# Patient Record
Sex: MALE | Race: WHITE | Employment: FULL TIME | URBAN - METROPOLITAN AREA
[De-identification: names, ages, dates, MRNs, and addresses within clinical notes are randomized per-mention and may not be internally consistent; named-entity substitution may affect disease eponyms.]

---

## 2017-01-11 ENCOUNTER — TELEPHONE (OUTPATIENT)
Dept: FAMILY MEDICINE | Facility: CLINIC | Age: 57
End: 2017-01-11

## 2017-01-11 NOTE — TELEPHONE ENCOUNTER
Denied    Pt needs to try and fail Cialis.  Requirement: Trial and failure of 3 or more in a class with at least 3 alternatives, 2 in a class with 2 alternatives, or 1 in a class with only 1 alternative.    Maria Victoria Maldonado, GABRIELET

## 2017-01-11 NOTE — TELEPHONE ENCOUNTER
Called [facility] [phone] for PA, they are going to send it to the pharmacist and we will get a fax within 24-48 hours.  ID# [id number]    Sounds like pt might have to try Cialis. I did tell them that pt has been on this medication since 2013.      Maria Victoria Maldonado, XRT

## 2017-01-12 NOTE — TELEPHONE ENCOUNTER
Patient has tried and failed Cialis:  See OV notes from 12/18/2015:    Patient is also concerned about changing his Cialis to a different medication for ED  Had rectal cancer and had radiation to the area which has affected his ability to get and maintain an erection  Has noticed no improvement with Cialis, would like something else    Ilene Horner RN

## 2017-01-16 NOTE — TELEPHONE ENCOUNTER
Called insurance again with Cialis trial dates.    APPROVED    Start date- 1/16/17  End date- 1/16/18  Pharm informed.      GABRIELE FloresT

## 2017-02-07 ENCOUNTER — TELEPHONE (OUTPATIENT)
Dept: UROLOGY | Facility: CLINIC | Age: 57
End: 2017-02-07

## 2017-02-07 ENCOUNTER — TELEPHONE (OUTPATIENT)
Dept: FAMILY MEDICINE | Facility: CLINIC | Age: 57
End: 2017-02-07

## 2017-02-07 ENCOUNTER — OFFICE VISIT (OUTPATIENT)
Dept: FAMILY MEDICINE | Facility: CLINIC | Age: 57
End: 2017-02-07

## 2017-02-07 VITALS
BODY MASS INDEX: 39.56 KG/M2 | DIASTOLIC BLOOD PRESSURE: 88 MMHG | WEIGHT: 261 LBS | HEART RATE: 92 BPM | HEIGHT: 68 IN | RESPIRATION RATE: 16 BRPM | TEMPERATURE: 98 F | SYSTOLIC BLOOD PRESSURE: 140 MMHG

## 2017-02-07 DIAGNOSIS — M54.50 ACUTE LEFT-SIDED LOW BACK PAIN WITHOUT SCIATICA: ICD-10-CM

## 2017-02-07 DIAGNOSIS — J06.9 UPPER RESPIRATORY TRACT INFECTION, UNSPECIFIED TYPE: Primary | ICD-10-CM

## 2017-02-07 DIAGNOSIS — R05.9 COUGH: Primary | ICD-10-CM

## 2017-02-07 DIAGNOSIS — G89.29 CHRONIC PAIN OF RIGHT KNEE: ICD-10-CM

## 2017-02-07 DIAGNOSIS — M25.561 CHRONIC PAIN OF RIGHT KNEE: ICD-10-CM

## 2017-02-07 PROCEDURE — 99214 OFFICE O/P EST MOD 30 MIN: CPT | Performed by: FAMILY MEDICINE

## 2017-02-07 RX ORDER — NABUMETONE 500 MG/1
1000 TABLET, FILM COATED ORAL DAILY PRN
Qty: 60 TABLET | Refills: 1 | Status: SHIPPED | OUTPATIENT
Start: 2017-02-07 | End: 2017-05-10

## 2017-02-07 RX ORDER — AZITHROMYCIN 250 MG/1
TABLET, FILM COATED ORAL
Qty: 6 TABLET | Refills: 0 | Status: SHIPPED | OUTPATIENT
Start: 2017-02-07 | End: 2017-05-10

## 2017-02-07 RX ORDER — CODEINE PHOSPHATE AND GUAIFENESIN 10; 100 MG/5ML; MG/5ML
2 SOLUTION ORAL EVERY 4 HOURS PRN
Qty: 120 ML | Refills: 0 | Status: SHIPPED | OUTPATIENT
Start: 2017-02-07 | End: 2017-05-10

## 2017-02-07 ASSESSMENT — ENCOUNTER SYMPTOMS
HEADACHES: 0
FOCAL WEAKNESS: 0
WHEEZING: 0
COUGH: 1
DOUBLE VISION: 0
SENSORY CHANGE: 0
BACK PAIN: 1
DIZZINESS: 0
SHORTNESS OF BREATH: 0
SPUTUM PRODUCTION: 1
BLURRED VISION: 0
TREMORS: 0

## 2017-02-07 NOTE — PATIENT INSTRUCTIONS
Pairing acetaminophen (Tylenol) with ibuprofen (Advil) has been shown to be as effective as morphine for controlling pain.    600-800 mg ibuprofen (3-4 tabs) with breakfast, lunch and dinner  1000 mg acetaminophen (2 extra strength tabs) at mid-morning, mid-afternoon and evening.    Do not take more than 3000mg of acetaminophen (6 extra strength tabs) in 24 hours.

## 2017-02-07 NOTE — PROGRESS NOTES
SUBJECTIVE:                                                    Aydin Reilly is a 56 year old male who presents to clinic today for the following health issues:      Acute Illness   Acute illness concerns: cold  Onset: x 1 week     Fever: no     Chills/Sweats: no     Headache (location?): YES    Sinus Pressure:YES    Conjunctivitis:  no    Ear Pain: no    Rhinorrhea: YES    Congestion: YES    Sore Throat: YES     Cough: YES - productive with SOB    Wheeze: YES     Decreased Appetite: no     Nausea: no     Vomiting: no    Diarrhea:  no    Dysuria/Freq.: no    Fatigue/Achiness: no     Sick/Strep Exposure: no      Therapies Tried and outcome: sudafed-with relief      Back Pain      Duration: x 1 week        Specific cause: none    Description:   Location of pain: low back left  Character of pain: sharp/stabbing  Pain radiation:none  New numbness or weakness in legs, not attributed to pain:  no radiation.    Intensity: Currently 6-7/10, At its worst 10/10    History:   Pain interferes with job: YES  History of back problems: L4 and L5; sciatic   Any previous MRI or X-rays: yes  Sees a specialist for back pain: yes-[facility]  Therapies tried without relief: aspirin, flexeril at pm-helps with sleeping    Alleviating factors:   Improved by: none      Precipitating factors:  Worsened by: sitting-getting up from sitting position    Functional and Psychosocial Screen (Naomi STarT Back):      Last 2 scores:     NAOMI START BACK TOTAL SCORE 2/7/2017   Total Score (all 9) 3              Accompanying Signs & Symptoms:  Risk of Fracture:  None  Risk of Cauda Equina:  None  Risk of Infection:  None  Risk of Cancer:  None  Risk of Ankylosing Spondylitis:  Onset at age <35, male, AND morning back stiffness. no                  Recently let go from work due to absence - which included last hospitalization and inpatient treatment.   from wife.  Currently delivering pizzas.    Back pain is most likely to trigger when  straightening up after bending over.  Using ASA, heat, not helpful.  Has some flexeril, using at night.  Feels related to getting in and out of car.      Having some more issues of incontinence.  Seemed to start a day after having intercourse with wife.  Has appointment with uro.  Seems to happen after standing.    HPI      Review of Systems   Constitutional: Positive for malaise/fatigue.   Eyes: Negative for blurred vision and double vision.   Respiratory: Positive for cough and sputum production. Negative for shortness of breath and wheezing.    Musculoskeletal: Positive for back pain.   Neurological: Negative for dizziness, tremors, sensory change, focal weakness and headaches.         Physical Exam   Constitutional: He is oriented to person, place, and time and well-developed, well-nourished, and in no distress. No distress.   HENT:   Right Ear: Tympanic membrane, external ear and ear canal normal.   Left Ear: Tympanic membrane, external ear and ear canal normal.   Mouth/Throat: Oropharynx is clear and moist. No oropharyngeal exudate.   Eyes: Conjunctivae are normal.   Neck: Neck supple.   Cardiovascular: Normal rate, regular rhythm and normal heart sounds.    Pulmonary/Chest: Effort normal and breath sounds normal.   Musculoskeletal:        Lumbar back: He exhibits decreased range of motion, tenderness and spasm. He exhibits no bony tenderness.   Lymphadenopathy:     He has no cervical adenopathy.   Neurological: He is alert and oriented to person, place, and time. He has normal strength and normal reflexes.   Skin: Skin is warm and dry. No rash noted.   Nursing note and vitals reviewed.      (J06.9) Upper respiratory tract infection, unspecified type  (primary encounter diagnosis)  Comment: printed rx for prn, refused Robitussin  Plan: azithromycin (ZITHROMAX) 250 MG tablet            (M54.5) Acute left-sided low back pain without sciatica  Comment:   Plan: pain ladder, refill nabumetone    (M25.561,  G89.29)  Chronic pain of right knee  Comment:   Plan: nabumetone (RELAFEN) 500 MG tablet              RTC in 1w prn    Adolfo Simmons MD

## 2017-02-07 NOTE — NURSING NOTE
"Chief Complaint   Patient presents with     URI     Back Pain     Initial /88 mmHg  Pulse 92  Temp(Src) 98  F (36.7  C) (Oral)  Resp 16  Ht 5' 8\" (1.727 m)  Wt 261 lb (118.389 kg)  BMI 39.69 kg/m2 Estimated body mass index is 39.69 kg/(m^2) as calculated from the following:    Height as of this encounter: 5' 8\" (1.727 m).    Weight as of this encounter: 261 lb (118.389 kg)..  BP completed using cuff size regular-RA      "

## 2017-02-07 NOTE — TELEPHONE ENCOUNTER
Was in today, offered cough medication and declined, but now after coughing again would like rx tyrone Cota RN, BS  Clinical Nurse Triage .

## 2017-02-07 NOTE — TELEPHONE ENCOUNTER
Rx was faxed to [facility], pharmacy will notify patient when ready to be picked up. Bruno Danielle

## 2017-02-13 ENCOUNTER — TELEPHONE (OUTPATIENT)
Dept: FAMILY MEDICINE | Facility: CLINIC | Age: 57
End: 2017-02-13

## 2017-02-13 DIAGNOSIS — M54.50 ACUTE RIGHT-SIDED LOW BACK PAIN WITHOUT SCIATICA: Primary | ICD-10-CM

## 2017-02-13 NOTE — TELEPHONE ENCOUNTER
Reason for call: Symptom   Symptom or request: sore throat and back pain    Duration (how long have symptoms been present): patient was seen on 02/07/17 and states his throat is still sore and his back is still hurting.  Have you been treated for this before? Yes    Additional comments: none    Phone Number Pt can be reached at: Other phone number:  [phone]  Best Time: anytime  Can we leave a detailed message on this number? YES

## 2017-02-14 RX ORDER — HYDROCODONE BITARTRATE AND ACETAMINOPHEN 5; 325 MG/1; MG/1
1-2 TABLET ORAL EVERY 8 HOURS PRN
Qty: 20 TABLET | Refills: 0 | Status: SHIPPED | OUTPATIENT
Start: 2017-02-14 | End: 2017-05-10

## 2017-02-14 NOTE — TELEPHONE ENCOUNTER
Back is stable to worse.  Referred to PT.  Has Relafen, Voltaren gel, flexeril.  Asking for small supply of pain narcotic.  Will do single fill to cover a few days.  No refills.  Rx printed    Throat is feeling better.  Has not used abx, forgot he had it.

## 2017-02-16 ENCOUNTER — TELEPHONE (OUTPATIENT)
Dept: FAMILY MEDICINE | Facility: CLINIC | Age: 57
End: 2017-02-16

## 2017-02-16 DIAGNOSIS — Z59.9 FINANCIAL DIFFICULTIES: Primary | ICD-10-CM

## 2017-02-16 SDOH — ECONOMIC STABILITY - INCOME SECURITY: PROBLEM RELATED TO HOUSING AND ECONOMIC CIRCUMSTANCES, UNSPECIFIED: Z59.9

## 2017-02-16 NOTE — LETTER
"              [address]  [address]      [place] [address]           Phone :  [phone]          Fax:  [phone]  Aydin Sheikhmirella  [address] [facility] [address]      To Whom It May Concern:    Re:  Aydin SIMON Melba    1960      Please excuse patient from work 2/15/2017 through 2017 due to back pain.  He can go back to work \"as tolerated\" due to pain on 2017.      Sincerely,        Adolfo Simmons MD / Ilene Horner RN                                                                                         "

## 2017-02-16 NOTE — TELEPHONE ENCOUNTER
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  Requesting note to be e-mailed to him at [email or web address].  Call if questions.  [phone]    Note written and e-mailed.  Ilene Horner RN    Patient wanted to let Dr. Simmons know that patient is currently without insurance and was given a referral to sports ortho but is unable to be seen.  Any other options?  "

## 2017-02-17 NOTE — TELEPHONE ENCOUNTER
Spoke to pt, he would like referral to see if there are any resources out there for him they can help with.  Advised we would place order and they will contact him.

## 2017-02-17 NOTE — TELEPHONE ENCOUNTER
Unfortunately not.  Can offer referral to Social Work to help him find and enroll in insurance (as of now DEANDRE is still active).    Letter signed.    Adolfo Simmons MD

## 2017-02-20 ENCOUNTER — CARE COORDINATION (OUTPATIENT)
Dept: CARE COORDINATION | Facility: CLINIC | Age: 57
End: 2017-02-20

## 2017-02-20 NOTE — LETTER
Health Care Home - Access Care Plan    About Me  Patient Name:  Aydin Reilly    YOB: 1960  Age:                            56 year old   Viral MRN:         [id number] Telephone Information:     Home Phone [phone]   Mobile [phone]       Address:    [address]  [facility] [address] Email address:  [email or web address]      Emergency Contact(s)  Name Relationship Lgl Grd Work Phone Home Phone Mobile Phone   1. ZACHARIAH REILLY Spouse No none [phone]             Health Maintenance: Routine Health maintenance Reviewed: Not assessed    My Access Plan  Medical Emergency 911   Questions or concerns during clinic hours Primary Clinic Line, I will call the clinic directly: Primary Clinic: [facility] [phone]   24 Hour Appointment Line [phone] or  [phone])  (toll free)   24 Hour Nurse Line [phone] (toll free)   Questions or concerns outside clinic hours 24 Hour Appointment Line, I will call the after-hours on-call line:   [facility] [phone] or [phone] ([phone]) (toll-free)   Preferred Urgent Care Preferred Urgent Care: [facility] [place], [phone]   Preferred Hospital Preferred Hospital: [facility]  [phone]   Preferred [facility]acy [facility] [place] [facility] [place] [place] - [address]     Behavioral Health Crisis Line [facility], [phone] or 911     My Care Team Members  Patient Care Team       Relationship Specialty Notifications Start End    Adolfo Simmons MD PCP - General Family Practice  3/24/15     Phone: [phone] Fax: [phone]         [facility] [address]  [address] [facility] [address]    Buzz Ren MD Referring Physician Urology  1/12/15     Phone: [phone] Fax: [phone]         [facility] UROLOGY [address] [place] [address]    Tiffany Erazo APRN CNP  Nurse Practitioner Neurology  4/16/15     Phone: [phone] Fax: [phone]         [facility] [address] [facility] [id number] [facility] [address]    Cira Wood, RN Nurse Coordinator Neurology Admissions 5/11/15     Phone: [phone] Pager: [phone]        Darell Barrera MD MD Urology  8/19/15     Phone: [phone] Fax: [phone]         [facility] [address] [facility] [address] [facility] [address]    Marga Monae, RN Clinic Care Coordinator  Admissions 2/3/16     Comment:  not active in care coordination 8/24/16 [facility]    Phone: [phone] Fax: [phone]            My Medical and Care Information  Problem List   Patient Active Problem List   Diagnosis     Malignant neoplasm of other sites of rectum, rectosigmoid junction, and anus     Cellulitis of scrotum     Lumbar Radiculopathy, SEE FYI     Back Pain w/ Radiation, SEE FYI     Chronic abdominal pain     Hyperlipidemia with target LDL less than 130     Hypogonadism     Scrotal pain     Erectile dysfunction     Withdrawal from opioids (H)     Drug abuse, opioid type     GIB (gastrointestinal bleeding)     MAGALY (obstructive sleep apnea)     Hyperplastic rectal polyp     Suprapubic catheter dysfunction (H)     Retention of urine     Generalized anxiety disorder     Urethral stricture     Urethral stricture     Alcohol intoxication in active alcoholic with complication (H)     Alcohol abuse     History of urethral stricture     Chronic pain     Edema     Klebsiella pneumoniae infection     Sepsis (H)     Anemia of chronic disease     Other mononeuropathy     SBO (small bowel obstruction) (H)     Health Care Home     Elevated blood pressure reading without diagnosis of hypertension     Rotator cuff injury, right, initial encounter     Chondritis     Anserine bursitis     Substance abuse     Chronic pain of right knee     Intertriginous candidiasis     Gastroesophageal reflux disease, esophagitis presence not specified     Subungual hematoma  of great toe of right foot, initial encounter     Subungual hematoma of great toe of left foot, initial encounter     Sepsis, due to unspecified organism (H)

## 2017-02-20 NOTE — LETTER
February 24, 2017      Aydin Reilly  [address]  [facility] [address]    Dear Aydin,  I am the Clinic Care Coordinator that works with your primary care provider's clinic. I have been trying to reach you recently to introduce Clinic Care Coordination and to see if there was anything I could assist you with.  Below is a description of what Clinic Care Coordination is and how I can further assist you.     My role as the care coordinator  is to help you manage your health and improve access to the [facility] system in the most efficient manner.I can assist you with community resources, behavioral and psychosocial needs, chemical dependency and counseling/psychiatric services.    Please feel free to keep this letter and contact information to contact me at [phone] with any further questions or concerns that may arise. We at [facility] are focused on providing you with the highest-quality healthcare experience possible. I look forward to working with you.    Sincerely,    BAKARI Solis, MSW  Social Work Care Coordinator  P:[phone]  [facility]    Enclosed: I have enclosed a copy of a 24 Hour Access Plan. This has helpful phone numbers for you to call when needed. Please keep this in an easy to access place to use as needed.

## 2017-02-20 NOTE — LETTER
February 24, 2017      Aydin Reilly  [address]  [facility] [address]    Dear Aydin,  I am the Clinic Care Coordinator that works with your primary care provider's clinic. I wanted to thank you for spending the time to talk with me.  Below is a description of what Clinic Care Coordination is and how I can further assist you.     My role as the care coordinator  is to help you manage your health and improve access to the [facility] system in the most efficient manner.I can assist you with community resources, behavioral and psychosocial needs, chemical dependency and counseling/psychiatric services.    Please feel free to keep this letter and contact information to contact me at [phone] with any further questions or concerns that may arise. We at [facility] are focused on providing you with the highest-quality healthcare experience possible. I look forward to working with you.    Sincerely,    BAKARI Solis, MSW  Social Work Care Coordinator  P:[phone]  [facility]    Enclosed: I have enclosed a copy of a 24 Hour Access Plan. This has helpful phone numbers for you to call when needed. Please keep this in an easy to access place to use as needed.               For Insurance ([place], medicaid) assistance  [facility] - [phone] ( Can also be called for low cost dental and mental health clinics)  [address] [place] [address] ([place]:[phone] , [place]:[phone])    Job search assistance  [facility] Workforce centers     [phone]  [place]      [phone]  [facility]      [phone]  [facility] WorkForce Center  [address]  [place] [address]    [place] [facility]Force Center  [address]  [place] [address]    [place] [facility] Center  [address] [place] [address]   [place] [phone]  For orientation registration Help on Mondays at 9:30  "am.  1 hour orientation to programs offered for job seekers  http://www.emergemn.org/OurServices/HelpforJobSeekers.aspx   [employer] 343-585-6148 HIRED offers dozens of no-cost programs for job seekers in any stage of work experience or career development. Job seekers could qualify for a program based on geography, income level, age or other circumstance. Other job seekers are referred to HIRED by other agencies. No matter what the entry point, HIRED counselors commit to wrap-around job support to move the job seeker to employment   [facility]  [phone] ([place]) to their Career Connections 9-11:30am First Saturday of the month            [facility] [phone] [address] [place], [place] [phone]  [facility] referrals and support to the needy. A food shelf or pantry is on site, and the non-profit also operates Family Resource Centers.   [facility] Agency 138-084-0940 Financial resources, community resources   [facility] [phone] Assistance for the \"working poor,\" those who are hungry, low income, homeless, or unable to provide for basic needs or pay their bills   [facility] Financial Counseling [phone]    [facility] [phone] Food shelf, clothing, financial    [facility] [phone] Assistance for housing expenses such as rent and utilities. Other programs assist with medical and other basic needs. All emergency assistance is short-term in nature, and people can only apply at most once per year.   [employer] [phone] [phone]([phone] [address] [place], [place]  [address] [place] [address]  Various resources - financial, food, case management,, vouchers sometimes   [facility] [phone]  [facility] [employer]. Staff can help individuals " locate assistance programs for paying heating, electric, water, or utility bills. Some limited direct aid may be offered too, as [employer] has an emergency energy assistance rafael that can provide up to $200 to [facility] families.  [facility] also operates a dental clinic for the uninsured. Other programs include a mobile food pantry for the homebound and supplies from [employer].   [facility] Rent/Mortgage/Moving assistance   [employer]  ([phone] 911-1465 [address] [place] [address],     CAP Agency 324-306-9982 The Family Homelessness Prevention and Assistance Program is an option for [place] families with children or single adults. [facility] can provide clients facing eviction with emergency rent assistance. Support is also available for the currently homeless, and they can receive funds for paying security deposits, first months rent, and ongoing case management.   [facility] Agency 352-853-6020 Workforce Housing Program , Senior Housing Program, [facility] Youth Supportive Housing , Scattered [place] Public Housing Program, Johnna [place] [place],Rental Assistance Programs,  Accessible Rental Housing,  For section 8 voucher assistance. COUNTY PROGRAM, First contact - Can call for deposit assistance , applying for assistance, apartment finding assistance   [facility] Housing Crisis Line 832-984-5159 Emergency shelter, Assistance to prevent eviction, Information on community housing resources and referrals   [facility] (567) 705-0572 Assistance for housing expenses such as rent and utilities. Other programs assist with medical and other basic needs. All emergency assistance is short-term in nature, and people can only apply at most once per year.   [employer] ([phone] [phone]) 121-4988 [phone] [address] [address],  [place], [place]  Various resources - financial, food, case management,, vouchers sometimes   [facility] [phone] -[phone]  [place] [employer]. Staff can help individuals locate assistance programs for paying heating, electric, water, or utility bills. Some limited direct aid may be offered too, as [employer] has an emergency energy assistance rafael that can provide up to $200 to [facility] families.  [facility] also operates a dental clinic for the uninsured. Other programs include a mobile food pantry for the homebound and supplies from [employer].   [place] [employer] ([facility]) 673.771.6165 Section 8, Public Housing and other housing assistance programs.  Please visit the HRA s site to review the best housing option for your situation

## 2017-02-20 NOTE — PROGRESS NOTES
Clinic Care Coordination Contact  [facility]/Voicemail    Clinical Data: Initial: insurance/ financial concerns    Outreach attempted x 1.  Left message on voicemail with call back information and requested return call.  Plan: Care coordinator will try again in 1-2 business days.    Soha Belcher, Social Work Care Coordinator, [facility], MSW  P:[phone]  [facility] [place]

## 2017-02-23 ENCOUNTER — TELEPHONE (OUTPATIENT)
Dept: UROLOGY | Facility: CLINIC | Age: 57
End: 2017-02-23

## 2017-02-23 NOTE — TELEPHONE ENCOUNTER
i have tried to call patient several times with no ability to leave a message.  Plan for the future is in feliberto message. Suzan River LPN Staff Nurse

## 2017-02-23 NOTE — TELEPHONE ENCOUNTER
----- Message from Darell Barrera MD sent at 2/10/2017 12:16 PM CST -----  Contact: [phone]  Have him try detrol for a month. If no improvement then do urodynamics and see me.   ----- Message -----     From: Suzan River LPN     Sent: 2/7/2017   2:53 PM       To: Darell Barrera MD, #    Patient called having episodes of severe leakage no other symptoms. Saw tarik in October last year. He went to pmd he stated no uti. But he has to wear diapers and they are soaked. Suzan River LPN Staff Nurse

## 2017-02-24 NOTE — PROGRESS NOTES
Clinic Care Coordination Contact  OUTREACH    Referral Information:  Referral Source: PCP  Reason for Contact: initial: incoming call from patient  Care Conference: No     Universal Utilization:   ED Visits in last year: 0  Hospital visits in last year: 2  Last PCP appointment: 02/07/17     Concerns: no insurance  Multiple Providers or Specialists: yes      Functional Status:   Equipment Currently Used at Home: none  Transportation: drives      Psychosocial:  Current living arrangement:: Not Assessed  Financial/Insurance: No insurance    Multiple financial concerns  Recently  from spouse and is having to get a rule 25 from the [facility] for his court case. Also has had to seek  mental health due to their divorce.     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 Most of these were from medical and court related concerns, but were not documented or medical/court notes were not given. Pt is about to loose his insurance. Discussed [employer]ure and [employer]. Pt will try to apply online, but was given number for [employer] ([phone]) to call for support. Also included other resources in letter.    Financial - worried about upcoming finances. Has already applied for [facility] resources like SNAP and is waiting to hear about when his interview will be with the [facility].Discussed some resources to call for utilities and rent. Pt is currently living with someone who has been lenient with rent but is now asking for money. Unclear what resources can assist him. [facility] resources over the phone and will mail out letter as well with them ( see letter for specifics).    Work assistance - Also mailed out resources that can assist with the pt to find a job.     Resources and Interventions:  Current Resources:  ;   Referrals Placed: Community Resources, County Resources, Financial Services, [facility]     Goals:   Goal 1 Statement: I will call the CCSW if I need more support  Goal 1  Progression Percent: 10%  Goal 1 Progression Date: 02/24/17     Barriers: not sure what resources he will qualify for or what services he made need  Strengths: motivated  Patient/Caregiver understanding: good  Frequency of Care Coordination: prn     PLAN:  Pt will outreach as needed  CCSW will follow up in 2-3 weeks    Soha Belcher, Social Work Care Coordinator, LG, MSW  [place] [facility]: [facility] and [place]   P:[phone]/ Signed February 24, 2017

## 2017-02-24 NOTE — PROGRESS NOTES
Clinic Care Coordination Contact  UT/Voicemail    Clinical Data: insurance/financial concerns    Outreach attempted x 1.  Left message on voicemail with call back information and requested return call.  Plan: Care coordinator will send utc/access plan and close case in 7-10 days if pt does not return the call.    Soha Belcher, Social Work Care Coordinator, [facility], [facility]: [facility] and [place]   P:[phone] / Signed February 24, 2017

## 2017-03-17 ENCOUNTER — CARE COORDINATION (OUTPATIENT)
Dept: CARE COORDINATION | Facility: CLINIC | Age: 57
End: 2017-03-17

## 2017-03-17 NOTE — PROGRESS NOTES
Clinic Care Coordination Contact  OUTREACH    Referral Information:  Referral Source: PCP  Reason for Contact: follow up  Care Conference: No     Universal Utilization:   ED Visits in last year: 0  Hospital visits in last year: 2  Last PCP appointment: 02/07/17     Concerns: no insurance  Multiple Providers or Specialists: yes    Clinical Concerns:  Current Medical Concerns: not discussed    Current Behavioral Concerns: depression, alcohol use. Receiving treatment for MH      Medication Management:  Not discussed      Functional Status:  Equipment Currently Used at Home: none  Transportation: drives     Psychosocial:  Current living arrangement:: Not Assessed  Financial/Insurance: [facility] - will be loosing his insurance soon due to a divorce.    Called the patient to follow up on insurance and community resources sent during previous call. The patient stated he had a lot to do today and was planning to turn himself in on Monday for an outstanding warrant on him. Patient described the events of how the warrant was placed on him . Patient's speech was fast paced, and thoughts somewhat tangential. CCSW had difficulty following some points of the story and was not able to stop the patient to clarify.  Patient will most likely be be in assisted for at least 10 days. CCSW and patient decided CCSW will outreach in 2 weeks for follow up after pt has gone through the warrant process     Resources and Interventions:  Current Resources:  ;       Referrals Placed: Community Resources, County Resources, Financial Services, [facility]     PLAN:  Pt will outreach as needed  CCSW will follow up in 2 -3 weeks    Soha Belcher, Social Work Care Coordinator, LGSW, MSW  [facility]: [facility] and [place]   P:[phone]/ Signed March 17, 2017

## 2017-04-04 ENCOUNTER — TELEPHONE (OUTPATIENT)
Dept: SLEEP MEDICINE | Facility: CLINIC | Age: 57
End: 2017-04-04

## 2017-04-04 NOTE — LETTER
[facility]    [place]   [address] [place] [address]  [phone]    2017    Aydin Reilly  [address]  [facility] [address]  [id number]  : 1960    RE: Obstructive Sleep Apnea and CPAP.    To whom it may concern,    Aydin Reilly has severe and life-threatening Obstructive Sleep Apnea.  He will need to wear CPAP nightly to treat this disorder.  If he is able to regularly and nightly wear CPAP nightly he will not be at higher risk for complications or untoward health effects.    I am happy to answer any questions about his care.  If you have any questions, please call us at the number listed above.    Sincerely,      Trenton Norton MD    [facility]                    To

## 2017-04-04 NOTE — TELEPHONE ENCOUNTER
Patient left a message stating he will be going into prison, patient is requesting a letter of necessity so he is able to use his cpap machine during his stay. Message will be routed to patients sleep provider.

## 2017-04-07 ENCOUNTER — TELEPHONE (OUTPATIENT)
Dept: SLEEP MEDICINE | Facility: CLINIC | Age: 57
End: 2017-04-07

## 2017-04-07 NOTE — TELEPHONE ENCOUNTER
Left a message for patient informing his that the letter he requested is ready, it will be at the  for  or can be mailed to patient.

## 2017-04-07 NOTE — TELEPHONE ENCOUNTER
Letter at .  Please ask what he would like done with it.  Trenton Norton MD, Sleep Physician  Apr 4, 2017

## 2017-04-14 NOTE — TELEPHONE ENCOUNTER
Spoke with Patient, he would like letter to be mailed.  I mailed letter along with appointment card for upcoming visit to his home on 4/14/2017.    Carmen Gonzalez  Clinical Coordinator

## 2017-04-17 DIAGNOSIS — G58.8 OTHER MONONEUROPATHY: Primary | ICD-10-CM

## 2017-04-17 RX ORDER — GABAPENTIN 300 MG/1
CAPSULE ORAL
Qty: 180 CAPSULE | Refills: 0 | Status: SHIPPED | OUTPATIENT
Start: 2017-04-17 | End: 2017-05-01

## 2017-04-17 NOTE — TELEPHONE ENCOUNTER
gabapentin (NEURONTIN) 300 MG capsule    (Historical)  Last Written Prescription Date: Unknown  Last Quantity: Unknown, # refills: Unknown  Last Office Visit with [facility], [facility] or [employer] prescribing provider: 2/7/2017       Creatinine   Date Value Ref Range Status   11/19/2016 0.88 0.66 - 1.25 mg/dL Final     Lab Results   Component Value Date    AST 30 11/14/2016     Lab Results   Component Value Date    ALT 42 11/14/2016     BP Readings from Last 3 Encounters:   02/07/17 140/88   12/06/16 144/72   11/20/16 128/80           Routing refill request to provider for review/approval because:  Drug not on the [facility], [facility] or [employer] refill protocol or controlled substance  Medication is reported/historical      GRACIELA Flores  April 17, 2017  1:36 PM

## 2017-04-19 ENCOUNTER — CARE COORDINATION (OUTPATIENT)
Dept: CARE COORDINATION | Facility: CLINIC | Age: 57
End: 2017-04-19

## 2017-04-19 NOTE — PROGRESS NOTES
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 - interested in SSDI    Current Behavioral Concerns: substance abuse - needs to seek OP tx per court order, starting with [place], had Rule 25 done last Friday, awaiting [facility] to approve before starting the program    Education Provided to patient: Discussed SSDI - what they are measuring for the application, resources that can assist in filling out the application     Medication Management:  Not discussed     Functional Status:  Equipment Currently Used at Home: none  Transportation: can drive, but court ordered not too     Psychosocial:  Current living arrangement:: Not Assessed  Financial/Insurance: [facility]     Pt recently served time in [facility] for violating his probation    Insurance  Pt plans to meet with an  to discuss his options rather than go through MNsure. CCSW also discussed Medicaid as pt is unemployed and offered to assist pt with application should he need it.    Employment/Unemployment  Pt is trying to find a job. CCSW offered to resend resources for help with employment. Pt consents. Pt is also interested in information regarding unemployment payments. CCSW was unable to give specifics but will send the [place] unemployment page for the pt to review.    [facility]  Pt previously applied for SNAP but states he never heard anything back. Interested in applying for SNAP again and possibly other [facility] assistance programs. [facility] will send to pt     Resources and Interventions:  Current Resources:  ;    Referrals Placed: [facility], [facility]  Resources, Financial Services, [facility]     Goals:   Goal 1 Statement: I will review the resources sent by the CCSW  Goal 1 Progression Percent: 10%  Goal 1 Progression Date: 04/19/17    Barriers: Pt is forgetful, has a lot of things on his plate  Strengths: pleasant, wants assistance, willing to make the calls  Patient/Caregiver understanding: good  Frequency of Care Coordination: 3-4 weeks     PLAN:  CCSW will email resources to pt at [email or web address]. Reviewed HIPPA safety with pt regarding communication through email  Pt will outreach as needed  CCSW will follow up in 3-4 weeks    Soha Belcher, Social Work Care Coordinator, LGSW, MSW  [place] [facility]: [facility] and [place]   P:[phone]/ Signed April 19, 2017

## 2017-04-20 NOTE — PROGRESS NOTES
Email sent to patient    Osman Salazar,    Hopefully I remembered all of the resources we discussed. If you have any questions regarding the resources below please call me at [phone], otherwise I will plan to touch base in a week.      Social Security Disability    , [employer] [phone] Can help with SSI application, fees associated           County Resources  https://www.co.[place]./HealthFamily/PublicAssistance/Cash/Pages/default.aspx  Click the link and it will bring you to the [facility] public assistance page. There is an online option to fill out the form for SNAP, emergency assistance, general assistance, and more.  It also has the downloadable form option, a call in option, and an in person option to apply    Unemployment    http://www.uimn.org/uimn/   This link will bring you to the [place] unemployment page, I am not sure if this will be of use to you, but they have a customer service line you can call to talk with someone who can help screen you as well.    Monday-Friday, 8:00 a.m. to 4:30 p.m., except on holidays.  [facility] area: [phone]  Job search assistance  [facility] Workforce [facility]     [phone]  [place]      [phone]  [facility]      [phone]  [facility] WorkForce Center  [address]  [place] [address]    [place] [employer] [place]  [address], [address]  [place] [address]    [facility]  [address] [place] [address]   [facility] [phone]  For orientation registration Help on Mondays at 9:30 am.  1 hour orientation to programs offered for job seekers  http://www.emergemn.org/OurServices/HelpforJobSeekers.aspx   [employer] [phone] HIRED offers dozens of no-cost programs for job seekers in any stage of work experience or career development. Job seekers could qualify for a program based on geography, income level, age or other circumstance. Other job seekers are referred to [employer]  "by other agencies. No matter what the entry point, [employer] counselors commit to wrap-around job support to move the job seeker to employment   [facility]  Blanka Smith  [phone] ([place]) to their Career Connections 9-11:30am First Saturday of the month            [employer] [phone] [address] [place], [place] [phone]  [place] referrals and support to the needy. A food shelf or pantry is on site, and the non-profit also operates Family Resource Centers.   [facility] Agency [phone] Financial resources, community resources   [facility] [phone] Assistance for the \"working poor,\" those who are hungry, low income, homeless, or unable to provide for basic needs or pay their bills   [facility] Financial Counseling [phone]    [facility] [phone] Food shelf, clothing, financial    [facility] [phone] Assistance for housing expenses such as rent and utilities. Other programs assist with medical and other basic needs. All emergency assistance is short-term in nature, and people can only apply at most once per year.   [employer] [phone] [address] [place] [address]  Various resources - financial, food, case management,, vouchers sometimes   [facility] [phone]  [place] [employer]. Staff can help individuals locate assistance programs for paying heating, electric, water, or utility bills. Some limited direct aid may be offered too, as [employer] has an emergency energy assistance rafael that can provide up to $200 to [facility] families.  [facility] also operates a dental clinic for the uninsured. Other programs include a mobile food pantry for the homebound and supplies from " [employer].   [facility] Rent/Mortgage/Moving assistance   [employer]           [phone] [address] [place] [address],     [facility] Agency [phone] The Family Homelessness Prevention and Assistance Program is an option for [place] families with children or single adults. The state can provide clients facing eviction with emergency rent assistance. Support is also available for the currently homeless, and they can receive funds for paying security deposits, first months rent, and ongoing case management.   [place] [employer] [facility] [phone] Workforce Housing Program , Senior Housing Program, [facility] Youth Supportive Housing , [place] Public Housing Program, Johnna Akers [place],Rental Assistance Programs,  Accessible Rental Housing,  For section 8 voucher assistance. COUNTY PROGRAM, First contact - Can call for deposit assistance , applying for assistance, apartment finding assistance   [facility] Housing Crisis Line 715-035-1899 Emergency shelter, Assistance to prevent eviction, Information on community housing resources and referrals   [facility] [phone] [phone] Assistance for housing expenses such as rent and utilities. Other programs assist with medical and other basic needs. All emergency assistance is short-term in nature, and people can only apply at most once per year.   [employer] [phone] [address] [address]  Various resources - financial, food, case management,, vouchers sometimes   [facility] [phone]  [place] [employer]. Staff can help individuals locate assistance programs for paying heating, electric, water, or utility bills. Some limited direct aid may be offered too, as [employer] has an emergency energy  assistance rafael that can provide up to $200 to [facility] families.  [facility] also operates a dental clinic for the uninsured. Other programs include a mobile food pantry for the homebound and supplies from [employer].   [facility] ([facility]) [phone] Section 8, Public Housing and other housing assistance programs.  Please visit the [facility] s site to review the best housing option for your situation

## 2017-04-27 ENCOUNTER — TELEPHONE (OUTPATIENT)
Dept: FAMILY MEDICINE | Facility: CLINIC | Age: 57
End: 2017-04-27

## 2017-04-27 DIAGNOSIS — G58.8 OTHER MONONEUROPATHY: ICD-10-CM

## 2017-04-27 NOTE — TELEPHONE ENCOUNTER
Pharmacist at [facility] in [facility] calling stating patient was there to  his rx for Gabapentin and states that he also take 1,200mg at bedtime which is not on his prescription.  Patient states that he takes:  Gabapentin 300mg    3 capsules (900mg) twice daily  4 capsules (1,200mg) at bedtime    I looked at past rx's and do not see the 4 capsules at bedtime.   checked 4/27/2017:    04/17/2017 GABAPENTIN 300 MG CAPSULE 90.00   04/05/2017 GABAPENTIN 300 MG CAPSULE 84.00   04/04/2017 GABAPENTIN 300 MG CAPSULE 39.00   03/11/2017 GABAPENTIN 300 MG CAPSULE 90.00   02/20/2017 GABAPENTIN 300 MG CAPSULE 90.00   02/14/2017 HYDROCODON-ACETAMINOPHEN 5-325 20.00   02/07/2017 GUAIFENESIN-CODEINE SYRUP 120.00   11/20/2016 OXYCODONE HCL 10 MG TABLET 20.00   11/06/2016 GABAPENTIN 600 MG TABLET 60.00     10/31/2016 GABAPENTIN 300 MG CAPSULE 270.00   10/01/2016 GABAPENTIN 300 MG CAPSULE 3.00     09/27/2016 GABAPENTIN 600 MG TABLET 135.00    09/06/2016 GABAPENTIN 300 MG CAPSULE 270.00   08/19/2016 OXYCODONE HCL 5 MG TABLET 20.00      Please advise.  Pharmacist would like to know if you would ok an rx for patient to have 1,200mg at bedtime.    Ilene Horner RN

## 2017-04-28 NOTE — TELEPHONE ENCOUNTER
Dr Simmons, please review, why are there several drs prescribing same drug?  on desk  Anna Cota RN, BS  Clinical Nurse Triage.

## 2017-05-01 RX ORDER — GABAPENTIN 300 MG/1
CAPSULE ORAL
Qty: 300 CAPSULE | Refills: 0 | Status: SHIPPED | OUTPATIENT
Start: 2017-05-01 | End: 2017-07-28

## 2017-05-01 NOTE — TELEPHONE ENCOUNTER
Dose is on high end of range, but not inappropriate.  Rx modified and sent.    Adolfo Simmons MD

## 2017-05-01 NOTE — TELEPHONE ENCOUNTER
[facility] calling again,  Previous rx for Gabapentin was 900 mg BID total 1800 mg, now 900 mg am, midday, 1200 mg pm total 3000 mg  Recommended max of 2400 mg per day   in [facility] [phone]    Route back to PCP  Anna Cota RN, BS  Clinical Nurse Triage.

## 2017-05-02 NOTE — TELEPHONE ENCOUNTER
Spoke with pharmacist and informed him of the dosing of the Gabapentin rx.  Because the rx is written for a dose that is 25% above the recommended maximum dose of 2,400mg per day, the pharmacist needs a medical explanation from you in order for him to dispense it to the patient.  Please advise.  Ilene Horner RN

## 2017-05-08 ENCOUNTER — TELEPHONE (OUTPATIENT)
Dept: FAMILY MEDICINE | Facility: CLINIC | Age: 57
End: 2017-05-08

## 2017-05-08 NOTE — TELEPHONE ENCOUNTER
Reason for call: Form   Our goal is to have forms completed within 72 hours, however some forms may require a visit or additional information.     Who is the form from? [employer]  (if other please explain)  Where did the form come from? Patient or family brought in     What clinic location was the form placed at? [place]   Where was the form placed? 's Box  What number is listed as a contact on the form? [phone] (fax)    Phone call message - patient request for a letter, form or note:     Date needed: as soon as possible  Patient will  at the clinic when completed  Has the patient signed a consent form for release of information? Not Applicable    Additional comments:     Type of letter, form or note: medical    Phone Number Pt can be reached at: Home number on file [phone] (home)  Best Time: Anytime  Can we leave a detailed message on this number? YES

## 2017-05-10 ENCOUNTER — OFFICE VISIT (OUTPATIENT)
Dept: FAMILY MEDICINE | Facility: CLINIC | Age: 57
End: 2017-05-10
Payer: MEDICAID

## 2017-05-10 VITALS
SYSTOLIC BLOOD PRESSURE: 112 MMHG | HEART RATE: 107 BPM | HEIGHT: 68 IN | OXYGEN SATURATION: 96 % | BODY MASS INDEX: 38.34 KG/M2 | DIASTOLIC BLOOD PRESSURE: 72 MMHG | TEMPERATURE: 98.3 F | RESPIRATION RATE: 16 BRPM | WEIGHT: 253 LBS

## 2017-05-10 DIAGNOSIS — B37.2 INTERTRIGINOUS CANDIDIASIS: Primary | ICD-10-CM

## 2017-05-10 DIAGNOSIS — M54.16 LUMBAR RADICULOPATHY: ICD-10-CM

## 2017-05-10 DIAGNOSIS — R60.0 PEDAL EDEMA: ICD-10-CM

## 2017-05-10 DIAGNOSIS — N52.8 OTHER MALE ERECTILE DYSFUNCTION: ICD-10-CM

## 2017-05-10 DIAGNOSIS — G58.8 OTHER MONONEUROPATHY: ICD-10-CM

## 2017-05-10 DIAGNOSIS — G89.29 CHRONIC PAIN OF RIGHT KNEE: ICD-10-CM

## 2017-05-10 DIAGNOSIS — M25.561 CHRONIC PAIN OF RIGHT KNEE: ICD-10-CM

## 2017-05-10 PROCEDURE — 99214 OFFICE O/P EST MOD 30 MIN: CPT | Performed by: FAMILY MEDICINE

## 2017-05-10 RX ORDER — SILDENAFIL 100 MG/1
50-100 TABLET, FILM COATED ORAL DAILY PRN
Qty: 12 TABLET | Refills: 11 | Status: SHIPPED | OUTPATIENT
Start: 2017-05-10 | End: 2017-07-28

## 2017-05-10 RX ORDER — NABUMETONE 500 MG/1
1000 TABLET, FILM COATED ORAL DAILY PRN
Qty: 60 TABLET | Refills: 1 | Status: SHIPPED | OUTPATIENT
Start: 2017-05-10 | End: 2017-07-28

## 2017-05-10 RX ORDER — CYCLOBENZAPRINE HCL 10 MG
10 TABLET ORAL
Qty: 60 TABLET | Refills: 1 | Status: SHIPPED | OUTPATIENT
Start: 2017-05-10 | End: 2017-07-28

## 2017-05-10 RX ORDER — GABAPENTIN 300 MG/1
CAPSULE ORAL
Qty: 300 CAPSULE | Refills: 0 | Status: CANCELLED | OUTPATIENT
Start: 2017-05-10

## 2017-05-10 RX ORDER — FUROSEMIDE 40 MG
40 TABLET ORAL DAILY PRN
Qty: 30 TABLET | Refills: 1 | Status: SHIPPED | OUTPATIENT
Start: 2017-05-10 | End: 2017-07-31

## 2017-05-10 ASSESSMENT — ENCOUNTER SYMPTOMS
DYSURIA: 0
PALPITATIONS: 0
RESPIRATORY NEGATIVE: 1
CONSTITUTIONAL NEGATIVE: 1

## 2017-05-10 NOTE — NURSING NOTE
"Chief Complaint   Patient presents with     Follow Up For     back pain       Initial /72 (BP Location: Right arm, Patient Position: Chair, Cuff Size: Adult Large)  Pulse 107  Temp 98.3  F (36.8  C) (Oral)  Resp 16  Ht 5' 8\" (1.727 m)  Wt 253 lb (114.8 kg)  SpO2 96%  BMI 38.47 kg/m2 Estimated body mass index is 38.47 kg/(m^2) as calculated from the following:    Height as of this encounter: 5' 8\" (1.727 m).    Weight as of this encounter: 253 lb (114.8 kg).  Medication Reconciliation: complete     Vania Day CMA      "

## 2017-05-10 NOTE — MR AVS SNAPSHOT
After Visit Summary   5/10/2017    Aydin Reilly    MRN: [id number]           Patient Information     Date Of Birth          1960        Visit Information        Provider Department      5/10/2017 10:40 AM Adolfo Simmons MD [facility]        Today's Diagnoses     Intertriginous candidiasis    -  1    Chronic pain of right knee        Other male erectile dysfunction        Other mononeuropathy        Lumbar Radiculopathy, SEE FYI        Pedal edema          Care Instructions    Call one number to schedule at any of the above locations: [phone].        Follow-ups after your visit        Follow-up notes from your care team     Return in about 3 months (around 8/10/2017).      Your next 10 appointments already scheduled     May 25, 2017  3:30 PM CDT   Return Sleep Patient with Trenton Norton MD   [facility] ([facility])    [address] [place] [address] [phone]              Who to contact     If you have questions or need follow up information about today's clinic visit or your schedule please contact [facility] directly at [phone].  Normal or non-critical lab and imaging results will be communicated to you by MyChart, letter or phone within 4 business days after the clinic has received the results. If you do not hear from us within 7 days, please contact the clinic through [employer] or phone. If you have a critical or abnormal lab result, we will notify you by phone as soon as possible.  Submit refill requests through [employer] or call your pharmacy and they will forward the refill request to us. Please allow 3 business days for your refill to be completed.          Additional Information About Your Visit        MyChart Information     [employer] gives you secure access to your electronic health record. If you see a primary care provider, you can also send messages to  "your care team and make appointments. If you have questions, please call your primary care clinic.  If you do not have a primary care provider, please call [phone] and they will assist you.        Care EveryWhere ID     This is your Care EveryWhere ID. This could be used by other organizations to access your [facility] medical records  [id number]        Your Vitals Were     Pulse Temperature Respirations Height Pulse Oximetry BMI (Body Mass Index)    107 98.3  F (36.8  C) (Oral) 16 5' 8\" (1.727 m) 96% 38.47 kg/m2       Blood Pressure from Last 3 Encounters:   05/10/17 112/72   02/07/17 140/88   12/06/16 144/72    Weight from Last 3 Encounters:   05/10/17 253 lb (114.8 kg)   02/07/17 261 lb (118.4 kg)   12/06/16 260 lb (117.9 kg)              Today, you had the following     No orders found for display         Today's Medication Changes          These changes are accurate as of: 5/10/17 11:43 AM.  If you have any questions, ask your nurse or doctor.               Start taking these medicines.        Dose/Directions    furosemide 40 MG tablet   Commonly known as:  LASIX   Used for:  Pedal edema   Started by:  Adolfo Simmons MD        Dose:  40 mg   Take 1 tablet (40 mg) by mouth daily as needed for edema   Quantity:  30 tablet   Refills:  1         Stop taking these medicines if you haven't already. Please contact your care team if you have questions.     azithromycin 250 MG tablet   Commonly known as:  ZITHROMAX   Stopped by:  Adolfo Simmons MD           guaiFENesin-codeine 100-10 MG/5ML Soln solution   Commonly known as:  ROBITUSSIN AC   Stopped by:  Adolfo Simmons MD           HYDROcodone-acetaminophen 5-325 MG per tablet   Commonly known as:  NORCO   Stopped by:  Adolfo Simmons MD           PRILOSEC PO   Stopped by:  Adolfo Simmons MD                Where to get your medicines      These medications were sent to [facility] [address] [place] [facility] [address]  [address] [facility] [address]     Phone:  [phone]     cyclobenzaprine 10 MG tablet    furosemide 40 MG tablet    miconazole 2 % powder    nabumetone 500 MG tablet         Some of these will need a paper prescription and others can be bought over the counter.  Ask your nurse if you have questions.     Bring a paper prescription for each of these medications     sildenafil 100 MG cap/tab                Primary Care Provider Office Phone # Fax #    Adolfo Simmons [phone]       [facility] [address]  [facility] [address]        Thank you!     Thank you for choosing [facility]  for your care. Our goal is always to provide you with excellent care. Hearing back from our patients is one way we can continue to improve our services. Please take a few minutes to complete the written survey that you may receive in the mail after your visit with us. Thank you!             Your Updated Medication List - Protect others around you: Learn how to safely use, store and throw away your medicines at www.disposemymeds.org.          This list is accurate as of: 5/10/17 11:43 AM.  Always use your most recent med list.                   Brand Name Dispense Instructions for use    BENADRYL PO      Take 25 mg by mouth every 6 hours as needed       cyclobenzaprine 10 MG tablet    FLEXERIL    60 tablet    Take 1 tablet (10 mg) by mouth nightly as needed for muscle spasms       diclofenac 1 % Gel topical gel    VOLTAREN     Place 2-4 g onto the skin 4 times daily as needed for moderate pain (apply 4 grams to knees or 2 grams to hands)       furosemide 40 MG tablet    LASIX    30 tablet    Take 1 tablet (40 mg) by mouth daily as needed for edema       * GABAPENTIN PO      Take 1,200 mg by mouth At Bedtime       * gabapentin 300 MG capsule    NEURONTIN    300 capsule    900 mg morning and mid day, 1200 mg evening       ibuprofen 200 MG capsule     120 capsule    Take  400-600 mg by mouth every 8 hours as needed       lidocaine 5 % Patch    LIDODERM    30 patch    Apply up to 3 patches to painful area at once for up to 12 h within a 24 h period.  Remove after 12 hours.       loperamide 2 MG capsule    IMODIUM     Take 2 mg by mouth 4 times daily as needed for diarrhea       MELATONIN PO      Take 3 mg by mouth nightly as needed Reported on 5/10/2017       miconazole 2 % powder    MICATIN; MICRO GUARD    60 g    Apply topically 2 times daily       Multi-vitamin Tabs tablet      Take 1 tablet by mouth daily       nabumetone 500 MG tablet    RELAFEN    60 tablet    Take 2 tablets (1,000 mg) by mouth daily as needed for moderate pain       pseudoePHEDrine 30 MG tablet    SUDAFED     Take 30-60 mg by mouth every 4 hours as needed for congestion       sertraline 100 MG tablet    ZOLOFT    135 tablet    Take 1.5 tablets (150 mg) by mouth daily       sildenafil 100 MG cap/tab    REVATIO/VIAGRA    12 tablet    Take 0.5-1 tablets ( mg) by mouth daily as needed for erectile dysfunction Take 30 min to 4 hours before intercourse.  Never use with nitroglycerin, terazosin or doxazosin.       traZODone 50 MG tablet    DESYREL    90 tablet    Take 1-3 tablets ( mg) by mouth nightly as needed for sleep       * Notice:  This list has 2 medication(s) that are the same as other medications prescribed for you. Read the directions carefully, and ask your doctor or other care provider to review them with you.

## 2017-05-25 ENCOUNTER — HOSPITAL ENCOUNTER (EMERGENCY)
Facility: CLINIC | Age: 57
Discharge: HOME | End: 2017-05-26
Attending: FAMILY MEDICINE | Admitting: FAMILY MEDICINE
Payer: MEDICAID

## 2017-05-25 VITALS
BODY MASS INDEX: 37.78 KG/M2 | WEIGHT: 248.5 LBS | HEART RATE: 114 BPM | TEMPERATURE: 97.6 F | DIASTOLIC BLOOD PRESSURE: 87 MMHG | OXYGEN SATURATION: 95 % | RESPIRATION RATE: 16 BRPM | SYSTOLIC BLOOD PRESSURE: 139 MMHG

## 2017-05-25 DIAGNOSIS — F15.10 METHAMPHETAMINE ABUSE (H): ICD-10-CM

## 2017-05-25 LAB
AMPHETAMINES UR QL SCN: ABNORMAL
BARBITURATES UR QL: ABNORMAL
BENZODIAZ UR QL: ABNORMAL
CANNABINOIDS UR QL SCN: ABNORMAL
COCAINE UR QL: ABNORMAL
ETHANOL UR QL SCN: ABNORMAL
OPIATES UR QL SCN: ABNORMAL

## 2017-05-25 PROCEDURE — 99283 EMERGENCY DEPT VISIT LOW MDM: CPT | Mod: Z6 | Performed by: FAMILY MEDICINE

## 2017-05-25 PROCEDURE — 80307 DRUG TEST PRSMV CHEM ANLYZR: CPT | Mod: 59 | Performed by: EMERGENCY MEDICINE

## 2017-05-25 PROCEDURE — 80320 DRUG SCREEN QUANTALCOHOLS: CPT | Performed by: EMERGENCY MEDICINE

## 2017-05-25 PROCEDURE — 80307 DRUG TEST PRSMV CHEM ANLYZR: CPT | Performed by: EMERGENCY MEDICINE

## 2017-05-25 PROCEDURE — 99283 EMERGENCY DEPT VISIT LOW MDM: CPT | Performed by: FAMILY MEDICINE

## 2017-05-25 ASSESSMENT — ENCOUNTER SYMPTOMS
SHORTNESS OF BREATH: 0
FEVER: 0

## 2017-05-25 NOTE — ED AVS SNAPSHOT
[facility], Emergency Department    [address]    [facility] [address]    Phone:  [phone]    Fax:  [phone]                                       Aydin Reilly   MRN: [id number]    Department:  [facility], Emergency Department   Date of Visit:  5/25/2017           After Visit Summary Signature Page     I have received my discharge instructions, and my questions have been answered. I have discussed any challenges I see with this plan with the nurse or doctor.    ..........................................................................................................................................  Patient/Patient Representative Signature      ..........................................................................................................................................  Patient Representative Print Name and Relationship to Patient    ..................................................               ................................................  Date                                            Time    ..........................................................................................................................................  Reviewed by Signature/Title    ...................................................              ..............................................  Date                                                            Time

## 2017-05-25 NOTE — ED AVS SNAPSHOT
[facility], Emergency Department    [address]    [place] [address]    Phone:  [phone]    Fax:  [phone]                                       Aydin Reilly   MRN: [id number]    Department:  [facility], Emergency Department   Date of Visit:  5/25/2017           Patient Information     Date Of Birth          1960        Your diagnoses for this visit were:     Methamphetamine abuse        You were seen by James Esparza MD.        Discharge Instructions       There is no detox required for methamphetamine use. It is not a physically addicting drug. There is a great deal of psychological addiction.  Suggest that you call  in the morning at 8 am to begin an evaluation for chemical use- you may need half way house or outpt chemical treatment such as [facility] which it appears that you have been in - in the past  At this time there is nothing further to do from the emergency room.  Your urine shows amphetamine and marijuana.    24 Hour Appointment Hotline       To make an appointment at any [facility] clinic, call [phone] ([phone]). If you don't have a family doctor or clinic, we will help you find one. [facility] clinics are conveniently located to serve the needs of you and your family.             Review of your medicines      Our records show that you are taking the medicines listed below. If these are incorrect, please call your family doctor or clinic.        Dose / Directions Last dose taken    BENADRYL PO   Dose:  25 mg        Take 25 mg by mouth every 6 hours as needed   Refills:  0        cyclobenzaprine 10 MG tablet   Commonly known as:  FLEXERIL   Dose:  10 mg   Quantity:  60 tablet        Take 1 tablet (10 mg) by mouth nightly as needed for muscle spasms   Refills:  1        diclofenac 1 % Gel topical gel   Commonly known as:  VOLTAREN   Dose:  2-4 g        Place 2-4 g onto the skin 4 times daily as needed for moderate pain (apply 4 grams to knees or 2  grams to hands)   Refills:  0        furosemide 40 MG tablet   Commonly known as:  LASIX   Dose:  40 mg   Quantity:  30 tablet        Take 1 tablet (40 mg) by mouth daily as needed for edema   Refills:  1        * GABAPENTIN PO   Dose:  1200 mg        Take 1,200 mg by mouth At Bedtime   Refills:  0        * gabapentin 300 MG capsule   Commonly known as:  NEURONTIN   Quantity:  300 capsule        900 mg morning and mid day, 1200 mg evening   Refills:  0        ibuprofen 200 MG capsule   Dose:  400-600 mg   Quantity:  120 capsule        Take 400-600 mg by mouth every 8 hours as needed   Refills:  0        lidocaine 5 % Patch   Commonly known as:  LIDODERM   Quantity:  30 patch        Apply up to 3 patches to painful area at once for up to 12 h within a 24 h period.  Remove after 12 hours.   Refills:  0        loperamide 2 MG capsule   Commonly known as:  IMODIUM   Dose:  2 mg        Take 2 mg by mouth 4 times daily as needed for diarrhea   Refills:  0        MELATONIN PO   Dose:  3 mg        Take 3 mg by mouth nightly as needed Reported on 5/10/2017   Refills:  0        miconazole 2 % powder   Commonly known as:  MICATIN; MICRO GUARD   Quantity:  60 g        Apply topically 2 times daily   Refills:  3        Multi-vitamin Tabs tablet   Dose:  1 tablet        Take 1 tablet by mouth daily   Refills:  0        nabumetone 500 MG tablet   Commonly known as:  RELAFEN   Dose:  1000 mg   Quantity:  60 tablet        Take 2 tablets (1,000 mg) by mouth daily as needed for moderate pain   Refills:  1        pseudoePHEDrine 30 MG tablet   Commonly known as:  SUDAFED   Dose:  30-60 mg        Take 30-60 mg by mouth every 4 hours as needed for congestion   Refills:  0        sertraline 100 MG tablet   Commonly known as:  ZOLOFT   Dose:  150 mg   Quantity:  135 tablet        Take 1.5 tablets (150 mg) by mouth daily   Refills:  1        sildenafil 100 MG cap/tab   Commonly known as:  REVATIO/VIAGRA   Dose:   mg   Quantity:  12  tablet        Take 0.5-1 tablets ( mg) by mouth daily as needed for erectile dysfunction Take 30 min to 4 hours before intercourse.  Never use with nitroglycerin, terazosin or doxazosin.   Refills:  11        traZODone 50 MG tablet   Commonly known as:  DESYREL   Dose:   mg   Quantity:  90 tablet        Take 1-3 tablets ( mg) by mouth nightly as needed for sleep   Refills:  3        * Notice:  This list has 2 medication(s) that are the same as other medications prescribed for you. Read the directions carefully, and ask your doctor or other care provider to review them with you.            Procedures and tests performed during your visit     Drug abuse screen 6 urine (tox)      Orders Needing Specimen Collection     None      Pending Results     No orders found from 5/23/2017 to 5/26/2017.            Pending Culture Results     No orders found from 5/23/2017 to 5/26/2017.            Pending Results Instructions     If you had any lab results that were not finalized at the time of your Discharge, you can call the ED Lab Result RN at [phone]. You will be contacted by this team for any positive Lab results or changes in treatment. The nurses are available 7 days a week from 10A to 6:30P.  You can leave a message 24 hours per day and they will return your call.        Thank you for choosing [facility]       Thank you for choosing [facility] for your care. Our goal is always to provide you with excellent care. Hearing back from our patients is one way we can continue to improve our services. Please take a few minutes to complete the written survey that you may receive in the mail after you visit with us. Thank you!        [facility]ne[facility] Information     [employer] gives you secure access to your electronic health record. If you see a primary care provider, you can also send messages to your care team and make appointments. If you have questions, please call your primary care clinic.  If you do not have a primary  care provider, please call [phone] and they will assist you.        Care EveryWhere ID     This is your Care EveryWhere ID. This could be used by other organizations to access your [facility] medical records  [id number]        After Visit Summary       This is your record. Keep this with you and show to your community pharmacist(s) and doctor(s) at your next visit.

## 2017-05-26 ENCOUNTER — CARE COORDINATION (OUTPATIENT)
Dept: CARE COORDINATION | Facility: CLINIC | Age: 57
End: 2017-05-26

## 2017-05-26 NOTE — DISCHARGE INSTRUCTIONS
There is no detox required for methamphetamine use. It is not a physically addicting drug. There is a great deal of psychological addiction.  Suggest that you call  in the morning at 8 am to begin an evaluation for chemical use- you may need half way house or outpt chemical treatment such as [facility] which it appears that you have been in - in the past  At this time there is nothing further to do from the emergency room.  Your urine shows amphetamine and marijuana.

## 2017-05-26 NOTE — PROGRESS NOTES
Clinic Care Coordination Contact  [facility]       Clinical Data: Care Coordinator Outreach    Patient calling for follow up with ED visit on 05/25/2017.    Called to work number & was unable to identify this patient as an employee at the listed phone #    Outreach attempted x 1.  Unable to leave message as this number does accept incoming calls  Plan:This Clinic Care Coordinator, LATA will route this to Clinic Care CoordinatorLATA assigned to patient's clinic to follow up on reaching this patient  WINDY Keys, [facility]  Clinic Care Coordinator, LATA with [facility]  [phone]

## 2017-05-26 NOTE — ED PROVIDER NOTES
History     Chief Complaint   Patient presents with     Addiction Problem     Pt c/o using meth and would like to stop.     HPI  Aydin Reilly is a 56 year old male with a history of alcohol abuse, opiate abuse, chronic pain and rectal cancer who presents to the Emergency Department for evaluation of an addiction problem. The patient has been smoking meth for some time, which has escalated to daily use. His last was at 5:00 PM (~5.5 hour ago). He has used other illicit drugs included cocaine, last use 1 month ago. Occasional alcohol use, last drink was today. He last went through alcohol treatment in October (7 months ago). No heroin use.  He explicitly denies suicidal ideation or any other concerns or complaints at this time. The pt was asked to leave his sober house and there are legal consequences- there fore he is here looking for detox.    Past Medical History:   Diagnosis Date     Alcohol abuse     stopped in 2008     Anserine bursitis 4/19/2016     Chondritis 4/19/2016     Elevated blood pressure reading without diagnosis of hypertension 4/19/2016     Gastric ulcer, unspecified as acute or chronic, without mention of hemorrhage, perforation, or obstruction ~1997    treated non-surgically     Gastro-oesophageal reflux disease      Hyperlipidemia LDL goal < 130 4/6/2010     Hypogonadism 5/24/2010     Lumbar disc herniation with radiculopathy     L4, recurrent episodic pain     Malignant neoplasm of other sites of rectum, rectosigmoid junction, and anus 1999    Squamous cell ca of anus; see PS     Rotator cuff injury, right, initial encounter 4/19/2016     Sleep apnea      Urethral stricture unspecified 2002    Following radiation; see PS       Past Surgical History:   Procedure Laterality Date     ABDOMEN SURGERY       BACK SURGERY       BIOPSY       BIOPSY       C NONSPECIFIC PROCEDURE  1991    L4-L5 laminectomy; disk herniation     C NONSPECIFIC PROCEDURE  1999    squamous cell CA - anus, 27 xrt/3  rounds, 5-FU/cisplatin     C NONSPECIFIC PROCEDURE      umbilical hernia     C NONSPECIFIC PROCEDURE  2002    cystscopy for urethral stricture from radiation therapy     COLONOSCOPY       COLONOSCOPY  4/18/2014    Procedure: Colonoscopy   polyp bx;  Surgeon: Josef Mckeon MD;  Location: [facility]     CYSTOSCOPY, CYSTOGRAM, COMBINED N/A 2/26/2015    Procedure: COMBINED CYSTOSCOPY, CYSTOGRAM;  Surgeon: Darell Barrera MD;  Location: [facility]     CYSTOSCOPY, INTERNAL URETHROTOMY, COMBINED N/A 12/19/2014    Procedure: COMBINED CYSTOSCOPY, INTERNAL URETHROTOMY;  Surgeon: Buzz Ren MD;  Location:  OR     CYSTOSTOMY, INSERT TUBE SUPRAPUBIC, COMBINED  12/19/2014    Procedure: COMBINED CYSTOSTOMY, INSERT TUBE SUPRAPUBIC;  Surgeon: Buzz Ren MD;  Location:  OR     CYSTOSTOMY, INSERT TUBE SUPRAPUBIC, COMBINED N/A 12/29/2014    Procedure: COMBINED CYSTOSTOMY, INSERT TUBE SUPRAPUBIC;  Surgeon: Buzz Ren MD;  Location:  OR     ENT SURGERY       ESOPHAGOSCOPY, GASTROSCOPY, DUODENOSCOPY (EGD), COMBINED  10/2/2012    Procedure: COMBINED ESOPHAGOSCOPY, GASTROSCOPY, DUODENOSCOPY (EGD);  COMBINED ESOPHAGOSCOPY, GASTROSCOPY, DUODENOSCOPY (EGD) ;  Surgeon: Raj Beltre MD;  Location: [facility]     HERNIA REPAIR       LAPAROSCOPIC LYSIS ADHESIONS N/A 12/4/2015    Procedure: LAPAROSCOPIC LYSIS ADHESIONS;  Surgeon: Herbie Sanchez MD;  Location: [facility]     LAPAROTOMY EXPLORATORY N/A 12/4/2015    Procedure: LAPAROTOMY EXPLORATORY;  Surgeon: Herbie Sanchez MD;  Location: [facility]     MYRINGOTOMY      in childhood     TONSILLECTOMY and adenoidectomy      in childhood     URETHROPLASTY WITH BUCCAL GRAFT N/A 3/27/2015    Procedure: URETHROPLASTY WITH BUCCAL GRAFT;  Surgeon: Darell Barrera MD;  Location: [facility]       Family History   Problem Relation Age of Onset     Adopted: Yes     Unknown/Adopted Mother      Suicide Father      Depression No family hx of      Anxiety  Disorder No family hx of      Schizophrenia No family hx of      Bipolar Disorder No family hx of      Substance Abuse No family hx of      Dementia No family hx of      Nora Disease No family hx of      Parkinsonism No family hx of      Autism Spectrum Disorder No family hx of      Intellectual Disability (Mental Retardation) No family hx of      MENTAL ILLNESS No family hx of        Social History   Substance Use Topics     Smoking status: Never Smoker     Smokeless tobacco: Never Used     Alcohol use 0.0 oz/week     0 Standard drinks or equivalent per week      Comment: quit june 30, 2008/ Quit 1/22/15       No current facility-administered medications for this encounter.      Current Outpatient Prescriptions   Medication     nabumetone (RELAFEN) 500 MG tablet     miconazole (MICATIN; MICRO GUARD) 2 % powder     sildenafil (REVATIO/VIAGRA) 100 MG cap/tab     cyclobenzaprine (FLEXERIL) 10 MG tablet     furosemide (LASIX) 40 MG tablet     gabapentin (NEURONTIN) 300 MG capsule     lidocaine (LIDODERM) 5 % Patch     ibuprofen 200 MG capsule     diclofenac (VOLTAREN) 1 % GEL     GABAPENTIN PO     pseudoePHEDrine (SUDAFED) 30 MG tablet     multivitamin, therapeutic with minerals (MULTI-VITAMIN) TABS     DiphenhydrAMINE HCl (BENADRYL PO)     MELATONIN PO     sertraline (ZOLOFT) 100 MG tablet     traZODone (DESYREL) 50 MG tablet     loperamide (IMODIUM) 2 MG capsule     Facility-Administered Medications Ordered in Other Encounters   Medication     Self Administer Medications: Behavioral Services     glycopyrrolate (ROBINUL) injection        Allergies   Allergen Reactions     No Known Allergies      Seasonal Allergies      I have reviewed the Medications, Allergies, Past Medical and Surgical History, and Social History in the Epic system.    Review of Systems   Constitutional: Negative for chills and fever.   HENT: Negative for congestion.    Respiratory: Negative for shortness of breath.    Cardiovascular: Negative  for chest pain and palpitations.   Gastrointestinal: Negative for nausea and vomiting.   Musculoskeletal: Negative for myalgias.   Skin: Negative for rash.   Allergic/Immunologic: Negative for immunocompromised state.   Neurological: Negative.    Hematological: Negative.    Psychiatric/Behavioral: Negative for hallucinations, self-injury and suicidal ideas.        Positive for polysubstance use.   All other systems reviewed and are negative.      Physical Exam   BP: 159/84  Pulse: 115  Temp: 98.3  F (36.8  C)  Resp: 16  Weight: 112.7 kg (248 lb 8 oz)  SpO2: 95 %  Physical Exam   Constitutional: He is oriented to person, place, and time. He appears well-developed and well-nourished. No distress.   No linger tachycardic   HENT:   Head: Normocephalic and atraumatic.   Cardiovascular: Normal rate, regular rhythm, normal heart sounds and intact distal pulses.    No murmur heard.  HR is 100   Pulmonary/Chest: No respiratory distress.   Neurological: He is alert and oriented to person, place, and time.   Skin: Skin is warm and dry. He is not diaphoretic.   Psychiatric:   Laughing  Pleasant   No suicidal or homicidal ideations  No delusions or hallucinations     Nursing note and vitals reviewed.      ED Course     10:28 PM  The patient was seen and examined by Dr. Esparza in Room 15.     ED Course     Procedures        U tox + for amphetamines and marijuana  Labs Ordered and Resulted from Time of ED Arrival Up to the Time of Departure from the ED   DRUG ABUSE SCREEN 6 CHEM DEP URINE ([facility]) - Abnormal; Notable for the following:        Result Value    Amphetamine Qual Urine   (*)     Value: Positive   Cutoff for a positive amphetamine is greater than 500 ng/mL. This is an   unconfirmed screening result to be used for medical purposes only.      Cannabinoids Qual Urine   (*)     Value: Positive   Cutoff for a positive cannabinoid is greater than 50 ng/mL. This is an   unconfirmed screening result to be used for medical  purposes only.      All other components within normal limits            Assessments & Plan (with Medical Decision Making)   The pt has drug polydrug abuse issues.  He is not impaired at this time.  Meth abuse does not need physical detox    I have reviewed the nursing notes.    I have reviewed the findings, diagnosis, plan and need for follow up with the patient.    Discharge Medication List as of 5/25/2017 11:34 PM          Final diagnoses:   Methamphetamine abuse     ILalita, am serving as a trained medical scribe to document services personally performed by James Esparza MD, based on the provider's statements to me.      IJames MD, was physically present and have reviewed and verified the accuracy of this note documented by Lalita Dent.       5/25/2017   [facility], [place], EMERGENCY DEPARTMENT     James Esparza MD  06/01/17 0243

## 2017-05-30 ENCOUNTER — CARE COORDINATION (OUTPATIENT)
Dept: CARE COORDINATION | Facility: CLINIC | Age: 57
End: 2017-05-30

## 2017-05-30 NOTE — LETTER
May 30, 2017      Aydin Reilly  [address]  [place] [address]    Dear Aydin,  I am the Social Work Care Coordinator that works with your primary care provider's clinic. I recently tried to call you to follow up, but was unable to reach you due to your phone not accepting incoming calls Below is a description of what Clinic Care Coordination is and how I can further assist you.     My role as the care coordinator  is to help you manage your health and improve access to the [facility] system in the most efficient manner.I can assist you with community resources (Housing, transportation, employment, financial resources, health insurance), behavioral and psychosocial needs, chemical dependency and counseling/psychiatric services.    I am a free and available resource Monday through Friday here at the clinic. Feel free to save my contact information and outreach to me anytime at my direct number  [phone]. I will try again in a few weeks to outreach, but please feel free to call me sooner if you would like to reach me.    Sincerely,    Soha Belcher, BAKARI, MSW  Social Work Care Coordinator  P:[phone]  [facility] [place] [facility] [place]

## 2017-05-30 NOTE — PROGRESS NOTES
Clinic Care Coordination Contact  [facility]/Voicemail    Clinical Data: ER f/u.    Outreach attempted x 2. Phone not accepting incoming calls. Unable to leave VM. Previous CCSW note states work number on file does not have record of the pt.    Plan: Care coordinator will send [facility] letter, though pt's address on file may not be correct as he is no longer living with his spouse. Will try again in 1-2 weeks.    Soha Belcher, Social Work Care Coordinator, SW, MSW  [facility]: [facility] and [facility]   P:[phone] / Signed May 30, 2017

## 2017-05-30 NOTE — LETTER
I am the Social Work Care Coordinator that works with your primary care provider's clinic. I have been trying to reach you to follow up, but your phone does not accept incoming messages. Below is a description of what Clinic Care Coordination is and how I can further assist you.     My role as the care coordinator  is to help you manage your health and improve access to the [facility] system in the most efficient manner.I can assist you with community resources (Housing, transportation, employment, financial resources, health insurance), behavioral and psychosocial needs, chemical dependency and counseling/psychiatric services.    I am a free and available resource Monday through Friday here at the clinic. Feel free to save my contact information and outreach to me anytime at my direct number  [phone]. I will try again in a few weeks to try and reach you, but please feel free to outreach sooner.    Sincerely,    BAKARI Solis, MSW  Social Work Care Coordinator  P:[phone]  [facility] [place] [facility] [place]

## 2017-05-31 ENCOUNTER — TELEPHONE (OUTPATIENT)
Dept: FAMILY MEDICINE | Facility: CLINIC | Age: 57
End: 2017-05-31

## 2017-05-31 NOTE — TELEPHONE ENCOUNTER
We can definitely refer him to Urology, can we ask him for the reason for referral?  Lino Pratt MD  [facility]  [phone]  r

## 2017-05-31 NOTE — TELEPHONE ENCOUNTER
Patient came in and asked to see Dr. Pratt, Provider was full, leaving message for Dr. Pratt to get a referral to see his Urologist Dr. Allred. Please call patient to get further information or have patient schedule an appointment with Dr. Pratt if need be.     Forwarded on to Dr. Pratt and the [facility] team

## 2017-05-31 NOTE — LETTER
[facility]  [address] [facility] [address] [phone]          June 2, 2017    Aydin Reilly                                                                                                                     [address] [facility] [address]            Aydin,    Will you call the clinic for a message from Dr. Pratt?  Please reference the May 31 note in your chart. We were unable to reach you by phone or MyChart.     When you call, will you update the phone numbers we have in your chart?    [phone] (home) number does not accept incoming calls and   [phone] (work) they could not find you on the employee list    Sincerely,         HAZEL Michaud for Lino Pratt MD

## 2017-05-31 NOTE — TELEPHONE ENCOUNTER
[phone] ([facility]) does not accept incoming calls.   We sent MOGt message   We tried to reach you by phone [phone] but our call would not go through to you. Dr. Pratt said he will refer you to see a urologist.  However, he first needs to know the reason for the referral. Will you call [phone] to talk with a nurse or send a MyChart reply to Dr. Pratt with your symptoms or concerns?   Jaswant Rubio RN

## 2017-06-01 ENCOUNTER — THERAPY VISIT (OUTPATIENT)
Dept: PHYSICAL THERAPY | Facility: CLINIC | Age: 57
End: 2017-06-01
Payer: MEDICAID

## 2017-06-01 DIAGNOSIS — G89.29 CHRONIC LEFT-SIDED LOW BACK PAIN WITHOUT SCIATICA: Primary | ICD-10-CM

## 2017-06-01 DIAGNOSIS — M54.50 CHRONIC LEFT-SIDED LOW BACK PAIN WITHOUT SCIATICA: Primary | ICD-10-CM

## 2017-06-01 PROCEDURE — 97110 THERAPEUTIC EXERCISES: CPT | Mod: GP | Performed by: PHYSICAL THERAPIST

## 2017-06-01 PROCEDURE — 97161 PT EVAL LOW COMPLEX 20 MIN: CPT | Mod: GP | Performed by: PHYSICAL THERAPIST

## 2017-06-01 ASSESSMENT — ENCOUNTER SYMPTOMS
NEUROLOGICAL NEGATIVE: 1
HEMATOLOGIC/LYMPHATIC NEGATIVE: 1
CHILLS: 0
PALPITATIONS: 0
HALLUCINATIONS: 0
VOMITING: 0
MYALGIAS: 0
NAUSEA: 0

## 2017-06-01 NOTE — PROGRESS NOTES
Subjective:    Patient is a 56 year old male presenting with rehab back hpi. The history is provided by the patient. No  was used.   Aydin Reilly is a 56 year old male with a lumbar condition.  Condition occurred with:  Insidious onset.  Condition occurred: for unknown reasons.  This is a chronic condition  Pt reports having left LBP that has been present for greater than 6 months.  No known truama.  MD appt on 2/15/17.  .    Patient reports pain:  Lumbar spine left.  Radiates to:  No radiation.  Pain is described as aching and is intermittent and reported as 7/10.  Associated symptoms:  Loss of motion/stiffness and loss of strength. Pain is worse during the day.  Symptoms are exacerbated by bending, lifting, sitting and standing and relieved by nothing.  Since onset symptoms are unchanged.    Previous treatment includes physical therapy.  There was mild improvement following previous treatment.  General health as reported by patient is good.  Pertinent medical history includes:  Cancer.  Medical allergies: no.  Other surgeries include:  Cancer surgery.  Current medications:  None as reported by the patient.  Current occupation is None reported  .        Barriers include:  None as reported by the patient.    Red flags:  None as reported by the patient.                        Objective:    Standing Alignment:        Lumbar:  Lordosis decr                Flexibility/Screens:   Positive screens:  Lumbar                   Lumbar/SI Evaluation  ROM:    AROM Lumbar:   Flexion:          75% with ERP  Ext:                    50% with ERP   Side Bend:        Left:  75% with ERP    Right:  75% with ERP  Rotation:           Left:     Right:   Side Glide:        Left:     Right:           Lumbar Myotomes:  normal                  Neural Tension/Mobility:  Lumbar:  Normal        Lumbar Palpation:    Tenderness present at Left:    Quadratus Lumborum and Erector Spinae  Tenderness not present at Left:     Piriformis; ASIS; Gluteus Medius or Vertebral    Tenderness not present at Right:  Quadratus Lumborum; Erector Spinae; Piriformis; ASIS; Gluteus Medius or Vertebral                                                     General     ROS    Assessment/Plan:      Patient is a 56 year old male with lumbar complaints.    Patient has the following significant findings with corresponding treatment plan.                Diagnosis 1:  LBP  Pain -  self management, education, directional preference exercise and home program  Decreased ROM/flexibility - manual therapy and therapeutic exercise  Decreased strength - therapeutic exercise and therapeutic activities  Impaired muscle performance - neuro re-education  Decreased function - therapeutic activities    Therapy Evaluation Codes:   1) History comprised of:   Personal factors that impact the plan of care:      None.    Comorbidity factors that impact the plan of care are:      Cancer.     Medications impacting care: None.  2) Examination of Body Systems comprised of:   Body structures and functions that impact the plan of care:      Lumbar spine.   Activity limitations that impact the plan of care are:      Bending, Sitting and Standing.  3) Clinical presentation characteristics are:   Stable/Uncomplicated.  4) Decision-Making    Low complexity using standardized patient assessment instrument and/or measureable assessment of functional outcome.  Cumulative Therapy Evaluation is: Low complexity.    Previous and current functional limitations:  (See Goal Flow Sheet for this information)    Short term and Long term goals: (See Goal Flow Sheet for this information)     Communication ability:  Patient appears to be able to clearly communicate and understand verbal and written communication and follow directions correctly.  Treatment Explanation - The following has been discussed with the patient:   RX ordered/plan of care  Anticipated outcomes  Possible risks and side effects  This  patient would benefit from PT intervention to resume normal activities.   Rehab potential is fair.    Frequency:  1 X week, once daily  Duration:  for 4 weeks  Discharge Plan:  Achieve all LTG.  Independent in home treatment program.  Reach maximal therapeutic benefit.    Please refer to the daily flowsheet for treatment today, total treatment time and time spent performing 1:1 timed codes.

## 2017-06-01 NOTE — MR AVS SNAPSHOT
After Visit Summary   6/1/2017    Aydin Reilly    MRN: [id number]           Patient Information     Date Of Birth          1960        Visit Information        Provider Department      6/1/2017 10:30 AM Mohamud Andrews PT [facility] Physical Therapy        Today's Diagnoses     Chronic left-sided low back pain without sciatica    -  1       Follow-ups after your visit        Who to contact     If you have questions or need follow up information about today's clinic visit or your schedule please contact [facility]THLETIC [facility] PHYSICAL [facility] directly at [phone].  Normal or non-critical lab and imaging results will be communicated to you by [employer], letter or phone within 4 business days after the clinic has received the results. If you do not hear from us within 7 days, please contact the clinic through [employer] or phone. If you have a critical or abnormal lab result, we will notify you by phone as soon as possible.  Submit refill requests through [employer] or call your pharmacy and they will forward the refill request to us. Please allow 3 business days for your refill to be completed.          Additional Information About Your Visit        MyChart Information     [employer] gives you secure access to your electronic health record. If you see a primary care provider, you can also send messages to your care team and make appointments. If you have questions, please call your primary care clinic.  If you do not have a primary care provider, please call [phone] and they will assist you.        Care EveryWhere ID     This is your Care EveryWhere ID. This could be used by other organizations to access your [facility] medical records  [id number]         Blood Pressure from Last 3 Encounters:   05/25/17 139/87   05/10/17 112/72   02/07/17 140/88    Weight from Last 3 Encounters:   05/25/17 112.7 kg (248 lb 8 oz)   05/10/17 114.8 kg  (253 lb)   02/07/17 118.4 kg (261 lb)              We Performed the Following     HC PT EVAL, LOW COMPLEXITY     VIK INITIAL EVAL REPORT     THERAPEUTIC EXERCISES        Primary Care Provider Office Phone # Fax #    Adolfo Simmons [phone]       [facility] [address]  [place] [address]        Thank you!     Thank you for choosing [facility] ATHLETIC [facility]ICINE [facility] PHYSICAL THERAPY  for your care. Our goal is always to provide you with excellent care. Hearing back from our patients is one way we can continue to improve our services. Please take a few minutes to complete the written survey that you may receive in the mail after your visit with us. Thank you!             Your Updated Medication List - Protect others around you: Learn how to safely use, store and throw away your medicines at www.disposemymeds.org.          This list is accurate as of: 6/1/17  1:47 PM.  Always use your most recent med list.                   Brand Name Dispense Instructions for use    BENADRYL PO      Take 25 mg by mouth every 6 hours as needed       cyclobenzaprine 10 MG tablet    FLEXERIL    60 tablet    Take 1 tablet (10 mg) by mouth nightly as needed for muscle spasms       diclofenac 1 % Gel topical gel    VOLTAREN     Place 2-4 g onto the skin 4 times daily as needed for moderate pain (apply 4 grams to knees or 2 grams to hands)       furosemide 40 MG tablet    LASIX    30 tablet    Take 1 tablet (40 mg) by mouth daily as needed for edema       * GABAPENTIN PO      Take 1,200 mg by mouth At Bedtime       * gabapentin 300 MG capsule    NEURONTIN    300 capsule    900 mg morning and mid day, 1200 mg evening       ibuprofen 200 MG capsule     120 capsule    Take 400-600 mg by mouth every 8 hours as needed       lidocaine 5 % Patch    LIDODERM    30 patch    Apply up to 3 patches to painful area at once for up to 12 h within a 24 h period.  Remove after 12 hours.        loperamide 2 MG capsule    IMODIUM     Take 2 mg by mouth 4 times daily as needed for diarrhea       MELATONIN PO      Take 3 mg by mouth nightly as needed Reported on 5/10/2017       miconazole 2 % powder    MICATIN; MICRO GUARD    60 g    Apply topically 2 times daily       Multi-vitamin Tabs tablet      Take 1 tablet by mouth daily       nabumetone 500 MG tablet    RELAFEN    60 tablet    Take 2 tablets (1,000 mg) by mouth daily as needed for moderate pain       pseudoePHEDrine 30 MG tablet    SUDAFED     Take 30-60 mg by mouth every 4 hours as needed for congestion       sertraline 100 MG tablet    ZOLOFT    135 tablet    Take 1.5 tablets (150 mg) by mouth daily       sildenafil 100 MG cap/tab    REVATIO/VIAGRA    12 tablet    Take 0.5-1 tablets ( mg) by mouth daily as needed for erectile dysfunction Take 30 min to 4 hours before intercourse.  Never use with nitroglycerin, terazosin or doxazosin.       traZODone 50 MG tablet    DESYREL    90 tablet    Take 1-3 tablets ( mg) by mouth nightly as needed for sleep       * Notice:  This list has 2 medication(s) that are the same as other medications prescribed for you. Read the directions carefully, and ask your doctor or other care provider to review them with you.

## 2017-06-01 NOTE — PROGRESS NOTES
Subjective:    Patient is a 56 year old male presenting with rehab left ankle/foot hpi.                                      Pertinent medical history includes:  Cancer.    Other surgeries include:  Cancer surgery.        Primary job tasks include:  Prolonged sitting, prolonged standing, driving, lifting, repetitive tasks and other.              Oswestry Score: 42 %                 Objective:    System    Physical Exam    General     ROS    Assessment/Plan:

## 2017-06-02 NOTE — TELEPHONE ENCOUNTER
[phone] (home) does not accept incoming calls.   Pt is not on employee list at [phone] (work).   Not checking MyChart   Letter mailed see 6/2/17 letter  Jaswant Rubio RN

## 2017-06-13 ENCOUNTER — CARE COORDINATION (OUTPATIENT)
Dept: CARE COORDINATION | Facility: CLINIC | Age: 57
End: 2017-06-13

## 2017-06-13 NOTE — PROGRESS NOTES
Clinic Care Coordination Contact  [facility]/Voicemail    Clinical Data: ED follow up, Methamphetamine use, drug use     Outreach attempted x 3.  Phone not in service  Plan: Care coordinator will close this patient to care coordination at this time. Unable to contact    Soha Belcher, Social Work Care Coordinator, [facility], MSW  [place] [facility]: [facility] and [place]   P:[phone] / Signed June 13, 2017

## 2017-06-29 ENCOUNTER — TELEPHONE (OUTPATIENT)
Dept: FAMILY MEDICINE | Facility: CLINIC | Age: 57
End: 2017-06-29

## 2017-06-29 NOTE — TELEPHONE ENCOUNTER
Form received from [employer] by fax to be completed and signed.  Form placed in Dr. Simmons's in-basket.  Ilene Horner RN

## 2017-07-03 NOTE — TELEPHONE ENCOUNTER
Faxed form to [phone], then forms were sent to [employer] and a temporary copy is kept in a folder at the [place] nurses station.      Bruno Danielle   07/03/17

## 2017-07-27 NOTE — PROGRESS NOTES
"    Outpatient Physical Therapy Ortho Treatment Note   Kaylen Mcclendon     Patient Name: Louie German  : 1937  MRN: [id number]  Today's Date: 2017      Visit Date: 2017    Visit Dx:  No diagnosis found.    Patient Active Problem List   Diagnosis   • Benign essential HTN   • Acid reflux   • Elevated cholesterol   • Calculus of kidney   • Cervical spinal stenosis   • Status post total hip replacement, left   • Right lumbar radiculopathy   • Status post total replacement of right hip   • Scoliosis of lumbar spine   • Degenerative disc disease, lumbar        Past Medical History:   Diagnosis Date   • Coronary artery disease    • DDD (degenerative disc disease), cervical    • Hx of cardiac arrest     Happened after Induction of anesthesia prior  to SURG 10 YRS AGO  \" I FLATLINE BUT W/IN SECONDS HEART WAS OK AFTER TURNED ON BACK...I THINK THEY GAVE ME TO MUCH ANESTHESIA\"   • Hyperlipidemia    • Hypertension    • Kidney stones    • Osteoarthritis    • Pleural effusion     DRAINAGE RT PLEURAL FLUID FOR TESTING WITH EGD ON 17   NO MALIGNANCY WITH CLEARANCE FROM DR SIMMONS FOR SURGERY   • Rheumatoid arthritis         Past Surgical History:   Procedure Laterality Date   • CORONARY ANGIOPLASTY WITH STENT PLACEMENT      17 years ago   • CYSTOSCOPY W/ LITHOLAPAXY / EHL      6-7 years ago   • ENDOSCOPY      ON 17 WITH DRAINAGE OF RT PLEURAL FLUID FOR BIOPSY    NO MALIGNANCY  CLEARANCE FOR SURG PER DR SIMMONS   • PLEURAL BIOPSY     • POSTERIOR CERVICAL FUSION     • TOTAL HIP ARTHROPLASTY Left     9-10 years ago    • TOTAL HIP ARTHROPLASTY Right 2017    Procedure: TOTAL HIP ARTHROPLASTY;  Surgeon: Gerson Cardoza MD;  Location: [facility];  Service:              PT Ortho       17 1300    Subjective Comments    Subjective Comments Pt relates that since he had MD referral he noticed his knee are higher than his hips so decided to try sitting on a pillow when he drives and sx in R " HPI    SUBJECTIVE:                                                    Aydin Reilly is a 56 year old male who presents to clinic today for the following health issues:      Patient here for follow up on back pain. Needing multiple refills. Pended.      Also concerned about bilateral edema of legs, has been an issue for 3 months.     No recent scrotal edema, does still have urinary incontinence.    Has improved diet - less carbs and starches.  Still working on setting up outpatient CD treatment.  Staying sober, attending AA and NA meetings.    New job working at .  Seems to be working well.    Review of Systems   Constitutional: Negative.    Respiratory: Negative.    Cardiovascular: Positive for leg swelling. Negative for chest pain and palpitations.   Genitourinary: Negative.  Negative for dysuria.   Musculoskeletal: Positive for joint pain.         Physical Exam   Constitutional: He is oriented to person, place, and time and well-developed, well-nourished, and in no distress.   Eyes: Conjunctivae and EOM are normal.   Cardiovascular: Normal rate, regular rhythm and normal heart sounds.    Pulmonary/Chest: Effort normal and breath sounds normal.   Musculoskeletal: He exhibits edema.   Neurological: He is alert and oriented to person, place, and time.   Skin: Skin is warm and dry.   Vitals reviewed.    (B37.2) Intertriginous candidiasis  (primary encounter diagnosis)  Comment: refill  Plan: miconazole (MICATIN; MICRO GUARD) 2 % powder            (M25.561,  G89.29) Chronic pain of right knee  Comment:   Plan: nabumetone (RELAFEN) 500 MG tablet            (N52.8) Other male erectile dysfunction  Comment:   Plan: sildenafil (REVATIO/VIAGRA) 100 MG cap/tab            (G58.8) Other mononeuropathy  Comment:   Plan: recently had refill    (M54.16) Lumbar Radiculopathy, SEE FYI  Comment:   Plan: cyclobenzaprine (FLEXERIL) 10 MG tablet            (R60.0) Pedal edema  Comment:   Plan: furosemide (LASIX) 40 MG  buttock and LE have reduced- has not had x 4 days  -CC    Precautions and Contraindications    Precautions/Limitations --   s/p 5 mo R JOEY  -CC    Posture/Observations    Forward Head Mild;Moderate  -CC    Thoracic Kyphosis Moderate;Increased  -CC    Rounded Shoulders Bilateral:;Moderate;Increased  -CC    Lumbar lordosis Moderate;Decreased  -CC    Iliac crests Right:;Mild;Elevated  -CC    Lumbosacral Palpation    SI Bilateral:   negative  -CC    Lumbosacral Segment Bilateral:   negative  -CC    Spinous Process Bilateral:   negative  -CC    Piriformis Right:   negative  -CC    Erector Spinae (Paraspinals) Bilateral:   negative  -CC    Greater Tuberosity Right:;Tender   slight to mild s/p R JOEY  x5 mos  -CC    Lumbosacral Palpation? Yes  -CC    Lumbar/SI Special Tests    Standing Flexion Test (SI Dysfunction) Left:;Positive  -CC    Slump Test (Neural Tension) Bilateral:;Negative  -CC    SLR (Neural Tension) Right:;Positive  -CC    ROM (Range of Motion)    General ROM Detail Spinal ROM 25% decrease all- hx of DJD  -CC    MMT (Manual Muscle Testing)    General MMT Assessment no strength deficits identified  -CC    Lower Extremity Flexibility    Hamstrings Right:   50 degrees postive for lumbar sx-L negative @ 50 degrees  -CC      User Key  (r) = Recorded By, (t) = Taken By, (c) = Cosigned By    Initials Name Provider Type    NASEEM Shah PT Physical Therapist                            PT Assessment/Plan       07/27/17 1311       PT Assessment    Assessment Comments Pt doing well  and showing improved alignment in pelvis/sacrum and tolerating back stabilization exercises.  -CC     PT Plan    PT Plan Comments Follow up next week to assess progress and check alignment and progress TE  -CC       User Key  (r) = Recorded By, (t) = Taken By, (c) = Cosigned By    Initials Name Provider Type    NASEEM Shah PT Physical Therapist                Modalities       07/27/17 1115          Subjective  tablet              RTC in damian Simmons MD         Comments    Subjective Comments Pt relates that he has really had no pain R back and hip since last session. Relates that he put a lumbar roll in his car too.  -CC      Subjective Pain    Able to rate subjective pain? yes  -CC      Pre-Treatment Pain Level 0  -CC        User Key  (r) = Recorded By, (t) = Taken By, (c) = Cosigned By    Initials Name Provider Type    NASEEM Shah PT Physical Therapist                Exercises       07/27/17 1115          Subjective Comments    Subjective Comments Pt relates that he has really had no pain R back and hip since last session. Relates that he put a lumbar roll in his car too.  -CC      Subjective Pain    Able to rate subjective pain? yes  -CC      Pre-Treatment Pain Level 0  -CC      Exercise 1    Exercise Name 1 Hamstretch to L with sheet  -CC      Cueing 1 Verbal;Tactile  -CC      Reps 1 4   use of sheet  -CC      Time (Seconds) 1 --   15 sec  -CC      Exercise 2    Exercise Name 2 DF with osscillations  -CC      Cueing 2 Verbal;Tactile  -CC      Sets 2 --   Pt unable to tolerate hamstretch-c/o lumbar sx  -CC      Reps 2 20  -CC      Exercise 3    Exercise Name 3 DILAN  -CC      Cueing 3 Verbal;Tactile  -CC      Reps 3 5   reduced reps due to cervical strain  -CC      Exercise 4    Exercise Name 4 Alternating PLR  -CC      Reps 4 10   with abd stabilization  -CC      Exercise 5    Exercise Name 5 Lower abs march  -CC      Cueing 5 Verbal  -CC      Reps 5 10  -CC        User Key  (r) = Recorded By, (t) = Taken By, (c) = Cosigned By    Initials Name Provider Type    NASEEM Shah PT Physical Therapist                                   Therapy Education       07/27/17 1310          Therapy Education    Education Details Issued handout for lumbar stabilization exercies  -CC      Given HEP  -CC        User Key  (r) = Recorded By, (t) = Taken By, (c) = Cosigned By    Initials Name Provider Type    NASEEM Shah PT Physical Therapist                 Time Calculation:   Start Time: 1115  Stop Time: 1145  Time Calculation (min): 30 min    Therapy Charges for Today     Code Description Service Date Service Provider Modifiers Qty    74584222693  PT THER PROC EA 15 MIN 7/27/2017 Nica Shah, PT GP 1    [id number]  PT MANUAL THERAPY EA 15 MIN 7/27/2017 Nica Shah, PT GP 1                    Nica Shah, PT  7/27/2017

## 2017-07-28 ENCOUNTER — MYC MEDICAL ADVICE (OUTPATIENT)
Dept: FAMILY MEDICINE | Facility: CLINIC | Age: 57
End: 2017-07-28

## 2017-07-28 ENCOUNTER — MYC REFILL (OUTPATIENT)
Dept: FAMILY MEDICINE | Facility: CLINIC | Age: 57
End: 2017-07-28

## 2017-07-28 DIAGNOSIS — F41.1 GAD (GENERALIZED ANXIETY DISORDER): ICD-10-CM

## 2017-07-28 DIAGNOSIS — M54.16 LUMBAR RADICULOPATHY: ICD-10-CM

## 2017-07-28 DIAGNOSIS — N52.8 OTHER MALE ERECTILE DYSFUNCTION: ICD-10-CM

## 2017-07-28 DIAGNOSIS — G89.29 CHRONIC PAIN OF RIGHT KNEE: ICD-10-CM

## 2017-07-28 DIAGNOSIS — F41.1 GENERALIZED ANXIETY DISORDER: ICD-10-CM

## 2017-07-28 DIAGNOSIS — M25.561 CHRONIC PAIN OF RIGHT KNEE: ICD-10-CM

## 2017-07-28 DIAGNOSIS — G58.8 OTHER MONONEUROPATHY: ICD-10-CM

## 2017-07-28 RX ORDER — GABAPENTIN 300 MG/1
1200 CAPSULE ORAL 3 TIMES DAILY
Qty: 360 CAPSULE | Refills: 1 | Status: SHIPPED | OUTPATIENT
Start: 2017-07-28 | End: 2017-09-20

## 2017-07-28 RX ORDER — CYCLOBENZAPRINE HCL 10 MG
10 TABLET ORAL
Qty: 60 TABLET | Refills: 1 | Status: SHIPPED | OUTPATIENT
Start: 2017-07-28 | End: 2017-09-20

## 2017-07-28 RX ORDER — SERTRALINE HYDROCHLORIDE 100 MG/1
150 TABLET, FILM COATED ORAL DAILY
Qty: 135 TABLET | Refills: 1 | Status: SHIPPED | OUTPATIENT
Start: 2017-07-28 | End: 2018-05-09

## 2017-07-28 RX ORDER — TRAZODONE HYDROCHLORIDE 50 MG/1
50-150 TABLET, FILM COATED ORAL
Qty: 90 TABLET | Refills: 3 | Status: SHIPPED | OUTPATIENT
Start: 2017-07-28 | End: 2017-09-20

## 2017-07-28 RX ORDER — SILDENAFIL 100 MG/1
50-100 TABLET, FILM COATED ORAL DAILY PRN
Qty: 12 TABLET | Refills: 11 | Status: SHIPPED | OUTPATIENT
Start: 2017-07-28 | End: 2017-09-20

## 2017-07-28 RX ORDER — NABUMETONE 500 MG/1
1000 TABLET, FILM COATED ORAL DAILY PRN
Qty: 60 TABLET | Refills: 1 | Status: SHIPPED | OUTPATIENT
Start: 2017-07-28 | End: 2017-09-20

## 2017-07-28 NOTE — TELEPHONE ENCOUNTER
Message from MyChart:  Original authorizing provider: MD Martin Espinal would like a refill of the following medications:  traZODone (DESYREL) 50 MG tablet [Adolfo Simomns MD]  sertraline (ZOLOFT) 100 MG tablet [Adolfo Simmons MD]  gabapentin (NEURONTIN) 300 MG capsule [Adolfo Simmons MD]  nabumetone (RELAFEN) 500 MG tablet [Adolfo Simmons MD]  sildenafil (REVATIO/VIAGRA) 100 MG cap/tab [Adolfo Simmons MD]  cyclobenzaprine (FLEXERIL) 10 MG tablet [Adolfo Simmons MD]    Preferred pharmacy: Other - [facility] [address] in [place]    Comment:  My script for gabapentin has changed to 1200mg 3 times per day.

## 2017-07-30 ENCOUNTER — MYC MEDICAL ADVICE (OUTPATIENT)
Dept: FAMILY MEDICINE | Facility: CLINIC | Age: 57
End: 2017-07-30

## 2017-07-31 ENCOUNTER — OFFICE VISIT (OUTPATIENT)
Dept: FAMILY MEDICINE | Facility: CLINIC | Age: 57
End: 2017-07-31
Payer: MEDICAID

## 2017-07-31 VITALS
WEIGHT: 256.3 LBS | HEIGHT: 68 IN | HEART RATE: 84 BPM | SYSTOLIC BLOOD PRESSURE: 120 MMHG | OXYGEN SATURATION: 97 % | BODY MASS INDEX: 38.85 KG/M2 | DIASTOLIC BLOOD PRESSURE: 70 MMHG | TEMPERATURE: 97.6 F

## 2017-07-31 DIAGNOSIS — F10.10 ALCOHOL ABUSE: Primary | ICD-10-CM

## 2017-07-31 DIAGNOSIS — G89.28 OTHER CHRONIC POSTPROCEDURAL PAIN: ICD-10-CM

## 2017-07-31 PROCEDURE — 99213 OFFICE O/P EST LOW 20 MIN: CPT | Performed by: FAMILY MEDICINE

## 2017-07-31 ASSESSMENT — ENCOUNTER SYMPTOMS
PSYCHIATRIC NEGATIVE: 1
CONSTITUTIONAL NEGATIVE: 1
GASTROINTESTINAL NEGATIVE: 1
RESPIRATORY NEGATIVE: 1
CARDIOVASCULAR NEGATIVE: 1

## 2017-07-31 NOTE — LETTER
[address], [address]  [facility] [address]  Phone: [phone]  Fax: [phone]    July 31, 2017        Aydin Reilly  [address] [facility] [address]          To whom it may concern:    RE: Aydin Reilly may use the following medications:    Sudafed, 30-60 mg, every 6 hours as needed for congestion or cold.  Flonase, 2 squirts each nostril daily as needed for allergies  Melatonin, 5-20 mg nightly in the evening as needed for sleep.  Acetaminophen, 1000 mg every 8 hours as needed for pain.      Please contact me for questions or concerns.      Sincerely,        Adolfo Simmons MD

## 2017-07-31 NOTE — TELEPHONE ENCOUNTER
Sildenafil Rx was faxed to [facility], pharmacy will notify patient when ready to be picked up.   Filed at  desk.     Bruno Danielle   07/31/17

## 2017-07-31 NOTE — PROGRESS NOTES
HPI    SUBJECTIVE:                                                    Aydin Reilly is a 57 year old male who presents to clinic today for the following health issues:      Pt is here to discuss medications and is in need of a letter stating it's ok to take as needed.  Recently completed inpatient CD treatment.  58 days sober.  Living in [place] in a sober house.  Needing letter for meds.  Planning on finding other sober housing, looking for work.    Some concerns about OTC meds are an issue - melatonin 20mg PRN, sudafed PRN for allergies, tylenol prn.  Rx meds are covered without concern.      Review of Systems   Constitutional: Negative.    Respiratory: Negative.    Cardiovascular: Negative.    Gastrointestinal: Negative.    Psychiatric/Behavioral: Negative.          Physical Exam   Constitutional: He is oriented to person, place, and time and well-developed, well-nourished, and in no distress.   Eyes: Conjunctivae and EOM are normal.   Cardiovascular: Normal rate, regular rhythm and normal heart sounds.    Pulmonary/Chest: Effort normal and breath sounds normal.   Musculoskeletal: He exhibits no edema.   Neurological: He is alert and oriented to person, place, and time.   Skin: Skin is warm and dry.   Vitals reviewed.    (F10.10) Alcohol abuse  (primary encounter diagnosis)  Comment: in recovery doing well  Plan:     (G89.28) Other chronic postprocedural pain  Comment:   Plan: non-narcotic treatment, currwilllty being managed by psychiatry.      RTC in  prn    Adolfo Simmons MD

## 2017-07-31 NOTE — MR AVS SNAPSHOT
"              After Visit Summary   7/31/2017    Aydin Reilly    MRN: [id number]           Patient Information     Date Of Birth          1960        Visit Information        Provider Department      7/31/2017 2:40 PM Adolfo Simmons MD [facility]        Today's Diagnoses     Alcohol abuse    -  1    Other chronic postprocedural pain           Follow-ups after your visit        Follow-up notes from your care team     Return in about 3 months (around 10/31/2017).      Who to contact     If you have questions or need follow up information about today's clinic visit or your schedule please contact [facility] directly at [phone].  Normal or non-critical lab and imaging results will be communicated to you by MyChart, letter or phone within 4 business days after the clinic has received the results. If you do not hear from us within 7 days, please contact the clinic through [employer] or phone. If you have a critical or abnormal lab result, we will notify you by phone as soon as possible.  Submit refill requests through [employer] or call your pharmacy and they will forward the refill request to us. Please allow 3 business days for your refill to be completed.          Additional Information About Your Visit        MyChart Information     [employer] gives you secure access to your electronic health record. If you see a primary care provider, you can also send messages to your care team and make appointments. If you have questions, please call your primary care clinic.  If you do not have a primary care provider, please call [phone] and they will assist you.        Care EveryWhere ID     This is your Care EveryWhere ID. This could be used by other organizations to access your [facility] medical records  [id number]        Your Vitals Were     Pulse Temperature Height Pulse Oximetry BMI (Body Mass Index)       84 97.6  F (36.4  C) (Oral) 5' 8\" (1.727 m) 97% 38.97 kg/m2        " Blood Pressure from Last 3 Encounters:   07/31/17 120/70   05/25/17 139/87   05/10/17 112/72    Weight from Last 3 Encounters:   07/31/17 256 lb 4.8 oz (116.3 kg)   05/25/17 248 lb 8 oz (112.7 kg)   05/10/17 253 lb (114.8 kg)              Today, you had the following     No orders found for display       Primary Care Provider Office Phone # Fax #    Adolfo Ronn Simmons [phone]       [facility] [address]  [facility] [address]        Equal Access to Services     [facility] : Hadii aad ku hadasho Soomaali, waaxda luqadaha, qaybta kaalmada adeegyada, gogo jules hayelizabeth adejamie swanson . So [facility] [phone].    ATENCIÓN: Si habla español, tiene a lyn disposición servicios gratuitos de asistencia lingüística. Llame al [phone].    We comply with applicable federal civil rights laws and [place] laws. We do not discriminate on the basis of race, color, national origin, age, disability sex, sexual orientation or gender identity.            Thank you!     Thank you for choosing [facility]  for your care. Our goal is always to provide you with excellent care. Hearing back from our patients is one way we can continue to improve our services. Please take a few minutes to complete the written survey that you may receive in the mail after your visit with us. Thank you!             Your Updated Medication List - Protect others around you: Learn how to safely use, store and throw away your medicines at www.disposemymeds.org.          This list is accurate as of: 7/31/17  3:01 PM.  Always use your most recent med list.                   Brand Name Dispense Instructions for use Diagnosis    BENADRYL PO      Take 25 mg by mouth every 6 hours as needed        cyclobenzaprine 10 MG tablet    FLEXERIL    60 tablet    Take 1 tablet (10 mg) by mouth nightly as needed for muscle spasms    Lumbar radiculopathy       diclofenac 1 % Gel topical gel    [facility]  2-4 g onto the skin 4 times daily as needed for moderate pain (apply 4 grams to knees or 2 grams to hands)        gabapentin 300 MG capsule    NEURONTIN    360 capsule    Take 4 capsules (1,200 mg) by mouth 3 times daily    Other mononeuropathy       ibuprofen 200 MG capsule     120 capsule    Take 400-600 mg by mouth every 8 hours as needed    Cellulitis of scrotum       MELATONIN PO      Take 3 mg by mouth nightly as needed Reported on 5/10/2017        miconazole 2 % powder    MICATIN; MICRO GUARD    60 g    Apply topically 2 times daily    Intertriginous candidiasis       Multi-vitamin Tabs tablet      Take 1 tablet by mouth daily        nabumetone 500 MG tablet    RELAFEN    60 tablet    Take 2 tablets (1,000 mg) by mouth daily as needed for moderate pain    Chronic pain of right knee       pseudoePHEDrine 30 MG tablet    SUDAFED     Take 30-60 mg by mouth every 4 hours as needed for congestion        sertraline 100 MG tablet    ZOLOFT    135 tablet    Take 1.5 tablets (150 mg) by mouth daily    Generalized anxiety disorder       sildenafil 100 MG cap/tab    REVATIO/VIAGRA    12 tablet    Take 0.5-1 tablets ( mg) by mouth daily as needed for erectile dysfunction Take 30 min to 4 hours before intercourse.  Never use with nitroglycerin, terazosin or doxazosin.    Other male erectile dysfunction       traZODone 50 MG tablet    DESYREL    90 tablet    Take 1-3 tablets ( mg) by mouth nightly as needed for sleep    CONNER (generalized anxiety disorder)

## 2017-08-10 ENCOUNTER — MYC MEDICAL ADVICE (OUTPATIENT)
Dept: FAMILY MEDICINE | Facility: CLINIC | Age: 57
End: 2017-08-10

## 2017-08-10 DIAGNOSIS — F41.1 GENERALIZED ANXIETY DISORDER: Primary | ICD-10-CM

## 2017-08-11 RX ORDER — BUPROPION HYDROCHLORIDE 300 MG/1
300 TABLET ORAL EVERY MORNING
Qty: 90 TABLET | Refills: 1 | Status: SHIPPED | OUTPATIENT
Start: 2017-08-11 | End: 2017-08-11

## 2017-08-11 RX ORDER — BUPROPION HYDROCHLORIDE 300 MG/1
300 TABLET ORAL EVERY MORNING
Qty: 90 TABLET | Refills: 1 | Status: SHIPPED | OUTPATIENT
Start: 2017-08-11 | End: 2017-10-31

## 2017-08-11 NOTE — TELEPHONE ENCOUNTER
Patient sent a [employer] message back requesting rx to be sent to [facility] off [address].  Ilene Horner RN

## 2017-09-03 ENCOUNTER — HOSPITAL ENCOUNTER (EMERGENCY)
Facility: CLINIC | Age: 57
Discharge: HOME | End: 2017-09-03
Attending: FAMILY MEDICINE | Admitting: FAMILY MEDICINE
Payer: MEDICAID

## 2017-09-03 VITALS
SYSTOLIC BLOOD PRESSURE: 121 MMHG | BODY MASS INDEX: 38.01 KG/M2 | OXYGEN SATURATION: 96 % | TEMPERATURE: 96.7 F | WEIGHT: 250 LBS | RESPIRATION RATE: 17 BRPM | DIASTOLIC BLOOD PRESSURE: 59 MMHG | HEART RATE: 97 BPM

## 2017-09-03 DIAGNOSIS — F10.20 UNCOMPLICATED ALCOHOL DEPENDENCE (H): ICD-10-CM

## 2017-09-03 LAB — ALCOHOL BREATH TEST: 0.04 (ref 0–0.01)

## 2017-09-03 PROCEDURE — 25000132 ZZH RX MED GY IP 250 OP 250 PS 637: Performed by: FAMILY MEDICINE

## 2017-09-03 PROCEDURE — 99283 EMERGENCY DEPT VISIT LOW MDM: CPT | Mod: Z6 | Performed by: FAMILY MEDICINE

## 2017-09-03 PROCEDURE — 82075 ASSAY OF BREATH ETHANOL: CPT | Performed by: FAMILY MEDICINE

## 2017-09-03 PROCEDURE — 99283 EMERGENCY DEPT VISIT LOW MDM: CPT | Performed by: FAMILY MEDICINE

## 2017-09-03 RX ORDER — DIAZEPAM 5 MG
5 TABLET ORAL ONCE
Status: COMPLETED | OUTPATIENT
Start: 2017-09-03 | End: 2017-09-03

## 2017-09-03 RX ADMIN — DIAZEPAM 5 MG: 5 TABLET ORAL at 21:07

## 2017-09-03 NOTE — ED PROVIDER NOTES
"  History     Chief Complaint   Patient presents with     Alcohol Intoxication     Detox from alcohol, drinking vodka, 5 day binge, last drink one hour ago, smoking meth, marijuana, using percocet.     HPI  Aydin Reilly is a 57 year old male who resents seeking detox from alcohol.  He has a long-standing history of alcoholism and polysubstance abuse.  He has been on a 5 day binge drinking primarily vodka.  His last drink was 2 hours ago.  He becomes shaky if he does not drink but has no history of delirium tremens or alcohol withdrawal seizures.  He is also used \"recreational drugs\" such as methamphetamine yesterday, one tablet of Percocet yesterday, and marijuana 2 or 3 times in the last week.  He denies any acute medical symptoms at this time.  He has a history of depression and anxiety but no suicidal thoughts and no symptoms of psychosis.    I have reviewed the Medications, Allergies, Past Medical and Surgical History, and Social History in the Epic system.    Review of Systems  All other systems were reviewed and are negative    Physical Exam   BP: 136/53  Pulse: 112  Heart Rate: 112  Temp: 98.4  F (36.9  C)  Resp: 16  Weight: 113.4 kg (250 lb)  SpO2: 98 %  Physical Exam   Constitutional: He is oriented to person, place, and time. He appears well-developed and well-nourished. No distress.   HENT:   Head: Normocephalic and atraumatic.   Eyes: Pupils are equal, round, and reactive to light.   Neck: Neck supple.   Cardiovascular: Normal heart sounds.    No murmur heard.  Heart rate 105 at the time of my exam   Pulmonary/Chest: Effort normal and breath sounds normal.   Abdominal: Soft.   Musculoskeletal: Normal range of motion.   Neurological: He is alert and oriented to person, place, and time.   Psychiatric: He has a normal mood and affect. His behavior is normal. Thought content normal.       ED Course     ED Course     Procedures             Critical Care time:  none             Labs Ordered and Resulted " from Time of ED Arrival Up to the Time of Departure from the ED   ALCOHOL BREATH TEST POCT - Abnormal; Notable for the following:        Result Value    Alcohol Breath Test 0.038 (*)     All other components within normal limits            Assessments & Plan (with Medical Decision Making)   Patient with history of alcoholism presents seeking detox from alcohol.  He has been on a 5 day binge.  He has no history of DTs or seizures but does become shaky and has trouble stopping in the community without assistance.  Unfortunately no detox beds are available here, but we were able to find a bed at community detox facility.  Patient agrees to go there.  He is medically and psychiatrically stable for transport.    I have reviewed the nursing notes.    I have reviewed the findings, diagnosis, plan and need for follow up with the patient.    New Prescriptions    No medications on file       Final diagnoses:   Uncomplicated alcohol dependence (H)       9/3/2017   [facility], [place], EMERGENCY DEPARTMENT     Rolando Belle MD  09/03/17 2021

## 2017-09-03 NOTE — ED AVS SNAPSHOT
[facility], Emergency Department    [address]    [facility] [address]    Phone:  [phone]    Fax:  [phone]                                       Aydin Reilly   MRN: [id number]    Department:  [facility], Emergency Department   Date of Visit:  9/3/2017           After Visit Summary Signature Page     I have received my discharge instructions, and my questions have been answered. I have discussed any challenges I see with this plan with the nurse or doctor.    ..........................................................................................................................................  Patient/Patient Representative Signature      ..........................................................................................................................................  Patient Representative Print Name and Relationship to Patient    ..................................................               ................................................  Date                                            Time    ..........................................................................................................................................  Reviewed by Signature/Title    ...................................................              ..............................................  Date                                                            Time

## 2017-09-03 NOTE — ED AVS SNAPSHOT
[facility], Emergency Department    [address]    [place] [address]    Phone:  [phone]    Fax:  [phone]                                       Aydin Reilly   MRN: [id number]    Department:  [facility], Emergency Department   Date of Visit:  9/3/2017           Patient Information     Date Of Birth          1960        Your diagnoses for this visit were:     Uncomplicated alcohol dependence (H)        You were seen by Rolando Belle MD.        Discharge Instructions       Thank you for choosing [facility].     Please closely monitor for further symptoms. Return to the Emergency Department if you develop any new or worsening signs or symptoms.    If you received any opiate pain medications or sedatives during your visit, please do not drive for at least 8 hours.     Labs, cultures or final xray interpretations may still need to be reviewed.  We will call you if your plan of care needs to be changed.    Please follow up with your primary care physician or clinic.    If you desire chemical dependency assessment or counseling, follow up with [facility]: [phone]      Discharge References/Attachments     ALCOHOL ABUSE (ENGLISH)    ADDICTION, RECOVERING (ENGLISH)      24 Hour Appointment Hotline       To make an appointment at any [facility] clinic, call [phone] ([phone]). If you don't have a family doctor or clinic, we will help you find one. [facility] clinics are conveniently located to serve the needs of you and your family.             Review of your medicines      Our records show that you are taking the medicines listed below. If these are incorrect, please call your family doctor or clinic.        Dose / Directions Last dose taken    BENADRYL PO   Dose:  25 mg        Take 25 mg by mouth every 6 hours as needed   Refills:  0        buPROPion 300 MG 24 hr tablet   Commonly known as:  WELLBUTRIN XL   Dose:  300 mg   Quantity:   90 tablet        Take 1 tablet (300 mg) by mouth every morning   Refills:  1        cyclobenzaprine 10 MG tablet   Commonly known as:  FLEXERIL   Dose:  10 mg   Quantity:  60 tablet        Take 1 tablet (10 mg) by mouth nightly as needed for muscle spasms   Refills:  1        diclofenac 1 % Gel topical gel   Commonly known as:  VOLTAREN   Dose:  2-4 g        Place 2-4 g onto the skin 4 times daily as needed for moderate pain (apply 4 grams to knees or 2 grams to hands)   Refills:  0        gabapentin 300 MG capsule   Commonly known as:  NEURONTIN   Dose:  1200 mg   Quantity:  360 capsule        Take 4 capsules (1,200 mg) by mouth 3 times daily   Refills:  1        ibuprofen 200 MG capsule   Dose:  400-600 mg   Quantity:  120 capsule        Take 400-600 mg by mouth every 8 hours as needed   Refills:  0        MELATONIN PO   Dose:  3 mg        Take 3 mg by mouth nightly as needed Reported on 5/10/2017   Refills:  0        miconazole 2 % powder   Commonly known as:  MICATIN; MICRO GUARD   Quantity:  60 g        Apply topically 2 times daily   Refills:  3        Multi-vitamin Tabs tablet   Dose:  1 tablet        Take 1 tablet by mouth daily   Refills:  0        nabumetone 500 MG tablet   Commonly known as:  RELAFEN   Dose:  1000 mg   Quantity:  60 tablet        Take 2 tablets (1,000 mg) by mouth daily as needed for moderate pain   Refills:  1        pseudoePHEDrine 30 MG tablet   Commonly known as:  SUDAFED   Dose:  30-60 mg        Take 30-60 mg by mouth every 4 hours as needed for congestion   Refills:  0        sertraline 100 MG tablet   Commonly known as:  ZOLOFT   Dose:  150 mg   Quantity:  135 tablet        Take 1.5 tablets (150 mg) by mouth daily   Refills:  1        sildenafil 100 MG cap/tab   Commonly known as:  REVATIO/VIAGRA   Dose:   mg   Quantity:  12 tablet        Take 0.5-1 tablets ( mg) by mouth daily as needed for erectile dysfunction Take 30 min to 4 hours before intercourse.  Never use  with nitroglycerin, terazosin or doxazosin.   Refills:  11        traZODone 50 MG tablet   Commonly known as:  DESYREL   Dose:   mg   Quantity:  90 tablet        Take 1-3 tablets ( mg) by mouth nightly as needed for sleep   Refills:  3                Procedures and tests performed during your visit     Alcohol breath test POCT      Orders Needing Specimen Collection     None      Pending Results     No orders found from 9/1/2017 to 9/4/2017.            Pending Culture Results     No orders found from 9/1/2017 to 9/4/2017.            Pending Results Instructions     If you had any lab results that were not finalized at the time of your Discharge, you can call the ED Lab Result RN at [phone]. You will be contacted by this team for any positive Lab results or changes in treatment. The nurses are available 7 days a week from 10A to 6:30P.  You can leave a message 24 hours per day and they will return your call.        Thank you for choosing [facility]       Thank you for choosing [facility] for your care. Our goal is always to provide you with excellent care. Hearing back from our patients is one way we can continue to improve our services. Please take a few minutes to complete the written survey that you may receive in the mail after you visit with us. Thank you!        Online Prasadhart Information     HackPad gives you secure access to your electronic health record. If you see a primary care provider, you can also send messages to your care team and make appointments. If you have questions, please call your primary care clinic.  If you do not have a primary care provider, please call [phone] and they will assist you.        Care EveryWhere ID     This is your Care EveryWhere ID. This could be used by other organizations to access your [facility] medical records  [id number]        Equal Access to Services     JIGAR GARCÍA AH: erick Cervantes qaybta kaalmada adeegyada, waxay idiin  mary donovan. So [facility] [phone].    ATENCIÓN: Si habla español, tiene a lyn disposición servicios gratuitos de asistencia lingüística. Llame al [phone].    We comply with applicable federal civil rights laws and [place] laws. We do not discriminate on the basis of race, color, national origin, age, disability sex, sexual orientation or gender identity.            After Visit Summary       This is your record. Keep this with you and show to your community pharmacist(s) and doctor(s) at your next visit.

## 2017-09-04 NOTE — DISCHARGE INSTRUCTIONS
Thank you for choosing [facility].     Please closely monitor for further symptoms. Return to the Emergency Department if you develop any new or worsening signs or symptoms.    If you received any opiate pain medications or sedatives during your visit, please do not drive for at least 8 hours.     Labs, cultures or final xray interpretations may still need to be reviewed.  We will call you if your plan of care needs to be changed.    Please follow up with your primary care physician or clinic.    If you desire chemical dependency assessment or counseling, follow up with [facility]: [phone]

## 2017-09-20 ENCOUNTER — MYC REFILL (OUTPATIENT)
Dept: FAMILY MEDICINE | Facility: CLINIC | Age: 57
End: 2017-09-20

## 2017-09-20 DIAGNOSIS — G89.29 CHRONIC PAIN OF RIGHT KNEE: ICD-10-CM

## 2017-09-20 DIAGNOSIS — N52.8 OTHER MALE ERECTILE DYSFUNCTION: ICD-10-CM

## 2017-09-20 DIAGNOSIS — M25.561 CHRONIC PAIN OF RIGHT KNEE: ICD-10-CM

## 2017-09-20 DIAGNOSIS — G58.8 OTHER MONONEUROPATHY: ICD-10-CM

## 2017-09-20 DIAGNOSIS — F41.1 GAD (GENERALIZED ANXIETY DISORDER): ICD-10-CM

## 2017-09-20 DIAGNOSIS — M54.16 LUMBAR RADICULOPATHY: ICD-10-CM

## 2017-09-20 RX ORDER — CYCLOBENZAPRINE HCL 10 MG
10 TABLET ORAL
Qty: 60 TABLET | Refills: 1 | Status: SHIPPED | OUTPATIENT
Start: 2017-09-20 | End: 2017-11-26

## 2017-09-20 RX ORDER — NABUMETONE 500 MG/1
1000 TABLET, FILM COATED ORAL DAILY PRN
Qty: 60 TABLET | Refills: 1 | Status: SHIPPED | OUTPATIENT
Start: 2017-09-20 | End: 2017-11-26

## 2017-09-20 RX ORDER — SILDENAFIL 100 MG/1
50-100 TABLET, FILM COATED ORAL DAILY PRN
Qty: 12 TABLET | Refills: 11 | Status: ON HOLD | OUTPATIENT
Start: 2017-09-20 | End: 2018-06-13

## 2017-09-20 RX ORDER — TRAZODONE HYDROCHLORIDE 50 MG/1
50-150 TABLET, FILM COATED ORAL
Qty: 90 TABLET | Refills: 3 | Status: SHIPPED | OUTPATIENT
Start: 2017-09-20 | End: 2019-06-19

## 2017-09-20 RX ORDER — GABAPENTIN 300 MG/1
1200 CAPSULE ORAL 3 TIMES DAILY
Qty: 360 CAPSULE | Refills: 1 | Status: SHIPPED | OUTPATIENT
Start: 2017-09-20 | End: 2017-12-12

## 2017-09-20 NOTE — TELEPHONE ENCOUNTER
Message from Dengt:  Original authorizing provider: MD Martin Espinal would like a refill of the following medications:  traZODone (DESYREL) 50 MG tablet [Adolfo Simmons MD]  gabapentin (NEURONTIN) 300 MG capsule [Adolfo Simmons MD]  nabumetone (RELAFEN) 500 MG tablet [Adolfo Simmons MD]  sildenafil (REVATIO/VIAGRA) 100 MG cap/tab [Adolfo Simmons MD]  cyclobenzaprine (FLEXERIL) 10 MG tablet [Adolfo Simmons MD]    Preferred pharmacy: [address] [address] [address]    Comment:

## 2017-09-21 NOTE — TELEPHONE ENCOUNTER
Viagra Rx was faxed to [facility], pharmacy will notify patient when ready to be picked up.     Bruno Danielle   09/21/17 9:26 AM

## 2017-09-28 ENCOUNTER — TELEPHONE (OUTPATIENT)
Dept: FAMILY MEDICINE | Facility: CLINIC | Age: 57
End: 2017-09-28

## 2017-09-28 NOTE — TELEPHONE ENCOUNTER
"[facility] calling to f/u on PA that was sent \"a few days ago\" for Viagra.      Writer advised often this is not a covered drug-       Hawkins suggest less expensive option would be sidenafil 20 mg tabs if PA will not cover.     Please do PA if possible or advise as to change in med.     Georgina Mcmanus, RN        "

## 2017-09-29 NOTE — TELEPHONE ENCOUNTER
[phone]  Called insurance, you have to fill out a form and fax it in.  They are going to fax us the form.    GRACIELA Flores  September 29, 2017  2:29 PM

## 2017-10-30 ENCOUNTER — TELEPHONE (OUTPATIENT)
Dept: FAMILY MEDICINE | Facility: CLINIC | Age: 57
End: 2017-10-30

## 2017-10-30 NOTE — TELEPHONE ENCOUNTER
Clinic Action Needed: YES. Please follow up with Martin regarding a work form he needs to have Dr. Simmons sign and fax tomorrow. He plans to bring the form to the clinic in the morning on 10/31/17 or make an appointment with Dr. Simmons.    FNA Triage Call  Presenting Problem: needs work form signed and faxed by tomorrow    Guideline Used: n/a    Patient Recommendations/Teaching: Bring form to clinic with fax number listed. Follow up with clinic in afternoon to verify form filled out and faxed. Martin transferred to central scheduling to make clinic appointment.     Routed to: Dr. Simmons's care team.

## 2017-10-31 ENCOUNTER — OFFICE VISIT (OUTPATIENT)
Dept: FAMILY MEDICINE | Facility: CLINIC | Age: 57
End: 2017-10-31
Payer: MEDICAID

## 2017-10-31 ENCOUNTER — TELEPHONE (OUTPATIENT)
Dept: FAMILY MEDICINE | Facility: CLINIC | Age: 57
End: 2017-10-31

## 2017-10-31 VITALS
BODY MASS INDEX: 37.56 KG/M2 | RESPIRATION RATE: 20 BRPM | DIASTOLIC BLOOD PRESSURE: 80 MMHG | HEART RATE: 104 BPM | SYSTOLIC BLOOD PRESSURE: 138 MMHG | TEMPERATURE: 98 F | WEIGHT: 247 LBS

## 2017-10-31 DIAGNOSIS — F33.1 MODERATE EPISODE OF RECURRENT MAJOR DEPRESSIVE DISORDER (H): ICD-10-CM

## 2017-10-31 DIAGNOSIS — G47.33 OSA (OBSTRUCTIVE SLEEP APNEA): ICD-10-CM

## 2017-10-31 DIAGNOSIS — E29.1 HYPOGONADISM MALE: ICD-10-CM

## 2017-10-31 DIAGNOSIS — L02.219 CELLULITIS AND ABSCESS OF TRUNK: ICD-10-CM

## 2017-10-31 DIAGNOSIS — K43.2 INCISIONAL HERNIA, WITHOUT OBSTRUCTION OR GANGRENE: Primary | ICD-10-CM

## 2017-10-31 DIAGNOSIS — L03.319 CELLULITIS AND ABSCESS OF TRUNK: ICD-10-CM

## 2017-10-31 PROBLEM — E66.01 MORBID OBESITY (H): Status: ACTIVE | Noted: 2017-10-31

## 2017-10-31 PROCEDURE — 99214 OFFICE O/P EST MOD 30 MIN: CPT | Performed by: FAMILY MEDICINE

## 2017-10-31 NOTE — MR AVS SNAPSHOT
After Visit Summary   10/31/2017    Aydin Reilly    MRN: [id number]           Patient Information     Date Of Birth          1960        Visit Information        Provider Department      10/31/2017 4:45 PM Lino Pratt MD [facility]        Today's Diagnoses     Incisional hernia, without obstruction or gangrene    -  1    MAGALY (obstructive sleep apnea)        Cellulitis of scrotum           Follow-ups after your visit        Additional Services     GENERAL SURG ADULT REFERRAL       Your provider has referred you to: [facility]: [facility] [place] [phone]   [email or web address]    Please be aware that coverage of these services is subject to the terms and limitations of your health insurance plan.  Call member services at your health plan with any benefit or coverage questions.      Please bring the following to your appointment:  >>   Any x-rays, CTs or MRIs which have been performed.  Contact the facility where they were done to arrange for  prior to your scheduled appointment.  Any new CT, MRI or other procedures ordered by your specialist must be performed at a [facility] facility or coordinated by your clinic's referral office.    >>   List of current medications   >>   This referral request   >>   Any documents/labs given to you for this referral            UROLOGY ADULT REFERRAL       Your provider has referred you to: [facility] [phone]    Please be aware that coverage of these services is subject to the terms and limitations of your health insurance plan.  Call member services at your health plan with any benefit or coverage questions.      Please bring the following to your appointment:    >>   Any x-rays, CTs or MRIs which have been performed.  Contact the facility where they were done to arrange for  prior to your scheduled appointment.  Any new CT, MRI or other procedures ordered by  your specialist must be performed at a [facility] facility or coordinated by your clinic's referral office.    >>   List of current medications   >>   This referral request   >>   Any documents/labs given to you for this referral                  Who to contact     If you have questions or need follow up information about today's clinic visit or your schedule please contact [facility] directly at [phone].  Normal or non-critical lab and imaging results will be communicated to you by InfoReachhart, letter or phone within 4 business days after the clinic has received the results. If you do not hear from us within 7 days, please contact the clinic through InfoReachhart or phone. If you have a critical or abnormal lab result, we will notify you by phone as soon as possible.  Submit refill requests through Meaningo or call your pharmacy and they will forward the refill request to us. Please allow 3 business days for your refill to be completed.          Additional Information About Your Visit        InfoReachhart Information     Meaningo gives you secure access to your electronic health record. If you see a primary care provider, you can also send messages to your care team and make appointments. If you have questions, please call your primary care clinic.  If you do not have a primary care provider, please call [phone] and they will assist you.        Care EveryWhere ID     This is your Care EveryWhere ID. This could be used by other organizations to access your [facility] medical records  [id number]        Your Vitals Were     Pulse Temperature Respirations BMI (Body Mass Index)          104 98  F (36.7  C) (Oral) 20 37.56 kg/m2         Blood Pressure from Last 3 Encounters:   10/31/17 138/80   09/03/17 121/59   07/31/17 120/70    Weight from Last 3 Encounters:   10/31/17 247 lb (112 kg)   09/03/17 250 lb (113.4 kg)   07/31/17 256 lb 4.8 oz (116.3 kg)              We Performed the Following     GENERAL SURG ADULT  REFERRAL     UROLOGY ADULT REFERRAL        Primary Care Provider Office Phone # Fax #    Adolfo Simmons [phone]       [id number]  [facility] [address]        Equal Access to Services     JIGAR GARCÍA : Hadpavan florencio bhtat arabellao Sojoeali, waaxda luqadaha, qaybta kaalmada adeegyada, gogo sanchez kiki donovan. So [facility] [phone].    ATENCIÓN: Si habla español, tiene a lyn disposición servicios gratuitos de asistencia lingüística. Llame al [phone].    We comply with applicable federal civil rights laws and [place] laws. We do not discriminate on the basis of race, color, national origin, age, disability, sex, sexual orientation, or gender identity.            Thank you!     Thank you for choosing [facility]  for your care. Our goal is always to provide you with excellent care. Hearing back from our patients is one way we can continue to improve our services. Please take a few minutes to complete the written survey that you may receive in the mail after your visit with us. Thank you!             Your Updated Medication List - Protect others around you: Learn how to safely use, store and throw away your medicines at www.disposemymeds.org.          This list is accurate as of: 10/31/17  5:37 PM.  Always use your most recent med list.                   Brand Name Dispense Instructions for use Diagnosis    cyclobenzaprine 10 MG tablet    FLEXERIL    60 tablet    Take 1 tablet (10 mg) by mouth nightly as needed for muscle spasms    Lumbar radiculopathy       diclofenac 1 % Gel topical gel    VOLTAREN     Place 2-4 g onto the skin 4 times daily as needed for moderate pain (apply 4 grams to knees or 2 grams to hands)        gabapentin 300 MG capsule    NEURONTIN    360 capsule    Take 4 capsules (1,200 mg) by mouth 3 times daily    Other mononeuropathy       ibuprofen 200 MG capsule     120 capsule    Take 400-600 mg by mouth every 8 hours as needed     Cellulitis of scrotum       MELATONIN PO      Take 3 mg by mouth nightly as needed Reported on 5/10/2017        miconazole 2 % powder    MICATIN; MICRO GUARD    60 g    Apply topically 2 times daily    Intertriginous candidiasis       Multi-vitamin Tabs tablet      Take 1 tablet by mouth daily        nabumetone 500 MG tablet    RELAFEN    60 tablet    Take 2 tablets (1,000 mg) by mouth daily as needed for moderate pain    Chronic pain of right knee       pseudoePHEDrine 30 MG tablet    SUDAFED     Take 30-60 mg by mouth every 4 hours as needed for congestion        sertraline 100 MG tablet    ZOLOFT    135 tablet    Take 1.5 tablets (150 mg) by mouth daily    Generalized anxiety disorder       sildenafil 100 MG tablet    VIAGRA    12 tablet    Take 0.5-1 tablets ( mg) by mouth daily as needed Take 30 min to 4 hours before intercourse.  Never use with nitroglycerin, terazosin or doxazosin.    Other male erectile dysfunction       traZODone 50 MG tablet    DESYREL    90 tablet    Take 1-3 tablets ( mg) by mouth nightly as needed for sleep    CONNER (generalized anxiety disorder)

## 2017-10-31 NOTE — TELEPHONE ENCOUNTER
Left message to call back    Pt was scheduled to see Dr. Pratt earlier today in AV. And now on [facility] schedule this afternoon in FM (AA covering FM for the evening shift)   Dr. Pratt wonders if Martin would see Dr. Simmons instead? PCP has open appointments tomorrow (at time of call)  It is likely AA might not be able to complete form at today's visit..     Martin, will you reschedule forms visit with Dr. Simmons?   Jaswant Rubio, RN

## 2017-10-31 NOTE — TELEPHONE ENCOUNTER
Called the Pt to discuss below. No answer, Left message advising callback.     Pt was scheduled today at 10:45am at the [facility], not sure if he made it to that appointment or not.     Eliane Arce RN -- [facility]

## 2017-10-31 NOTE — PROGRESS NOTES
SUBJECTIVE:   Aydin Reilly is a 57 year old male who presents to clinic today for the following health issues:      FORMS - needs form completed to keep electricity on at home.  Uses C-pap machine nightly.    hernia      Duration: 1 year ago.    Description (location/character/radiation): noticed bulging in the umbilicus and mid stomach.    Intensity:  moderate    Accompanying signs and symptoms: feels gurgling in the stomach and some pain in the hernia area sometimes.    History (similar episodes/previous evaluation): hx of previous hernia in the umbilicus, s/p repair.    Precipitating or alleviating factors: obesity, pt is working on weight loss.    Therapies tried and outcome: None         Problem list and histories reviewed & adjusted, as indicated.  Additional history: as documented    Patient Active Problem List   Diagnosis     H/O malignant neoplasm of rectum, rectosigmoid junction, and anus     Cellulitis of scrotum     Lumbar Radiculopathy, SEE FYI     Back Pain w/ Radiation, SEE FYI     Chronic abdominal pain     Hyperlipidemia with target LDL less than 130     Hypogonadism     Scrotal pain     Erectile dysfunction     Drug abuse, opioid type     GIB (gastrointestinal bleeding)     MAGALY (obstructive sleep apnea)     Generalized anxiety disorder     Urethral stricture     Urethral stricture     Alcohol abuse     History of urethral stricture     Chronic pain     Edema     Anemia of chronic disease     Other mononeuropathy     Hx SBO     Health Care Home     Elevated blood pressure reading without diagnosis of hypertension     Rotator cuff injury, right, initial encounter     Chondritis     Anserine bursitis     Substance abuse     Chronic pain of right knee     Intertriginous candidiasis     Gastroesophageal reflux disease, esophagitis presence not specified     Morbid obesity (H)     Moderate episode of recurrent major depressive disorder (H)     Past Surgical History:   Procedure Laterality Date      ABDOMEN SURGERY       BACK SURGERY       BIOPSY       BIOPSY       C NONSPECIFIC PROCEDURE  1991    L4-L5 laminectomy; disk herniation     C NONSPECIFIC PROCEDURE  1999    squamous cell CA - anus, 27 xrt/3 rounds, 5-FU/cisplatin     C NONSPECIFIC PROCEDURE      umbilical hernia     C NONSPECIFIC PROCEDURE  2002    cystscopy for urethral stricture from radiation therapy     COLONOSCOPY       COLONOSCOPY  4/18/2014    Procedure: Colonoscopy   polyp bx;  Surgeon: Josef Mckeon MD;  Location:  GI     CYSTOSCOPY, CYSTOGRAM, COMBINED N/A 2/26/2015    Procedure: COMBINED CYSTOSCOPY, CYSTOGRAM;  Surgeon: Darell Barrera MD;  Location: [facility] OR     CYSTOSCOPY, INTERNAL URETHROTOMY, COMBINED N/A 12/19/2014    Procedure: COMBINED CYSTOSCOPY, INTERNAL URETHROTOMY;  Surgeon: Buzz Ren MD;  Location:  OR     CYSTOSTOMY, INSERT TUBE SUPRAPUBIC, COMBINED  12/19/2014    Procedure: COMBINED CYSTOSTOMY, INSERT TUBE SUPRAPUBIC;  Surgeon: Buzz Ren MD;  Location:  OR     CYSTOSTOMY, INSERT TUBE SUPRAPUBIC, COMBINED N/A 12/29/2014    Procedure: COMBINED CYSTOSTOMY, INSERT TUBE SUPRAPUBIC;  Surgeon: Buzz Ren MD;  Location:  OR     ENT SURGERY       ESOPHAGOSCOPY, GASTROSCOPY, DUODENOSCOPY (EGD), COMBINED  10/2/2012    Procedure: COMBINED ESOPHAGOSCOPY, GASTROSCOPY, DUODENOSCOPY (EGD);  COMBINED ESOPHAGOSCOPY, GASTROSCOPY, DUODENOSCOPY (EGD) ;  Surgeon: Raj Beltre MD;  Location:  GI     HERNIA REPAIR       LAPAROSCOPIC LYSIS ADHESIONS N/A 12/4/2015    Procedure: LAPAROSCOPIC LYSIS ADHESIONS;  Surgeon: Herbie Sanchez MD;  Location:  OR     LAPAROTOMY EXPLORATORY N/A 12/4/2015    Procedure: LAPAROTOMY EXPLORATORY;  Surgeon: Herbie Sanchez MD;  Location: [facility] OR     MYRINGOTOMY      in childhood     TONSILLECTOMY and adenoidectomy      in childhood     URETHROPLASTY WITH BUCCAL GRAFT N/A 3/27/2015    Procedure: URETHROPLASTY WITH BUCCAL GRAFT;  Surgeon:  Darell Barrera MD;  Location:  OR       Social History   Substance Use Topics     Smoking status: Never Smoker     Smokeless tobacco: Never Used     Alcohol use 0.0 oz/week     0 Standard drinks or equivalent per week      Comment: quit june 30, 2008/ Quit 1/22/15     Family History   Problem Relation Age of Onset     Adopted: Yes     Unknown/Adopted Mother      Suicide Father      Depression No family hx of      Anxiety Disorder No family hx of      Schizophrenia No family hx of      Bipolar Disorder No family hx of      Substance Abuse No family hx of      Dementia No family hx of      [place] Disease No family hx of      Parkinsonism No family hx of      Autism Spectrum Disorder No family hx of      Intellectual Disability (Mental Retardation) No family hx of      MENTAL ILLNESS No family hx of          Current Outpatient Prescriptions   Medication Sig Dispense Refill     traZODone (DESYREL) 50 MG tablet Take 1-3 tablets ( mg) by mouth nightly as needed for sleep 90 tablet 3     gabapentin (NEURONTIN) 300 MG capsule Take 4 capsules (1,200 mg) by mouth 3 times daily 360 capsule 1     nabumetone (RELAFEN) 500 MG tablet Take 2 tablets (1,000 mg) by mouth daily as needed for moderate pain 60 tablet 1     cyclobenzaprine (FLEXERIL) 10 MG tablet Take 1 tablet (10 mg) by mouth nightly as needed for muscle spasms 60 tablet 1     sertraline (ZOLOFT) 100 MG tablet Take 1.5 tablets (150 mg) by mouth daily 135 tablet 1     ibuprofen 200 MG capsule Take 400-600 mg by mouth every 8 hours as needed 120 capsule      diclofenac (VOLTAREN) 1 % GEL Place 2-4 g onto the skin 4 times daily as needed for moderate pain (apply 4 grams to knees or 2 grams to hands)       pseudoePHEDrine (SUDAFED) 30 MG tablet Take 30-60 mg by mouth every 4 hours as needed for congestion       multivitamin, therapeutic with minerals (MULTI-VITAMIN) TABS Take 1 tablet by mouth daily       MELATONIN PO Take 3 mg by mouth nightly as needed  Reported on 5/10/2017       sildenafil (VIAGRA) 100 MG tablet Take 0.5-1 tablets ( mg) by mouth daily as needed Take 30 min to 4 hours before intercourse.  Never use with nitroglycerin, terazosin or doxazosin. 12 tablet 11     miconazole (MICATIN; MICRO GUARD) 2 % powder Apply topically 2 times daily (Patient not taking: Reported on 10/31/2017) 60 g 3     Allergies   Allergen Reactions     No Known Allergies      Seasonal Allergies          Reviewed and updated as needed this visit by clinical staffTobacco  Allergies  Problems  Med Hx  Surg Hx  Fam Hx  Soc Hx      Reviewed and updated as needed this visit by Provider         ROS:  C: NEGATIVE for fever, chills, change in weight  CV: NEGATIVE for chest pain, palpitations or peripheral edema    OBJECTIVE:     /80 (BP Location: Right arm, Patient Position: Sitting, Cuff Size: Adult Large)  Pulse 104  Temp 98  F (36.7  C) (Oral)  Resp 20  Wt 247 lb (112 kg)  BMI 37.56 kg/m2  Body mass index is 37.56 kg/(m^2).  GENERAL: healthy, alert and no distress  ABDOMEN: soft, nontender, bowel sounds normal, hernia of the umbilicus and surgical hernia noticed just above the umbilicus.  and obeisty.        ASSESSMENT/PLAN:             1. Incisional hernia, without obstruction or gangrene  Refer to   - GENERAL SURG ADULT REFERRAL    2. MAGALY (obstructive sleep apnea)  Form filled, for [employer]    3. Cellulitis of scrotum  Hx of recurrent cellluitis, and radiation problem of the scrotum, refer to   - UROLOGY ADULT REFERRAL    4. Moderate episode of recurrent major depressive disorder (H)  Stable at this time.     5. Hypogonadism male            Lino Pratt MD  [facility]

## 2017-10-31 NOTE — TELEPHONE ENCOUNTER
Patient states he found the forms yesterday and needs them done today.    The form is for a CPAP machine and just needs a signature stating he uses it for electric company.    Patient apologizes for missing the mornings appointment.    Rita Adame

## 2017-12-08 ENCOUNTER — MYC MEDICAL ADVICE (OUTPATIENT)
Dept: FAMILY MEDICINE | Facility: CLINIC | Age: 57
End: 2017-12-08

## 2017-12-08 DIAGNOSIS — R60.0 PEDAL EDEMA: ICD-10-CM

## 2017-12-08 RX ORDER — FUROSEMIDE 40 MG
40 TABLET ORAL DAILY PRN
Qty: 30 TABLET | Refills: 1 | Status: SHIPPED | OUTPATIENT
Start: 2017-12-08 | End: 2018-05-09

## 2017-12-08 NOTE — TELEPHONE ENCOUNTER
Lasix 40mg      Last Written Prescription Date: 5/10/2017  Last Fill Quantity: 30, # refills: 1  Last Office Visit with [facility] or [facility] prescribing provider: 10/31/2017       Potassium   Date Value Ref Range Status   11/19/2016 4.1 3.4 - 5.3 mmol/L Final     Creatinine   Date Value Ref Range Status   11/19/2016 0.88 0.66 - 1.25 mg/dL Final     BP Readings from Last 3 Encounters:   10/31/17 138/80   09/03/17 121/59   07/31/17 120/70     Routing refill request to provider for review/approval because:  Labs not current:  Needs yearly potassium and creatinine.  Ilene Horner RN

## 2017-12-12 DIAGNOSIS — G58.8 OTHER MONONEUROPATHY: ICD-10-CM

## 2017-12-13 RX ORDER — GABAPENTIN 300 MG/1
CAPSULE ORAL
Qty: 360 CAPSULE | Refills: 1 | Status: SHIPPED | OUTPATIENT
Start: 2017-12-13 | End: 2018-03-04

## 2017-12-13 NOTE — TELEPHONE ENCOUNTER
gabapentin (NEURONTIN) 300 MG capsule      Sig: Take 4 capsules (1,200 mg) by mouth 3 times daily  Last Written Prescription Date: 9/20/17  Last Quantity: 360, # refills: 1  Last Office Visit with [facility], [facility] or  Health prescribing provider: 10/31/2017       Creatinine   Date Value Ref Range Status   11/19/2016 0.88 0.66 - 1.25 mg/dL Final     Lab Results   Component Value Date    AST 30 11/14/2016     Lab Results   Component Value Date    ALT 42 11/14/2016     BP Readings from Last 3 Encounters:   10/31/17 138/80   09/03/17 121/59   07/31/17 120/70     Associated Diagnoses   Other mononeuropathy [G58.8]             Routing refill request to provider for review/approval because:  Drug not on the [facility], [facility] or  Health refill protocol or controlled substance    GRACIELA Flores  December 13, 2017  8:17 AM

## 2018-01-11 ENCOUNTER — TRANSFERRED RECORDS (OUTPATIENT)
Dept: HEALTH INFORMATION MANAGEMENT | Facility: CLINIC | Age: 58
End: 2018-01-11

## 2018-01-15 ENCOUNTER — OFFICE VISIT (OUTPATIENT)
Dept: FAMILY MEDICINE | Facility: CLINIC | Age: 58
End: 2018-01-15
Payer: COMMERCIAL

## 2018-01-15 VITALS
DIASTOLIC BLOOD PRESSURE: 82 MMHG | BODY MASS INDEX: 40.46 KG/M2 | HEIGHT: 67 IN | WEIGHT: 257.8 LBS | OXYGEN SATURATION: 99 % | TEMPERATURE: 98 F | SYSTOLIC BLOOD PRESSURE: 122 MMHG | HEART RATE: 105 BPM | RESPIRATION RATE: 20 BRPM

## 2018-01-15 DIAGNOSIS — Z23 NEED FOR PROPHYLACTIC VACCINATION AND INOCULATION AGAINST INFLUENZA: ICD-10-CM

## 2018-01-15 DIAGNOSIS — J20.9 ACUTE BRONCHITIS, UNSPECIFIED ORGANISM: Primary | ICD-10-CM

## 2018-01-15 PROCEDURE — 99213 OFFICE O/P EST LOW 20 MIN: CPT | Performed by: FAMILY MEDICINE

## 2018-01-15 RX ORDER — ALBUTEROL SULFATE 90 UG/1
2 AEROSOL, METERED RESPIRATORY (INHALATION) EVERY 6 HOURS PRN
Qty: 1 INHALER | Refills: 0 | Status: SHIPPED | OUTPATIENT
Start: 2018-01-15 | End: 2018-04-16

## 2018-01-15 RX ORDER — AZITHROMYCIN 250 MG/1
TABLET, FILM COATED ORAL
Qty: 6 TABLET | Refills: 0 | Status: SHIPPED | OUTPATIENT
Start: 2018-01-15 | End: 2018-04-16

## 2018-01-15 ASSESSMENT — ANXIETY QUESTIONNAIRES
3. WORRYING TOO MUCH ABOUT DIFFERENT THINGS: NOT AT ALL
GAD7 TOTAL SCORE: 0
2. NOT BEING ABLE TO STOP OR CONTROL WORRYING: NOT AT ALL
6. BECOMING EASILY ANNOYED OR IRRITABLE: NOT AT ALL
5. BEING SO RESTLESS THAT IT IS HARD TO SIT STILL: NOT AT ALL
1. FEELING NERVOUS, ANXIOUS, OR ON EDGE: NOT AT ALL
7. FEELING AFRAID AS IF SOMETHING AWFUL MIGHT HAPPEN: NOT AT ALL
IF YOU CHECKED OFF ANY PROBLEMS ON THIS QUESTIONNAIRE, HOW DIFFICULT HAVE THESE PROBLEMS MADE IT FOR YOU TO DO YOUR WORK, TAKE CARE OF THINGS AT HOME, OR GET ALONG WITH OTHER PEOPLE: NOT DIFFICULT AT ALL

## 2018-01-15 ASSESSMENT — ENCOUNTER SYMPTOMS
SORE THROAT: 1
WHEEZING: 1
PALPITATIONS: 0
SHORTNESS OF BREATH: 1
GASTROINTESTINAL NEGATIVE: 1
COUGH: 1
SPUTUM PRODUCTION: 1
FEVER: 1

## 2018-01-15 ASSESSMENT — PATIENT HEALTH QUESTIONNAIRE - PHQ9
SUM OF ALL RESPONSES TO PHQ QUESTIONS 1-9: 0
5. POOR APPETITE OR OVEREATING: NOT AT ALL

## 2018-01-15 NOTE — MR AVS SNAPSHOT
After Visit Summary   1/15/2018    Aydin Reilly    MRN: [id number]           Patient Information     Date Of Birth          1960        Visit Information        Provider Department      1/15/2018 2:20 PM Adolfo Simmons MD [facility]        Today's Diagnoses     Acute bronchitis, unspecified organism    -  1    Need for prophylactic vaccination and inoculation against influenza           Follow-ups after your visit        Follow-up notes from your care team     Return in about 2 weeks (around 1/29/2018).      Who to contact     If you have questions or need follow up information about today's clinic visit or your schedule please contact [facility] directly at [phone].  Normal or non-critical lab and imaging results will be communicated to you by MyChart, letter or phone within 4 business days after the clinic has received the results. If you do not hear from us within 7 days, please contact the clinic through [email or web address] or phone. If you have a critical or abnormal lab result, we will notify you by phone as soon as possible.  Submit refill requests through foodjunky or call your pharmacy and they will forward the refill request to us. Please allow 3 business days for your refill to be completed.          Additional Information About Your Visit        MyChart Information     [employer] gives you secure access to your electronic health record. If you see a primary care provider, you can also send messages to your care team and make appointments. If you have questions, please call your primary care clinic.  If you do not have a primary care provider, please call [phone] and they will assist you.        Care EveryWhere ID     This is your Care EveryWhere ID. This could be used by other organizations to access your [place] medical records  [id number]        Your Vitals Were     Pulse Temperature Respirations Height Pulse Oximetry BMI (Body Mass Index)     "105 98  F (36.7  C) (Oral) 20 5' 6.5\" (1.689 m) 99% 40.99 kg/m2       Blood Pressure from Last 3 Encounters:   01/15/18 122/82   10/31/17 138/80   09/03/17 121/59    Weight from Last 3 Encounters:   01/15/18 257 lb 12.8 oz (116.9 kg)   10/31/17 247 lb (112 kg)   09/03/17 250 lb (113.4 kg)              Today, you had the following     No orders found for display         Today's Medication Changes          These changes are accurate as of: 1/15/18  3:01 PM.  If you have any questions, ask your nurse or doctor.               Start taking these medicines.        Dose/Directions    albuterol 108 (90 BASE) MCG/ACT Inhaler   Commonly known as:  PROAIR HFA/PROVENTIL HFA/VENTOLIN HFA   Used for:  Acute bronchitis, unspecified organism   Started by:  Adolfo Simmons MD        Dose:  2 puff   Inhale 2 puffs into the lungs every 6 hours as needed for shortness of breath / dyspnea or wheezing   Quantity:  1 Inhaler   Refills:  0       azithromycin 250 MG tablet   Commonly known as:  ZITHROMAX   Used for:  Acute bronchitis, unspecified organism   Started by:  Adolfo Simmons MD        Two tablets first day, then one tablet daily for four days.   Quantity:  6 tablet   Refills:  0            Where to get your medicines      These medications were sent to [id number] [employer] [facility] [address]  [address] [facility] [address]     Phone:  [phone]     albuterol 108 (90 BASE) MCG/ACT Inhaler    azithromycin 250 MG tablet                Primary Care Provider Office Phone # Fax #    Adolfo Simmons [phone]       [id number]  [facility] [address]        Equal Access to Services     PAULETTE GARCÍA AH: Alexander Fraga, erick macias, qaybta kaalmanat herrera, gogo donovan. So [facility] [phone].    ATENCIÓN: Si habla español, tiene a lyn disposición servicios gratuitos de asistencia lingüística. Llame al " [phone].    We comply with applicable federal civil rights laws and [place] laws. We do not discriminate on the basis of race, color, national origin, age, disability, sex, sexual orientation, or gender identity.            Thank you!     Thank you for choosing [facility]  for your care. Our goal is always to provide you with excellent care. Hearing back from our patients is one way we can continue to improve our services. Please take a few minutes to complete the written survey that you may receive in the mail after your visit with us. Thank you!             Your Updated Medication List - Protect others around you: Learn how to safely use, store and throw away your medicines at www.disposemymeds.org.          This list is accurate as of: 1/15/18  3:01 PM.  Always use your most recent med list.                   Brand Name Dispense Instructions for use Diagnosis    albuterol 108 (90 BASE) MCG/ACT Inhaler    PROAIR HFA/PROVENTIL HFA/VENTOLIN HFA    1 Inhaler    Inhale 2 puffs into the lungs every 6 hours as needed for shortness of breath / dyspnea or wheezing    Acute bronchitis, unspecified organism       azithromycin 250 MG tablet    ZITHROMAX    6 tablet    Two tablets first day, then one tablet daily for four days.    Acute bronchitis, unspecified organism       cyclobenzaprine 10 MG tablet    FLEXERIL    60 tablet    TAKE 1 TABLET (10 MG) BY MOUTH NIGHTLY AS NEEDED FOR MUSCLE SPASMS    Lumbar radiculopathy       diclofenac 1 % Gel topical gel    VOLTAREN     Place 2-4 g onto the skin 4 times daily as needed for moderate pain (apply 4 grams to knees or 2 grams to hands)        furosemide 40 MG tablet    LASIX    30 tablet    Take 1 tablet (40 mg) by mouth daily as needed for edema    Pedal edema       gabapentin 300 MG capsule    NEURONTIN    360 capsule    TAKE 4 CAPSULES (1,200 MG) BY MOUTH 3 TIMES DAILY    Other mononeuropathy       ibuprofen 200 MG capsule     120 capsule    Take  400-600 mg by mouth every 8 hours as needed    Cellulitis of scrotum       MELATONIN PO      Take 3 mg by mouth nightly as needed Reported on 5/10/2017        miconazole 2 % powder    MICATIN; MICRO GUARD    60 g    Apply topically 2 times daily    Intertriginous candidiasis       Multi-vitamin Tabs tablet      Take 1 tablet by mouth daily        nabumetone 500 MG tablet    RELAFEN    60 tablet    TAKE 2 TABLETS (1,000 MG) BY MOUTH DAILY AS NEEDED FOR MODERATE PAIN    Chronic pain of right knee       pseudoePHEDrine 30 MG tablet    SUDAFED     Take 30-60 mg by mouth every 4 hours as needed for congestion        sertraline 100 MG tablet    ZOLOFT    135 tablet    Take 1.5 tablets (150 mg) by mouth daily    Generalized anxiety disorder       sildenafil 100 MG tablet    VIAGRA    12 tablet    Take 0.5-1 tablets ( mg) by mouth daily as needed Take 30 min to 4 hours before intercourse.  Never use with nitroglycerin, terazosin or doxazosin.    Other male erectile dysfunction       traZODone 50 MG tablet    DESYREL    90 tablet    Take 1-3 tablets ( mg) by mouth nightly as needed for sleep    CONNER (generalized anxiety disorder)

## 2018-01-15 NOTE — PROGRESS NOTES
HPI    SUBJECTIVE:   Aydin Reilly is a 57 year old male who presents to clinic today for the following health issues:      Acute Illness   Acute illness concerns: URI  Onset: x2 weeks    Fever: YES- slight    Chills/Sweats: YES    Headache (location?): no    Sinus Pressure:no    Conjunctivitis:  no    Ear Pain: YES: right    Rhinorrhea: YES    Congestion: YES    Sore Throat: YES     Cough: YES-non-productive    Wheeze: YES    Decreased Appetite: no    Nausea: no    Vomiting: no    Diarrhea:  no    Dysuria/Freq.: no    Fatigue/Achiness: YES    Sick/Strep Exposure: no     Therapies Tried and outcome: Tylenol, cough syrup, Naproxen, trazadone; no improvement    Two weeks ago tomorrow.  Sx steady over the last few days.  Did go into [facility] x2, no meds or interventions.  Is wheezing, feeling short of breath, unable to exert much before feeling short of breath.      Review of Systems   Constitutional: Positive for fever and malaise/fatigue.   HENT: Positive for congestion and sore throat. Negative for ear pain.    Respiratory: Positive for cough, sputum production, shortness of breath and wheezing.    Cardiovascular: Negative for palpitations.   Gastrointestinal: Negative.          Physical Exam   Constitutional: He is oriented to person, place, and time and well-developed, well-nourished, and in no distress.   Eyes: Conjunctivae and EOM are normal.   Cardiovascular: Normal rate, regular rhythm and normal heart sounds.    Pulmonary/Chest: Effort normal. He has wheezes. He has rhonchi.   Mild rhonchi and wheezing throughout   Musculoskeletal: He exhibits no edema.   Neurological: He is alert and oriented to person, place, and time.   Skin: Skin is warm and dry.   Vitals reviewed.    (J20.9) Acute bronchitis, unspecified organism  (primary encounter diagnosis)  Comment: sounding quite rough, mild wheeze  Plan: azithromycin (ZITHROMAX) 250 MG tablet,         albuterol (PROAIR HFA/PROVENTIL HFA/VENTOLIN          HFA) 108 (90 BASE) MCG/ACT Inhaler            (Z23) Need for prophylactic vaccination and inoculation against influenza  Comment:   Plan: defer until feeling      RTC in 2w prn    Adolfo Simmons MD

## 2018-01-15 NOTE — NURSING NOTE
"Chief Complaint   Patient presents with     Pharyngitis       Initial /82  Pulse 105  Temp 98  F (36.7  C) (Oral)  Resp 20  Ht 5' 6.5\" (1.689 m)  Wt 257 lb 12.8 oz (116.9 kg)  SpO2 99%  BMI 40.99 kg/m2 Estimated body mass index is 40.99 kg/(m^2) as calculated from the following:    Height as of this encounter: 5' 6.5\" (1.689 m).    Weight as of this encounter: 257 lb 12.8 oz (116.9 kg).  Medication Reconciliation: complete   Taya Mcgraw CMA (AAMA)      "

## 2018-01-16 ASSESSMENT — ANXIETY QUESTIONNAIRES: GAD7 TOTAL SCORE: 0

## 2018-02-02 DIAGNOSIS — G89.29 CHRONIC PAIN OF RIGHT KNEE: ICD-10-CM

## 2018-02-02 DIAGNOSIS — M25.561 CHRONIC PAIN OF RIGHT KNEE: ICD-10-CM

## 2018-02-02 DIAGNOSIS — M54.16 LUMBAR RADICULOPATHY: ICD-10-CM

## 2018-02-02 RX ORDER — CYCLOBENZAPRINE HCL 10 MG
TABLET ORAL
Qty: 60 TABLET | Refills: 0 | Status: SHIPPED | OUTPATIENT
Start: 2018-02-02 | End: 2018-04-06

## 2018-02-02 RX ORDER — NABUMETONE 500 MG/1
TABLET, FILM COATED ORAL
Qty: 60 TABLET | Refills: 1 | Status: SHIPPED | OUTPATIENT
Start: 2018-02-02 | End: 2018-04-26

## 2018-02-02 NOTE — TELEPHONE ENCOUNTER
"cyclobenzaprine (FLEXERIL) 10 MG tablet     Sig: TAKE 1 TABLET (10 MG) BY MOUTH NIGHTLY AS NEEDED FOR MUSCLE SPASMS   Last Written Prescription Date:  11/27/17  Last Fill Quantity: 60,   # refills: 0  Last Office Visit with [facility], [facility] or [facility] prescribing provider: 1/15/2018     Associated Diagnoses   Lumbar Radiculopathy, SEE JOSÉ MIGUEL [M54.16]           Routing refill request to provider for review/approval because:  Drug not on the [facility], [facility] or [facility] refill protocol or controlled substance    ________________________________________________________________________________      nabumetone (RELAFEN) 500 MG tablet [Pharmacy Med Name: NABUMETONE 500 MG TABLET] 60 tablet 1    Last Written Prescription Date:  11/27/17  Last Fill Quantity: 60,  # refills: 1   Last Office Visit: 1/15/2018   Future Office Visit:      Sig: TAKE 2 TABLETS (1,000 MG) BY MOUTH DAILY AS NEEDED FOR MODERATE PAIN    NSAID Medications Failed    2/2/2018 11:03 AM       Failed - Normal ALT on file in past 12 months    Recent Labs   Lab Test  11/14/16   0021   ALT  42            Failed - Normal AST on file in past 12 months    Recent Labs   Lab Test  11/14/16   0021   AST  30            Failed - Normal CBC on file in past 12 months    Recent Labs   Lab Test  11/19/16   0739   WBC  7.7   RBC  4.39*   HGB  11.9*   HCT  38.0*   PLT  197            Failed - Normal serum creatinine on file in past 12 months    Recent Labs   Lab Test  11/19/16   0739   CR  0.88            Passed - Blood pressure under 140/90    BP Readings from Last 3 Encounters:   01/15/18 122/82   10/31/17 138/80   09/03/17 121/59                Passed - Recent or future visit with authorizing provider's specialty    Patient had office visit in the last year or has a visit in the next 30 days with authorizing provider.  See \"Patient Info\" tab in inbasket, or \"Choose Columns\" in Meds & Orders section of the refill encounter.            Passed - Patient is age 6-64 years          "

## 2018-02-02 NOTE — TELEPHONE ENCOUNTER
Routing refill request to provider for review/approval because:  Drug not on the [facility] refill protocol   Labs not current:  BMP, CBC    Anna Cota RN, BS  Clinical Nurse Triage.

## 2018-03-04 DIAGNOSIS — G58.8 OTHER MONONEUROPATHY: ICD-10-CM

## 2018-03-05 RX ORDER — GABAPENTIN 300 MG/1
1200 CAPSULE ORAL 3 TIMES DAILY
Qty: 360 CAPSULE | Refills: 3 | Status: ON HOLD | OUTPATIENT
Start: 2018-03-05 | End: 2018-06-13

## 2018-03-05 NOTE — TELEPHONE ENCOUNTER
gabapentin (NEURONTIN) 300 MG capsule      Last Written Prescription Date:  12/13/17  Last Fill Quantity: 360 capsule,   # refills: 1  Last Office Visit: 1/15/18  Future Office visit:       Routing refill request to provider for review/approval because:  Drug not on the [facility], [facility] or [facility] refill protocol or controlled substance

## 2018-03-29 ENCOUNTER — MYC MEDICAL ADVICE (OUTPATIENT)
Dept: FAMILY MEDICINE | Facility: CLINIC | Age: 58
End: 2018-03-29

## 2018-03-30 ENCOUNTER — MYC MEDICAL ADVICE (OUTPATIENT)
Dept: FAMILY MEDICINE | Facility: CLINIC | Age: 58
End: 2018-03-30

## 2018-03-30 NOTE — TELEPHONE ENCOUNTER
Please review [employer] message   Respond directly to pt if appropriate  Anna Cota RN BS

## 2018-03-30 NOTE — TELEPHONE ENCOUNTER
Disp Refills Start End MARY JO   sildenafil (VIAGRA) 100 MG tablet 12 tablet 11 9/20/2017  No   Sig: Take 0.5-1 tablets ( mg) by mouth daily as needed Take 30 min to 4 hours before intercourse.  Never use with nitroglycerin, terazosin or doxazosin.   Class: Local [employer]

## 2018-04-06 DIAGNOSIS — M54.16 LUMBAR RADICULOPATHY: ICD-10-CM

## 2018-04-06 RX ORDER — CYCLOBENZAPRINE HCL 10 MG
TABLET ORAL
Qty: 60 TABLET | Refills: 0 | Status: ON HOLD | OUTPATIENT
Start: 2018-04-06 | End: 2018-06-13

## 2018-04-06 NOTE — TELEPHONE ENCOUNTER
cyclobenzaprine (FLEXERIL) 10 MG tablet      Sig: TAKE 1 TABLET (10 MG) BY MOUTH NIGHTLY AS NEEDED FOR MUSCLE SPASMS  Last Written Prescription Date:  2/2/18  Last Fill Quantity: 60,   # refills: 0  Last Office Visit with [facility], [facility] or [facility] prescribing provider: 1/15/2018    Next 5 appointments (look out 90 days)     Apr 06, 2018 10:20 AM CDT   Gregorio Kaur with Adolfo Simmons MD   [facility]    [address] [place] [address], [address] [place] [id number] [phone]               Associated Diagnoses   Lumbar Radiculopathy, SEE JOSÉ MIGUEL [M54.16]           Routing refill request to provider for review/approval because:  Drug not on the [facility], [facility] or [facility] refill protocol or controlled substance    GRACIELA Flores  April 6, 2018  10:10 AM

## 2018-04-16 ENCOUNTER — OFFICE VISIT (OUTPATIENT)
Dept: FAMILY MEDICINE | Facility: CLINIC | Age: 58
End: 2018-04-16
Payer: COMMERCIAL

## 2018-04-16 ENCOUNTER — RADIANT APPOINTMENT (OUTPATIENT)
Dept: GENERAL RADIOLOGY | Facility: CLINIC | Age: 58
End: 2018-04-16
Attending: FAMILY MEDICINE
Payer: COMMERCIAL

## 2018-04-16 VITALS
RESPIRATION RATE: 20 BRPM | TEMPERATURE: 98.2 F | SYSTOLIC BLOOD PRESSURE: 138 MMHG | HEART RATE: 101 BPM | BODY MASS INDEX: 40.99 KG/M2 | DIASTOLIC BLOOD PRESSURE: 70 MMHG | WEIGHT: 257.8 LBS | OXYGEN SATURATION: 98 %

## 2018-04-16 DIAGNOSIS — M25.561 CHRONIC PAIN OF RIGHT KNEE: ICD-10-CM

## 2018-04-16 DIAGNOSIS — R60.0 PEDAL EDEMA: Primary | ICD-10-CM

## 2018-04-16 DIAGNOSIS — G89.29 CHRONIC PAIN OF RIGHT KNEE: ICD-10-CM

## 2018-04-16 DIAGNOSIS — R60.0 PEDAL EDEMA: ICD-10-CM

## 2018-04-16 LAB
ALBUMIN SERPL-MCNC: 3.7 G/DL (ref 3.4–5)
ALP SERPL-CCNC: 234 U/L (ref 40–150)
ALT SERPL W P-5'-P-CCNC: 29 U/L (ref 0–70)
ANION GAP SERPL CALCULATED.3IONS-SCNC: 8 MMOL/L (ref 3–14)
AST SERPL W P-5'-P-CCNC: 23 U/L (ref 0–45)
BILIRUB SERPL-MCNC: 0.3 MG/DL (ref 0.2–1.3)
BUN SERPL-MCNC: 18 MG/DL (ref 7–30)
CALCIUM SERPL-MCNC: 13.3 MG/DL (ref 8.5–10.1)
CHLORIDE SERPL-SCNC: 105 MMOL/L (ref 94–109)
CO2 SERPL-SCNC: 28 MMOL/L (ref 20–32)
CREAT SERPL-MCNC: 1.32 MG/DL (ref 0.66–1.25)
ERYTHROCYTE [DISTWIDTH] IN BLOOD BY AUTOMATED COUNT: 14.6 % (ref 10–15)
GFR SERPL CREATININE-BSD FRML MDRD: 56 ML/MIN/1.7M2
GLUCOSE SERPL-MCNC: 111 MG/DL (ref 70–99)
HCT VFR BLD AUTO: 39.3 % (ref 40–53)
HGB BLD-MCNC: 13.1 G/DL (ref 13.3–17.7)
MCH RBC QN AUTO: 29.1 PG (ref 26.5–33)
MCHC RBC AUTO-ENTMCNC: 33.3 G/DL (ref 31.5–36.5)
MCV RBC AUTO: 87 FL (ref 78–100)
NT-PROBNP SERPL-MCNC: 11 PG/ML (ref 0–125)
PLATELET # BLD AUTO: 168 10E9/L (ref 150–450)
POTASSIUM SERPL-SCNC: 3.3 MMOL/L (ref 3.4–5.3)
PROT SERPL-MCNC: 7.3 G/DL (ref 6.8–8.8)
RBC # BLD AUTO: 4.5 10E12/L (ref 4.4–5.9)
SODIUM SERPL-SCNC: 141 MMOL/L (ref 133–144)
WBC # BLD AUTO: 5 10E9/L (ref 4–11)

## 2018-04-16 PROCEDURE — 71046 X-RAY EXAM CHEST 2 VIEWS: CPT | Mod: FY

## 2018-04-16 PROCEDURE — 99214 OFFICE O/P EST MOD 30 MIN: CPT | Performed by: FAMILY MEDICINE

## 2018-04-16 PROCEDURE — 93000 ELECTROCARDIOGRAM COMPLETE: CPT | Performed by: FAMILY MEDICINE

## 2018-04-16 PROCEDURE — 85027 COMPLETE CBC AUTOMATED: CPT | Performed by: FAMILY MEDICINE

## 2018-04-16 PROCEDURE — 80053 COMPREHEN METABOLIC PANEL: CPT | Performed by: FAMILY MEDICINE

## 2018-04-16 PROCEDURE — 83880 ASSAY OF NATRIURETIC PEPTIDE: CPT | Performed by: FAMILY MEDICINE

## 2018-04-16 PROCEDURE — 36415 COLL VENOUS BLD VENIPUNCTURE: CPT | Performed by: FAMILY MEDICINE

## 2018-04-16 ASSESSMENT — ENCOUNTER SYMPTOMS
SHORTNESS OF BREATH: 0
PALPITATIONS: 0
WHEEZING: 0
CONSTITUTIONAL NEGATIVE: 1
COUGH: 0

## 2018-04-16 ASSESSMENT — ANXIETY QUESTIONNAIRES
7. FEELING AFRAID AS IF SOMETHING AWFUL MIGHT HAPPEN: NOT AT ALL
5. BEING SO RESTLESS THAT IT IS HARD TO SIT STILL: NOT AT ALL
IF YOU CHECKED OFF ANY PROBLEMS ON THIS QUESTIONNAIRE, HOW DIFFICULT HAVE THESE PROBLEMS MADE IT FOR YOU TO DO YOUR WORK, TAKE CARE OF THINGS AT HOME, OR GET ALONG WITH OTHER PEOPLE: NOT DIFFICULT AT ALL
1. FEELING NERVOUS, ANXIOUS, OR ON EDGE: NOT AT ALL
2. NOT BEING ABLE TO STOP OR CONTROL WORRYING: NOT AT ALL
6. BECOMING EASILY ANNOYED OR IRRITABLE: NOT AT ALL
3. WORRYING TOO MUCH ABOUT DIFFERENT THINGS: NOT AT ALL
GAD7 TOTAL SCORE: 0

## 2018-04-16 ASSESSMENT — PATIENT HEALTH QUESTIONNAIRE - PHQ9: 5. POOR APPETITE OR OVEREATING: NOT AT ALL

## 2018-04-16 NOTE — PROGRESS NOTES
SUBJECTIVE:   Aydin Reilly is a 57 year old male who presents to clinic today for the following health issues:    Joint Pain    Onset: x1 month    Description:   Location: top of right foot  Character: Dull ache    Intensity: 4-8/10    Progression of Symptoms: better    Accompanying Signs & Symptoms:  Other symptoms: swelling    History:   Previous similar pain: no       Precipitating factors:   Trauma or overuse: no     Alleviating factors:  Improved by: rest/inactivity, ice, Ibuprofen and gabapentin and lasix    Therapies Tried and outcome: as documented
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
## 2018-04-16 NOTE — MR AVS SNAPSHOT
After Visit Summary   4/16/2018    Aydin Reilly    MRN: [id number]           Patient Information     Date Of Birth          1960        Visit Information        Provider Department      4/16/2018 7:00 AM Adolfo Simmons MD [facility]        Today's Diagnoses     Pedal edema    -  1    Chronic pain of right knee           Follow-ups after your visit        Who to contact     If you have questions or need follow up information about today's clinic visit or your schedule please contact [facility] directly at [phone].  Normal or non-critical lab and imaging results will be communicated to you by [employer], letter or phone within 4 business days after the clinic has received the results. If you do not hear from us within 7 days, please contact the clinic through [employer] or phone. If you have a critical or abnormal lab result, we will notify you by phone as soon as possible.  Submit refill requests through [employer] or call your pharmacy and they will forward the refill request to us. Please allow 3 business days for your refill to be completed.          Additional Information About Your Visit        MyChart Information     [employer] gives you secure access to your electronic health record. If you see a primary care provider, you can also send messages to your care team and make appointments. If you have questions, please call your primary care clinic.  If you do not have a primary care provider, please call [phone] and they will assist you.        Care EveryWhere ID     This is your Care EveryWhere ID. This could be used by other organizations to access your [facility] medical records  [id number]        Your Vitals Were     Pulse Temperature Respirations Pulse Oximetry BMI (Body Mass Index)       101 98.2  F (36.8  C) (Oral) 20 98% 40.99 kg/m2        Blood Pressure from Last 3 Encounters:   04/16/18 138/70   01/15/18 122/82   10/31/17 138/80    Weight  from Last 3 Encounters:   04/16/18 257 lb 12.8 oz (116.9 kg)   01/15/18 257 lb 12.8 oz (116.9 kg)   10/31/17 247 lb (112 kg)              We Performed the Following     BNP-N terminal pro     CBC with platelets     Comprehensive metabolic panel     EKG 12-lead complete w/read - Clinics          Today's Medication Changes          These changes are accurate as of 4/16/18  8:21 AM.  If you have any questions, ask your nurse or doctor.               Start taking these medicines.        Dose/Directions    JOBST ACTIVE 20-30MMHG MEDIUM Misc   Used for:  Pedal edema   Started by:  Adolfo Simmons MD        Dose:  1 Device   1 Device daily as needed Knee high   Quantity:  6 each   Refills:  1            Where to get your medicines      These medications were sent to [place] [id number] [employer] - [facility] [address]  [address], [facility] [address]     Phone:  [phone]     diclofenac 1 % Gel topical gel         Some of these will need a paper prescription and others can be bought over the counter.  Ask your nurse if you have questions.     Bring a paper prescription for each of these medications     JOBST ACTIVE 20-30MMHG MEDIUM Misc                Primary Care Provider Office Phone # Fax #    Adolfo Simmons [phone]       [address] [facility] [address]        Equal Access to Services     JIGAR GARCÍA AH: Hadii aad ku hadasho Soomaali, waaxda luqadaha, qaybta kaalmada adeegyada, gogo jules hayelizabeth donovan. So [facility] [phone].    ATENCIÓN: Si habla español, tiene a lyn disposición servicios gratuitos de asistencia lingüística. Llame al [phone].    We comply with applicable federal civil rights laws and [place] laws. We do not discriminate on the basis of race, color, national origin, age, disability, sex, sexual orientation, or gender identity.            Thank you!     Thank you for choosing [facility]  for your care. Our  goal is always to provide you with excellent care. Hearing back from our patients is one way we can continue to improve our services. Please take a few minutes to complete the written survey that you may receive in the mail after your visit with us. Thank you!             Your Updated Medication List - Protect others around you: Learn how to safely use, store and throw away your medicines at www.disposemymeds.org.          This list is accurate as of 4/16/18  8:21 AM.  Always use your most recent med list.                   Brand Name Dispense Instructions for use Diagnosis    cyclobenzaprine 10 MG tablet    FLEXERIL    60 tablet    TAKE 1 TABLET (10 MG) BY MOUTH NIGHTLY AS NEEDED FOR MUSCLE SPASMS    Lumbar radiculopathy       diclofenac 1 % Gel topical gel    VOLTAREN    100 g    Place 2-4 g onto the skin 4 times daily as needed for moderate pain (apply 4 grams to knees or 2 grams to hands)    Chronic pain of right knee       furosemide 40 MG tablet    LASIX    30 tablet    Take 1 tablet (40 mg) by mouth daily as needed for edema    Pedal edema       gabapentin 300 MG capsule    NEURONTIN    360 capsule    Take 4 capsules (1,200 mg) by mouth 3 times daily    Other mononeuropathy       ibuprofen 200 MG capsule     120 capsule    Take 400-600 mg by mouth every 8 hours as needed    Cellulitis of scrotum       JOBST ACTIVE 20-30MMHG MEDIUM Misc     6 each    1 Device daily as needed Knee high    Pedal edema       MELATONIN PO      Take 3 mg by mouth nightly as needed Reported on 5/10/2017        Multi-vitamin Tabs tablet      Take 1 tablet by mouth daily        nabumetone 500 MG tablet    RELAFEN    60 tablet    TAKE 2 TABLETS (1,000 MG) BY MOUTH DAILY AS NEEDED FOR MODERATE PAIN    Chronic pain of right knee       pseudoePHEDrine 30 MG tablet    SUDAFED     Take 30-60 mg by mouth every 4 hours as needed for congestion        sertraline 100 MG tablet    ZOLOFT    135 tablet    Take 1.5 tablets (150 mg) by mouth daily     Generalized anxiety disorder       sildenafil 100 MG tablet    VIAGRA    12 tablet    Take 0.5-1 tablets ( mg) by mouth daily as needed Take 30 min to 4 hours before intercourse.  Never use with nitroglycerin, terazosin or doxazosin.    Other male erectile dysfunction       traZODone 50 MG tablet    DESYREL    90 tablet    Take 1-3 tablets ( mg) by mouth nightly as needed for sleep    CONNER (generalized anxiety disorder)

## 2018-04-17 ASSESSMENT — PATIENT HEALTH QUESTIONNAIRE - PHQ9: SUM OF ALL RESPONSES TO PHQ QUESTIONS 1-9: 0

## 2018-04-17 ASSESSMENT — ANXIETY QUESTIONNAIRES: GAD7 TOTAL SCORE: 0

## 2018-04-24 ENCOUNTER — HOSPITAL ENCOUNTER (EMERGENCY)
Facility: CLINIC | Age: 58
Discharge: HOME | End: 2018-04-24
Attending: EMERGENCY MEDICINE | Admitting: EMERGENCY MEDICINE
Payer: COMMERCIAL

## 2018-04-24 VITALS
OXYGEN SATURATION: 99 % | DIASTOLIC BLOOD PRESSURE: 96 MMHG | TEMPERATURE: 97.9 F | HEART RATE: 94 BPM | RESPIRATION RATE: 14 BRPM | SYSTOLIC BLOOD PRESSURE: 161 MMHG

## 2018-04-24 DIAGNOSIS — F41.9 ANXIETY: ICD-10-CM

## 2018-04-24 DIAGNOSIS — S01.81XA FACIAL LACERATION, INITIAL ENCOUNTER: ICD-10-CM

## 2018-04-24 DIAGNOSIS — T74.91XA DOMESTIC VIOLENCE OF ADULT, INITIAL ENCOUNTER: ICD-10-CM

## 2018-04-24 PROCEDURE — 99283 EMERGENCY DEPT VISIT LOW MDM: CPT

## 2018-04-24 PROCEDURE — 25000132 ZZH RX MED GY IP 250 OP 250 PS 637: Performed by: EMERGENCY MEDICINE

## 2018-04-24 RX ORDER — LORAZEPAM 0.5 MG/1
0.5 TABLET ORAL EVERY 8 HOURS PRN
Qty: 4 TABLET | Refills: 0 | Status: SHIPPED | OUTPATIENT
Start: 2018-04-24 | End: 2018-06-09

## 2018-04-24 RX ORDER — SERTRALINE HYDROCHLORIDE 100 MG/1
100 TABLET, FILM COATED ORAL DAILY
Qty: 30 TABLET | Refills: 0 | Status: SHIPPED | OUTPATIENT
Start: 2018-04-24 | End: 2018-06-09

## 2018-04-24 RX ORDER — LORAZEPAM 0.5 MG/1
0.5 TABLET ORAL ONCE
Status: COMPLETED | OUTPATIENT
Start: 2018-04-24 | End: 2018-04-24

## 2018-04-24 RX ORDER — IBUPROFEN 600 MG/1
600 TABLET, FILM COATED ORAL ONCE
Status: COMPLETED | OUTPATIENT
Start: 2018-04-24 | End: 2018-04-24

## 2018-04-24 RX ADMIN — LORAZEPAM 0.5 MG: 0.5 TABLET ORAL at 20:02

## 2018-04-24 RX ADMIN — IBUPROFEN 600 MG: 600 TABLET ORAL at 20:07

## 2018-04-24 ASSESSMENT — ENCOUNTER SYMPTOMS: HEADACHES: 0
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
## 2018-04-24 NOTE — ED AVS SNAPSHOT
[facility] Emergency Department    [address]    [facility] [address]    Phone:  [phone]    Fax:  [phone]                                       Aydin Reilly   MRN: [id number]    Department:  [facility] Emergency Department   Date of Visit:  4/24/2018           After Visit Summary Signature Page     I have received my discharge instructions, and my questions have been answered. I have discussed any challenges I see with this plan with the nurse or doctor.    ..........................................................................................................................................  Patient/Patient Representative Signature      ..........................................................................................................................................  Patient Representative Print Name and Relationship to Patient    ..................................................               ................................................  Date                                            Time    ..........................................................................................................................................  Reviewed by Signature/Title    ...................................................              ..............................................  Date                                                            Time
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
## 2018-04-26 DIAGNOSIS — G89.29 CHRONIC PAIN OF RIGHT KNEE: ICD-10-CM

## 2018-04-26 DIAGNOSIS — M25.561 CHRONIC PAIN OF RIGHT KNEE: ICD-10-CM

## 2018-04-27 RX ORDER — NABUMETONE 500 MG/1
TABLET, FILM COATED ORAL
Qty: 60 TABLET | Refills: 1 | Status: ON HOLD | OUTPATIENT
Start: 2018-04-27 | End: 2018-06-13

## 2018-04-27 NOTE — TELEPHONE ENCOUNTER
Routing refill request to provider for review/approval because:  Labs out of range:  BP, phyllisat    Anna Cota RN, BS  Clinical Nurse Triage.

## 2018-04-27 NOTE — TELEPHONE ENCOUNTER
"Requested Prescriptions   Pending Prescriptions Disp Refills     nabumetone (RELAFEN) 500 MG tablet [Pharmacy Med Name: NABUMETONE 500 MG TABLET] 60 tablet 1    Last Written Prescription Date:  2/2/18  Last Fill Quantity: 60,  # refills: 1   Last Office Visit: 4/16/2018   Future Office Visit:      Sig: TAKE 2 TABLETS (1,000 MG) BY MOUTH DAILY AS NEEDED FOR MODERATE PAIN    NSAID Medications Failed    4/26/2018  4:02 PM       Failed - Blood pressure under 140/90 in past 12 months    BP Readings from Last 3 Encounters:   04/24/18 (!) 161/96   04/16/18 138/70   01/15/18 122/82                Failed - Normal serum creatinine on file in past 12 months    Recent Labs   Lab Test  04/16/18   0740   CR  1.32*            Passed - Normal ALT on file in past 12 months    Recent Labs   Lab Test  04/16/18   0740   ALT  29            Passed - Normal AST on file in past 12 months    Recent Labs   Lab Test  04/16/18   0740   AST  23            Passed - Recent (12 mo) or future (30 days) visit within the authorizing provider's specialty    Patient had office visit in the last 12 months or has a visit in the next 30 days with authorizing provider or within the authorizing provider's specialty.  See \"Patient Info\" tab in inbasket, or \"Choose Columns\" in Meds & Orders section of the refill encounter.           Passed - Patient is age 6-64 years       Passed - Normal CBC on file in past 12 months    Recent Labs   Lab Test  04/16/18   0740   WBC  5.0   RBC  4.50   HGB  13.1*   HCT  39.3*   PLT  168               "

## 2018-05-01 ENCOUNTER — TELEPHONE (OUTPATIENT)
Dept: SLEEP MEDICINE | Facility: CLINIC | Age: 58
End: 2018-05-01

## 2018-05-01 DIAGNOSIS — G47.33 OSA (OBSTRUCTIVE SLEEP APNEA): Primary | ICD-10-CM

## 2018-05-01 NOTE — TELEPHONE ENCOUNTER
Left a message informing patient that the new prescription for CPAP was faxed to [facility], and asked him to call us to schedule a 2 month follow up with Dr. Norton.em
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
## 2018-05-01 NOTE — TELEPHONE ENCOUNTER
Replacement CPAP ordered.  Will route chart to [facility] staff to fax prescription to [facility].  Patient should follow up in about 2 months with new CPAP to see how he is doing.    Trenton Norton MD, Sleep Physician  May 1, 2018

## 2018-05-09 ENCOUNTER — OFFICE VISIT (OUTPATIENT)
Dept: FAMILY MEDICINE | Facility: CLINIC | Age: 58
End: 2018-05-09
Payer: COMMERCIAL

## 2018-05-09 ENCOUNTER — TELEPHONE (OUTPATIENT)
Dept: FAMILY MEDICINE | Facility: CLINIC | Age: 58
End: 2018-05-09

## 2018-05-09 VITALS
DIASTOLIC BLOOD PRESSURE: 74 MMHG | HEART RATE: 94 BPM | TEMPERATURE: 98.3 F | OXYGEN SATURATION: 96 % | SYSTOLIC BLOOD PRESSURE: 132 MMHG | BODY MASS INDEX: 40.22 KG/M2 | RESPIRATION RATE: 18 BRPM | WEIGHT: 253 LBS

## 2018-05-09 DIAGNOSIS — R60.0 PEDAL EDEMA: ICD-10-CM

## 2018-05-09 DIAGNOSIS — F41.9 ANXIETY: Primary | ICD-10-CM

## 2018-05-09 PROCEDURE — 99213 OFFICE O/P EST LOW 20 MIN: CPT | Performed by: NURSE PRACTITIONER

## 2018-05-09 RX ORDER — FUROSEMIDE 40 MG
40 TABLET ORAL DAILY PRN
Qty: 30 TABLET | Refills: 1 | Status: ON HOLD | OUTPATIENT
Start: 2018-05-09 | End: 2018-06-13

## 2018-05-09 RX ORDER — SERTRALINE HYDROCHLORIDE 100 MG/1
100 TABLET, FILM COATED ORAL DAILY
Qty: 90 TABLET | Refills: 0 | Status: SHIPPED | OUTPATIENT
Start: 2018-05-09 | End: 2018-06-09

## 2018-05-09 ASSESSMENT — ANXIETY QUESTIONNAIRES
2. NOT BEING ABLE TO STOP OR CONTROL WORRYING: NOT AT ALL
3. WORRYING TOO MUCH ABOUT DIFFERENT THINGS: NOT AT ALL
5. BEING SO RESTLESS THAT IT IS HARD TO SIT STILL: NOT AT ALL
7. FEELING AFRAID AS IF SOMETHING AWFUL MIGHT HAPPEN: NOT AT ALL
GAD7 TOTAL SCORE: 0
1. FEELING NERVOUS, ANXIOUS, OR ON EDGE: NOT AT ALL
6. BECOMING EASILY ANNOYED OR IRRITABLE: NOT AT ALL

## 2018-05-09 ASSESSMENT — PATIENT HEALTH QUESTIONNAIRE - PHQ9: 5. POOR APPETITE OR OVEREATING: NOT AT ALL
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
## 2018-05-09 NOTE — MR AVS SNAPSHOT
After Visit Summary   5/9/2018    Aydin Reilly    MRN: [id number]           Patient Information     Date Of Birth          1960        Visit Information        Provider Department      5/9/2018 1:40 PM Olga Soto APRN CNP [facility]        Today's Diagnoses     Anxiety    -  1       Follow-ups after your visit        Follow-up notes from your care team     Return in about 3 months (around 8/9/2018) for anxiety .      Your next 10 appointments already scheduled     Jun 29, 2018 11:30 AM CDT   (Arrive by 11:00 AM)   New Visit with Mahesh Ibarra [facility] ([facility])    [address] [address] [facility] [address] [phone]            Jul 06, 2018  8:30 AM CDT   Return Visit with Mahesh Ibarra [facility] ([facility])    [address] [address] [id number] [phone]              Who to contact     If you have questions or need follow up information about today's clinic visit or your schedule please contact [facility] directly at [phone].  Normal or non-critical lab and imaging results will be communicated to you by MyChart, letter or phone within 4 business days after the clinic has received the results. If you do not hear from us within 7 days, please contact the clinic through MyChart or phone. If you have a critical or abnormal lab result, we will notify you by phone as soon as possible.  Submit refill requests through [employer] or call your pharmacy and they will forward the refill request to us. Please allow 3 business days for your refill to be completed.          Additional Information About Your Visit        MyChart Information     [employer] gives you secure access to your electronic health record. If you see a primary care provider, you can also send messages to your care team and make appointments. If you have  questions, please call your primary care clinic.  If you do not have a primary care provider, please call [phone] and they will assist you.        Care EveryWhere ID     This is your Care EveryWhere ID. This could be used by other organizations to access your [facility] medical records  [id number]        Your Vitals Were     Pulse Temperature Respirations Pulse Oximetry BMI (Body Mass Index)       94 98.3  F (36.8  C) (Oral) 18 96% 40.22 kg/m2        Blood Pressure from Last 3 Encounters:   05/09/18 132/74   04/24/18 (!) 161/96   04/16/18 138/70    Weight from Last 3 Encounters:   05/09/18 253 lb (114.8 kg)   04/16/18 257 lb 12.8 oz (116.9 kg)   01/15/18 257 lb 12.8 oz (116.9 kg)              Today, you had the following     No orders found for display         Today's Medication Changes          These changes are accurate as of 5/9/18  2:33 PM.  If you have any questions, ask your nurse or doctor.               These medicines have changed or have updated prescriptions.        Dose/Directions    * sertraline 100 MG tablet   Commonly known as:  ZOLOFT   This may have changed:  Another medication with the same name was added. Make sure you understand how and when to take each.   Used for:  Generalized anxiety disorder   Changed by:  Olga Soto APRN CNP        Dose:  150 mg   Take 1.5 tablets (150 mg) by mouth daily   Quantity:  135 tablet   Refills:  1       * sertraline 100 MG tablet   Commonly known as:  ZOLOFT   This may have changed:  Another medication with the same name was added. Make sure you understand how and when to take each.   Changed by:  Olga Soto APRN CNP        Dose:  100 mg   Take 1 tablet (100 mg) by mouth daily   Quantity:  30 tablet   Refills:  0       * sertraline 100 MG tablet   Commonly known as:  ZOLOFT   This may have changed:  You were already taking a medication with the same name, and this prescription was added. Make sure you understand how and when to take  each.   Used for:  Anxiety   Changed by:  Olga Soto APRN CNP        Dose:  100 mg   Take 1 tablet (100 mg) by mouth daily   Quantity:  90 tablet   Refills:  0       * Notice:  This list has 3 medication(s) that are the same as other medications prescribed for you. Read the directions carefully, and ask your doctor or other care provider to review them with you.         Where to get your medicines      These medications were sent to [facility] [address] [facility] - [place], [place] - [address]  [address] [facility] [address]     Phone:  [phone]     sertraline 100 MG tablet                Primary Care Provider Office Phone # Fax #    Adolfo Ronn Simmons [phone]  [address] [facility] [address]        Equal Access to Services     [facility] RAQUEL : Hadii aad ku hadasho [place], waaxda luqadaha, qaybta kaalmada adeegyada, gogo swanson . So [facility] [phone].    ATENCIÓN: Si habla español, tiene a lyn disposición servicios gratuitos de asistencia lingüística. Eva al [phone].    We comply with applicable federal civil rights laws and [place] laws. We do not discriminate on the basis of race, color, national origin, age, disability, sex, sexual orientation, or gender identity.            Thank you!     Thank you for choosing [facility]  for your care. Our goal is always to provide you with excellent care. Hearing back from our patients is one way we can continue to improve our services. Please take a few minutes to complete the written survey that you may receive in the mail after your visit with us. Thank you!             Your Updated Medication List - Protect others around you: Learn how to safely use, store and throw away your medicines at www.disposemymeds.org.          This list is accurate as of 5/9/18  2:33 PM.  Always use your most recent med list.                   Brand Name Dispense Instructions for  use Diagnosis    cyclobenzaprine 10 MG tablet    FLEXERIL    60 tablet    TAKE 1 TABLET (10 MG) BY MOUTH NIGHTLY AS NEEDED FOR MUSCLE SPASMS    Lumbar radiculopathy       furosemide 40 MG tablet    LASIX    30 tablet    Take 1 tablet (40 mg) by mouth daily as needed for edema    Pedal edema       gabapentin 300 MG capsule    NEURONTIN    360 capsule    Take 4 capsules (1,200 mg) by mouth 3 times daily    Other mononeuropathy       ibuprofen 200 MG capsule     120 capsule    Take 400-600 mg by mouth every 8 hours as needed    Cellulitis of scrotum       JOBST ACTIVE 20-30MMHG MEDIUM Misc     6 each    1 Device daily as needed Knee high    Pedal edema       LORazepam 0.5 MG tablet    ATIVAN    4 tablet    Take 1 tablet (0.5 mg) by mouth every 8 hours as needed for anxiety        MELATONIN PO      Take 3 mg by mouth nightly as needed Reported on 5/10/2017        Multi-vitamin Tabs tablet      Take 1 tablet by mouth daily        nabumetone 500 MG tablet    RELAFEN    60 tablet    TAKE 2 TABLETS (1,000 MG) BY MOUTH DAILY AS NEEDED FOR MODERATE PAIN    Chronic pain of right knee       pseudoePHEDrine 30 MG tablet    SUDAFED     Take 30-60 mg by mouth every 4 hours as needed for congestion        * sertraline 100 MG tablet    ZOLOFT    135 tablet    Take 1.5 tablets (150 mg) by mouth daily    Generalized anxiety disorder       * sertraline 100 MG tablet    ZOLOFT    30 tablet    Take 1 tablet (100 mg) by mouth daily        * sertraline 100 MG tablet    ZOLOFT    90 tablet    Take 1 tablet (100 mg) by mouth daily    Anxiety       sildenafil 100 MG tablet    VIAGRA    12 tablet    Take 0.5-1 tablets ( mg) by mouth daily as needed Take 30 min to 4 hours before intercourse.  Never use with nitroglycerin, terazosin or doxazosin.    Other male erectile dysfunction       traZODone 50 MG tablet    DESYREL    90 tablet    Take 1-3 tablets ( mg) by mouth nightly as needed for sleep    CONNER (generalized anxiety disorder)        * Notice:  This list has 3 medication(s) that are the same as other medications prescribed for you. Read the directions carefully, and ask your doctor or other care provider to review them with you.

## 2018-05-10 ASSESSMENT — PATIENT HEALTH QUESTIONNAIRE - PHQ9: SUM OF ALL RESPONSES TO PHQ QUESTIONS 1-9: 0

## 2018-05-10 ASSESSMENT — ANXIETY QUESTIONNAIRES: GAD7 TOTAL SCORE: 0

## 2018-05-12 NOTE — TELEPHONE ENCOUNTER
Late entry. RN in FM requested assistance in reprinting a DME in [place] on May 9. Done  Message handled by Nurse Triage.Junior Cota RN in FM  Jaswant Rubio, RN

## 2018-05-23 ENCOUNTER — NURSE TRIAGE (OUTPATIENT)
Dept: NURSING | Facility: CLINIC | Age: 58
End: 2018-05-23

## 2018-05-23 NOTE — TELEPHONE ENCOUNTER
Caller states that he has severe pain in both feet right greater than left. He states that the pain medication Relafen and gabapentin are not helping. He is unable to rate the pain but notes severe pain and very tender to the touch, he is having difficulty walking due to he pain. He denies any redness, swelling, fever. Reviewed guidelines below and recommended he be seen in next 4 hours and he agreed with plan of care and will have his friend Niles bring him to ER today.     Reason for Disposition    [1] SEVERE pain (e.g., excruciating, unable to do any normal activities) AND [2] not improved after 2 hours of pain medicine    Additional Information    Negative: Followed a foot injury    Negative: Diabetes    Negative: Ankle pain is main symptom    Negative: Thigh or calf pain is main symptom    Negative: Entire foot is cool or blue in comparison to other foot    Negative: Purple or black skin on foot or toe    Negative: [1] Red area or streak AND [2] fever    Negative: [1] Swollen foot AND [2] fever    Negative: Patient sounds very sick or weak to the triager    Protocols used: FOOT PAIN-ADULT-    Trina Steward, RN, BSN  [facility];

## 2018-06-09 ENCOUNTER — APPOINTMENT (OUTPATIENT)
Dept: CT IMAGING | Facility: CLINIC | Age: 58
End: 2018-06-09
Attending: EMERGENCY MEDICINE
Payer: MEDICAID

## 2018-06-09 ENCOUNTER — HOSPITAL ENCOUNTER (INPATIENT)
Facility: CLINIC | Age: 58
LOS: 4 days | Discharge: HOME | End: 2018-06-13
Attending: EMERGENCY MEDICINE | Admitting: HOSPITALIST
Payer: MEDICAID

## 2018-06-09 ENCOUNTER — HOSPITAL ENCOUNTER (EMERGENCY)
Facility: CLINIC | Age: 58
Discharge: HOME | End: 2018-06-09
Attending: EMERGENCY MEDICINE | Admitting: EMERGENCY MEDICINE
Payer: MEDICAID

## 2018-06-09 VITALS
DIASTOLIC BLOOD PRESSURE: 99 MMHG | TEMPERATURE: 97.9 F | OXYGEN SATURATION: 97 % | HEART RATE: 126 BPM | SYSTOLIC BLOOD PRESSURE: 159 MMHG

## 2018-06-09 DIAGNOSIS — M54.41 ACUTE BILATERAL LOW BACK PAIN WITH RIGHT-SIDED SCIATICA: ICD-10-CM

## 2018-06-09 DIAGNOSIS — F15.10 AMPHETAMINE ABUSE (H): ICD-10-CM

## 2018-06-09 DIAGNOSIS — R41.82 ALTERED MENTAL STATUS, UNSPECIFIED ALTERED MENTAL STATUS TYPE: ICD-10-CM

## 2018-06-09 DIAGNOSIS — D64.9 NORMOCYTIC ANEMIA: Primary | ICD-10-CM

## 2018-06-09 DIAGNOSIS — M25.561 CHRONIC PAIN OF RIGHT KNEE: ICD-10-CM

## 2018-06-09 DIAGNOSIS — G58.8 OTHER MONONEUROPATHY: ICD-10-CM

## 2018-06-09 DIAGNOSIS — R44.3 HALLUCINATIONS: ICD-10-CM

## 2018-06-09 DIAGNOSIS — G89.29 CHRONIC PAIN OF RIGHT KNEE: ICD-10-CM

## 2018-06-09 DIAGNOSIS — I10 BENIGN ESSENTIAL HYPERTENSION: ICD-10-CM

## 2018-06-09 DIAGNOSIS — N17.9 ACUTE RENAL FAILURE, UNSPECIFIED ACUTE RENAL FAILURE TYPE (H): ICD-10-CM

## 2018-06-09 DIAGNOSIS — F32.A DEPRESSION, UNSPECIFIED DEPRESSION TYPE: ICD-10-CM

## 2018-06-09 PROBLEM — R44.1 HALLUCINATIONS, VISUAL: Status: ACTIVE | Noted: 2018-06-09

## 2018-06-09 LAB
ALBUMIN SERPL-MCNC: 3.9 G/DL (ref 3.4–5)
ALBUMIN UR-MCNC: 100 MG/DL
ALP SERPL-CCNC: 315 U/L (ref 40–150)
ALT SERPL W P-5'-P-CCNC: 87 U/L (ref 0–70)
AMPHETAMINES UR QL SCN: POSITIVE
ANION GAP SERPL CALCULATED.3IONS-SCNC: 9 MMOL/L (ref 3–14)
APAP SERPL-MCNC: <2 MG/L (ref 10–20)
APPEARANCE UR: ABNORMAL
AST SERPL W P-5'-P-CCNC: 75 U/L (ref 0–45)
BACTERIA #/AREA URNS HPF: ABNORMAL /HPF
BARBITURATES UR QL: NEGATIVE
BASOPHILS # BLD AUTO: 0 10E9/L (ref 0–0.2)
BASOPHILS NFR BLD AUTO: 0.6 %
BENZODIAZ UR QL: NEGATIVE
BILIRUB SERPL-MCNC: 1 MG/DL (ref 0.2–1.3)
BILIRUB UR QL STRIP: NEGATIVE
BUN SERPL-MCNC: 35 MG/DL (ref 7–30)
CA-I SERPL ISE-MCNC: 5.6 MG/DL (ref 4.4–5.2)
CALCIUM SERPL-MCNC: 11.5 MG/DL (ref 8.5–10.1)
CANNABINOIDS UR QL SCN: NEGATIVE
CHLORIDE SERPL-SCNC: 108 MMOL/L (ref 94–109)
CO2 SERPL-SCNC: 26 MMOL/L (ref 20–32)
COCAINE UR QL: NEGATIVE
COHGB MFR BLD: 1.7 % (ref 0–2)
COLOR UR AUTO: YELLOW
CREAT SERPL-MCNC: 3.7 MG/DL (ref 0.66–1.25)
CREAT UR-MCNC: 160 MG/DL
DIFFERENTIAL METHOD BLD: ABNORMAL
EOSINOPHIL # BLD AUTO: 0.2 10E9/L (ref 0–0.7)
EOSINOPHIL NFR BLD AUTO: 3.3 %
ERYTHROCYTE [DISTWIDTH] IN BLOOD BY AUTOMATED COUNT: 14.7 % (ref 10–15)
ETHANOL SERPL-MCNC: <0.01 G/DL
FRACT EXCRET NA UR+SERPL-RTO: 0.7 %
GFR SERPL CREATININE-BSD FRML MDRD: 17 ML/MIN/1.7M2
GLUCOSE SERPL-MCNC: 94 MG/DL (ref 70–99)
GLUCOSE UR STRIP-MCNC: NEGATIVE MG/DL
HCT VFR BLD AUTO: 32.7 % (ref 40–53)
HGB BLD-MCNC: 10.7 G/DL (ref 13.3–17.7)
HGB UR QL STRIP: ABNORMAL
IMM GRANULOCYTES # BLD: 0 10E9/L (ref 0–0.4)
IMM GRANULOCYTES NFR BLD: 0.5 %
KETONES UR STRIP-MCNC: 5 MG/DL
LACTATE BLD-SCNC: 0.9 MMOL/L (ref 0.4–1.9)
LEUKOCYTE ESTERASE UR QL STRIP: NEGATIVE
LYMPHOCYTES # BLD AUTO: 0.5 10E9/L (ref 0.8–5.3)
LYMPHOCYTES NFR BLD AUTO: 7.5 %
MCH RBC QN AUTO: 28.8 PG (ref 26.5–33)
MCHC RBC AUTO-ENTMCNC: 32.7 G/DL (ref 31.5–36.5)
MCV RBC AUTO: 88 FL (ref 78–100)
MONOCYTES # BLD AUTO: 0.9 10E9/L (ref 0–1.3)
MONOCYTES NFR BLD AUTO: 13.5 %
MUCOUS THREADS #/AREA URNS LPF: PRESENT /LPF
NEUTROPHILS # BLD AUTO: 4.8 10E9/L (ref 1.6–8.3)
NEUTROPHILS NFR BLD AUTO: 74.6 %
NITRATE UR QL: NEGATIVE
NRBC # BLD AUTO: 0 10*3/UL
NRBC BLD AUTO-RTO: 0 /100
OPIATES UR QL SCN: NEGATIVE
OSMOLALITY SERPL: 314 MMOL/KG (ref 275–295)
PCP UR QL SCN: NEGATIVE
PH UR STRIP: 6 PH (ref 5–7)
PLATELET # BLD AUTO: 156 10E9/L (ref 150–450)
POTASSIUM SERPL-SCNC: 3.9 MMOL/L (ref 3.4–5.3)
PROT SERPL-MCNC: 7.4 G/DL (ref 6.8–8.8)
RBC # BLD AUTO: 3.72 10E12/L (ref 4.4–5.9)
RBC #/AREA URNS AUTO: 12 /HPF (ref 0–2)
SALICYLATES SERPL-MCNC: <2 MG/DL
SODIUM SERPL-SCNC: 143 MMOL/L (ref 133–144)
SODIUM UR-SCNC: 41 MMOL/L
SODIUM UR-SCNC: 42 MMOL/L
SOURCE: ABNORMAL
SP GR UR STRIP: 1.02 (ref 1–1.03)
SQUAMOUS #/AREA URNS AUTO: 2 /HPF (ref 0–1)
TROPONIN I SERPL-MCNC: <0.015 UG/L (ref 0–0.04)
TSH SERPL DL<=0.005 MIU/L-ACNC: 1.58 MU/L (ref 0.4–4)
UROBILINOGEN UR STRIP-MCNC: 2 MG/DL (ref 0–2)
WBC # BLD AUTO: 6.4 10E9/L (ref 4–11)
WBC #/AREA URNS AUTO: 70 /HPF (ref 0–5)

## 2018-06-09 PROCEDURE — 80320 DRUG SCREEN QUANTALCOHOLS: CPT | Performed by: EMERGENCY MEDICINE

## 2018-06-09 PROCEDURE — 80307 DRUG TEST PRSMV CHEM ANLYZR: CPT | Performed by: EMERGENCY MEDICINE

## 2018-06-09 PROCEDURE — 25000132 ZZH RX MED GY IP 250 OP 250 PS 637: Performed by: HOSPITALIST

## 2018-06-09 PROCEDURE — 81001 URINALYSIS AUTO W/SCOPE: CPT | Performed by: EMERGENCY MEDICINE

## 2018-06-09 PROCEDURE — 85025 COMPLETE CBC W/AUTO DIFF WBC: CPT | Performed by: EMERGENCY MEDICINE

## 2018-06-09 PROCEDURE — 99285 EMERGENCY DEPT VISIT HI MDM: CPT | Mod: 25

## 2018-06-09 PROCEDURE — 80329 ANALGESICS NON-OPIOID 1 OR 2: CPT | Performed by: EMERGENCY MEDICINE

## 2018-06-09 PROCEDURE — 99284 EMERGENCY DEPT VISIT MOD MDM: CPT

## 2018-06-09 PROCEDURE — 82570 ASSAY OF URINE CREATININE: CPT | Performed by: EMERGENCY MEDICINE

## 2018-06-09 PROCEDURE — 70450 CT HEAD/BRAIN W/O DYE: CPT

## 2018-06-09 PROCEDURE — 25000132 ZZH RX MED GY IP 250 OP 250 PS 637: Performed by: EMERGENCY MEDICINE

## 2018-06-09 PROCEDURE — 83970 ASSAY OF PARATHORMONE: CPT | Performed by: HOSPITALIST

## 2018-06-09 PROCEDURE — 93005 ELECTROCARDIOGRAM TRACING: CPT

## 2018-06-09 PROCEDURE — 36415 COLL VENOUS BLD VENIPUNCTURE: CPT | Performed by: HOSPITALIST

## 2018-06-09 PROCEDURE — 96361 HYDRATE IV INFUSION ADD-ON: CPT

## 2018-06-09 PROCEDURE — 82330 ASSAY OF CALCIUM: CPT | Performed by: HOSPITALIST

## 2018-06-09 PROCEDURE — 83930 ASSAY OF BLOOD OSMOLALITY: CPT | Performed by: EMERGENCY MEDICINE

## 2018-06-09 PROCEDURE — 82375 ASSAY CARBOXYHB QUANT: CPT | Performed by: EMERGENCY MEDICINE

## 2018-06-09 PROCEDURE — 84484 ASSAY OF TROPONIN QUANT: CPT | Performed by: EMERGENCY MEDICINE

## 2018-06-09 PROCEDURE — 25000128 H RX IP 250 OP 636: Performed by: HOSPITALIST

## 2018-06-09 PROCEDURE — 83935 ASSAY OF URINE OSMOLALITY: CPT | Performed by: EMERGENCY MEDICINE

## 2018-06-09 PROCEDURE — 80053 COMPREHEN METABOLIC PANEL: CPT | Performed by: EMERGENCY MEDICINE

## 2018-06-09 PROCEDURE — 25000128 H RX IP 250 OP 636: Performed by: EMERGENCY MEDICINE

## 2018-06-09 PROCEDURE — HZ2ZZZZ DETOXIFICATION SERVICES FOR SUBSTANCE ABUSE TREATMENT: ICD-10-PCS | Performed by: INTERNAL MEDICINE

## 2018-06-09 PROCEDURE — 96372 THER/PROPH/DIAG INJ SC/IM: CPT

## 2018-06-09 PROCEDURE — 96374 THER/PROPH/DIAG INJ IV PUSH: CPT

## 2018-06-09 PROCEDURE — 87088 URINE BACTERIA CULTURE: CPT | Performed by: EMERGENCY MEDICINE

## 2018-06-09 PROCEDURE — 87186 SC STD MICRODIL/AGAR DIL: CPT | Performed by: EMERGENCY MEDICINE

## 2018-06-09 PROCEDURE — 99223 1ST HOSP IP/OBS HIGH 75: CPT | Mod: AI | Performed by: HOSPITALIST

## 2018-06-09 PROCEDURE — 90791 PSYCH DIAGNOSTIC EVALUATION: CPT

## 2018-06-09 PROCEDURE — 84443 ASSAY THYROID STIM HORMONE: CPT | Performed by: EMERGENCY MEDICINE

## 2018-06-09 PROCEDURE — 87086 URINE CULTURE/COLONY COUNT: CPT | Performed by: EMERGENCY MEDICINE

## 2018-06-09 PROCEDURE — 12000007 ZZH R&B INTERMEDIATE

## 2018-06-09 PROCEDURE — 83605 ASSAY OF LACTIC ACID: CPT | Performed by: HOSPITALIST

## 2018-06-09 PROCEDURE — 84300 ASSAY OF URINE SODIUM: CPT | Performed by: EMERGENCY MEDICINE

## 2018-06-09 RX ORDER — METHYLPREDNISOLONE 4 MG
TABLET, DOSE PACK ORAL
Qty: 21 TABLET | Refills: 0 | Status: ON HOLD | OUTPATIENT
Start: 2018-06-09 | End: 2018-06-13

## 2018-06-09 RX ORDER — CYCLOBENZAPRINE HCL 10 MG
10 TABLET ORAL ONCE
Status: COMPLETED | OUTPATIENT
Start: 2018-06-09 | End: 2018-06-09

## 2018-06-09 RX ORDER — NALOXONE HYDROCHLORIDE 0.4 MG/ML
.1-.4 INJECTION, SOLUTION INTRAMUSCULAR; INTRAVENOUS; SUBCUTANEOUS
Status: DISCONTINUED | OUTPATIENT
Start: 2018-06-09 | End: 2018-06-13 | Stop reason: HOSPADM

## 2018-06-09 RX ORDER — MULTIPLE VITAMINS W/ MINERALS TAB 9MG-400MCG
1 TAB ORAL DAILY
Status: DISCONTINUED | OUTPATIENT
Start: 2018-06-10 | End: 2018-06-13 | Stop reason: HOSPADM

## 2018-06-09 RX ORDER — LANOLIN ALCOHOL/MO/W.PET/CERES
3 CREAM (GRAM) TOPICAL AT BEDTIME
Status: DISCONTINUED | OUTPATIENT
Start: 2018-06-09 | End: 2018-06-13 | Stop reason: HOSPADM

## 2018-06-09 RX ORDER — SODIUM CHLORIDE 9 MG/ML
INJECTION, SOLUTION INTRAVENOUS CONTINUOUS
Status: DISCONTINUED | OUTPATIENT
Start: 2018-06-09 | End: 2018-06-10

## 2018-06-09 RX ORDER — AMOXICILLIN 250 MG
1 CAPSULE ORAL 2 TIMES DAILY
Status: DISCONTINUED | OUTPATIENT
Start: 2018-06-09 | End: 2018-06-13 | Stop reason: HOSPADM

## 2018-06-09 RX ORDER — GABAPENTIN 400 MG/1
400 CAPSULE ORAL 3 TIMES DAILY
Status: DISCONTINUED | OUTPATIENT
Start: 2018-06-09 | End: 2018-06-13 | Stop reason: HOSPADM

## 2018-06-09 RX ORDER — ONDANSETRON 2 MG/ML
4 INJECTION INTRAMUSCULAR; INTRAVENOUS EVERY 6 HOURS PRN
Status: DISCONTINUED | OUTPATIENT
Start: 2018-06-09 | End: 2018-06-13 | Stop reason: HOSPADM

## 2018-06-09 RX ORDER — AMOXICILLIN 250 MG
2 CAPSULE ORAL 2 TIMES DAILY
Status: DISCONTINUED | OUTPATIENT
Start: 2018-06-09 | End: 2018-06-13 | Stop reason: HOSPADM

## 2018-06-09 RX ORDER — KETOROLAC TROMETHAMINE 15 MG/ML
30 INJECTION, SOLUTION INTRAMUSCULAR; INTRAVENOUS ONCE
Status: COMPLETED | OUTPATIENT
Start: 2018-06-09 | End: 2018-06-09

## 2018-06-09 RX ORDER — ONDANSETRON 4 MG/1
4 TABLET, ORALLY DISINTEGRATING ORAL EVERY 6 HOURS PRN
Status: DISCONTINUED | OUTPATIENT
Start: 2018-06-09 | End: 2018-06-13 | Stop reason: HOSPADM

## 2018-06-09 RX ORDER — TRAZODONE HYDROCHLORIDE 50 MG/1
50-150 TABLET, FILM COATED ORAL
Status: DISCONTINUED | OUTPATIENT
Start: 2018-06-09 | End: 2018-06-10

## 2018-06-09 RX ORDER — LORAZEPAM 2 MG/ML
1 INJECTION INTRAMUSCULAR ONCE
Status: COMPLETED | OUTPATIENT
Start: 2018-06-09 | End: 2018-06-09

## 2018-06-09 RX ADMIN — MELATONIN TAB 3 MG 3 MG: 3 TAB at 21:22

## 2018-06-09 RX ADMIN — GABAPENTIN 400 MG: 400 CAPSULE ORAL at 21:22

## 2018-06-09 RX ADMIN — LORAZEPAM 1 MG: 2 INJECTION INTRAMUSCULAR; INTRAVENOUS at 17:19

## 2018-06-09 RX ADMIN — CYCLOBENZAPRINE HYDROCHLORIDE 10 MG: 10 TABLET, FILM COATED ORAL at 06:30

## 2018-06-09 RX ADMIN — SENNOSIDES AND DOCUSATE SODIUM 1 TABLET: 8.6; 5 TABLET ORAL at 21:22

## 2018-06-09 RX ADMIN — KETOROLAC TROMETHAMINE 30 MG: 15 INJECTION, SOLUTION INTRAMUSCULAR; INTRAVENOUS at 06:30

## 2018-06-09 RX ADMIN — SODIUM CHLORIDE 1000 ML: 9 INJECTION, SOLUTION INTRAVENOUS at 20:49

## 2018-06-09 RX ADMIN — SODIUM CHLORIDE: 9 INJECTION, SOLUTION INTRAVENOUS at 21:20

## 2018-06-09 RX ADMIN — SODIUM CHLORIDE 1000 ML: 9 INJECTION, SOLUTION INTRAVENOUS at 17:19

## 2018-06-09 ASSESSMENT — ENCOUNTER SYMPTOMS
ABDOMINAL PAIN: 0
NAUSEA: 0
CHILLS: 0
NUMBNESS: 1
BACK PAIN: 1
FEVER: 0
HALLUCINATIONS: 1
BACK PAIN: 1
ABDOMINAL PAIN: 0
FEVER: 0
VOMITING: 0
DIFFICULTY URINATING: 0
SHORTNESS OF BREATH: 0

## 2018-06-09 NOTE — ED AVS SNAPSHOT
[facility] Emergency Department    [address]    [facility] [address]    Phone:  [phone]    Fax:  [phone]                                       Aydin Reilly   MRN: [id number]    Department:  [facility] Emergency Department   Date of Visit:  6/9/2018           After Visit Summary Signature Page     I have received my discharge instructions, and my questions have been answered. I have discussed any challenges I see with this plan with the nurse or doctor.    ..........................................................................................................................................  Patient/Patient Representative Signature      ..........................................................................................................................................  Patient Representative Print Name and Relationship to Patient    ..................................................               ................................................  Date                                            Time    ..........................................................................................................................................  Reviewed by Signature/Title    ...................................................              ..............................................  Date                                                            Time

## 2018-06-09 NOTE — ED PROVIDER NOTES
"  History     Chief Complaint:  Back pain    The history is provided by the patient.      Aydin Reilly is a 57 year old male with a history of lumbar disc herniation with radiculopathy, who presents to the emergency department today for evaluation of back pain. The patient reports chronic back pain and has undergone L4-5 back surgery in 1989. Since then, he has experienced occasional back problems. Last week, he slipped and mechanically fell out of bed and felt a \"pop\" in his low back and had shooting pain down his right leg when he tried to walk. The symptoms then improved over the past week, but then he overexerted himself last night at approximately 0100 in the morning while cleaning out his garage, and heard another pop in his low back and has since had bilateral pain localized across his low back radiating down the backside of his right leg. He reports having baseline numbness in his toes, and denies saddle anesthesia. He has new numbness in the middle of his low back along with feeling off-balanced when walking, but otherwise denies fever, leg weakness,or  difficulty urinating. He normally uses Advil every 4-6 hours along with naproxen and gabapentin for symptom management. He admits to alcohol abuse and meth use, but denies having any alcohol withdrawal symptoms and can stop drinking whenever he wants to. He last used meth a couple of days ago. He denies IV drug use. His primary care provider is Dr. Simmons.     Allergies:  Seasonal Allergies    Medications:    furosemide (LASIX) 40 MG tablet  gabapentin (NEURONTIN) 300 MG capsule  ibuprofen 200 MG capsule  LORazepam (ATIVAN) 0.5 MG tablet  MELATONIN PO  multivitamin, therapeutic with minerals (MULTI-VITAMIN) TABS  nabumetone (RELAFEN) 500 MG tablet  pseudoePHEDrine (SUDAFED) 30 MG tablet  sertraline (ZOLOFT) 100 MG tablet  sildenafil (VIAGRA) 100 MG tablet  traZODone (DESYREL) 50 MG tablet    Past Medical History:    Alcohol abuse   Anserine " bursitis   Cancer (H)   Chondritis   Elevated blood pressure reading without diagnosis of hypertension   Gastric ulcer, unspecified as acute or chronic, without mention of hemorrhage, perforation, or obstruction   Gastro-oesophageal reflux disease   Malignant neoplasm of rectum, rectosigmoid junction, and anus   Hyperlipidemia LDL goal < 130   Hypogonadism   Lumbar disc herniation with radiculopathy   Moderate episode of recurrent major depressive disorder (H)   Rotator cuff injury, right, initial encounter   Sleep apnea   Urethral stricture unspecified     Past Surgical History:    Abdomen surgery   Back surgery   L4-5 laminectomy; disk herniation  Anus surgery; squamous cell cancer  Umbilical hernia  Cystoscopy for urethral stricture from radiation therapy  Cystoscoopy, cystogram, combined  Cystoscopy, internal urethrotomy, combined  Cystoscopy, insert tube suprapubic, combined  EGD  Hernia repair  Laparoscopic lysis adhesions  Laparotomy exploratory  Myringotomy  Tonsillectomy and adenoidectomy  Urethroplasty with buccal graft    Family History:    Unknown/Adopted Mother   Suicide   Father    Social History:  Smoking Status: Never Smoker  Smokeless Tobacco: Never Used  Alcohol Use: Positive; Alcohol abuse  Marital Status:       Review of Systems   Constitutional: Negative for chills and fever.   Gastrointestinal: Negative for abdominal pain.   Genitourinary: Negative for difficulty urinating.   Musculoskeletal: Positive for back pain and gait problem.   Neurological: Positive for numbness.   All other systems reviewed and are negative.    Physical Exam     Patient Vitals for the past 24 hrs:   BP Temp Temp src Pulse Heart Rate SpO2   06/09/18 0615 - - - - - 97 %   06/09/18 0605 - 97.9  F (36.6  C) Temporal - - -   06/09/18 0604 - - - - - 100 %   06/09/18 0603 - - - 126 126 98 %   06/09/18 0602 (!) 159/99 - - - - -      Physical Exam  Constitutional: Alert, attentive, GCS 15, middle aged obese male sitting  in bed, appears uncomfortable   HENT: normocephalic, atraumatic   Eyes: Normal conjunctiva. Pupils are equal, round, and reactive to light.   CV: tachycardic rate and regular rhythm; no murmurs, rubs or gallups  Chest: Effort normal and breath sounds normal.    GI:  There is no tenderness to deep palpation. Protuberant abdomen, no distension. Normal bowel sounds  MSK: Midline spinal tenderness in upper lumbar spine, no paraspinal muscle tenderness, antalgic gait with slight limp on right leg, 1+ bilateral pitting edema to mid-shins, has compression socks on   Neurological: Alert, attentive, oriented, strength equal bilateral 4+/5 in lower extremities, limited by pain, sensation decreased of left lateral thigh (new) and bilateral feet (old), buttock sensation normal   Skin: Skin is warm and dry, no rashes, dirt and dust on bilateral forearms     Emergency Department Course     Interventions:  0630 Flexeril 10 mg PO   0630 Toradol 30 mg IM     Emergency Department Course:    Nursing notes and vitals reviewed.    0613 I performed an exam of the patient as documented above.     I discussed the treatment plan with the patient. He expressed understanding of this plan and consented to discharge. He will be discharged home with instructions for care and follow up. In addition, the patient will return to the emergency department if their symptoms persist, worsen, if new symptoms arise or if there is any concern.  All questions were answered.     Impression & Plan      Medical Decision Making:  Aydin Reilly is a 57 year old male who presents for evaluation of back pain and radicular symptoms. They have a history of back pain in the past with remote surgery.  He did not sustain any trauma, therefore x-rays are not necessary due to the low likelihood of fracture or subluxation.  I think this is most likely herniated disc versus lumbar strain with sciatica.  Advanced imaging with CT/MRI is not indicated at this time, but may  be indicated in the future if symptoms fail to resolve.  Nor is there any indication for consultation with neurosurgery or orthopedic spinal surgeon.  There is no clinical evidence of cauda equina syndrome, discitis, spinal/epidural space hematoma or epidural abscess or other emergently worrisome etiology. He has occasional meth use, but does not use intravenously and is afebrile. Given mechanism of injury with strain with heavy lifting, I doubt epidural abscess.  The neurological exam is normal, with the exception of the decreased sensation in left lateral thigh.  He will be started on medrol dose pack and has flexeril and naproxen at home.  I instructed him to NOT take ibuprofen in addition to naproxen as both are NSAIDs.  The patient was advised that radiculopathy often takes significant time to resolve, and that follow up with primary care, neurology and/or neurosurgery will be indicated if symptoms do not improve.  No heavy lifting, bending or twisting. Return if increasing pain, muscular weakness, or bowel or bladder dysfunction.     Diagnosis:    ICD-10-CM    1. Acute bilateral low back pain with right-sided sciatica M54.41      Disposition:   The patient is discharged to home.     Discharge Medications:  New Prescriptions    METHYLPREDNISOLONE (MEDROL DOSEPAK) 4 MG TABLET    Follow package instructions     Scribe Disclosure:  Stefani EDGAR, am serving as a scribe at 6:07 AM on 6/9/2018 to document services personally performed by Ashly Weaver MD, based on my observations and the provider's statements to me.      [facility] EMERGENCY DEPARTMENT       Ashly Weaver MD  06/09/18 3141

## 2018-06-09 NOTE — IP AVS SNAPSHOT
[facility] [address] [facility]    [address]    [facility] [address]    Phone:  [phone]    Fax:  [phone]                                       After Visit Summary   6/9/2018    Aydin Reilly    MRN: [id number]           After Visit Summary Signature Page     I have received my discharge instructions, and my questions have been answered. I have discussed any challenges I see with this plan with the nurse or doctor.    ..........................................................................................................................................  Patient/Patient Representative Signature      ..........................................................................................................................................  Patient Representative Print Name and Relationship to Patient    ..................................................               ................................................  Date                                            Time    ..........................................................................................................................................  Reviewed by Signature/Title    ...................................................              ..............................................  Date                                                            Time

## 2018-06-09 NOTE — ED NOTES
Discharge instructions gone over with pt, verbalized understanding. Discharged home with plan for follow up and new prescription. Denies questions at time of discharge.

## 2018-06-09 NOTE — ED TRIAGE NOTES
States having hx sciatic pain. Noticed pain developed a week ago symptoms improved. Yesterday was cleaning garage and did a twisting motion and noticed pain to L side of lower back radiating to LLE. Denies incontinence    Hx meth and marijuana abuse, alcohol abuse

## 2018-06-09 NOTE — LETTER
June 9, 2018      To Whom It May Concern:      Aydin Reilly was seen in our Emergency Department today, 06/09/18.  I expect his condition to improve over the next 2 days.  He may return to work when improved.    Sincerely,        Ashly Weaver MD

## 2018-06-09 NOTE — ED NOTES
Bed: ED08  Expected date: 6/9/18  Expected time: 3:47 PM  Means of arrival: Ambulance  Comments:  Phylicia Hurd
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
## 2018-06-09 NOTE — ED AVS SNAPSHOT
[facility] Emergency Department    [address]    [facility] [address]    Phone:  [phone]    Fax:  [phone]                                       Aydin Reilly   MRN: [id number]    Department:  [facility] Emergency Department   Date of Visit:  6/9/2018           Patient Information     Date Of Birth          1960        Your diagnoses for this visit were:     Acute bilateral low back pain with right-sided sciatica        You were seen by Ashly Weaver MD.      Follow-up Information     Follow up with Adolfo Simmons MD. Schedule an appointment as soon as possible for a visit in 5 days.    Specialty:  Family Practice    Why:  for recheck of back pain, discuss whether MRI is indicated     Contact information:    [address]  [facility] [address]  [phone]          Go to [facility] Emergency Department.    Specialty:  EMERGENCY MEDICINE    Why:  If symptoms worsen including progressive numbness, weakness, difficulty walking, unable to urinate, fevers/chills with back pain     Contact information:    [address]  [facility] [address]  [phone]        Discharge Instructions       Discharge Instructions  Back Pain  You were seen today for back pain. Back pain can have many causes, but most will get better without surgery or other specific treatment. Sometimes there is a herniated ( slipped ) disc. We do not usually do MRI scans to look for these right away, since most herniated discs will get better on their own with time.  Today, we did not find any evidence that your back pain was caused by a serious condition. However, sometimes symptoms develop over time and cannot be found during an emergency visit, so it is very important that you follow up with your primary provider.  Generally, every Emergency Department visit should have a follow-up clinic visit with either a primary or a specialty  clinic/provider. Please follow-up as instructed by your emergency provider today.    Return to the Emergency Department if:    You develop a fever with your back pain.     You have weakness or change in sensation in one or both legs.    You lose control of your bowels or bladder, or cannot empty your bladder (cannot pee).    Your pain gets much worse.     Follow-up with your provider:    Unless your pain has completely gone away, please make an appointment with your provider within one week. Most of the routine care for back pain is available in a clinic and not the Emergency Department. You may need further management of your back pain, such as more pain medication, imaging such as an X-ray or MRI, or physical therapy.    What can I do to help myself?    Remain Active -- People are often afraid that they will hurt their back further or delay recovery by remaining active, but this is one of the best things you can do for your back. In fact, staying in bed for a long time to rest is not recommended. Studies have shown that people with low back pain recover faster when they remain active. Movement helps to bring blood flow to the muscles and relieve muscle spasms as well as preventing loss of muscle strength.    Heat -- Using a heating pad can help with low back pain during the first few weeks. Do not sleep with a heating pad, as you can be burned.     Pain medications - You may take a pain medication such as Tylenol  (acetaminophen), Advil , Motrin  (ibuprofen) or Aleve  (naproxen).  If you were given a prescription for medicine here today, be sure to read all of the information (including the package insert) that comes with your prescription.  This will include important information about the medicine, its side effects, and any warnings that you need to know about.  The pharmacist who fills the prescription can provide more information and answer questions you may have about the medicine.  If you have questions or  concerns that the pharmacist cannot address, please call or return to the Emergency Department.   Remember that you can always come back to the Emergency Department if you are not able to see your regular provider in the amount of time listed above, if you get any new symptoms, or if there is anything that worries you.      Your next 10 appointments already scheduled     Jun 11, 2018  8:40 AM CDT   SHORT with Adolfo Simmons MD   [facility] ([facility]    [address] [place] [address], [address]  [facility] [id number] [phone]            Jun 29, 2018 11:30 AM CDT   (Arrive by 11:00 AM)   New Visit with Mahesh Ibarra [facility] ([facility])    [address] [place] [address] [phone]            Jul 06, 2018  8:30 AM CDT   Return Visit with Mahesh Ibarra [address] [address] [phone]              24 Hour Appointment Hotline       To make an appointment at any [facility], call [phone] ([phone]). If you don't have a family doctor or clinic, we will help you find one. [facility] are conveniently located to serve the needs of you and your family.             Review of your medicines      START taking        Dose / Directions Last dose taken    methylPREDNISolone 4 MG tablet   Commonly known as:  MEDROL DOSEPAK   Quantity:  21 tablet        Follow package instructions   Refills:  0          Our records show that you are taking the medicines listed below. If these are incorrect, please call your family doctor or clinic.        Dose / Directions Last dose taken    cyclobenzaprine 10 MG tablet   Commonly known as:  FLEXERIL   Quantity:  60 tablet        TAKE 1 TABLET (10 MG) BY MOUTH NIGHTLY AS NEEDED FOR MUSCLE SPASMS   Refills:  0        furosemide 40 MG tablet   Commonly known as:   LASIX   Dose:  40 mg   Quantity:  30 tablet        Take 1 tablet (40 mg) by mouth daily as needed for edema   Refills:  1        gabapentin 300 MG capsule   Commonly known as:  NEURONTIN   Dose:  1200 mg   Quantity:  360 capsule        Take 4 capsules (1,200 mg) by mouth 3 times daily   Refills:  3        ibuprofen 200 MG capsule   Dose:  400-600 mg   Quantity:  120 capsule        Take 400-600 mg by mouth every 8 hours as needed   Refills:  0        JOBST ACTIVE 20-30MMHG MEDIUM Misc   Dose:  1 Device   Quantity:  6 each        1 Device daily as needed Knee high   Refills:  1        LORazepam 0.5 MG tablet   Commonly known as:  ATIVAN   Dose:  0.5 mg   Quantity:  4 tablet        Take 1 tablet (0.5 mg) by mouth every 8 hours as needed for anxiety   Refills:  0        MELATONIN PO   Dose:  3 mg        Take 3 mg by mouth nightly as needed Reported on 5/10/2017   Refills:  0        Multi-vitamin Tabs tablet   Dose:  1 tablet        Take 1 tablet by mouth daily   Refills:  0        nabumetone 500 MG tablet   Commonly known as:  RELAFEN   Quantity:  60 tablet        TAKE 2 TABLETS (1,000 MG) BY MOUTH DAILY AS NEEDED FOR MODERATE PAIN   Refills:  1        pseudoePHEDrine 30 MG tablet   Commonly known as:  SUDAFED   Dose:  30-60 mg        Take 30-60 mg by mouth every 4 hours as needed for congestion   Refills:  0        * sertraline 100 MG tablet   Commonly known as:  ZOLOFT   Dose:  100 mg   Quantity:  30 tablet        Take 1 tablet (100 mg) by mouth daily   Refills:  0        * sertraline 100 MG tablet   Commonly known as:  ZOLOFT   Dose:  100 mg   Quantity:  90 tablet        Take 1 tablet (100 mg) by mouth daily   Refills:  0        sildenafil 100 MG tablet   Commonly known as:  VIAGRA   Dose:   mg   Quantity:  12 tablet        Take 0.5-1 tablets ( mg) by mouth daily as needed Take 30 min to 4 hours before intercourse.  Never use with nitroglycerin, terazosin or doxazosin.   Refills:  11        traZODone 50  MG tablet   Commonly known as:  DESYREL   Dose:   mg   Quantity:  90 tablet        Take 1-3 tablets ( mg) by mouth nightly as needed for sleep   Refills:  3        * Notice:  This list has 2 medication(s) that are the same as other medications prescribed for you. Read the directions carefully, and ask your doctor or other care provider to review them with you.            Prescriptions were sent or printed at these locations (1 Prescription)                   Other Prescriptions                Printed at Department/Unit printer (1 of 1)         methylPREDNISolone (MEDROL DOSEPAK) 4 MG tablet                Orders Needing Specimen Collection     None      Pending Results     No orders found from 6/7/2018 to 6/10/2018.            Pending Culture Results     No orders found from 6/7/2018 to 6/10/2018.            Pending Results Instructions     If you had any lab results that were not finalized at the time of your Discharge, you can call the ED Lab Result RN at [phone]. You will be contacted by this team for any positive Lab results or changes in treatment. The nurses are available 7 days a week from 10A to 6:30P.  You can leave a message 24 hours per day and they will return your call.        Test Results From Your Hospital Stay               Clinical Quality Measure: Blood Pressure Screening     Your blood pressure was checked while you were in the emergency department today. The last reading we obtained was  BP: (!) 159/99 . Please read the guidelines below about what these numbers mean and what you should do about them.  If your systolic blood pressure (the top number) is less than 120 and your diastolic blood pressure (the bottom number) is less than 80, then your blood pressure is normal. There is nothing more that you need to do about it.  If your systolic blood pressure (the top number) is 120-139 or your diastolic blood pressure (the bottom number) is 80-89, your blood pressure may be higher  than it should be. You should have your blood pressure rechecked within a year by a primary care provider.  If your systolic blood pressure (the top number) is 140 or greater or your diastolic blood pressure (the bottom number) is 90 or greater, you may have high blood pressure. High blood pressure is treatable, but if left untreated over time it can put you at risk for heart attack, stroke, or kidney failure. You should have your blood pressure rechecked by a primary care provider within the next 4 weeks.  If your provider in the emergency department today gave you specific instructions to follow-up with your doctor or provider even sooner than that, you should follow that instruction and not wait for up to 4 weeks for your follow-up visit.        Thank you for choosing [facility]       Thank you for choosing [facility] for your care. Our goal is always to provide you with excellent care. Hearing back from our patients is one way we can continue to improve our services. Please take a few minutes to complete the written survey that you may receive in the mail after you visit with us. Thank you!        [facility] Information     [employer] gives you secure access to your electronic health record. If you see a primary care provider, you can also send messages to your care team and make appointments. If you have questions, please call your primary care clinic.  If you do not have a primary care provider, please call [phone] and they will assist you.        Care EveryWhere ID     This is your Care EveryWhere ID. This could be used by other organizations to access your [facility] medical records  [id number]        Equal Access to Services     JIGAR GARCÍA : Hadii florencio Fraga, waaxda luqadaha, qaybta kaalmada gogo herrera . So [facility] [phone].    ATENCIÓN: Si habla español, tiene a lyn disposición servicios gratuitos de asistencia lingüística. Llame al [phone].    We  comply with applicable federal civil rights laws and [place] laws. We do not discriminate on the basis of race, color, national origin, age, disability, sex, sexual orientation, or gender identity.            After Visit Summary       This is your record. Keep this with you and show to your community pharmacist(s) and doctor(s) at your next visit.

## 2018-06-09 NOTE — IP AVS SNAPSHOT
MRN:[id number]                      After Visit Summary   6/9/2018    Aydin Reilly    MRN: [id number]           Thank you!     Thank you for choosing [facility] for your care. Our goal is always to provide you with excellent care. Hearing back from our patients is one way we can continue to improve our services. Please take a few minutes to complete the written survey that you may receive in the mail after you visit. If you would like to speak to someone directly about your visit please contact Patient Relations at [phone]. Thank you!          Patient Information     Date Of Birth          1960        Designated Caregiver       Most Recent Value    Caregiver    Will someone help with your care after discharge? yes    Name of designated caregiver ED    Phone number of caregiver [phone]    Caregiver address [place]      About your hospital stay     You were admitted on:  June 9, 2018 You last received care in the:  [place] [address] [facility]    You were discharged on:  June 13, 2018       Who to Call     For medical emergencies, please call 911.  For non-urgent questions about your medical care, please call your primary care provider or clinic, [phone]          Attending Provider     Provider Specialty    Adolfo Singh MD Emergency Medicine    Kee Monk MD Family Practice       Primary Care Provider Office Phone # Fax #    Adolfo Simmons [phone]      After Care Instructions     Activity       Your activity upon discharge: activity as tolerated            Diet       Follow this diet upon discharge: Regular                  Follow-up Appointments     Follow-up and recommended labs and tests        Follow up with primary care provider, Adolfo Simmons, within 7 days for hospital follow- up.  Follow-up with oncology within 7 days.  The following labs/tests are recommended: BMP and CBC in 7 days.                 "  Your next 10 appointments already scheduled     Jun 29, 2018 11:30 AM CDT   (Arrive by 11:00 AM)   New Visit with Mahesh Ibarra [facility] ([facility])    [address]  [facility] [address]   [phone]            Jul 06, 2018  8:30 AM CDT   Return Visit with Mahesh Ibarra [facility] ([facility])    [address] [facility] [address]   [phone]              Pending Results     Date and Time Order Name Status Description    6/11/2018 0000 25 Hydroxyvitamin D2 and D3 In process     6/11/2018 0000 PTH hormone (related peptide) In process     6/10/2018 0953 PTH hormone (related peptide) In process             Statement of Approval     Ordered          06/13/18 1256  I have reviewed and agree with all the recommendations and orders detailed in this document.  EFFECTIVE NOW     Approved and electronically signed by:  Juan Antonio Medina DO             Admission Information     Date & Time Provider Department Dept. Phone    6/9/2018 Kee Monk MD [place] [address] [facility] [phone]      Your Vitals Were     Blood Pressure Temperature Respirations Height Weight Pulse Oximetry    155/87 97.7  F (36.5  C) (Oral) 20 1.727 m (5' 8\") 111.6 kg (246 lb) 95%    BMI (Body Mass Index)                   37.4 kg/m2           MyChart Information     [employer] gives you secure access to your electronic health record. If you see a primary care provider, you can also send messages to your care team and make appointments. If you have questions, please call your primary care clinic.  If you do not have a primary care provider, please call [phone] and they will assist you.        Care EveryWhere ID     This is your Care EveryWhere ID. This could be used by other organizations to access your [facility] medical records  [id number]        Equal Access to Services     JIGAR GARCÍA AH: Hadii aad " nova Fraga, waaxda luqadaha, qaybta kaalmada adeshashank, gogo alfredain hayaalazaro corraljamie sanchez lakareenlazaro zion. So [facility] [phone].    ATENCIÓN: Si rubin crandall, tiene a lyn disposición servicios gratuitos de asistencia lingüística. Llgabriel al [phone].    We comply with applicable federal civil rights laws and [place] laws. We do not discriminate on the basis of race, color, national origin, age, disability, sex, sexual orientation, or gender identity.               Review of your medicines      START taking        Dose / Directions    acetaminophen 325 MG tablet   Commonly known as:  TYLENOL   Used for:  Chronic pain of right knee        Dose:  650 mg   Take 2 tablets (650 mg) by mouth every 8 hours as needed for mild pain   Quantity:  100 tablet   Refills:  0       amLODIPine 5 MG tablet   Commonly known as:  NORVASC   Used for:  Benign essential hypertension        Dose:  5 mg   Take 1 tablet (5 mg) by mouth daily   Quantity:  30 tablet   Refills:  0       folic acid 1 MG tablet   Commonly known as:  FOLVITE   Used for:  Acute renal failure, unspecified acute renal failure type (H)        Dose:  1 mg   Take 1 tablet (1 mg) by mouth daily   Quantity:  30 tablet   Refills:  0       hydrOXYzine 25 MG tablet   Commonly known as:  ATARAX   Used for:  Chronic pain of right knee        Dose:  25-50 mg   Take 1-2 tablets (25-50 mg) by mouth every 8 hours as needed for itching or anxiety (pain)   Quantity:  20 tablet   Refills:  0       OLANZapine 5 MG tablet   Commonly known as:  zyPREXA   Used for:  Hallucinations        Dose:  5 mg   Take 1 tablet (5 mg) by mouth At Bedtime   Quantity:  30 tablet   Refills:  0       thiamine 100 MG tablet   Used for:  Acute renal failure, unspecified acute renal failure type (H)        Dose:  100 mg   Take 1 tablet (100 mg) by mouth daily   Quantity:  30 tablet   Refills:  0         CONTINUE these medicines which may have CHANGED, or have new prescriptions. If we are uncertain of  the size of tablets/capsules you have at home, strength may be listed as something that might have changed.        Dose / Directions    gabapentin 300 MG capsule   Commonly known as:  NEURONTIN   This may have changed:  how much to take   Used for:  Other mononeuropathy        Dose:  600 mg   Take 2 capsules (600 mg) by mouth 3 times daily   Quantity:  180 capsule   Refills:  0         CONTINUE these medicines which have NOT CHANGED        Dose / Directions    JOBST ACTIVE 20-30MMHG MEDIUM Misc   Used for:  Pedal edema        Dose:  1 Device   1 Device daily as needed Knee high   Quantity:  6 each   Refills:  1       MELATONIN PO        Dose:  3 mg   Take 3 mg by mouth nightly as needed Reported on 5/10/2017   Refills:  0       Multi-vitamin Tabs tablet        Dose:  1 tablet   Take 1 tablet by mouth daily   Refills:  0       SERTRALINE HCL PO        Dose:  200-300 mg   Take 200-300 mg by mouth daily   Refills:  0       traZODone 50 MG tablet   Commonly known as:  DESYREL   Used for:  CONNER (generalized anxiety disorder)        Dose:   mg   Take 1-3 tablets ( mg) by mouth nightly as needed for sleep   Quantity:  90 tablet   Refills:  3         STOP taking     cyclobenzaprine 10 MG tablet   Commonly known as:  FLEXERIL           furosemide 40 MG tablet   Commonly known as:  LASIX           ibuprofen 200 MG capsule           methylPREDNISolone 4 MG tablet   Commonly known as:  MEDROL DOSEPAK           nabumetone 500 MG tablet   Commonly known as:  RELAFEN           pseudoePHEDrine 30 MG tablet   Commonly known as:  SUDAFED           sildenafil 100 MG tablet   Commonly known as:  VIAGRA                Where to get your medicines      These medications were sent to [facility] [address] [place] [id number] [facility] [address]     Phone:  [phone]     amLODIPine 5 MG tablet    folic acid 1 MG tablet    hydrOXYzine 25 MG tablet    OLANZapine 5 MG tablet     thiamine 100 MG tablet         Some of these will need a paper prescription and others can be bought over the counter. Ask your nurse if you have questions.     You don't need a prescription for these medications     acetaminophen 325 MG tablet    gabapentin 300 MG capsule                Protect others around you: Learn how to safely use, store and throw away your medicines at www.disposemymeds.org.             Medication List: This is a list of all your medications and when to take them. Check marks below indicate your daily home schedule. Keep this list as a reference.      Medications           Morning Afternoon Evening Bedtime As Needed    acetaminophen 325 MG tablet   Commonly known as:  TYLENOL   Take 2 tablets (650 mg) by mouth every 8 hours as needed for mild pain   Last time this was given:  650 mg on 6/13/2018  7:57 AM                                amLODIPine 5 MG tablet   Commonly known as:  NORVASC   Take 1 tablet (5 mg) by mouth daily   Last time this was given:  5 mg on 6/13/2018  1:53 PM                                folic acid 1 MG tablet   Commonly known as:  FOLVITE   Take 1 tablet (1 mg) by mouth daily   Last time this was given:  1 mg on 6/13/2018  7:57 AM                                gabapentin 300 MG capsule   Commonly known as:  NEURONTIN   Take 2 capsules (600 mg) by mouth 3 times daily   Last time this was given:  400 mg on 6/13/2018  7:54 AM                                hydrOXYzine 25 MG tablet   Commonly known as:  ATARAX   Take 1-2 tablets (25-50 mg) by mouth every 8 hours as needed for itching or anxiety (pain)   Last time this was given:  25 mg on 6/13/2018  1:53 PM                                JOBST ACTIVE 20-30MMHG MEDIUM Misc   1 Device daily as needed Knee high                                MELATONIN PO   Take 3 mg by mouth nightly as needed Reported on 5/10/2017   Last time this was given:  3 mg on 6/12/2018  9:26 PM                                Multi-vitamin Tabs  tablet   Take 1 tablet by mouth daily   Last time this was given:  1 tablet on 6/13/2018  7:54 AM                                OLANZapine 5 MG tablet   Commonly known as:  zyPREXA   Take 1 tablet (5 mg) by mouth At Bedtime   Last time this was given:  5 mg on 6/12/2018  9:26 PM                                SERTRALINE HCL PO   Take 200-300 mg by mouth daily   Last time this was given:  200 mg on 6/13/2018  7:57 AM                                thiamine 100 MG tablet   Take 1 tablet (100 mg) by mouth daily   Last time this was given:  100 mg on 6/13/2018  7:54 AM                                traZODone 50 MG tablet   Commonly known as:  DESYREL   Take 1-3 tablets ( mg) by mouth nightly as needed for sleep

## 2018-06-10 LAB
ALBUMIN SERPL-MCNC: 3 G/DL (ref 3.4–5)
ALP SERPL-CCNC: 261 U/L (ref 40–150)
ALT SERPL W P-5'-P-CCNC: 75 U/L (ref 0–70)
ANION GAP SERPL CALCULATED.3IONS-SCNC: 6 MMOL/L (ref 3–14)
AST SERPL W P-5'-P-CCNC: 83 U/L (ref 0–45)
BASOPHILS # BLD AUTO: 0 10E9/L (ref 0–0.2)
BASOPHILS NFR BLD AUTO: 0.8 %
BILIRUB SERPL-MCNC: 1.5 MG/DL (ref 0.2–1.3)
BUN SERPL-MCNC: 32 MG/DL (ref 7–30)
CA-I SERPL ISE-MCNC: 5.5 MG/DL (ref 4.4–5.2)
CALCIUM SERPL-MCNC: 9.4 MG/DL (ref 8.5–10.1)
CHLORIDE SERPL-SCNC: 111 MMOL/L (ref 94–109)
CO2 SERPL-SCNC: 28 MMOL/L (ref 20–32)
CREAT SERPL-MCNC: 2.52 MG/DL (ref 0.66–1.25)
DIFFERENTIAL METHOD BLD: ABNORMAL
EOSINOPHIL # BLD AUTO: 0.3 10E9/L (ref 0–0.7)
EOSINOPHIL NFR BLD AUTO: 7 %
ERYTHROCYTE [DISTWIDTH] IN BLOOD BY AUTOMATED COUNT: 15 % (ref 10–15)
GFR SERPL CREATININE-BSD FRML MDRD: 27 ML/MIN/1.7M2
GLUCOSE SERPL-MCNC: 96 MG/DL (ref 70–99)
HCT VFR BLD AUTO: 29.3 % (ref 40–53)
HGB BLD-MCNC: 9.5 G/DL (ref 13.3–17.7)
IMM GRANULOCYTES # BLD: 0 10E9/L (ref 0–0.4)
IMM GRANULOCYTES NFR BLD: 0.5 %
INTERPRETATION ECG - MUSE: NORMAL
LYMPHOCYTES # BLD AUTO: 0.6 10E9/L (ref 0.8–5.3)
LYMPHOCYTES NFR BLD AUTO: 15.1 %
MCH RBC QN AUTO: 29.1 PG (ref 26.5–33)
MCHC RBC AUTO-ENTMCNC: 32.4 G/DL (ref 31.5–36.5)
MCV RBC AUTO: 90 FL (ref 78–100)
MONOCYTES # BLD AUTO: 0.6 10E9/L (ref 0–1.3)
MONOCYTES NFR BLD AUTO: 16.8 %
NEUTROPHILS # BLD AUTO: 2.2 10E9/L (ref 1.6–8.3)
NEUTROPHILS NFR BLD AUTO: 59.8 %
NRBC # BLD AUTO: 0 10*3/UL
NRBC BLD AUTO-RTO: 0 /100
OSMOLALITY UR: 454 MMOL/KG (ref 100–1200)
PLATELET # BLD AUTO: 111 10E9/L (ref 150–450)
POTASSIUM SERPL-SCNC: 3.5 MMOL/L (ref 3.4–5.3)
PROT SERPL-MCNC: 6 G/DL (ref 6.8–8.8)
PTH-INTACT SERPL-MCNC: 12 PG/ML (ref 18–80)
RBC # BLD AUTO: 3.27 10E12/L (ref 4.4–5.9)
SODIUM SERPL-SCNC: 145 MMOL/L (ref 133–144)
WBC # BLD AUTO: 3.7 10E9/L (ref 4–11)

## 2018-06-10 PROCEDURE — 36415 COLL VENOUS BLD VENIPUNCTURE: CPT | Performed by: HOSPITALIST

## 2018-06-10 PROCEDURE — 85025 COMPLETE CBC W/AUTO DIFF WBC: CPT | Performed by: HOSPITALIST

## 2018-06-10 PROCEDURE — 82330 ASSAY OF CALCIUM: CPT | Performed by: HOSPITALIST

## 2018-06-10 PROCEDURE — 80053 COMPREHEN METABOLIC PANEL: CPT | Performed by: HOSPITALIST

## 2018-06-10 PROCEDURE — 25000128 H RX IP 250 OP 636: Performed by: INTERNAL MEDICINE

## 2018-06-10 PROCEDURE — 25000132 ZZH RX MED GY IP 250 OP 250 PS 637: Performed by: INTERNAL MEDICINE

## 2018-06-10 PROCEDURE — 12000007 ZZH R&B INTERMEDIATE

## 2018-06-10 PROCEDURE — 25000128 H RX IP 250 OP 636: Performed by: HOSPITALIST

## 2018-06-10 PROCEDURE — 25000132 ZZH RX MED GY IP 250 OP 250 PS 637: Performed by: HOSPITALIST

## 2018-06-10 PROCEDURE — 99233 SBSQ HOSP IP/OBS HIGH 50: CPT | Performed by: INTERNAL MEDICINE

## 2018-06-10 PROCEDURE — 99207 ZZC NON-BILLABLE SERV PER CHARTING: CPT | Performed by: PSYCHOLOGIST

## 2018-06-10 PROCEDURE — 82542 COL CHROMOTOGRAPHY QUAL/QUAN: CPT | Performed by: HOSPITALIST

## 2018-06-10 RX ORDER — LORAZEPAM 2 MG/ML
1-2 INJECTION INTRAMUSCULAR EVERY 30 MIN PRN
Status: DISCONTINUED | OUTPATIENT
Start: 2018-06-10 | End: 2018-06-13 | Stop reason: HOSPADM

## 2018-06-10 RX ORDER — ACETAMINOPHEN 325 MG/1
650 TABLET ORAL EVERY 8 HOURS PRN
Status: DISCONTINUED | OUTPATIENT
Start: 2018-06-10 | End: 2018-06-13 | Stop reason: HOSPADM

## 2018-06-10 RX ORDER — TRAZODONE HYDROCHLORIDE 50 MG/1
50-100 TABLET, FILM COATED ORAL
Status: DISCONTINUED | OUTPATIENT
Start: 2018-06-10 | End: 2018-06-13 | Stop reason: HOSPADM

## 2018-06-10 RX ORDER — LANOLIN ALCOHOL/MO/W.PET/CERES
100 CREAM (GRAM) TOPICAL DAILY
Status: DISCONTINUED | OUTPATIENT
Start: 2018-06-10 | End: 2018-06-13 | Stop reason: HOSPADM

## 2018-06-10 RX ORDER — MULTIPLE VITAMINS W/ MINERALS TAB 9MG-400MCG
1 TAB ORAL DAILY
Status: DISCONTINUED | OUTPATIENT
Start: 2018-06-10 | End: 2018-06-10

## 2018-06-10 RX ORDER — DIAZEPAM 10 MG/2ML
5-10 INJECTION, SOLUTION INTRAMUSCULAR; INTRAVENOUS EVERY 30 MIN PRN
Status: DISCONTINUED | OUTPATIENT
Start: 2018-06-10 | End: 2018-06-10

## 2018-06-10 RX ORDER — LANOLIN ALCOHOL/MO/W.PET/CERES
100 CREAM (GRAM) TOPICAL DAILY
Status: DISCONTINUED | OUTPATIENT
Start: 2018-06-10 | End: 2018-06-10

## 2018-06-10 RX ORDER — FOLIC ACID 1 MG/1
1 TABLET ORAL DAILY
Status: DISCONTINUED | OUTPATIENT
Start: 2018-06-10 | End: 2018-06-10

## 2018-06-10 RX ORDER — FOLIC ACID 1 MG/1
1 TABLET ORAL DAILY
Status: DISCONTINUED | OUTPATIENT
Start: 2018-06-10 | End: 2018-06-13 | Stop reason: HOSPADM

## 2018-06-10 RX ORDER — HYDROMORPHONE HYDROCHLORIDE 1 MG/ML
0.2 INJECTION, SOLUTION INTRAMUSCULAR; INTRAVENOUS; SUBCUTANEOUS
Status: COMPLETED | OUTPATIENT
Start: 2018-06-10 | End: 2018-06-10

## 2018-06-10 RX ORDER — BISACODYL 10 MG
10 SUPPOSITORY, RECTAL RECTAL DAILY PRN
Status: DISCONTINUED | OUTPATIENT
Start: 2018-06-10 | End: 2018-06-13 | Stop reason: HOSPADM

## 2018-06-10 RX ORDER — LORAZEPAM 2 MG/ML
1 INJECTION INTRAMUSCULAR ONCE
Status: COMPLETED | OUTPATIENT
Start: 2018-06-10 | End: 2018-06-10

## 2018-06-10 RX ORDER — SODIUM CHLORIDE 9 MG/ML
INJECTION, SOLUTION INTRAVENOUS CONTINUOUS
Status: DISCONTINUED | OUTPATIENT
Start: 2018-06-10 | End: 2018-06-12

## 2018-06-10 RX ORDER — SERTRALINE HYDROCHLORIDE 100 MG/1
200 TABLET, FILM COATED ORAL DAILY
Status: DISCONTINUED | OUTPATIENT
Start: 2018-06-10 | End: 2018-06-13 | Stop reason: HOSPADM

## 2018-06-10 RX ORDER — POLYETHYLENE GLYCOL 3350 17 G/17G
17 POWDER, FOR SOLUTION ORAL 2 TIMES DAILY PRN
Status: DISCONTINUED | OUTPATIENT
Start: 2018-06-10 | End: 2018-06-13 | Stop reason: HOSPADM

## 2018-06-10 RX ORDER — OLANZAPINE 5 MG/1
5 TABLET ORAL AT BEDTIME
Status: DISCONTINUED | OUTPATIENT
Start: 2018-06-10 | End: 2018-06-13 | Stop reason: HOSPADM

## 2018-06-10 RX ORDER — DIAZEPAM 10 MG
10 TABLET ORAL EVERY 30 MIN PRN
Status: DISCONTINUED | OUTPATIENT
Start: 2018-06-10 | End: 2018-06-10

## 2018-06-10 RX ORDER — LORAZEPAM 1 MG/1
1-2 TABLET ORAL EVERY 30 MIN PRN
Status: DISCONTINUED | OUTPATIENT
Start: 2018-06-10 | End: 2018-06-13 | Stop reason: HOSPADM

## 2018-06-10 RX ADMIN — SODIUM CHLORIDE: 9 INJECTION, SOLUTION INTRAVENOUS at 03:53

## 2018-06-10 RX ADMIN — HYDROMORPHONE HYDROCHLORIDE 0.2 MG: 1 INJECTION, SOLUTION INTRAMUSCULAR; INTRAVENOUS; SUBCUTANEOUS at 09:58

## 2018-06-10 RX ADMIN — MELATONIN TAB 3 MG 3 MG: 3 TAB at 21:55

## 2018-06-10 RX ADMIN — LORAZEPAM 2 MG: 2 INJECTION INTRAMUSCULAR; INTRAVENOUS at 22:55

## 2018-06-10 RX ADMIN — LORAZEPAM 1 MG: 2 INJECTION INTRAMUSCULAR; INTRAVENOUS at 05:51

## 2018-06-10 RX ADMIN — GABAPENTIN 400 MG: 400 CAPSULE ORAL at 21:54

## 2018-06-10 RX ADMIN — SENNOSIDES AND DOCUSATE SODIUM 1 TABLET: 8.6; 5 TABLET ORAL at 09:10

## 2018-06-10 RX ADMIN — SODIUM CHLORIDE: 9 INJECTION, SOLUTION INTRAVENOUS at 14:15

## 2018-06-10 RX ADMIN — OLANZAPINE 5 MG: 5 TABLET, FILM COATED ORAL at 21:54

## 2018-06-10 RX ADMIN — ACETAMINOPHEN 650 MG: 325 TABLET, FILM COATED ORAL at 19:45

## 2018-06-10 RX ADMIN — SERTRALINE HYDROCHLORIDE 200 MG: 100 TABLET ORAL at 16:06

## 2018-06-10 RX ADMIN — GABAPENTIN 400 MG: 400 CAPSULE ORAL at 09:10

## 2018-06-10 RX ADMIN — FOLIC ACID 1 MG: 1 TABLET ORAL at 10:32

## 2018-06-10 RX ADMIN — LORAZEPAM 1 MG: 2 INJECTION INTRAMUSCULAR; INTRAVENOUS at 17:59

## 2018-06-10 RX ADMIN — MULTIPLE VITAMINS W/ MINERALS TAB 1 TABLET: TAB at 09:10

## 2018-06-10 RX ADMIN — HYDROMORPHONE HYDROCHLORIDE 0.2 MG: 1 INJECTION, SOLUTION INTRAMUSCULAR; INTRAVENOUS; SUBCUTANEOUS at 05:51

## 2018-06-10 RX ADMIN — Medication 100 MG: at 10:32

## 2018-06-10 RX ADMIN — GABAPENTIN 400 MG: 400 CAPSULE ORAL at 16:06

## 2018-06-10 ASSESSMENT — ACTIVITIES OF DAILY LIVING (ADL)
ADLS_ACUITY_SCORE: 11
ADLS_ACUITY_SCORE: 12
ADLS_ACUITY_SCORE: 11
ADLS_ACUITY_SCORE: 10
ADLS_ACUITY_SCORE: 12
ADLS_ACUITY_SCORE: 11

## 2018-06-10 NOTE — PLAN OF CARE
Problem: Patient Care Overview  Goal: Plan of Care/Patient Progress Review  Outcome: No Change  Pt oriented to room and call light use, VSS, SBA, Reg diet, A&O, hallucinating that there are pictures on the wall and that someone was talking in his room, calm, cooperative,  ml/hr, LS diminished, Isolation for ESBL in urine in 2016, chronic neuropathy in feet, takes Neurontin, Continue to monitor. Report to MD if patient needs a sitter. Isolation needs reviewed and charted on. Pt verbalizes understanding.
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
## 2018-06-10 NOTE — PROGRESS NOTES
"SPIRITUAL HEALTH SERVICES  SPIRITUAL ASSESSMENT Progress Note  [facility] Med/Surg 3    PRIMARY FOCUS:     Goals of care    Symptom/pain management    Emotional/spiritual/Tenriism distress    Support for coping    ILLNESS CIRCUMSTANCES:   Reviewed documentation. Reflective conversation shared with pt, Aydin, which integrated elements of illness and family narratives. Prayer was offered    Context of Serious Illness/Symptom(s) - Aydin shared that he was brought in to the hospital because of drug-related hallucinations. Due to his mental status, he slips out of conversation roughly once every two minutes or less.    Resources for Support - Aydin stated \"I'm having some family issues,\" and did not mention any family members who are particularly close to him. He does not have children and is currently in the process of  his wife. He sometimes turns to prayer for support, although he does not regularly attend Sunday service. When he is able, he does love to visit his family, especially his mother.    DISTRESS:     Emotional/Existential/Relational Distress - Aydin's \"family issues\" and coming divorce seem to be the strongest stressors affecting him. However, his general demeanor was positive--happy, and eager to converse.    Spiritual/Evangelical Distress - None detected other than Aydin's self-articulated desire to more regularly attend Anabaptist.    Social/Cultural/Economic Distress - Possible minor social isolation    SPIRITUAL/Congregation COPING:     Mandaeism/Estela - Aydin is Islam and belongs to [employer] [facility] [employer] in [place]. His chart lists [employer] as a Anabaptist he is connected with, but it did not come up in conversation at all.    Spiritual Practice(s) - Although Aydin does not regularly attend Sunday service, he has a personal prayer life, and is happy to pray with others.    Emotional/Existential/Relational Connections - I want to note that Aydin asked me to pray for his wife. This was an " objectively selfless and mature thing to do, considering events that led to their pursuing divorce.    GOALS OF CARE:    Goals of Care - Aydin is hoping to go home tomorrow (Mon June 11) or the day after.    Meaning/Sense-Making - Aydin took ownership of the state of his own health and relationships, and is not seeking help making sense of the events in his life.    PLAN: If I have time, I will personally stop by and converse with Aydin, as he proposed during our farewell. Otherwise, no further [facility] services are anticipated at this time; [facility] services remain available.      Aime Erazo   Intern  Pager [phone]

## 2018-06-10 NOTE — PROGRESS NOTES
Patient's demographics show no insurance. CM contacted Financial Counselor's office (*[id number]) to request follow up with patient re: insurance.     Kamryn Cruz RN, BSN, CTS  [facility]  [phone]

## 2018-06-10 NOTE — PLAN OF CARE
"Problem: Patient Care Overview  Goal: Plan of Care/Patient Progress Review  Outcome: No Change  Vss, no co cp/sob, back pain controlled with IV dilaudid.   Tele ST.  IVF rate decreased to 100/hr, up sba to bathroom, tremors noted with slightly unsteady gait at times, alarms on for safety.  BLE edema noted, declined PCDs while in bed, Psyc seen (refer to note for details).  Using urinal at bedside, +BM, contact isolation maintained.  CIWA 2, 2. Na 145, Cr 2.52, ALT 75, AST 83.  Pt told \"jokes\" frequently to staff and did say a couple of inappropriate ones but argued that they were indeed within reason.  Not yet ready for dc, continue poc and monitoring.       "
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
## 2018-06-10 NOTE — PROGRESS NOTES
"[facility]  Hospitalist Progress Note  Juan Antonio Medina, DO 06/10/2018    Reason for Stay (Diagnosis): Hallucinations.         Assessment and Plan:      Summary of Stay: Aydin Reilly is a 57 year old male admitted on 6/9/2018 with hallucinations.  Problem List:   1. Hallucinations.  Improved.  Appreciate psych input.  Start Zyprexa 5 mg a day.  2. Acute withdrawals.  Likely from alcohol and methamphetamine.  Start Ativan withdrawal protocol with oral vitamins.  3. Depression.  Restart sertraline 200 mg a day.  4. Acute kidney failure.  Likely prerenal.  Creatinine has improved to 2.5 today.  Continue IV fluids.  Recheck metabolic panel tomorrow.  Avoid nephrotoxins as able.  5. Hypercalcemia.  PTH level low.  Check PTHrp, 1, 25 dihydroxy vitamin D, 25-hydroxy vitamin D levels, and SPEP.  Continue IV fluids.  6. Hepatitis.  Possibly due to alcohol abuse.  Recheck CMP tomorrow.  Check abdominal ultrasound.  7. Pancytopenia.  Check SPEP.  With CT scan findings also concerning for possible underlying hematologic disorder, will consult hematology/oncology.  DVT Prophylaxis: Pneumatic Compression Devices  Code Status: Full Code  Discharge Dispo: Home  Estimated Disch Date / # of Days until Disch: 1-3        Interval History (Subjective):      No auditory or visual hallucinations today.  Denies chest pain, shortness of breath, fevers, chills, nausea, vomiting, or diarrhea.                  Physical Exam:      Last Vital Signs:  /75 (BP Location: Left arm)  Temp 97.1  F (36.2  C) (Oral)  Resp 20  Ht 1.727 m (5' 8\")  Wt 104.6 kg (230 lb 8 oz)  SpO2 97%  BMI 35.05 kg/m2    Gen:  NAD, A&Ox3.  Eyes:  PERRL, sclera anicteric.  OP:  MMM, no lesions.  Neck:  Supple.  CV:  Regular, no murmurs.  Lung:  CTA b/l, normal effort.  Ab:  +BS, soft.  Skin:  Warm, dry to touch.  No rash.  Ext:  No pitting edema LE b/l.           Medications:      All current medications were reviewed with changes reflected " in problem list.         Data:      All new lab and imaging data was reviewed.   Labs:    Recent Labs  Lab 06/10/18  0638   *   POTASSIUM 3.5   CHLORIDE 111*   CO2 28   ANIONGAP 6   GLC 96   BUN 32*   CR 2.52*   GFRESTIMATED 27*   GFRESTBLACK 32*   ELSI 9.4       Recent Labs  Lab 06/10/18  0638   WBC 3.7*   HGB 9.5*   HCT 29.3*   MCV 90   *      Imaging:   Recent Results (from the past 24 hour(s))   CT Head w/o Contrast    Narrative    CT SCAN OF THE HEAD WITHOUT CONTRAST   6/9/2018 7:02 PM     HISTORY: Altered mental status.      TECHNIQUE:  Axial images of the head and coronal reformations without  IV contrast material.  Radiation dose for this scan was reduced using  automated exposure control, adjustment of the mA and/or kV according  to patient size, or iterative reconstruction technique.    COMPARISON: 12/14/2012.    FINDINGS: During the interval, there has been development of an  extremely mottled marrow pattern throughout the calvarium which is  probably due to a hemopoietic disorder. There is no evidence for  fracture. There is also some mild cerebral atrophy. Brain parenchyma  is otherwise normal. There is no evidence for intracranial hemorrhage,  acute infarct, or mass effect.      Impression    IMPRESSION:  1. Thickened mottled calvarium which has developed since the prior  exam. This is most likely due to a hemopoietic disorder such as a  blood dyscrasia, anemia, or leukemia. Metastatic disease is also a  possibility but less likely due to the diffuse thickening of the  calvarium.  2. Mild cerebral atrophy.      PAYAL OLIVARES MD

## 2018-06-10 NOTE — PROVIDER NOTIFICATION
"Text paged MD \"Pt requesting Ibuprofen for pain. Thx\"  Tylenol ordered.     Text paged MD \"US abd is scheduled for tomorrow morning, US would like pt NPO at midnight please.\"  "

## 2018-06-10 NOTE — PHARMACY-ADMISSION MEDICATION HISTORY
Admission medication history interview status for this patient is complete. See [facility] admission navigator for allergy information, prior to admission medications and immunization status.     Medication history interview source(s):Patient  Medication history resources (including written lists, pill bottles, clinic record):None  Primary pharmacy:[facility]    Changes made to PTA medication list:  Added: none  Deleted: lorazepam (short course), sertraline (removed duplicate entry)  Changed: sertraline (increase from 100 mg daily to 200 mg - 300 mg daily - see below)    Actions taken by pharmacist (provider contacted, etc):None     Additional medication history information: Aydin has multiple medications on hand but does not take routinely. He stated his prescriber increased his sertraline dose to help with controlling his anxiety and he was increased to 200 mg - 300 mg daily; he usually takes 200 mg (two 100 mg tablets).    Medication reconciliation/reorder completed by provider prior to medication history? No    Do you take OTC medications (eg tylenol, ibuprofen, fish oil, eye/ear drops, etc)? Y     Prior to Admission medications    Medication Sig Last Dose Taking? Auth Provider   cyclobenzaprine (FLEXERIL) 10 MG tablet TAKE 1 TABLET (10 MG) BY MOUTH NIGHTLY AS NEEDED FOR MUSCLE SPASMS 6/9/2018 Yes Adolfo Simmons MD   furosemide (LASIX) 40 MG tablet Take 1 tablet (40 mg) by mouth daily as needed for edema 6/9/2018 at 0700 Yes Olga Soto APRN CNP   gabapentin (NEURONTIN) 300 MG capsule Take 4 capsules (1,200 mg) by mouth 3 times daily 6/9/2018 at 0700 Yes Adolfo Simmons MD   ibuprofen 200 MG capsule Take 400-600 mg by mouth every 8 hours as needed 6/9/2018 Yes Nathaniel Jackson MD   multivitamin, therapeutic with minerals (MULTI-VITAMIN) TABS Take 1 tablet by mouth daily 6/9/2018 at 0700 Yes Reported, Patient   nabumetone (RELAFEN) 500 MG tablet TAKE 2 TABLETS (1,000 MG)  BY MOUTH DAILY AS NEEDED FOR MODERATE PAIN 6/9/2018 at 0700 Yes Adolfo Simmons MD   SERTRALINE HCL PO Take 200-300 mg by mouth daily 6/9/2018 at AM (2 tabs) Yes Reported, Patient   Elastic Bandages & Supports (JOBST ACTIVE 20-30MMHG MEDIUM) MISC 1 Device daily as needed Knee high  Patient not taking: Reported on 5/9/2018   Adolfo Simmons MD   MELATONIN PO Take 3 mg by mouth nightly as needed Reported on 5/10/2017 Unknown  Reported, Patient   methylPREDNISolone (MEDROL DOSEPAK) 4 MG tablet Follow package instructions not started  Ashly Weaver MD   pseudoePHEDrine (SUDAFED) 30 MG tablet Take 30-60 mg by mouth every 4 hours as needed for congestion Unknown  Unknown, Entered By History   sildenafil (VIAGRA) 100 MG tablet Take 0.5-1 tablets ( mg) by mouth daily as needed Take 30 min to 4 hours before intercourse.  Never use with nitroglycerin, terazosin or doxazosin. Unknown  Adolfo Simmons MD   traZODone (DESYREL) 50 MG tablet Take 1-3 tablets ( mg) by mouth nightly as needed for sleep Unknown  Adolfo Simmons MD

## 2018-06-11 ENCOUNTER — APPOINTMENT (OUTPATIENT)
Dept: ULTRASOUND IMAGING | Facility: CLINIC | Age: 58
End: 2018-06-11
Attending: INTERNAL MEDICINE
Payer: MEDICAID

## 2018-06-11 LAB
ALBUMIN SERPL-MCNC: 2.9 G/DL (ref 3.4–5)
ALP SERPL-CCNC: 234 U/L (ref 40–150)
ALT SERPL W P-5'-P-CCNC: 72 U/L (ref 0–70)
ANION GAP SERPL CALCULATED.3IONS-SCNC: 6 MMOL/L (ref 3–14)
AST SERPL W P-5'-P-CCNC: 65 U/L (ref 0–45)
BASOPHILS # BLD AUTO: 0 10E9/L (ref 0–0.2)
BASOPHILS NFR BLD AUTO: 0.7 %
BILIRUB SERPL-MCNC: 0.6 MG/DL (ref 0.2–1.3)
BUN SERPL-MCNC: 26 MG/DL (ref 7–30)
CALCIUM SERPL-MCNC: 9.9 MG/DL (ref 8.5–10.1)
CHLORIDE SERPL-SCNC: 112 MMOL/L (ref 94–109)
CO2 SERPL-SCNC: 26 MMOL/L (ref 20–32)
CREAT SERPL-MCNC: 1.52 MG/DL (ref 0.66–1.25)
DIFFERENTIAL METHOD BLD: ABNORMAL
DIFFERENTIAL METHOD BLD: NORMAL
EOSINOPHIL # BLD AUTO: 0.2 10E9/L (ref 0–0.7)
EOSINOPHIL NFR BLD AUTO: 4.7 %
ERYTHROCYTE [DISTWIDTH] IN BLOOD BY AUTOMATED COUNT: 15.2 % (ref 10–15)
ERYTHROCYTE [DISTWIDTH] IN BLOOD BY AUTOMATED COUNT: 15.2 % (ref 10–15)
ERYTHROCYTE [DISTWIDTH] IN BLOOD BY AUTOMATED COUNT: NORMAL % (ref 10–15)
FOLATE SERPL-MCNC: 23.9 NG/ML
GFR SERPL CREATININE-BSD FRML MDRD: 48 ML/MIN/1.7M2
GLUCOSE SERPL-MCNC: 100 MG/DL (ref 70–99)
HCT VFR BLD AUTO: 28.4 % (ref 40–53)
HCT VFR BLD AUTO: 29 % (ref 40–53)
HCT VFR BLD AUTO: NORMAL % (ref 40–53)
HGB BLD-MCNC: 9.2 G/DL (ref 13.3–17.7)
HGB BLD-MCNC: 9.2 G/DL (ref 13.3–17.7)
HGB BLD-MCNC: NORMAL G/DL (ref 13.3–17.7)
IMM GRANULOCYTES # BLD: 0 10E9/L (ref 0–0.4)
IMM GRANULOCYTES NFR BLD: 0.2 %
LACTATE BLD-SCNC: 0.7 MMOL/L (ref 0.4–1.9)
LYMPHOCYTES # BLD AUTO: 0.7 10E9/L (ref 0.8–5.3)
LYMPHOCYTES NFR BLD AUTO: 16 %
MCH RBC QN AUTO: 29 PG (ref 26.5–33)
MCH RBC QN AUTO: 29.4 PG (ref 26.5–33)
MCH RBC QN AUTO: NORMAL PG (ref 26.5–33)
MCHC RBC AUTO-ENTMCNC: 31.7 G/DL (ref 31.5–36.5)
MCHC RBC AUTO-ENTMCNC: 32.4 G/DL (ref 31.5–36.5)
MCHC RBC AUTO-ENTMCNC: NORMAL G/DL (ref 31.5–36.5)
MCV RBC AUTO: 91 FL (ref 78–100)
MCV RBC AUTO: 92 FL (ref 78–100)
MCV RBC AUTO: NORMAL FL (ref 78–100)
MONOCYTES # BLD AUTO: 0.6 10E9/L (ref 0–1.3)
MONOCYTES NFR BLD AUTO: 14.3 %
NEUTROPHILS # BLD AUTO: 2.6 10E9/L (ref 1.6–8.3)
NEUTROPHILS NFR BLD AUTO: 64.1 %
NRBC # BLD AUTO: 0 10*3/UL
NRBC BLD AUTO-RTO: 0 /100
PLATELET # BLD AUTO: 133 10E9/L (ref 150–450)
PLATELET # BLD AUTO: 140 10E9/L (ref 150–450)
PLATELET # BLD AUTO: NORMAL 10E9/L (ref 150–450)
POTASSIUM SERPL-SCNC: 3.9 MMOL/L (ref 3.4–5.3)
PROT SERPL-MCNC: 6 G/DL (ref 6.8–8.8)
RBC # BLD AUTO: 3.13 10E12/L (ref 4.4–5.9)
RBC # BLD AUTO: 3.17 10E12/L (ref 4.4–5.9)
RBC # BLD AUTO: NORMAL 10E12/L (ref 4.4–5.9)
RETICS # AUTO: 83.6 10E9/L (ref 25–95)
RETICS # AUTO: NORMAL 10E9/L (ref 25–95)
RETICS/RBC NFR AUTO: 2.7 % (ref 0.5–2)
RETICS/RBC NFR AUTO: NORMAL % (ref 0.5–2)
SODIUM SERPL-SCNC: 144 MMOL/L (ref 133–144)
VIT B12 SERPL-MCNC: 1211 PG/ML (ref 193–986)
WBC # BLD AUTO: 3.5 10E9/L (ref 4–11)
WBC # BLD AUTO: 4.1 10E9/L (ref 4–11)
WBC # BLD AUTO: NORMAL 10E9/L (ref 4–11)

## 2018-06-11 PROCEDURE — 36415 COLL VENOUS BLD VENIPUNCTURE: CPT | Performed by: HOSPITALIST

## 2018-06-11 PROCEDURE — 12000007 ZZH R&B INTERMEDIATE

## 2018-06-11 PROCEDURE — 76700 US EXAM ABDOM COMPLETE: CPT

## 2018-06-11 PROCEDURE — 85025 COMPLETE CBC W/AUTO DIFF WBC: CPT | Performed by: HOSPITALIST

## 2018-06-11 PROCEDURE — 82652 VIT D 1 25-DIHYDROXY: CPT | Performed by: INTERNAL MEDICINE

## 2018-06-11 PROCEDURE — 25000132 ZZH RX MED GY IP 250 OP 250 PS 637: Performed by: INTERNAL MEDICINE

## 2018-06-11 PROCEDURE — 25000132 ZZH RX MED GY IP 250 OP 250 PS 637: Performed by: HOSPITALIST

## 2018-06-11 PROCEDURE — 99222 1ST HOSP IP/OBS MODERATE 55: CPT | Performed by: INTERNAL MEDICINE

## 2018-06-11 PROCEDURE — 00000402 ZZHCL STATISTIC TOTAL PROTEIN: Performed by: INTERNAL MEDICINE

## 2018-06-11 PROCEDURE — 36415 COLL VENOUS BLD VENIPUNCTURE: CPT | Performed by: INTERNAL MEDICINE

## 2018-06-11 PROCEDURE — 83605 ASSAY OF LACTIC ACID: CPT | Performed by: INTERNAL MEDICINE

## 2018-06-11 PROCEDURE — 99232 SBSQ HOSP IP/OBS MODERATE 35: CPT | Performed by: INTERNAL MEDICINE

## 2018-06-11 PROCEDURE — 82306 VITAMIN D 25 HYDROXY: CPT | Performed by: INTERNAL MEDICINE

## 2018-06-11 PROCEDURE — 40000847 ZZHCL STATISTIC MORPHOLOGY W/INTERP HISTOLOGY TC 85060: Performed by: INTERNAL MEDICINE

## 2018-06-11 PROCEDURE — 82607 VITAMIN B-12: CPT | Performed by: INTERNAL MEDICINE

## 2018-06-11 PROCEDURE — 25000128 H RX IP 250 OP 636: Performed by: INTERNAL MEDICINE

## 2018-06-11 PROCEDURE — 82746 ASSAY OF FOLIC ACID SERUM: CPT | Performed by: INTERNAL MEDICINE

## 2018-06-11 PROCEDURE — 85027 COMPLETE CBC AUTOMATED: CPT | Performed by: INTERNAL MEDICINE

## 2018-06-11 PROCEDURE — 80053 COMPREHEN METABOLIC PANEL: CPT | Performed by: INTERNAL MEDICINE

## 2018-06-11 PROCEDURE — 85060 BLOOD SMEAR INTERPRETATION: CPT | Performed by: INTERNAL MEDICINE

## 2018-06-11 PROCEDURE — 84165 PROTEIN E-PHORESIS SERUM: CPT | Performed by: INTERNAL MEDICINE

## 2018-06-11 PROCEDURE — 85045 AUTOMATED RETICULOCYTE COUNT: CPT | Performed by: HOSPITALIST

## 2018-06-11 PROCEDURE — 82542 COL CHROMOTOGRAPHY QUAL/QUAN: CPT | Performed by: INTERNAL MEDICINE

## 2018-06-11 RX ADMIN — LORAZEPAM 1 MG: 1 TABLET ORAL at 21:42

## 2018-06-11 RX ADMIN — OLANZAPINE 5 MG: 5 TABLET, FILM COATED ORAL at 21:15

## 2018-06-11 RX ADMIN — MELATONIN TAB 3 MG 3 MG: 3 TAB at 21:15

## 2018-06-11 RX ADMIN — Medication 100 MG: at 09:47

## 2018-06-11 RX ADMIN — GABAPENTIN 400 MG: 400 CAPSULE ORAL at 21:15

## 2018-06-11 RX ADMIN — SODIUM CHLORIDE: 9 INJECTION, SOLUTION INTRAVENOUS at 10:35

## 2018-06-11 RX ADMIN — FOLIC ACID 1 MG: 1 TABLET ORAL at 09:47

## 2018-06-11 RX ADMIN — GABAPENTIN 400 MG: 400 CAPSULE ORAL at 16:09

## 2018-06-11 RX ADMIN — MULTIPLE VITAMINS W/ MINERALS TAB 1 TABLET: TAB at 09:47

## 2018-06-11 RX ADMIN — SERTRALINE HYDROCHLORIDE 200 MG: 100 TABLET ORAL at 09:47

## 2018-06-11 RX ADMIN — LORAZEPAM 1 MG: 1 TABLET ORAL at 16:09

## 2018-06-11 RX ADMIN — SODIUM CHLORIDE: 9 INJECTION, SOLUTION INTRAVENOUS at 19:47

## 2018-06-11 RX ADMIN — GABAPENTIN 400 MG: 400 CAPSULE ORAL at 09:47

## 2018-06-11 ASSESSMENT — ACTIVITIES OF DAILY LIVING (ADL)
ADLS_ACUITY_SCORE: 12
ADLS_ACUITY_SCORE: 12
ADLS_ACUITY_SCORE: 11
ADLS_ACUITY_SCORE: 12

## 2018-06-11 NOTE — PLAN OF CARE
"Problem: Patient Care Overview  Goal: Plan of Care/Patient Progress Review  Outcome: No Change  VSS, SBA, walked in halls x1, slightly unsteady at times and states he \"feels like he's on a boat\" but more back to baseline, PIV left hand NS @ 100 ml/hr, Tele ST (), more alert this shift, no report of hallucinations, CIWA 8 and 5, Ativan given with relief, Refused senna due to large stools today. Back pain, tylenol given see MAR, D/C 2-3 days.       "

## 2018-06-11 NOTE — PROGRESS NOTES
Infection Prevention:    Patient requires Contact precautions because of ESBL: Urine, 2016. Please contact Infection Prevention with any questions/concerns at *[phone].    Radha Farrell, ICP

## 2018-06-11 NOTE — CONSULTS
"CLINICAL NUTRITION SERVICES  -  ASSESSMENT NOTE      MALNUTRITION:  % Weight Loss:  > 5% in 1 month (severe malnutrition) --> question accuracy   % Intake:  Unable to determine at this time  Subcutaneous Fat Loss: Unable to determine at this time  Muscle Loss: Unable to determine at this time  Fluid Retention:  None noted    Malnutrition Diagnosis: Unable to determine due to lack of physical exam reweigh, and nutrition history        REASON FOR ASSESSMENT  Aydin Reilly is a 57 year old male seen by Registered Dietitian for Admission Nutrition Risk Screen - Unintentional weight loss of 10# or more in past 2 months.      NUTRITION HISTORY  - Information obtained from chart given patient off-floor x fist attempt, sleep x second attempt.  - Patient with a h/o alcohol and substance abuse, depressive.    - NKFA.        CURRENT NUTRITION ORDERS  Diet Order:     NPO    Current Intake/Tolerance:  NPO for US this AM.  Previously on regular diet and refusing meals or consuming 100% since admission.        PHYSICAL FINDINGS  Observed  Unable to complete NFPE at this time   Obtained from Chart/Interdisciplinary Team  No nutritionally pertinent    ANTHROPOMETRICS  Height: 5' 8\"  Weight: 104.5 kg (230#)  Body mass index is 35.05 kg/(m^2).  Weight Status:  Obesity Grade I BMI 30-34.9  IBW: 70 kg (154#)  % IBW: 149%  Weight History:  Wt Readings from Last 10 Encounters:   06/09/18 104.6 kg (230 lb 8 oz)   05/09/18 114.8 kg (253 lb)   04/16/18 116.9 kg (257 lb 12.8 oz)   01/15/18 116.9 kg (257 lb 12.8 oz)   10/31/17 112 kg (247 lb)   09/03/17 113.4 kg (250 lb)   07/31/17 116.3 kg (256 lb 4.8 oz)   05/25/17 112.7 kg (248 lb 8 oz)   05/10/17 114.8 kg (253 lb)   02/07/17 118.4 kg (261 lb)   - Potential for 10% wt loss x 1 month.  Unable to verify with patient or complete physical exam, would benefit from reweigh.      LABS  Labs reviewed    MEDICATIONS  Medications reviewed      ASSESSED NUTRITION NEEDS PER APPROVED PRACTICE " GUIDELINES:    Dosing Weight 78.6 kg - adjusted weight   Estimated Energy Needs: 9117-9827 kcals (25-30 Kcal/Kg)  Justification: maintenance  Estimated Protein Needs: + grams protein (1.2-1.5 g pro/Kg+)  Justification: preservation of lean body mass and potential for repletion needs  Estimated Fluid Needs: 5272-1512 mL (1 mL/Kcal)  Justification: maintenance and per provider pending fluid status      NUTRITION DIAGNOSIS:  Inadequate oral intake related to NPO for US with potential for abuse/social history to impact appetite and access to food as evidenced by NPO x <1 day since admission, potential for ?10% wt loss x 1 month.       NUTRITION INTERVENTIONS  Recommendations / Nutrition Prescription  Diet per MD.    Please obtain reweigh as able.       Implementation  Nutrition education: Not appropriate at this time due to patient condition.    Collaboration and Referral of Nutrition care: Discussed POC with team during rounds and with RN.      Nutrition Goals  Diet advancement w/in 24 hrs.       MONITORING AND EVALUATION:  Progress towards goals will be monitored and evaluated per protocol and Practice Guidelines        Josefina Sin RD, LD  Clinical Dietitian  3rd floor/ICU: [phone]  All other floors: [phone]  Weekend/holiday: [phone]

## 2018-06-11 NOTE — CONSULTS
[facility] PHYSIC[facility]ANS  HEMATOLOGY ONCOLOGY    Consult Date:  06/11/2018      INPATIENT HEMATOLOGY CONSULTATION       REASON FOR CONSULTATION:  Pancytopenia and abnormality of calvarium noted on the CT scan.      REFERRING PROVIDER:  Juan Antonio Medina MD      HISTORY OF PRESENT ILLNESS:  The patient is a 57-year-old gentleman who was admitted to this hospital 06/09/2018 with visual hallucinations and acute kidney injury, which was thought to be related to drug use.  His workup included a CBC which showed a low white cell count at 3.7 with a normal neutrophil count, normocytic anemia with hemoglobin of 9.2, mild thrombocytopenia with platelet count of 133,000.  He had a CT of the brain 06/09/2018 which showed a thickened mottled calvarium which had developed since the prior exam.  The comparison was in 12/2012.  The radiologist thought that it might be related to hematopoietic disorder such as a blood dyscrasia, anemia or leukemia.      The patient was seen in his room.  He was sitting on a chair.  He was alert, awake.  He stated that he did not have any alarming symptoms lately.  No issues with weight loss, fever, or excessive night sweats.  He thinks that he has been in his baseline health status.      PAST MEDICAL HISTORY:  Alcohol abuse, a history of rectal cancer, high cholesterol, depression.      MEDICATIONS:  Reviewed.      ALLERGIES:  REVIEWED.      SOCIAL HISTORY:  Nonsmoker, alcohol use and additional drug abuse as well.  He works as a special .      FAMILY HISTORY:  He was adopted and does not know about his family history.      REVIEW OF SYSTEMS:  A complete review of systems performed and found to be negative other than pertinent positives mentioned in the history of present illness.      PHYSICAL EXAMINATION:   VITAL SIGNS:  His temperature was 96.6, heart rate 97, blood pressure 125/77, respirations 16.   CONSTITUTIONAL:  Sitting on chair, appears tired.   HEENT:  Pupils equal.   Oropharynx clear.   NECK:  No cervical or supraclavicular lymphadenopathy.   RESPIRATORY:  Lungs clear bilaterally.   CARDIOVASCULAR:  S1, S2, regular.   GASTROINTESTINAL:  Abdomen is soft, nontender.   MUSCULOSKELETAL:  Warm, well-perfused extremities.   NEUROLOGIC:  Alert, awake.   INTEGUMENT:  No rash.   LYMPHATICS:  No edema.   PSYCHIATRIC:  Appears anxious.      LABORATORY DATA:  His creatinine is 1.52, his total bilirubin is His total protein is 6.0, alkaline phosphatase is 234, ALT 72, AST 65.  White cell count is 3.5, hemoglobin 9.2, platelet count 133,000.  Review of his labs indicates that he has been thrombocytopenic since at least 2015.        CT scan of the head results were reviewed and are discussed in history of present illness.        He had an ultrasound of the abdomen 06/11/2018 which showed hepatosplenomegaly, no fatty liver or liver mass.      ASSESSMENT AND RECOMMENDATIONS:  This is a 57-year-old gentleman who apparently had a history of rectal cancer.  I did not have a chance to talk to him about rectal cancer; however, I noticed this history during the chart review after I had seen this patient.      He was seen by me today in the hospital for pancytopenia while he is admitted with alcohol intoxication and other drug abuse.  Pancytopenia in this gentleman can very well be related to alcohol abuse.  I have ordered some workup for that which includes peripheral blood morphology, folate, B12 level.      He has an abnormality noted on his CT of the head which was performed 06/09/2018, which showed mottled appearance and thickening of the calvarium.  Dr. Medina has ordered a serum protein electrophoresis which is pending at this point.  We will follow up this above-mentioned workup results and we may consider imaging to assess for a malignancy given his history of rectal cancer.  We will continue to follow during this hospitalization.         KM STARKS MD             D: 06/11/2018   T:  2018   MT: EUGENIO      Name:     PAYAL THOMPSON   MRN:      [id number]        Account:       [id number]   :      1960           Consult Date:  2018      Document: [id number]       cc: Adolfo Simmons MD

## 2018-06-11 NOTE — PLAN OF CARE
Problem: Patient Care Overview  Goal: Plan of Care/Patient Progress Review  Outcome: No Change  Vss, no co cp/sob/pain.   Tele SR.  IVFcontinue, up sba to bathroom but pt is impulsive, alarms on for safety.  BLE edema noted, up in chair for meals.  Using urinal at bedside, +BM, contact isolation maintained.  CIWA 0, 0, 0. Cr 1.52, ALT 72, AST 65, abd US done. Plan for hemoc to see the pt today, continue poc and monitoring.

## 2018-06-11 NOTE — PLAN OF CARE
"Problem: Patient Care Overview  Goal: Plan of Care/Patient Progress Review  Outcome: Improving  Vitals: /76 (BP Location: Left arm)  Temp 96.5  F (35.8  C) (Axillary)  Resp 16  Ht 1.727 m (5' 8\")  Wt 104.6 kg (230 lb 8 oz)  SpO2 96%  BMI 35.05 kg/m2  Pain:pt denied pain.  LOC: Alert to self and situation. Disoriented to time. Lethargic overnight.Promote relaxation, cluster cares, and minimizes distractions. Pt slept comfortably this shift.   Mobility:Pt is up with SBA. Bed alarm on.  : Voiding without difficulty  GI:No loose stools this shift  IV:NS running at 100ml/hr  Other: Pt slept comfortably through out the night. Scored 0 on CIWA. NPO at midnight in preporation for ultra sound today. Pt hopeful for discharge today 6/11.            "

## 2018-06-12 LAB
ALBUMIN SERPL ELPH-MCNC: 3.3 G/DL (ref 3.7–5.1)
ALPHA1 GLOB SERPL ELPH-MCNC: 0.4 G/DL (ref 0.2–0.4)
ALPHA2 GLOB SERPL ELPH-MCNC: 0.7 G/DL (ref 0.5–0.9)
ANION GAP SERPL CALCULATED.3IONS-SCNC: 7 MMOL/L (ref 3–14)
B-GLOBULIN SERPL ELPH-MCNC: 0.7 G/DL (ref 0.6–1)
BUN SERPL-MCNC: 16 MG/DL (ref 7–30)
CALCIUM SERPL-MCNC: 10.3 MG/DL (ref 8.5–10.1)
CHLORIDE SERPL-SCNC: 112 MMOL/L (ref 94–109)
CO2 SERPL-SCNC: 24 MMOL/L (ref 20–32)
COPATH REPORT: NORMAL
CREAT SERPL-MCNC: 1.23 MG/DL (ref 0.66–1.25)
GAMMA GLOB SERPL ELPH-MCNC: 0.8 G/DL (ref 0.7–1.6)
GFR SERPL CREATININE-BSD FRML MDRD: 61 ML/MIN/1.7M2
GLUCOSE SERPL-MCNC: 94 MG/DL (ref 70–99)
M PROTEIN SERPL ELPH-MCNC: 0 G/DL
POTASSIUM SERPL-SCNC: 3.9 MMOL/L (ref 3.4–5.3)
PROT PATTERN SERPL ELPH-IMP: ABNORMAL
SODIUM SERPL-SCNC: 143 MMOL/L (ref 133–144)

## 2018-06-12 PROCEDURE — 99231 SBSQ HOSP IP/OBS SF/LOW 25: CPT | Performed by: INTERNAL MEDICINE

## 2018-06-12 PROCEDURE — 25000132 ZZH RX MED GY IP 250 OP 250 PS 637: Performed by: INTERNAL MEDICINE

## 2018-06-12 PROCEDURE — 99231 SBSQ HOSP IP/OBS SF/LOW 25: CPT | Performed by: PHYSICIAN ASSISTANT

## 2018-06-12 PROCEDURE — 80048 BASIC METABOLIC PNL TOTAL CA: CPT | Performed by: INTERNAL MEDICINE

## 2018-06-12 PROCEDURE — 25000132 ZZH RX MED GY IP 250 OP 250 PS 637: Performed by: HOSPITALIST

## 2018-06-12 PROCEDURE — 12000007 ZZH R&B INTERMEDIATE

## 2018-06-12 PROCEDURE — 36415 COLL VENOUS BLD VENIPUNCTURE: CPT | Performed by: INTERNAL MEDICINE

## 2018-06-12 RX ORDER — HYDROXYZINE HYDROCHLORIDE 25 MG/1
25-50 TABLET, FILM COATED ORAL EVERY 6 HOURS PRN
Status: DISCONTINUED | OUTPATIENT
Start: 2018-06-12 | End: 2018-06-13 | Stop reason: HOSPADM

## 2018-06-12 RX ADMIN — LORAZEPAM 1 MG: 1 TABLET ORAL at 16:16

## 2018-06-12 RX ADMIN — GABAPENTIN 400 MG: 400 CAPSULE ORAL at 08:25

## 2018-06-12 RX ADMIN — FOLIC ACID 1 MG: 1 TABLET ORAL at 08:24

## 2018-06-12 RX ADMIN — OLANZAPINE 5 MG: 5 TABLET, FILM COATED ORAL at 21:26

## 2018-06-12 RX ADMIN — SERTRALINE HYDROCHLORIDE 200 MG: 100 TABLET ORAL at 08:25

## 2018-06-12 RX ADMIN — LORAZEPAM 1 MG: 1 TABLET ORAL at 17:24

## 2018-06-12 RX ADMIN — MULTIPLE VITAMINS W/ MINERALS TAB 1 TABLET: TAB at 08:25

## 2018-06-12 RX ADMIN — Medication 100 MG: at 08:25

## 2018-06-12 RX ADMIN — GABAPENTIN 400 MG: 400 CAPSULE ORAL at 16:16

## 2018-06-12 RX ADMIN — MELATONIN TAB 3 MG 3 MG: 3 TAB at 21:26

## 2018-06-12 RX ADMIN — ACETAMINOPHEN 650 MG: 325 TABLET, FILM COATED ORAL at 18:41

## 2018-06-12 RX ADMIN — GABAPENTIN 400 MG: 400 CAPSULE ORAL at 21:26

## 2018-06-12 ASSESSMENT — ACTIVITIES OF DAILY LIVING (ADL)
ADLS_ACUITY_SCORE: 12
ADLS_ACUITY_SCORE: 11

## 2018-06-12 NOTE — PROGRESS NOTES
"[facility]  Hospitalist Progress Note  Juan Antonio Medina, DO 06/12/2018    Reason for Stay (Diagnosis): Hallucinations         Assessment and Plan:      Summary of Stay: Aydin Reilly is a 57 year old male admitted on 6/9/2018 with hallucinations.  Problem List:   1. Hallucinations.  Resolved.  Appreciate psych input.  Continue Zyprexa 5 mg a day.  2. Acute withdrawals.  Likely from alcohol and methamphetamine.  Ativan withdrawal protocol with oral vitamins.  3. Depression.  Sertraline 200 mg a day.  4. Acute kidney failure.  Likely prerenal.  Creatinine has improved to 1.2 today.  Stop IV fluids.  Avoid nephrotoxins as able.  5. Hypercalcemia.  PTH level low.  PTHrp, 1, 25 dihydroxy vitamin D, 25-hydroxy vitamin D levels, and SPEP pending.    6. Hepatitis.  Likely due to alcohol abuse.    7. Pancytopenia.  Possibly marrow suppresion from alcohol abuse.  SPEP pending.  Hematology/oncology input appreciated.  Follow-up with hematology in the outpatient setting.  DVT Prophylaxis: Pneumatic Compression Devices  Code Status: Full Code  Discharge Dispo: Home  Estimated Disch Date / # of Days until Disch: 1        Interval History (Subjective):      Having some back pain.  No hallucinations.  Denies chest pain, shortness of breath, fevers, chills, nausea, vomiting, or diarrhea.                  Physical Exam:      Last Vital Signs:  /88 (BP Location: Left arm)  Temp 97.6  F (36.4  C) (Oral)  Resp 16  Ht 1.727 m (5' 8\")  Wt 111.6 kg (246 lb)  SpO2 99%  BMI 37.4 kg/m2    Gen:  NAD, A&Ox3.  Eyes:  PERRL, sclera anicteric.  OP:  MMM, no lesions.  Neck:  Supple.  CV:  Regular, no murmurs.  Lung:  CTA b/l, normal effort.  Ab:  +BS, soft.  Skin:  Warm, dry to touch.  No rash.  Ext:  No pitting edema LE b/l.           Medications:      All current medications were reviewed with changes reflected in problem list.         Data:      All new lab and imaging data was reviewed.   Labs:    Recent " Labs  Lab 06/12/18  0804      POTASSIUM 3.9   CHLORIDE 112*   CO2 24   ANIONGAP 7   GLC 94   BUN 16   CR 1.23   GFRESTIMATED 61   GFRESTBLACK 74   ELSI 10.3*       Recent Labs  Lab 06/11/18  1610   WBC 4.1   HGB 9.2*   HCT 28.4*   MCV 91   *      Imaging:   No results found for this or any previous visit (from the past 24 hour(s)).

## 2018-06-12 NOTE — PROGRESS NOTES
"[facility]  Hospitalist Progress Note  Juan Antonio Medina, DO 06/11/2018    Reason for Stay (Diagnosis): Hallucinations         Assessment and Plan:      Summary of Stay: Aydin Reilly is a 57 year old male admitted on 6/9/2018 with hallucinations.  Problem List:   1. Hallucinations.  Improved.  Appreciate psych input.  Continue Zyprexa 5 mg a day.  2. Acute withdrawals.  Likely from alcohol and methamphetamine.  Ativan withdrawal protocol with oral vitamins.  3. Depression.  Sertraline 200 mg a day.  4. Acute kidney failure.  Likely prerenal.  Creatinine has improved to 1.5 today.  Continue IV fluids.  Recheck metabolic panel tomorrow.  Avoid nephrotoxins as able.  5. Hypercalcemia.  PTH level low.  PTHrp, 1, 25 dihydroxy vitamin D, 25-hydroxy vitamin D levels, and SPEP pending.  Continue IV fluids.  6. Hepatitis.  Likely due to alcohol abuse.  Recheck CMP tomorrow.  Check abdominal ultrasound.  7. Pancytopenia.  Possibly marrow suppresion from alcohol abuse.  SPEP pending.  Hematology/oncology input appreciated.  DVT Prophylaxis: Pneumatic Compression Devices  Code Status: Full Code  Discharge Dispo: Home  Estimated Disch Date / # of Days until Disch: 1-2        Interval History (Subjective):      No hallucinations today.  Having occasional back pain.  Denies chest pain, shortness of breath, fevers, chills, nausea, vomiting, or diarrhea.                  Physical Exam:      Last Vital Signs:  BP (!) 132/95 (BP Location: Left arm)  Temp 97  F (36.1  C) (Oral)  Resp 16  Ht 1.727 m (5' 8\")  Wt 111.6 kg (246 lb)  SpO2 96%  BMI 37.4 kg/m2    Gen:  NAD, A&Ox3.  Eyes:  PERRL, sclera anicteric.  OP:  MMM, no lesions.  Neck:  Supple.  CV:  Regular, no murmurs.  Lung:  CTA b/l, normal effort.  Ab:  +BS, soft.  Skin:  Warm, dry to touch.  No rash.  Ext:  No pitting edema LE b/l.           Medications:      All current medications were reviewed with changes reflected in problem list.         Data:    "   All new lab and imaging data was reviewed.   Labs:    Recent Labs  Lab 06/11/18  0632      POTASSIUM 3.9   CHLORIDE 112*   CO2 26   ANIONGAP 6   *   BUN 26   CR 1.52*   GFRESTIMATED 48*   GFRESTBLACK 58*   ELSI 9.9       Recent Labs  Lab 06/11/18  1610   WBC 4.1   HGB 9.2*   HCT 28.4*   MCV 91   *      Imaging:   Recent Results (from the past 24 hour(s))   US Abdomen Complete    Narrative    ULTRASOUND ABDOMEN COMPLETE June 11, 2018 9:21 AM     HISTORY: Hepatitis.     FINDINGS: Liver is normal in echogenicity and enlarged measuring over  20 cm in AP dimension. No focal mass is appreciated. Gallbladder is  normal without stones or sludge. Common bile duct is normal in  diameter. Pancreas is normal where visualized. Spleen is mildly  enlarged measuring nearly 14 cm in AP dimension. Kidneys are normal in  size. There is no hydronephrosis. Proximal abdominal aorta and IVC are  within normal limits.      Impression    IMPRESSION:  1. Hepatosplenomegaly. No evidence of fatty liver infiltration or  mass.  2. No gallstones or bile duct dilatation. Kidneys are otherwise  unremarkable.    RICARDO GRANT MD

## 2018-06-12 NOTE — PROVIDER NOTIFICATION
Message paged to Dr Medina  pt's bp is high, 150/100. no bp meds currently given. WAnt to add anything? Thanks

## 2018-06-12 NOTE — PLAN OF CARE
Problem: Patient Care Overview  Goal: Plan of Care/Patient Progress Review  Outcome: No Change  Assumed care for the last 4 hours. Patient sleeping. Heart and lungs are WNL.  Alarms on.

## 2018-06-12 NOTE — PLAN OF CARE
Problem: Patient Care Overview  Goal: Plan of Care/Patient Progress Review  Outcome: Improving  VSS (BP and HR elevated when more anxious).  SR-ST on tele.  SL IVF.  Pain controlled with Neurontin, Tylenol, and aromatherapy.  Up independently with visual assist due to fall precautions.  Bed alarm.  Alert, oriented, but forgetful.  Denies hallucinations.  Anxiety at moderate level.  CIWA 8, 13, 5, 0.  2 doses of oral ativan given per prn orders.  BRV/urinal void.  Good appetite.  States last BM this morning.  Continue to monitor.  Possible discharge 1 day.

## 2018-06-12 NOTE — PLAN OF CARE
Problem: Patient Care Overview  Goal: Plan of Care/Patient Progress Review  Outcome: Improving  VSS, A&O, no hallucinations, calm and appropriate. CIWA 10,3,9,0. 2 mg Ativan given. Pt anxious about lab/ct results given by MD, was able to calm him and answer questions that he had, MD also visited and answered remaining questions. SBA, Refused senna, 1 stool this shift,  ml/hr pump locked, Tele SR HR 98. Plan to d/c pt tomorrow back to his hotel/home.

## 2018-06-12 NOTE — PLAN OF CARE
All VSS, LS clear and satting well on RA.  NS running at 100 mL/hr.  New IV placed this shift.  CIWA scores 0 and 0.  No complaints of pain or nausea, no PRN's given.  Tele: SR.  Psych and hem/onc following.  Plan is to d/c today or tomorrow.  Will continue with POC.

## 2018-06-12 NOTE — PROGRESS NOTES
[facility]    Hematology/Oncology Follow-up Note      Today's Date: 06/12/18  Date of Admission:  6/9/2018  Reason for Consult: Pancytopenia, CT abnormality      ASSESSMENT/PLAN:  Aydin Reilly is a 57 year old male admitted for hallucinations on 6/9/18 and known kidney injury with pancytopenia.      Pancytopenia: Likely chronic due to alcohol and drug abuse.  WBC 4.1, platelets 140, hemoglobin 9.2 and ANC 2.6.  Folate normal and vitamin B12 at 1200, however no significance at this time. Okay to discharge from our perspective and we will closely follow-up as an outpatient when discharged.    CT abnormality: CT of the head performed on 6/9/18 due to hallucinations which showed a mottled appearance of thickening of the calvarium.  Serum protein electrophoresis is pending and may consider further imaging to evaluate for further malignancy given history of rectal cancer. No indication to obtain emergent imaging while inpatient and can be done as an outpatient in clinic, if needed.    Discussed with Dr Rose and he agreed with the plan above.       INTERIM HISTORY: Aydin is seen in his room today and has no complaints.  He admits that he has been an on and off drinker, occasional meth and marijuana use.  Last use of methamphetamines was last week and he smokes it.  Denies any headaches, chest pain, shortness of breath, bleeding, fever, chills, rashes.  He has no other complaints today.       MEDICATIONS:  Current Facility-Administered Medications   Medication     acetaminophen (TYLENOL) tablet 650 mg     bisacodyl (DULCOLAX) Suppository 10 mg     folic acid (FOLVITE) tablet 1 mg     gabapentin (NEURONTIN) capsule 400 mg     hydrOXYzine (ATARAX) tablet 25-50 mg     LORazepam (ATIVAN) tablet 1-2 mg    Or     LORazepam (ATIVAN) injection 1-2 mg     melatonin tablet 3 mg     multivitamin, therapeutic with minerals (THERA-VIT-M) tablet 1 tablet     naloxone (NARCAN) injection 0.1-0.4 mg      "OLANZapine (zyPREXA) tablet 5 mg     ondansetron (ZOFRAN-ODT) ODT tab 4 mg    Or     ondansetron (ZOFRAN) injection 4 mg     polyethylene glycol (MIRALAX/GLYCOLAX) Packet 17 g     senna-docusate (SENOKOT-S;PERICOLACE) 8.6-50 MG per tablet 1 tablet    Or     senna-docusate (SENOKOT-S;PERICOLACE) 8.6-50 MG per tablet 2 tablet     sertraline (ZOLOFT) tablet 200 mg     sodium chloride 0.9% infusion     thiamine tablet 100 mg     traZODone (DESYREL) tablet  mg     Facility-Administered Medications Ordered in Other Encounters   Medication     glycopyrrolate (ROBINUL) injection         ALLERGIES:  Allergies   Allergen Reactions     No Known Allergies      Seasonal Allergies          PHYSICAL EXAM:  Vital signs:  Temp: 97.6  F (36.4  C) Temp src: Oral BP: 153/88   Heart Rate: 104 Resp: 16 SpO2: 99 % O2 Device: None (Room air)   Height: 172.7 cm (5' 8\") Weight: 111.6 kg (246 lb)  Estimated body mass index is 37.4 kg/(m^2) as calculated from the following:    Height as of this encounter: 1.727 m (5' 8\").    Weight as of this encounter: 111.6 kg (246 lb).    GENERAL/CONSTITUTIONAL: No acute distress.  EYES: Pupils are equal, round, and react to light and accommodation. Extraocular movements intact.  No scleral icterus.  ENT/MOUTH: Neck supple. Oropharynx clear, no mucositis.  RESPIRATORY: Clear to auscultation bilaterally. No crackles or wheezing.   CARDIOVASCULAR: Regular rate and rhythm without murmurs, gallops, or rubs.  GASTROINTESTINAL: No hepatosplenomegaly, masses, or tenderness. The patient has normal bowel sounds. No guarding.  No distention.  MUSCULOSKELETAL: Warm and well-perfused, no cyanosis, clubbing, or edema.  NEUROLOGIC: Cranial nerves II-XII are intact. Alert, oriented, answers questions appropriately.  INTEGUMENTARY: No rashes or jaundice.      LABS:  CBC RESULTS:   Recent Labs   Lab Test  06/11/18   1610   WBC  4.1   RBC  3.13*   HGB  9.2*   HCT  28.4*   MCV  91   MCH  29.4   MCHC  32.4   RDW  15.2* "   PLT  140*       Recent Labs   Lab Test  06/12/18   0804  06/11/18   0632   NA  143  144   POTASSIUM  3.9  3.9   CHLORIDE  112*  112*   CO2  24  26   ANIONGAP  7  6   GLC  94  100*   BUN  16  26   CR  1.23  1.52*   ELSI  10.3*  9.9         Robyn Babcock PA-C  Hematology/Oncology  [facility]

## 2018-06-13 VITALS
BODY MASS INDEX: 37.28 KG/M2 | SYSTOLIC BLOOD PRESSURE: 155 MMHG | RESPIRATION RATE: 20 BRPM | HEIGHT: 68 IN | OXYGEN SATURATION: 95 % | DIASTOLIC BLOOD PRESSURE: 87 MMHG | TEMPERATURE: 97.7 F | WEIGHT: 246 LBS

## 2018-06-13 LAB
1,25(OH)2D SERPL-MCNC: <5 PG/ML (ref 19.9–79.3)
BACTERIA SPEC CULT: ABNORMAL
BACTERIA SPEC CULT: ABNORMAL
Lab: ABNORMAL
SPECIMEN SOURCE: ABNORMAL

## 2018-06-13 PROCEDURE — 99239 HOSP IP/OBS DSCHRG MGMT >30: CPT | Performed by: INTERNAL MEDICINE

## 2018-06-13 PROCEDURE — 36415 COLL VENOUS BLD VENIPUNCTURE: CPT | Performed by: HOSPITALIST

## 2018-06-13 PROCEDURE — 25000132 ZZH RX MED GY IP 250 OP 250 PS 637: Performed by: HOSPITALIST

## 2018-06-13 PROCEDURE — 25000132 ZZH RX MED GY IP 250 OP 250 PS 637: Performed by: INTERNAL MEDICINE

## 2018-06-13 RX ORDER — ACETAMINOPHEN 325 MG/1
650 TABLET ORAL EVERY 8 HOURS PRN
Qty: 100 TABLET | Status: ON HOLD | COMMUNITY
Start: 2018-06-13 | End: 2019-01-01

## 2018-06-13 RX ORDER — AMLODIPINE BESYLATE 5 MG/1
5 TABLET ORAL DAILY
Status: DISCONTINUED | OUTPATIENT
Start: 2018-06-13 | End: 2018-06-13 | Stop reason: HOSPADM

## 2018-06-13 RX ORDER — LANOLIN ALCOHOL/MO/W.PET/CERES
100 CREAM (GRAM) TOPICAL DAILY
Qty: 30 TABLET | Refills: 0 | Status: SHIPPED | OUTPATIENT
Start: 2018-06-13 | End: 2019-06-19

## 2018-06-13 RX ORDER — AMLODIPINE BESYLATE 5 MG/1
5 TABLET ORAL DAILY
Qty: 30 TABLET | Refills: 0 | Status: SHIPPED | OUTPATIENT
Start: 2018-06-13 | End: 2018-06-19

## 2018-06-13 RX ORDER — OLANZAPINE 5 MG/1
5 TABLET ORAL AT BEDTIME
Qty: 30 TABLET | Refills: 0 | Status: SHIPPED | OUTPATIENT
Start: 2018-06-13 | End: 2019-06-19

## 2018-06-13 RX ORDER — FOLIC ACID 1 MG/1
1 TABLET ORAL DAILY
Qty: 30 TABLET | Refills: 0 | Status: ON HOLD | OUTPATIENT
Start: 2018-06-13 | End: 2019-01-01

## 2018-06-13 RX ORDER — HYDROXYZINE HYDROCHLORIDE 25 MG/1
25-50 TABLET, FILM COATED ORAL EVERY 8 HOURS PRN
Qty: 20 TABLET | Refills: 0 | Status: SHIPPED | OUTPATIENT
Start: 2018-06-13 | End: 2019-06-19

## 2018-06-13 RX ORDER — GABAPENTIN 300 MG/1
600 CAPSULE ORAL 3 TIMES DAILY
Qty: 180 CAPSULE | Refills: 0 | Status: ON HOLD
Start: 2018-06-13 | End: 2018-07-31

## 2018-06-13 RX ADMIN — AMLODIPINE BESYLATE 5 MG: 5 TABLET ORAL at 13:53

## 2018-06-13 RX ADMIN — FOLIC ACID 1 MG: 1 TABLET ORAL at 07:57

## 2018-06-13 RX ADMIN — SERTRALINE HYDROCHLORIDE 200 MG: 100 TABLET ORAL at 07:57

## 2018-06-13 RX ADMIN — HYDROXYZINE HYDROCHLORIDE 25 MG: 25 TABLET ORAL at 13:53

## 2018-06-13 RX ADMIN — HYDROXYZINE HYDROCHLORIDE 25 MG: 25 TABLET ORAL at 07:54

## 2018-06-13 RX ADMIN — GABAPENTIN 400 MG: 400 CAPSULE ORAL at 07:54

## 2018-06-13 RX ADMIN — Medication 100 MG: at 07:54

## 2018-06-13 RX ADMIN — ACETAMINOPHEN 650 MG: 325 TABLET, FILM COATED ORAL at 07:57

## 2018-06-13 RX ADMIN — MULTIPLE VITAMINS W/ MINERALS TAB 1 TABLET: TAB at 07:54

## 2018-06-13 ASSESSMENT — ACTIVITIES OF DAILY LIVING (ADL)
ADLS_ACUITY_SCORE: 10
ADLS_ACUITY_SCORE: 11
ADLS_ACUITY_SCORE: 10
ADLS_ACUITY_SCORE: 11

## 2018-06-13 NOTE — DISCHARGE SUMMARY
Discharge Summary  Hospitalist Service    Aydin Reilly MRN# [id number]   YOB: 1960 Age: 57 year old     Date of Admission:  6/9/2018  Date of Discharge:  6/13/2018  Admitting Physician:  Kee Monk MD  Discharge Physician: Juan Antonio Medina DO  Discharging Service: Hospitalist Service     Primary Provider: Adolfo Simmons  Primary Care Physician Phone Number: [phone]         Discharge Diagnoses/Problem Oriented Hospital Course (Providers):    Aydin Reilly was admitted on 6/9/2018 by Kee Monk MD and I would refer you to their history and physical.  The following problems were addressed during his hospitalization:  1. Hallucinations.  Resolved.  Appreciate psych input.  Continue Zyprexa 5 mg a day.  2. Acute withdrawals.  Likely from alcohol and methamphetamine.  Ativan withdrawal protocol with oral vitamins during hospital stay.  Withdrawals have completed by the time of discharge.  Continue multivitamin, thiamine, and folic acid for 30 days.  3. Depression.  Sertraline 200 mg a day.  4. Acute kidney failure.  Likely prerenal.  Resolved with IV fluids.  Avoid nephrotoxins as able.  He had previously been taking NSAIDs.  Was counseled on no longer using NSAIDs for pain if at all possible.  5. Hypercalcemia.  PTH level low.  Follow-up with oncology within the next 7 days in the outpatient setting.  6. Hepatitis.  Likely due to alcohol abuse.    7. Pancytopenia.  Possibly marrow suppresion from alcohol abuse.  Hematology/oncology input appreciated.  Follow-up with hematology in the outpatient setting.  8. Bacteriuria.  Did have a urine culture with greater than 100,000 colonies of Aerococcus.  Is not having symptoms of urinary tract infection.  Did not receive antibiotics during hospital stay.  Was counseled that if he were to develop any symptoms of a urinary tract infection such as burning or pain with urination or increased urinary frequency, or if he develops  fevers, he should be seen  by his primary care physician or at an urgent care for further evaluation.         Code Status:      Full Code        Pending Results:        Unresulted Labs Ordered in the Past 30 Days of this Admission     Date and Time Order Name Status Description    6/11/2018 0000 25 Hydroxyvitamin D2 and D3 In process     6/11/2018 0000 PTH hormone (related peptide) In process     6/10/2018 0953 PTH hormone (related peptide) In process             Discharge Instructions and Follow-Up:      Follow-up Appointments     Follow-up and recommended labs and tests        Follow up with primary care provider, Adolfo Simmons, within 7 days   for hospital follow- up.  Follow-up with oncology within 7 days.  The   following labs/tests are recommended: BMP and CBC in 7 days.                      Discharge Disposition:      Discharged to home         Discharge Medications:        Current Discharge Medication List      START taking these medications    Details   acetaminophen (TYLENOL) 325 MG tablet Take 2 tablets (650 mg) by mouth every 8 hours as needed for mild pain  Qty: 100 tablet    Associated Diagnoses: Chronic pain of right knee      amLODIPine (NORVASC) 5 MG tablet Take 1 tablet (5 mg) by mouth daily  Qty: 30 tablet, Refills: 0    Associated Diagnoses: Benign essential hypertension      folic acid (FOLVITE) 1 MG tablet Take 1 tablet (1 mg) by mouth daily  Qty: 30 tablet, Refills: 0    Associated Diagnoses: Acute renal failure, unspecified acute renal failure type (H)      hydrOXYzine (ATARAX) 25 MG tablet Take 1-2 tablets (25-50 mg) by mouth every 8 hours as needed for itching or anxiety (pain)  Qty: 20 tablet, Refills: 0    Associated Diagnoses: Chronic pain of right knee      OLANZapine (ZYPREXA) 5 MG tablet Take 1 tablet (5 mg) by mouth At Bedtime  Qty: 30 tablet, Refills: 0    Associated Diagnoses: Hallucinations      thiamine 100 MG tablet Take 1 tablet (100 mg) by mouth daily  Qty: 30 tablet,  "Refills: 0    Associated Diagnoses: Acute renal failure, unspecified acute renal failure type (H)         CONTINUE these medications which have CHANGED    Details   gabapentin (NEURONTIN) 300 MG capsule Take 2 capsules (600 mg) by mouth 3 times daily  Qty: 180 capsule, Refills: 0    Associated Diagnoses: Other mononeuropathy         CONTINUE these medications which have NOT CHANGED    Details   multivitamin, therapeutic with minerals (MULTI-VITAMIN) TABS Take 1 tablet by mouth daily      SERTRALINE HCL PO Take 200-300 mg by mouth daily      Elastic Bandages & Supports (JOBST ACTIVE 20-30MMHG MEDIUM) MISC 1 Device daily as needed Knee high  Qty: 6 each, Refills: 1    Associated Diagnoses: Pedal edema      MELATONIN PO Take 3 mg by mouth nightly as needed Reported on 5/10/2017      traZODone (DESYREL) 50 MG tablet Take 1-3 tablets ( mg) by mouth nightly as needed for sleep  Qty: 90 tablet, Refills: 3    Associated Diagnoses: CONNER (generalized anxiety disorder)         STOP taking these medications       cyclobenzaprine (FLEXERIL) 10 MG tablet Comments:   Reason for Stopping:         furosemide (LASIX) 40 MG tablet Comments:   Reason for Stopping:         ibuprofen 200 MG capsule Comments:   Reason for Stopping:         methylPREDNISolone (MEDROL DOSEPAK) 4 MG tablet Comments:   Reason for Stopping:         nabumetone (RELAFEN) 500 MG tablet Comments:   Reason for Stopping:         pseudoePHEDrine (SUDAFED) 30 MG tablet Comments:   Reason for Stopping:         sildenafil (VIAGRA) 100 MG tablet Comments:   Reason for Stopping:                 Allergies:         Allergies   Allergen Reactions     No Known Allergies      Seasonal Allergies            Condition and Physical on Discharge:      Discharge condition: Stable   Vitals: Blood pressure (!) 156/103, temperature 97.7  F (36.5  C), temperature source Oral, resp. rate 20, height 1.727 m (5' 8\"), weight 111.6 kg (246 lb), SpO2 95 %.   Gen:  NAD, A&Ox3.  Eyes:  " PERRL, sclera anicteric.  OP:  MMM, no lesions.  Neck:  Supple.  CV:  Regular, no murmurs.  Lung:  CTA b/l, normal effort.  Ab:  +BS, soft.  Skin:  Warm, dry to touch.  No rash.  Ext:  No pitting edema LE b/l.        Discharge Time:      I spent 35 minutes with Mr. Reilly and working on discharge on 6/13/18.        Image Results From This Hospital Stay (For Non-EPIC Providers):        Results for orders placed or performed during the hospital encounter of 06/09/18   CT Head w/o Contrast    Narrative    CT SCAN OF THE HEAD WITHOUT CONTRAST   6/9/2018 7:02 PM     HISTORY: Altered mental status.      TECHNIQUE:  Axial images of the head and coronal reformations without  IV contrast material.  Radiation dose for this scan was reduced using  automated exposure control, adjustment of the mA and/or kV according  to patient size, or iterative reconstruction technique.    COMPARISON: 12/14/2012.    FINDINGS: During the interval, there has been development of an  extremely mottled marrow pattern throughout the calvarium which is  probably due to a hemopoietic disorder. There is no evidence for  fracture. There is also some mild cerebral atrophy. Brain parenchyma  is otherwise normal. There is no evidence for intracranial hemorrhage,  acute infarct, or mass effect.      Impression    IMPRESSION:  1. Thickened mottled calvarium which has developed since the prior  exam. This is most likely due to a hemopoietic disorder such as a  blood dyscrasia, anemia, or leukemia. Metastatic disease is also a  possibility but less likely due to the diffuse thickening of the  calvarium.  2. Mild cerebral atrophy.      PAYAL OLIVARES MD   US Abdomen Complete    Narrative    ULTRASOUND ABDOMEN COMPLETE June 11, 2018 9:21 AM     HISTORY: Hepatitis.     FINDINGS: Liver is normal in echogenicity and enlarged measuring over  20 cm in AP dimension. No focal mass is appreciated. Gallbladder is  normal without stones or sludge. Common bile duct is  normal in  diameter. Pancreas is normal where visualized. Spleen is mildly  enlarged measuring nearly 14 cm in AP dimension. Kidneys are normal in  size. There is no hydronephrosis. Proximal abdominal aorta and IVC are  within normal limits.      Impression    IMPRESSION:  1. Hepatosplenomegaly. No evidence of fatty liver infiltration or  mass.  2. No gallstones or bile duct dilatation. Kidneys are otherwise  unremarkable.    RICARDO GRANT MD     *Note: Due to a large number of results and/or encounters for the requested time period, some results have not been displayed. A complete set of results can be found in Results Review.           Most Recent Lab Results In EPIC (For Non-EPIC Providers):    Most Recent 3 CBC's:  Recent Labs   Lab Test  06/11/18   1610  06/11/18   1516  06/11/18   0632   WBC  4.1  Canceled, Test credited  3.5*   HGB  9.2*  Canceled, Test credited  9.2*   MCV  91  Canceled, Test credited  92   PLT  140*  Canceled, Test credited  133*      Most Recent 3 BMP's:  Recent Labs   Lab Test  06/12/18   0804  06/11/18   0632  06/10/18   0638   NA  143  144  145*   POTASSIUM  3.9  3.9  3.5   CHLORIDE  112*  112*  111*   CO2  24  26  28   BUN  16  26  32*   CR  1.23  1.52*  2.52*   ANIONGAP  7  6  6   ELSI  10.3*  9.9  9.4   GLC  94  100*  96     Most Recent 3 Troponin's:No lab results found.  Most Recent 3 INR's:  Recent Labs   Lab Test  11/14/16   0021  06/21/15   0630  10/01/12   1428   INR  1.13  0.94  1.01     Most Recent 2 LFT's:  Recent Labs   Lab Test  06/11/18   0632  06/10/18   0638   AST  65*  83*   ALT  72*  75*   ALKPHOS  234*  261*   BILITOTAL  0.6  1.5*     Most Recent Cholesterol Panel:  Recent Labs   Lab Test  08/01/16   0746   CHOL  173   LDL  80   HDL  37*   TRIG  279*     Most Recent 6 Bacteria Isolates From Any Culture (See EPIC Reports for Culture Details):  Recent Labs   Lab Test  06/09/18   1930  11/14/16   0202  11/14/16   0038  11/14/16   0021  08/16/16   1430  08/16/16   132    CULT  >100,000 colonies/mL  Aerococcus urinae  Identification obtained by MALDI-TOF mass spectrometry research use only database. Test   characteristics determined and verified by the [facility]   ([facility]) [place], [place].  *  <10,000 colonies/mL  mixed urogenital suki    10,000 to 50,000 colonies/mL Escherichia coli Confirmatory testing shows this is   not an ESBL  Nicolette Pal RN 5th floor notified of possible ESBL 11/16/16 0730 dg  *  No growth  No growth  No growth  No growth     Most Recent TSH, T4 and HgbA1c:   Recent Labs   Lab Test  06/09/18   1711   TSH  1.58

## 2018-06-13 NOTE — PLAN OF CARE
Problem: Patient Care Overview  Goal: Plan of Care/Patient Progress Review  Outcome: Improving  All VSS, LS clear and satting well on RA.  /89.  Tolerating regular diet.  CIWA scores 0 and 0. Moves independently in room.  No complaints of pain or nausea, no PRN's given.  Plan is to d/c today.  Hem/onc and chem dep following.  Will continue with POC.

## 2018-06-13 NOTE — PLAN OF CARE
Problem: Patient Care Overview  Goal: Plan of Care/Patient Progress Review  Outcome: Adequate for Discharge Date Met: 06/13/18  Pt d/c to home. Pt will call [employer] for transportation. D/c instructions reviewed with pt. Pt understood all instructions, meds, f/u appts needed and s/s of when to call the dr or 911. 5 new medications from this hospital stay which 4 were filled at [facility] and given to pt. Pt understood he needs to get thiamine otc. All belongings taken with pt. Atarax given for anxiety with good relief.

## 2018-06-14 ENCOUNTER — PATIENT OUTREACH (OUTPATIENT)
Dept: CARE COORDINATION | Facility: CLINIC | Age: 58
End: 2018-06-14

## 2018-06-14 ENCOUNTER — TELEPHONE (OUTPATIENT)
Dept: FAMILY MEDICINE | Facility: CLINIC | Age: 58
End: 2018-06-14

## 2018-06-14 NOTE — TELEPHONE ENCOUNTER
Went to ER on 6/9 @ 0552 for Sciatica, back pain and renal failure. Returned at 1600 by EMS for hallucinations & assessed by ROSARIO Danielle   06/14/18 9:57 AM
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
## 2018-06-14 NOTE — TELEPHONE ENCOUNTER
Patient has a follow up appointment scheduled on Monday 6/18/2018 and is being followed by Care Coordination.    Ilene Horner RN
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
## 2018-06-14 NOTE — PROGRESS NOTES
Clinic Care Coordination Contact  [facility]/Voicemail    Clinical Data: see below    Outreach attempted x 1.  Left message on voicemail with call back information and requested return call.  Plan: Care coordinator will try again in 1-2 business days.    Soha Belcher, Social Work Care Coordinator, [facility], [facility] Clinics: [place], [place], and [place]   P:[phone] / Signed June 14, 2018

## 2018-06-15 LAB
DEPRECATED CALCIDIOL+CALCIFEROL SERPL-MC: <23 UG/L (ref 20–75)
PTH RELATED PROT SERPL-SCNC: 2.2 PMOL/L (ref 0–2.3)
VITAMIN D2 SERPL-MCNC: <5 UG/L
VITAMIN D3 SERPL-MCNC: 18 UG/L

## 2018-06-19 ENCOUNTER — OFFICE VISIT (OUTPATIENT)
Dept: FAMILY MEDICINE | Facility: CLINIC | Age: 58
End: 2018-06-19
Payer: MEDICAID

## 2018-06-19 VITALS
BODY MASS INDEX: 35.85 KG/M2 | RESPIRATION RATE: 16 BRPM | HEART RATE: 100 BPM | TEMPERATURE: 98 F | SYSTOLIC BLOOD PRESSURE: 122 MMHG | WEIGHT: 235.8 LBS | DIASTOLIC BLOOD PRESSURE: 60 MMHG

## 2018-06-19 DIAGNOSIS — R44.3 HALLUCINATIONS: ICD-10-CM

## 2018-06-19 DIAGNOSIS — Z09 HOSPITAL DISCHARGE FOLLOW-UP: Primary | ICD-10-CM

## 2018-06-19 DIAGNOSIS — D61.818 PANCYTOPENIA (H): ICD-10-CM

## 2018-06-19 DIAGNOSIS — I10 BENIGN ESSENTIAL HYPERTENSION: ICD-10-CM

## 2018-06-19 DIAGNOSIS — G89.28 OTHER CHRONIC POSTPROCEDURAL PAIN: ICD-10-CM

## 2018-06-19 DIAGNOSIS — R79.89 ELEVATED LFTS: ICD-10-CM

## 2018-06-19 DIAGNOSIS — G58.8 OTHER MONONEUROPATHY: ICD-10-CM

## 2018-06-19 DIAGNOSIS — N17.9 ACUTE RENAL FAILURE, UNSPECIFIED ACUTE RENAL FAILURE TYPE (H): ICD-10-CM

## 2018-06-19 DIAGNOSIS — F10.10 ALCOHOL ABUSE: ICD-10-CM

## 2018-06-19 LAB
ERYTHROCYTE [DISTWIDTH] IN BLOOD BY AUTOMATED COUNT: 14.8 % (ref 10–15)
HCT VFR BLD AUTO: 31.5 % (ref 40–53)
HGB BLD-MCNC: 10.6 G/DL (ref 13.3–17.7)
MCH RBC QN AUTO: 29.8 PG (ref 26.5–33)
MCHC RBC AUTO-ENTMCNC: 33.7 G/DL (ref 31.5–36.5)
MCV RBC AUTO: 89 FL (ref 78–100)
PLATELET # BLD AUTO: 211 10E9/L (ref 150–450)
PTH RELATED PROT SERPL-SCNC: 2.2 PMOL/L (ref 0–2.3)
RBC # BLD AUTO: 3.56 10E12/L (ref 4.4–5.9)
WBC # BLD AUTO: 4.2 10E9/L (ref 4–11)

## 2018-06-19 PROCEDURE — 80048 BASIC METABOLIC PNL TOTAL CA: CPT | Performed by: FAMILY MEDICINE

## 2018-06-19 PROCEDURE — 80076 HEPATIC FUNCTION PANEL: CPT | Performed by: FAMILY MEDICINE

## 2018-06-19 PROCEDURE — 99496 TRANSJ CARE MGMT HIGH F2F 7D: CPT | Performed by: FAMILY MEDICINE

## 2018-06-19 PROCEDURE — 85027 COMPLETE CBC AUTOMATED: CPT | Performed by: FAMILY MEDICINE

## 2018-06-19 PROCEDURE — 36415 COLL VENOUS BLD VENIPUNCTURE: CPT | Performed by: FAMILY MEDICINE

## 2018-06-19 RX ORDER — AMLODIPINE BESYLATE 5 MG/1
5 TABLET ORAL DAILY
Qty: 90 TABLET | Refills: 1 | Status: SHIPPED | OUTPATIENT
Start: 2018-06-19 | End: 2018-12-19

## 2018-06-19 ASSESSMENT — ENCOUNTER SYMPTOMS
GASTROINTESTINAL NEGATIVE: 1
DEPRESSION: 0
NERVOUS/ANXIOUS: 0
INSOMNIA: 0
CONSTITUTIONAL NEGATIVE: 1
CARDIOVASCULAR NEGATIVE: 1
RESPIRATORY NEGATIVE: 1

## 2018-06-19 ASSESSMENT — LIFESTYLE VARIABLES: SUBSTANCE_ABUSE: 1

## 2018-06-19 NOTE — NURSING NOTE
"Chief Complaint   Patient presents with     Hospital F/U     Forms     Disability form      Initial /60 (BP Location: Right arm, Patient Position: Chair, Cuff Size: Adult Large)  Pulse 100  Temp 98  F (36.7  C) (Oral)  Resp 16  Wt 235 lb 12.8 oz (107 kg)  BMI 35.85 kg/m2 Estimated body mass index is 35.85 kg/(m^2) as calculated from the following:    Height as of 6/9/18: 5' 8\" (1.727 m).    Weight as of this encounter: 235 lb 12.8 oz (107 kg).  BP completed using cuff size large right arm    Lisa Magill, CMA    "

## 2018-06-19 NOTE — MR AVS SNAPSHOT
After Visit Summary   6/19/2018    Aydin Reilly    MRN: [id number]           Patient Information     Date Of Birth          1960        Visit Information        Provider Department      6/19/2018 2:40 PM Adolfo Simmons MD [facility] Diagnoses     Hospital discharge follow-up    -  1    Pancytopenia (H)        Elevated LFTs        Hallucinations        Alcohol abuse        Acute renal failure, unspecified acute renal failure type (H)        Other mononeuropathy        Other chronic postprocedural pain        Benign essential hypertension          Care Instructions    Capsaicin cream          Follow-ups after your visit        Additional Services     PAIN MANAGEMENT REFERRAL       [facility] Referral    Please be aware that coverage of these services is subject to the terms and limitations of your health insurance plan.  Call member services at your health plan with any benefit or coverage questions.      Please bring the following to your appointment:  Any x-rays, CTs or MRIs which have been performed.  Contact the facility where they were done to arrange for  prior to your scheduled appointment.  Any new CT, MRI or other procedures ordered by your specialist must be performed at a [place] facility or coordinated by your clinic's referral office.    List of current medications   This referral request  Any documents/labs given to you for this referral      Reason for Consult:  Evaluation for comprehensive services- patients will be evaluated if appropriate for comprehensive service including medication changes, procedures, pain psychology, and pain physical therapy.  While involved with comprehensive services, pain providers will work with referring provider/PCP to stabilize appropriate medication management, with long-term plan of transition of prescribing back to referring provider/PCP upon completion of comprehensive  services.      Please complete the following questions:    Do you have any specific questions for the pain specialist? No    Are there any red flags that may impact the assessment or management of the patient? Active or recent alcohol, drug or prescription medication misuse (explain): alcohol, crack and Mental Illness / Communication Difficulties (explain): depression    Please answer the following questions:    What is your diagnosis for this patient's pain?  Chronic sciatic pain following prolonged nerve compression during surgery    Do you have any specific questions for the pain specialist? No    Are there any red flags that may impact the assessment or management of this patient?    Active or recent alcohol, drug or prescription medication misuse (explain):  and Mental Illness / Communication Difficulties (explain):     ANY DIAGNOSTIC TESTS THAT ARE NOT IN EPIC SHOULD BE SENT TO THE PAIN CENTER    Please note the pre op pain consult must be scheduled 2-3 weeks prior to the patient's surgery. Patient's trying to schedule within 2 weeks of surgery may not be accommodated.    Pre Op Pain Consults are only good for 30 days.    REGARDING OPIOID MEDICATIONS:  We will always address appropriateness of opioid pain medications, but we generally will not automatically take on a prescribing role. When we do take on prescribing of opioids for chronic pain, it is in collaboration with the referring physician for an intermediate period of time (months), with an expectation that the primary physician or provider will assume the prescribing role if medications are effective at stable doses with demonstrated compliance.  Therefore, please do not assume that your prescribing responsibilities end on the day of pain clinic consultation.  Is this agreeable to you? YES    For any questions, contact the [facility] at [phone]                  Follow-up notes from your care team     Return in about 1 month  (around 7/19/2018) for BP Recheck.      Your next 10 appointments already scheduled     Jun 29, 2018 11:30 AM CDT   (Arrive by 11:00 AM)   New Visit with Mahesh Ibarra [facility] ([facility])    [address]  [facility] [address] [phone]            Jul 06, 2018  8:30 AM CDT   Return Visit with Mahesh Ibarra [facility] ([facility])    [address] [facility] [id number] [phone]              Who to contact     If you have questions or need follow up information about today's clinic visit or your schedule please contact [facility] directly at [phone].  Normal or non-critical lab and imaging results will be communicated to you by MyChart, letter or phone within 4 business days after the clinic has received the results. If you do not hear from us within 7 days, please contact the clinic through [email or web address] or phone. If you have a critical or abnormal lab result, we will notify you by phone as soon as possible.  Submit refill requests through [employer] or call your pharmacy and they will forward the refill request to us. Please allow 3 business days for your refill to be completed.          Additional Information About Your Visit        [email or web address] Information     [employer]es gives you secure access to your electronic health record. If you see a primary care provider, you can also send messages to your care team and make appointments. If you have questions, please call your primary care clinic.  If you do not have a primary care provider, please call [phone] and they will assist you.        Care EveryWhere ID     This is your Care EveryWhere ID. This could be used by other organizations to access your [facility] medical records  [id number]        Your Vitals Were     Pulse Temperature Respirations BMI (Body Mass Index)          100 98  F (36.7  C) (Oral) 16 35.85 kg/m2          Blood Pressure from Last 3 Encounters:   06/19/18 122/60   06/13/18 155/87   06/09/18 (!) 159/99    Weight from Last 3 Encounters:   06/19/18 235 lb 12.8 oz (107 kg)   06/11/18 246 lb (111.6 kg)   05/09/18 253 lb (114.8 kg)              We Performed the Following     Basic metabolic panel     CBC with platelets     Hepatic panel     PAIN MANAGEMENT REFERRAL          Where to get your medicines      These medications were sent to [place] [address] [facility] - [place], [place] - [address]  [address], [facility] [address]     Phone:  [phone]     amLODIPine 5 MG tablet          Primary Care Provider Office Phone # Fax #    Adolfo Simmons [phone] [id number] [facility] [place] [address]        Equal Access to Services     [facility] Hospital[facility] : Hadii florencio bhatt hadasho Soomaali, waaxda luqadaha, qaybta kaalmada adeegyada, gogo jules hayelizabeth swanson . So [facility] [phone].    ATENCIÓN: Si habla español, tiene a lyn disposición servicios gratuitos de asistencia lingüística. Llame al [phone].    We comply with applicable federal civil rights laws and [place] laws. We do not discriminate on the basis of race, color, national origin, age, disability, sex, sexual orientation, or gender identity.            Thank you!     Thank you for choosing [facility]  for your care. Our goal is always to provide you with excellent care. Hearing back from our patients is one way we can continue to improve our services. Please take a few minutes to complete the written survey that you may receive in the mail after your visit with us. Thank you!             Your Updated Medication List - Protect others around you: Learn how to safely use, store and throw away your medicines at www.disposemymeds.org.          This list is accurate as of 6/19/18  3:39 PM.  Always use your most recent med list.                   Brand Name Dispense Instructions for use Diagnosis     acetaminophen 325 MG tablet    TYLENOL    100 tablet    Take 2 tablets (650 mg) by mouth every 8 hours as needed for mild pain    Chronic pain of right knee       amLODIPine 5 MG tablet    NORVASC    90 tablet    Take 1 tablet (5 mg) by mouth daily    Benign essential hypertension       folic acid 1 MG tablet    FOLVITE    30 tablet    Take 1 tablet (1 mg) by mouth daily    Acute renal failure, unspecified acute renal failure type (H)       gabapentin 300 MG capsule    NEURONTIN    180 capsule    Take 2 capsules (600 mg) by mouth 3 times daily    Other mononeuropathy       hydrOXYzine 25 MG tablet    ATARAX    20 tablet    Take 1-2 tablets (25-50 mg) by mouth every 8 hours as needed for itching or anxiety (pain)    Chronic pain of right knee       JOBST ACTIVE 20-30MMHG MEDIUM Misc     6 each    1 Device daily as needed Knee high    Pedal edema       MELATONIN PO      Take 3 mg by mouth nightly as needed Reported on 5/10/2017        Multi-vitamin Tabs tablet      Take 1 tablet by mouth daily        OLANZapine 5 MG tablet    zyPREXA    30 tablet    Take 1 tablet (5 mg) by mouth At Bedtime    Hallucinations       SERTRALINE HCL PO      Take 200-300 mg by mouth daily        thiamine 100 MG tablet     30 tablet    Take 1 tablet (100 mg) by mouth daily    Acute renal failure, unspecified acute renal failure type (H)       traZODone 50 MG tablet    DESYREL    90 tablet    Take 1-3 tablets ( mg) by mouth nightly as needed for sleep    CONNER (generalized anxiety disorder)
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
## 2018-06-20 LAB
ALBUMIN SERPL-MCNC: 3.5 G/DL (ref 3.4–5)
ALP SERPL-CCNC: 260 U/L (ref 40–150)
ALT SERPL W P-5'-P-CCNC: 40 U/L (ref 0–70)
ANION GAP SERPL CALCULATED.3IONS-SCNC: 12 MMOL/L (ref 3–14)
AST SERPL W P-5'-P-CCNC: 23 U/L (ref 0–45)
BILIRUB DIRECT SERPL-MCNC: <0.1 MG/DL (ref 0–0.2)
BILIRUB SERPL-MCNC: 0.3 MG/DL (ref 0.2–1.3)
BUN SERPL-MCNC: 19 MG/DL (ref 7–30)
CALCIUM SERPL-MCNC: 9.6 MG/DL (ref 8.5–10.1)
CHLORIDE SERPL-SCNC: 107 MMOL/L (ref 94–109)
CO2 SERPL-SCNC: 23 MMOL/L (ref 20–32)
CREAT SERPL-MCNC: 1.37 MG/DL (ref 0.66–1.25)
GFR SERPL CREATININE-BSD FRML MDRD: 54 ML/MIN/1.7M2
GLUCOSE SERPL-MCNC: 132 MG/DL (ref 70–99)
POTASSIUM SERPL-SCNC: 3.5 MMOL/L (ref 3.4–5.3)
PROT SERPL-MCNC: 7.3 G/DL (ref 6.8–8.8)
SODIUM SERPL-SCNC: 142 MMOL/L (ref 133–144)

## 2018-06-29 ENCOUNTER — OFFICE VISIT (OUTPATIENT)
Dept: PSYCHOLOGY | Facility: CLINIC | Age: 58
End: 2018-06-29
Payer: MEDICAID

## 2018-06-29 DIAGNOSIS — F10.19 ALCOHOL ABUSE WITH ALCOHOL-INDUCED DISORDER (H): ICD-10-CM

## 2018-06-29 DIAGNOSIS — F41.1 GAD (GENERALIZED ANXIETY DISORDER): Primary | ICD-10-CM

## 2018-06-29 PROCEDURE — 90791 PSYCH DIAGNOSTIC EVALUATION: CPT | Performed by: SOCIAL WORKER

## 2018-06-29 ASSESSMENT — ANXIETY QUESTIONNAIRES
GAD7 TOTAL SCORE: 0
3. WORRYING TOO MUCH ABOUT DIFFERENT THINGS: NOT AT ALL
7. FEELING AFRAID AS IF SOMETHING AWFUL MIGHT HAPPEN: NOT AT ALL
2. NOT BEING ABLE TO STOP OR CONTROL WORRYING: NOT AT ALL
6. BECOMING EASILY ANNOYED OR IRRITABLE: NOT AT ALL
5. BEING SO RESTLESS THAT IT IS HARD TO SIT STILL: NOT AT ALL
1. FEELING NERVOUS, ANXIOUS, OR ON EDGE: NOT AT ALL

## 2018-06-29 ASSESSMENT — PATIENT HEALTH QUESTIONNAIRE - PHQ9: 5. POOR APPETITE OR OVEREATING: NOT AT ALL
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
## 2018-06-29 NOTE — Clinical Note
Ashlee Simmons, I completed [facility]'s Psychiatric Diagnostic Eval and have him scheduled to see me for follow up sessions.  Thank you for the referral. Mahesh Ibarra, KLARISSASW

## 2018-06-29 NOTE — MR AVS SNAPSHOT
MRN:[id number]                      After Visit Summary   6/29/2018    Aydin Reilly    MRN: [id number]           Visit Information        Provider Department      6/29/2018 11:30 AM Mahesh Ibarra, [facility] Generic      Your next 10 appointments already scheduled     Jul 06, 2018  8:30 AM CDT   Return Visit with Mahesh Ibarra [facility] ([facility])    [address] [address] [facility] [address]   [phone]            Jul 18, 2018  1:00 PM CDT   SHORT with Adolfo Simmons MD   [facility]    [address] [place] [address], [address]  [facility] [id number] [phone]            Jul 23, 2018  4:30 PM CDT   Return Visit with Mahesh Ibarra [facility] ([facility])    [address] [facility] [id number] [phone]            Aug 20, 2018 11:30 AM CDT   Return Visit with Mahesh Ibarra [facility] ([facility])    [address] [facility] [address] [id number] [phone]              MyChart Information     [facility] [facility] gives you secure access to your electronic health record. If you see a primary care provider, you can also send messages to your care team and make appointments. If you have questions, please call your primary care clinic.  If you do not have a primary care provider, please call [phone] and they will assist you.        Care EveryWhere ID     This is your Care EveryWhere ID. This could be used by other organizations to access your [place] medical records  [id number]        Equal Access to Services     JIGAR GARCÍA : Alexander Fraga, wajasonda lusun, qaybta kaalmanat herrera, gogo donovan. So [facility] [phone].    ATENCIÓN: Si habla español, tiene a lyn disposición servicios  genevieveos de asistencia lingüística. Eva al [phone].    We comply with applicable federal civil rights laws and [place] laws. We do not discriminate on the basis of race, color, national origin, age, disability, sex, sexual orientation, or gender identity.

## 2018-06-30 ASSESSMENT — PATIENT HEALTH QUESTIONNAIRE - PHQ9: SUM OF ALL RESPONSES TO PHQ QUESTIONS 1-9: 0

## 2018-06-30 ASSESSMENT — ANXIETY QUESTIONNAIRES: GAD7 TOTAL SCORE: 0

## 2018-07-06 ENCOUNTER — TELEPHONE (OUTPATIENT)
Dept: PSYCHOLOGY | Facility: CLINIC | Age: 58
End: 2018-07-06

## 2018-07-06 NOTE — TELEPHONE ENCOUNTER
Writer attempted to call [facility] to inform of missed appointment and to discuss future scheduled appointments.  Line was busy and writer was unable to leave a message.

## 2018-07-18 ENCOUNTER — TELEPHONE (OUTPATIENT)
Dept: FAMILY MEDICINE | Facility: CLINIC | Age: 58
End: 2018-07-18

## 2018-07-18 NOTE — LETTER
[facility]  [address], [address]  [place] [address]  [phone]      July 18, 2018      Aydin Reilly  [address]  [place] [address]      Dear Aydin Reilly:    On two prior occasions we notified you of our cancellation policy of a two hour notice if you are unable to keep an appointment. According to our records, you did not contact us within the specified timeframe prior to each missed appointment.   [facility] will provide necessary or emergency service to you for the next 30 days. After 8/18/18, we will no longer provide your medical care. We recommend you find a new health care provider as soon as possible. The member relations department of your health plan or physician referral network can help you locate a new provider.   We will forward a copy of your medical records to your new provider as soon as we receive your written consent on the enclosed Release of Information form.    If you have any questions regarding this notice, please contact me at [phone]  Sincerely,        Josefina Velazco   [facility] Administrator/Manager    Enclosure: Release of Information Form

## 2018-07-18 NOTE — TELEPHONE ENCOUNTER
3rd No Show letter T'd up, please review.     Routed to Dr. Corona & Josefina Moreno   07/18/18 4:16 PM       Please see care coordination notes. I am not sure if we should give him one more chance?    [facility], MD

## 2018-07-20 ENCOUNTER — PATIENT OUTREACH (OUTPATIENT)
Dept: CARE COORDINATION | Facility: CLINIC | Age: 58
End: 2018-07-20

## 2018-07-20 DIAGNOSIS — Z76.89 HEALTH CARE HOME: Primary | ICD-10-CM
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
## 2018-07-20 NOTE — LETTER
Health Care Home - Access Care Plan    About Me  Patient Name:  Aydin Reilly    YOB: 1960  Age:                             57 year old   Viral MRN:            [id number] Telephone Information:     Home Phone [phone]   Mobile [phone]       Address:    [address]  [place] [address] Email address:  [email or web address]      Emergency Contact(s)  Name Relationship Lgl Grd Work Phone Home Phone Mobile Phone   1Tony REILLY, ED Brother  none none [phone]             Health Maintenance: Routine Health maintenance Reviewed: Due/Overdue   Health Maintenance Due   Topic Date Due     HIV SCREEN (SYSTEM ASSIGNED)  07/24/1980     ADVANCE DIRECTIVE PLANNING Q5 YRS  07/24/2017     URINE DRUG SCREEN Q1 YR  05/25/2018     My Access Plan  Medical Emergency 911   Questions or concerns during clinic hours Primary Clinic Line, I will call the clinic directly: [facility] - [phone]   24 Hour Appointment Line [phone] or  [phone]) (toll free)   24 Hour Nurse Line [phone] (toll free)   Questions or concerns outside clinic hours 24 Hour Appointment Line, I will call the after-hours on-call line:   [facility] [phone] or [phone]) (toll-free)   Preferred Urgent Care     Preferred Hospital     Preferred Pharmacy [facility] [place], [place] [facility] [address]     Behavioral Health Crisis Line The National Suicide Prevention Lifeline at 1-362.694.5892 or 913     My Care Team Members  Patient Care Team       Relationship Specialty Notifications Start End    Adolfo Simmons MD PCP - General Family Practice  3/24/15     Phone: [phone] Fax: [phone] [address]  [facility] [address]    Buzz Ren MD Referring Physician Urology  1/12/15     Phone: [phone] Fax: [phone] [address] [place] [address]    Tiffany Erazo  BUD Fernandez CNP Nurse Practitioner Neurology  4/16/15     Phone: [phone] Fax: [phone]         [address] [facility] [facility] [address]    Cira Wood, RN Nurse Coordinator Neurology Admissions 5/11/15     Phone: [phone] Pager: [phone]        Darell Barrera MD MD Urology  8/19/15     Phone: [phone] Fax: [phone]         [address] [place] [address] [address] [facility] [address]           My Medical and Care Information  Problem List   Patient Active Problem List   Diagnosis     H/O malignant neoplasm of rectum, rectosigmoid junction, and anus     Cellulitis of scrotum     Lumbar Radiculopathy, SEE FYI     Back Pain w/ Radiation, SEE FYI     Chronic abdominal pain     Hyperlipidemia with target LDL less than 130     Hypogonadism     Scrotal pain     Erectile dysfunction     Drug abuse, opioid type     GIB (gastrointestinal bleeding)     MAGALY (obstructive sleep apnea)     Generalized anxiety disorder     Urethral stricture     Alcohol abuse     History of urethral stricture     Chronic pain     Edema     Anemia of chronic disease     Other mononeuropathy     Hx SBO     Health Care Home     Benign essential hypertension     Rotator cuff injury, right, initial encounter     Chondritis     Anserine bursitis     Substance abuse     Chronic pain of right knee     Intertriginous candidiasis     Gastroesophageal reflux disease, esophagitis presence not specified     Morbid obesity (H)     Moderate episode of recurrent major depressive disorder (H)     Hallucinations, visual      Current Medications and Allergies:  See printed Medication Report

## 2018-07-20 NOTE — LETTER
[place] CARE COORDINATION  [facility]   [address]   [place] [address]    July 20, 2018    Aydin Reilly  [address]  [place] [address]      Dear Aydin,    I am a clinic care coordinator who works with Adolfo Simmons MD at [facility]. I wanted to thank you for spending the time to talk with me.  Please call in the future if there is anything I can assist you with.   I wanted to introduce myself and provide you with my contact information so that you can call me with questions or concerns about your health care. Below is a description of clinic care coordination and how I can further assist you.     The clinic care coordinator is a registered nurse and/or  who understand the health care system. The goal of clinic care coordination is to help you manage your health and improve access to the [facility] system in the most efficient manner. The registered nurse can assist you in meeting your health care goals by providing education, coordinating services, and strengthening the communication among your providers. The  can assist you with financial, behavioral, psychosocial, chemical dependency, counseling, and/or psychiatric resources.    Please feel free to contact me at [phone] with any questions or concerns. We at [facility] are focused on providing you with the highest-quality healthcare experience possible and that all starts with you.     Sincerely,     Marga Monae Care Coordinator RN  [facility]  [phone]  July 20, 2018     Enclosed: I have enclosed a copy of a 24 Hour Access Plan. This has helpful phone numbers for you to call when needed. Please keep this in an easy to access place to use as needed.

## 2018-07-23 ENCOUNTER — TELEPHONE (OUTPATIENT)
Dept: PSYCHOLOGY | Facility: CLINIC | Age: 58
End: 2018-07-23
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
## 2018-07-23 NOTE — TELEPHONE ENCOUNTER
I'm willing to keep seeing him, but did want to start the process as he makes his appointments less than half the time.    Adolfo Simmons MD

## 2018-07-26 ENCOUNTER — FCC EXTENDED DOCUMENTATION (OUTPATIENT)
Dept: PSYCHOLOGY | Facility: CLINIC | Age: 58
End: 2018-07-26

## 2018-07-26 NOTE — LETTER
7/26/2018      Aydin KIMO Reilly  [address]  [place] [address]     At the start of therapy with [facility] we ask clients to prioritize their mental health care with an attendance agreement.  In this agreement clients make a commitment to follow through with scheduled appointments. If a person cancels an appointment within 24 hours of the appointment time or does not make their appointment and does not contact the clinic it is considered a violation of the agreement.    It has come to the attention of [facility] administration that you were unable to honor that agreement.  Regretfully, we have to inform you that we are discharging you from [facility] due to multiple missed appointments.  Any future scheduled appointments have been cancelled.   If you wish to continue therapy with [facility] and are ready to make a commitment to regular attendance, you may call back in six months and we will be happy to review a new agreement with you.     To access current services we recommend that you contact your insurance plan s provider network to identify a therapist with whom you can schedule services.  For your convenience we have also listed contact information for two agencies which provide mental health services around the [facility] area.      [facility]  [place] [facility]  [place] [phone] [place] [phone]  [place] [phone] [place] [phone]  [place] [phone] [place] [phone]  [place] [phone] [place] [phone]  [place] [phone]     Sincerely,      [facility]  Administration  [phone]
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
## 2018-07-31 ENCOUNTER — APPOINTMENT (OUTPATIENT)
Dept: ULTRASOUND IMAGING | Facility: CLINIC | Age: 58
DRG: 603 | End: 2018-07-31
Attending: EMERGENCY MEDICINE
Payer: COMMERCIAL

## 2018-07-31 ENCOUNTER — HOSPITAL ENCOUNTER (INPATIENT)
Facility: CLINIC | Age: 58
LOS: 3 days | Discharge: HOME | DRG: 603 | End: 2018-08-03
Attending: EMERGENCY MEDICINE | Admitting: INTERNAL MEDICINE
Payer: COMMERCIAL

## 2018-07-31 DIAGNOSIS — R00.0 SINUS TACHYCARDIA: ICD-10-CM

## 2018-07-31 DIAGNOSIS — N49.2 CELLULITIS OF SCROTUM: ICD-10-CM

## 2018-07-31 DIAGNOSIS — R82.81 PYURIA: ICD-10-CM

## 2018-07-31 LAB
ALBUMIN SERPL-MCNC: 3.5 G/DL (ref 3.4–5)
ALBUMIN UR-MCNC: NEGATIVE MG/DL
ALP SERPL-CCNC: 289 U/L (ref 40–150)
ALT SERPL W P-5'-P-CCNC: 22 U/L (ref 0–70)
AMORPH CRY #/AREA URNS HPF: ABNORMAL /HPF
ANION GAP SERPL CALCULATED.3IONS-SCNC: 8 MMOL/L (ref 3–14)
APPEARANCE UR: ABNORMAL
AST SERPL W P-5'-P-CCNC: 15 U/L (ref 0–45)
BACTERIA #/AREA URNS HPF: ABNORMAL /HPF
BASOPHILS # BLD AUTO: 0 10E9/L (ref 0–0.2)
BASOPHILS NFR BLD AUTO: 0.3 %
BILIRUB SERPL-MCNC: 0.6 MG/DL (ref 0.2–1.3)
BILIRUB UR QL STRIP: NEGATIVE
BUN SERPL-MCNC: 22 MG/DL (ref 7–30)
CALCIUM SERPL-MCNC: 8.4 MG/DL (ref 8.5–10.1)
CHLORIDE SERPL-SCNC: 108 MMOL/L (ref 94–109)
CO2 SERPL-SCNC: 24 MMOL/L (ref 20–32)
COLOR UR AUTO: YELLOW
CREAT SERPL-MCNC: 1.32 MG/DL (ref 0.66–1.25)
DIFFERENTIAL METHOD BLD: ABNORMAL
EOSINOPHIL # BLD AUTO: 0 10E9/L (ref 0–0.7)
EOSINOPHIL NFR BLD AUTO: 0.2 %
ERYTHROCYTE [DISTWIDTH] IN BLOOD BY AUTOMATED COUNT: 15.1 % (ref 10–15)
ETHANOL SERPL-MCNC: <0.01 G/DL
GFR SERPL CREATININE-BSD FRML MDRD: 56 ML/MIN/1.7M2
GLUCOSE SERPL-MCNC: 143 MG/DL (ref 70–99)
GLUCOSE UR STRIP-MCNC: NEGATIVE MG/DL
HCT VFR BLD AUTO: 31.8 % (ref 40–53)
HGB BLD-MCNC: 10.6 G/DL (ref 13.3–17.7)
HGB UR QL STRIP: NEGATIVE
IMM GRANULOCYTES # BLD: 0.1 10E9/L (ref 0–0.4)
IMM GRANULOCYTES NFR BLD: 0.5 %
INTERPRETATION ECG - MUSE: NORMAL
KETONES UR STRIP-MCNC: NEGATIVE MG/DL
LACTATE BLD-SCNC: 1.2 MMOL/L (ref 0.7–2)
LEUKOCYTE ESTERASE UR QL STRIP: ABNORMAL
LYMPHOCYTES # BLD AUTO: 0.4 10E9/L (ref 0.8–5.3)
LYMPHOCYTES NFR BLD AUTO: 3.6 %
MCH RBC QN AUTO: 30.5 PG (ref 26.5–33)
MCHC RBC AUTO-ENTMCNC: 33.3 G/DL (ref 31.5–36.5)
MCV RBC AUTO: 91 FL (ref 78–100)
MONOCYTES # BLD AUTO: 0.4 10E9/L (ref 0–1.3)
MONOCYTES NFR BLD AUTO: 3.4 %
MUCOUS THREADS #/AREA URNS LPF: PRESENT /LPF
NEUTROPHILS # BLD AUTO: 9.8 10E9/L (ref 1.6–8.3)
NEUTROPHILS NFR BLD AUTO: 92 %
NITRATE UR QL: NEGATIVE
NRBC # BLD AUTO: 0 10*3/UL
NRBC BLD AUTO-RTO: 0 /100
PH UR STRIP: 6 PH (ref 5–7)
PLATELET # BLD AUTO: 151 10E9/L (ref 150–450)
POTASSIUM SERPL-SCNC: 3.7 MMOL/L (ref 3.4–5.3)
PROT SERPL-MCNC: 6.8 G/DL (ref 6.8–8.8)
RBC # BLD AUTO: 3.48 10E12/L (ref 4.4–5.9)
RBC #/AREA URNS AUTO: 2 /HPF (ref 0–2)
SODIUM SERPL-SCNC: 140 MMOL/L (ref 133–144)
SOURCE: ABNORMAL
SP GR UR STRIP: 1.03 (ref 1–1.03)
SQUAMOUS #/AREA URNS AUTO: 1 /HPF (ref 0–1)
UROBILINOGEN UR STRIP-MCNC: 0 MG/DL (ref 0–2)
WBC # BLD AUTO: 10.7 10E9/L (ref 4–11)
WBC #/AREA URNS AUTO: 13 /HPF (ref 0–5)

## 2018-07-31 PROCEDURE — 25000132 ZZH RX MED GY IP 250 OP 250 PS 637: Performed by: INTERNAL MEDICINE

## 2018-07-31 PROCEDURE — 87186 SC STD MICRODIL/AGAR DIL: CPT | Performed by: EMERGENCY MEDICINE

## 2018-07-31 PROCEDURE — 96375 TX/PRO/DX INJ NEW DRUG ADDON: CPT

## 2018-07-31 PROCEDURE — 80053 COMPREHEN METABOLIC PANEL: CPT | Performed by: EMERGENCY MEDICINE

## 2018-07-31 PROCEDURE — 87040 BLOOD CULTURE FOR BACTERIA: CPT | Performed by: EMERGENCY MEDICINE

## 2018-07-31 PROCEDURE — 25000128 H RX IP 250 OP 636: Performed by: EMERGENCY MEDICINE

## 2018-07-31 PROCEDURE — 81001 URINALYSIS AUTO W/SCOPE: CPT | Performed by: EMERGENCY MEDICINE

## 2018-07-31 PROCEDURE — 93005 ELECTROCARDIOGRAM TRACING: CPT

## 2018-07-31 PROCEDURE — 99222 1ST HOSP IP/OBS MODERATE 55: CPT | Mod: AI | Performed by: INTERNAL MEDICINE

## 2018-07-31 PROCEDURE — 93976 VASCULAR STUDY: CPT

## 2018-07-31 PROCEDURE — 99285 EMERGENCY DEPT VISIT HI MDM: CPT | Mod: 25

## 2018-07-31 PROCEDURE — 80320 DRUG SCREEN QUANTALCOHOLS: CPT | Performed by: EMERGENCY MEDICINE

## 2018-07-31 PROCEDURE — 99207 ZZC APP CREDIT; MD BILLING SHARED VISIT: CPT | Performed by: INTERNAL MEDICINE

## 2018-07-31 PROCEDURE — 85025 COMPLETE CBC W/AUTO DIFF WBC: CPT | Performed by: EMERGENCY MEDICINE

## 2018-07-31 PROCEDURE — 87088 URINE BACTERIA CULTURE: CPT | Performed by: EMERGENCY MEDICINE

## 2018-07-31 PROCEDURE — 83605 ASSAY OF LACTIC ACID: CPT | Performed by: EMERGENCY MEDICINE

## 2018-07-31 PROCEDURE — 36415 COLL VENOUS BLD VENIPUNCTURE: CPT | Performed by: EMERGENCY MEDICINE

## 2018-07-31 PROCEDURE — 96376 TX/PRO/DX INJ SAME DRUG ADON: CPT

## 2018-07-31 PROCEDURE — 87086 URINE CULTURE/COLONY COUNT: CPT | Performed by: EMERGENCY MEDICINE

## 2018-07-31 PROCEDURE — 96365 THER/PROPH/DIAG IV INF INIT: CPT

## 2018-07-31 PROCEDURE — 12000000 ZZH R&B MED SURG/OB

## 2018-07-31 PROCEDURE — 96361 HYDRATE IV INFUSION ADD-ON: CPT

## 2018-07-31 RX ORDER — GABAPENTIN 300 MG/1
300 CAPSULE ORAL 3 TIMES DAILY
Status: DISCONTINUED | OUTPATIENT
Start: 2018-07-31 | End: 2018-07-31

## 2018-07-31 RX ORDER — TRAZODONE HYDROCHLORIDE 50 MG/1
50-150 TABLET, FILM COATED ORAL
Status: DISCONTINUED | OUTPATIENT
Start: 2018-07-31 | End: 2018-08-03 | Stop reason: HOSPADM

## 2018-07-31 RX ORDER — OLANZAPINE 5 MG/1
5 TABLET ORAL AT BEDTIME
Status: DISCONTINUED | OUTPATIENT
Start: 2018-07-31 | End: 2018-08-03 | Stop reason: HOSPADM

## 2018-07-31 RX ORDER — CEFTRIAXONE 2 G/1
2 INJECTION, POWDER, FOR SOLUTION INTRAMUSCULAR; INTRAVENOUS ONCE
Status: COMPLETED | OUTPATIENT
Start: 2018-07-31 | End: 2018-07-31

## 2018-07-31 RX ORDER — SODIUM CHLORIDE, SODIUM LACTATE, POTASSIUM CHLORIDE, CALCIUM CHLORIDE 600; 310; 30; 20 MG/100ML; MG/100ML; MG/100ML; MG/100ML
1000 INJECTION, SOLUTION INTRAVENOUS CONTINUOUS
Status: DISCONTINUED | OUTPATIENT
Start: 2018-07-31 | End: 2018-07-31

## 2018-07-31 RX ORDER — ACETAMINOPHEN 325 MG/1
650 TABLET ORAL EVERY 4 HOURS PRN
Status: DISCONTINUED | OUTPATIENT
Start: 2018-07-31 | End: 2018-08-03 | Stop reason: HOSPADM

## 2018-07-31 RX ORDER — GABAPENTIN 300 MG/1
1200 CAPSULE ORAL 3 TIMES DAILY
COMMUNITY
End: 2018-10-14

## 2018-07-31 RX ORDER — CEFTRIAXONE 1 G/1
1 INJECTION, POWDER, FOR SOLUTION INTRAMUSCULAR; INTRAVENOUS EVERY 24 HOURS
Status: DISCONTINUED | OUTPATIENT
Start: 2018-08-01 | End: 2018-08-03 | Stop reason: HOSPADM

## 2018-07-31 RX ORDER — LANOLIN ALCOHOL/MO/W.PET/CERES
3 CREAM (GRAM) TOPICAL
Status: DISCONTINUED | OUTPATIENT
Start: 2018-07-31 | End: 2018-08-03 | Stop reason: HOSPADM

## 2018-07-31 RX ORDER — SERTRALINE HYDROCHLORIDE 100 MG/1
200-300 TABLET, FILM COATED ORAL DAILY
Status: DISCONTINUED | OUTPATIENT
Start: 2018-07-31 | End: 2018-07-31

## 2018-07-31 RX ORDER — GABAPENTIN 300 MG/1
1200 CAPSULE ORAL 3 TIMES DAILY
Status: DISCONTINUED | OUTPATIENT
Start: 2018-07-31 | End: 2018-08-03 | Stop reason: HOSPADM

## 2018-07-31 RX ORDER — SERTRALINE HYDROCHLORIDE 100 MG/1
100 TABLET, FILM COATED ORAL DAILY
Status: DISCONTINUED | OUTPATIENT
Start: 2018-08-01 | End: 2018-08-03 | Stop reason: HOSPADM

## 2018-07-31 RX ORDER — HYDROXYZINE HYDROCHLORIDE 25 MG/1
25-50 TABLET, FILM COATED ORAL EVERY 8 HOURS PRN
Status: DISCONTINUED | OUTPATIENT
Start: 2018-07-31 | End: 2018-08-03 | Stop reason: HOSPADM

## 2018-07-31 RX ORDER — AMLODIPINE BESYLATE 5 MG/1
5 TABLET ORAL DAILY
Status: DISCONTINUED | OUTPATIENT
Start: 2018-07-31 | End: 2018-08-03 | Stop reason: HOSPADM

## 2018-07-31 RX ORDER — ONDANSETRON 4 MG/1
4 TABLET, ORALLY DISINTEGRATING ORAL EVERY 6 HOURS PRN
Status: DISCONTINUED | OUTPATIENT
Start: 2018-07-31 | End: 2018-08-03 | Stop reason: HOSPADM

## 2018-07-31 RX ORDER — LIDOCAINE 40 MG/G
CREAM TOPICAL
Status: DISCONTINUED | OUTPATIENT
Start: 2018-07-31 | End: 2018-08-03 | Stop reason: HOSPADM

## 2018-07-31 RX ORDER — NALOXONE HYDROCHLORIDE 0.4 MG/ML
.1-.4 INJECTION, SOLUTION INTRAMUSCULAR; INTRAVENOUS; SUBCUTANEOUS
Status: DISCONTINUED | OUTPATIENT
Start: 2018-07-31 | End: 2018-08-03 | Stop reason: HOSPADM

## 2018-07-31 RX ORDER — CALCIUM CARBONATE 500 MG/1
1000 TABLET, CHEWABLE ORAL
Status: DISCONTINUED | OUTPATIENT
Start: 2018-07-31 | End: 2018-08-03 | Stop reason: HOSPADM

## 2018-07-31 RX ORDER — ONDANSETRON 2 MG/ML
4 INJECTION INTRAMUSCULAR; INTRAVENOUS EVERY 6 HOURS PRN
Status: DISCONTINUED | OUTPATIENT
Start: 2018-07-31 | End: 2018-08-03 | Stop reason: HOSPADM

## 2018-07-31 RX ORDER — MORPHINE SULFATE 4 MG/ML
4 INJECTION, SOLUTION INTRAMUSCULAR; INTRAVENOUS
Status: DISCONTINUED | OUTPATIENT
Start: 2018-07-31 | End: 2018-07-31

## 2018-07-31 RX ADMIN — ACETAMINOPHEN 650 MG: 325 TABLET, FILM COATED ORAL at 18:13

## 2018-07-31 RX ADMIN — AMLODIPINE BESYLATE 5 MG: 5 TABLET ORAL at 10:55

## 2018-07-31 RX ADMIN — MORPHINE SULFATE 4 MG: 4 INJECTION INTRAVENOUS at 03:14

## 2018-07-31 RX ADMIN — GABAPENTIN 1200 MG: 300 CAPSULE ORAL at 14:16

## 2018-07-31 RX ADMIN — GABAPENTIN 1200 MG: 300 CAPSULE ORAL at 21:37

## 2018-07-31 RX ADMIN — SODIUM CHLORIDE 1000 ML: 9 INJECTION, SOLUTION INTRAVENOUS at 03:11

## 2018-07-31 RX ADMIN — MORPHINE SULFATE 4 MG: 4 INJECTION INTRAVENOUS at 07:09

## 2018-07-31 RX ADMIN — ACETAMINOPHEN 650 MG: 325 TABLET, FILM COATED ORAL at 10:22

## 2018-07-31 RX ADMIN — SERTRALINE HYDROCHLORIDE 300 MG: 100 TABLET ORAL at 10:56

## 2018-07-31 RX ADMIN — GABAPENTIN 1200 MG: 300 CAPSULE ORAL at 10:55

## 2018-07-31 RX ADMIN — CALCIUM CARBONATE (ANTACID) CHEW TAB 500 MG 1000 MG: 500 CHEW TAB at 11:33

## 2018-07-31 RX ADMIN — OLANZAPINE 5 MG: 5 TABLET, FILM COATED ORAL at 21:37

## 2018-07-31 RX ADMIN — ACETAMINOPHEN 650 MG: 325 TABLET, FILM COATED ORAL at 14:16

## 2018-07-31 RX ADMIN — CEFTRIAXONE SODIUM 2 G: 2 INJECTION, POWDER, FOR SOLUTION INTRAMUSCULAR; INTRAVENOUS at 03:31

## 2018-07-31 ASSESSMENT — ACTIVITIES OF DAILY LIVING (ADL)
SWALLOWING: 0-->SWALLOWS FOODS/LIQUIDS WITHOUT DIFFICULTY
TRANSFERRING: 0-->INDEPENDENT
DRESS: 0-->INDEPENDENT
FALL_HISTORY_WITHIN_LAST_SIX_MONTHS: YES
ADLS_ACUITY_SCORE: 12
AMBULATION: 0-->INDEPENDENT
BATHING: 0-->INDEPENDENT
RETIRED_COMMUNICATION: 0-->UNDERSTANDS/COMMUNICATES WITHOUT DIFFICULTY
WHICH_OF_THE_ABOVE_FUNCTIONAL_RISKS_HAD_A_RECENT_ONSET_OR_CHANGE?: AMBULATION
COGNITION: 0 - NO COGNITION ISSUES REPORTED
ADLS_ACUITY_SCORE: 11
NUMBER_OF_TIMES_PATIENT_HAS_FALLEN_WITHIN_LAST_SIX_MONTHS: 1
RETIRED_EATING: 0-->INDEPENDENT
TOILETING: 0-->INDEPENDENT
ADLS_ACUITY_SCORE: 12

## 2018-07-31 ASSESSMENT — ENCOUNTER SYMPTOMS
ABDOMINAL PAIN: 0
SHORTNESS OF BREATH: 0
DYSURIA: 1

## 2018-07-31 NOTE — ED TRIAGE NOTES
"States \"achy, redness and swelling to scrotum since yesterday \"    Hx cellulitis of scrotum     Denies taking OTC meds PTA   "

## 2018-07-31 NOTE — ED NOTES
[facility]  ED Nurse Handoff Report    Aydin Reilly is a 58 year old male   ED Chief complaint: Cellulitis  . ED Diagnosis:   Final diagnoses:   Cellulitis of scrotum   Sinus tachycardia     Allergies:   Allergies   Allergen Reactions     No Known Allergies      Seasonal Allergies        Code Status: Full Code  Activity level - Baseline/Home:  Independent. Activity Level - Current:   Independent. Lift room needed: No. Bariatric: No   Needed: No   Isolation: No. Infection: Not Applicable.     Vital Signs:   Vitals:    07/31/18 0513 07/31/18 0514 07/31/18 0516 07/31/18 0517   BP:       Resp:       Temp:       TempSrc:       SpO2: 98% 99% 98% 95%       Cardiac Rhythm:  ,      Pain level: 0-10 Pain Scale: 8  Patient confused: No. Patient Falls Risk: Yes.   Elimination Status: Has voided   Patient Report - Initial Complaint: swollen scrotum. Focused Assessment: same   Tests Performed: labscan. Abnormal Results:   Labs Ordered and Resulted from Time of ED Arrival Up to the Time of Departure from the ED   CBC WITH PLATELETS DIFFERENTIAL - Abnormal; Notable for the following:        Result Value    RBC Count 3.48 (*)     Hemoglobin 10.6 (*)     Hematocrit 31.8 (*)     RDW 15.1 (*)     Absolute Neutrophil 9.8 (*)     Absolute Lymphocytes 0.4 (*)     All other components within normal limits   COMPREHENSIVE METABOLIC PANEL - Abnormal; Notable for the following:     Glucose 143 (*)     Creatinine 1.32 (*)     GFR Estimate 56 (*)     Calcium 8.4 (*)     Alkaline Phosphatase 289 (*)     All other components within normal limits   LACTIC ACID WHOLE BLOOD   ROUTINE UA WITH MICROSCOPIC   ALCOHOL ETHYL   PULSE OXIMETRY NURSING   PERIPHERAL IV CATHETER   CARDIAC CONTINUOUS MONITORING   URINE CULTURE AEROBIC BACTERIAL   BLOOD CULTURE   BLOOD CULTURE     .   Treatments provided: below  Family Comments: na  OBS brochure/video discussed/provided to patient:  N/A  ED Medications:   Medications   lidocaine 1 %  1 mL (not administered)   lidocaine (LMX4) kit (not administered)   sodium chloride (PF) 0.9% PF flush 3 mL (not administered)   sodium chloride (PF) 0.9% PF flush 3 mL (not administered)   morphine (PF) injection 4 mg (4 mg Intravenous Given 7/31/18 0114)   cefTRIAXone (ROCEPHIN) 2 g vial to attach to  ml bag for ADULTS or NS 50 ml bag for PEDS (0 g Intravenous Stopped 7/31/18 0252)   0.9% sodium chloride BOLUS (0 mLs Intravenous Stopped 7/31/18 5212)     Drips infusing:  No  For the majority of the shift, the patient's behavior Green. Interventions performed were n.     Severe Sepsis OR Septic Shock Diagnosis Present: No      ED Nurse Name/Phone Number: Miguel Self,   5:19 AM    RECEIVING UNIT ED HANDOFF REVIEW    Above ED Nurse Handoff Report was reviewed: Yes  Reviewed by: Danita Alatorre on July 31, 2018 at 8:10 AM

## 2018-07-31 NOTE — IP AVS SNAPSHOT
[facility]    [address]    [facility] [address]    Phone:  [phone]    Fax:  [phone]                                       After Visit Summary   7/31/2018    Aydin Reilly    MRN: [id number]           After Visit Summary Signature Page     I have received my discharge instructions, and my questions have been answered. I have discussed any challenges I see with this plan with the nurse or doctor.    ..........................................................................................................................................  Patient/Patient Representative Signature      ..........................................................................................................................................  Patient Representative Print Name and Relationship to Patient    ..................................................               ................................................  Date                                            Time    ..........................................................................................................................................  Reviewed by Signature/Title    ...................................................              ..............................................  Date                                                            Time

## 2018-07-31 NOTE — IP AVS SNAPSHOT
MRN:[id number]                      After Visit Summary   7/31/2018    Aydin Reilly    MRN: [id number]           Thank you!     Thank you for choosing [facility] for your care. Our goal is always to provide you with excellent care. Hearing back from our patients is one way we can continue to improve our services. Please take a few minutes to complete the written survey that you may receive in the mail after you visit. If you would like to speak to someone directly about your visit please contact Patient Relations at [phone]. Thank you!          Patient Information     Date Of Birth          1960        Designated Caregiver       Most Recent Value    Caregiver    Will someone help with your care after discharge? no      About your hospital stay     You were admitted on:  July 31, 2018 You last received care in the:  [facility]    You were discharged on:  August 3, 2018        Reason for your hospital stay       Admitted for RLE and Scrotal cellulitis                  Who to Call     For medical emergencies, please call 911.  For non-urgent questions about your medical care, please call your primary care provider or clinic, [phone]          Attending Provider     Provider Specialty    Phlily Joseph MD Emergency Medicine    [facility], Cruz Cifuentes III, MD Internal Medicine       Primary Care Provider Office Phone # Fax #    Adolfo Simmons [phone]      After Care Instructions     Activity       Your activity upon discharge: activity as tolerated            Diet       Follow this diet upon discharge: Orders Placed This Encounter      Combination Diet Regular Diet Adult                  Follow-up Appointments     Follow-up and recommended labs and tests        Follow up with primary care provider, Adolfo Simmons, within 7 days to evaluate medication change, to evaluate treatment change and for hospital follow-  up.  No follow up labs or test are needed.                  Your next 10 appointments already scheduled     Aug 13, 2018  2:00 PM CDT   Office Visit with Emil Chacon PA-C   [facility]    [address] [place] [address], [address]  [facility] [address] [phone]           Bring a current list of meds and any records pertaining to this visit. For Physicals, please bring immunization records and any forms needing to be filled out. Please arrive 10 minutes early to complete paperwork.              Further instructions from your care team       RECOMMENDATIONS ON YOUR LAST DISCHARGE WERE TO FOLLOW UP WITH [facility] there number is  call to scheduled at follow up after discussing winter Simmons.  Dr. Simmons is on Vacation until August 20th , so a discharge follow up appointment has been arranged as listed above at [facility].     Pending Results     Date and Time Order Name Status Description    8/1/2018 0045 Blood culture Preliminary     8/1/2018 0045 Blood culture Preliminary     7/31/2018 0308 Blood culture Preliminary     7/31/2018 0231 Blood culture Preliminary             Statement of Approval     Ordered          08/03/18 1200  I have reviewed and agree with all the recommendations and orders detailed in this document.  EFFECTIVE NOW     Approved and electronically signed by:  Emiliano Figueredo MD             Admission Information     Date & Time Provider Department Dept. Phone    7/31/2018 Cruz Gibbs III, MD [facility] [phone]      Your Vitals Were     Blood Pressure Temperature Respirations Pulse Oximetry          110/73 (BP Location: Right arm) 97.6  F (36.4  C) (Oral) 16 93%        MyChart Information     [employer] gives you secure access to your electronic health record. If you see a primary care provider, you can also send messages to your care team and make appointments. If you have  questions, please call your primary care clinic.  If you do not have a primary care provider, please call [phone] and they will assist you.        Care EveryWhere ID     This is your Care EveryWhere ID. This could be used by other organizations to access your [facility] medical records  [id number]        Equal Access to Services     JIGAR GARCÍA : Hadpavan bhatt hadasho Soomaali, waaxda luqadaha, qaybta kaalmada sharon, gogo donovan. So [facility] [phone].    ATENCIÓN: Si habla español, tiene a lyn disposición servicios gratuitos de asistencia lingüística. Llame al [phone].    We comply with applicable federal civil rights laws and [place] laws. We do not discriminate on the basis of race, color, national origin, age, disability, sex, sexual orientation, or gender identity.               Review of your medicines      START taking        Dose / Directions    amoxicillin-clavulanate 875-125 MG per tablet   Commonly known as:  AUGMENTIN        Dose:  1 tablet   Take 1 tablet by mouth 2 times daily for 7 days   Quantity:  14 tablet   Refills:  0       oxyCODONE IR 5 MG tablet   Commonly known as:  ROXICODONE        Dose:  5 mg   Take 1 tablet (5 mg) by mouth every 4 hours as needed for moderate to severe pain   Quantity:  10 tablet   Refills:  0         CONTINUE these medicines which have NOT CHANGED        Dose / Directions    acetaminophen 325 MG tablet   Commonly known as:  TYLENOL   Used for:  Chronic pain of right knee        Dose:  650 mg   Take 2 tablets (650 mg) by mouth every 8 hours as needed for mild pain   Quantity:  100 tablet   Refills:  0       amLODIPine 5 MG tablet   Commonly known as:  NORVASC   Used for:  Benign essential hypertension        Dose:  5 mg   Take 1 tablet (5 mg) by mouth daily   Quantity:  90 tablet   Refills:  1       folic acid 1 MG tablet   Commonly known as:  FOLVITE   Used for:  Acute renal failure, unspecified acute renal failure type (H)         Dose:  1 mg   Take 1 tablet (1 mg) by mouth daily   Quantity:  30 tablet   Refills:  0       hydrOXYzine 25 MG tablet   Commonly known as:  ATARAX   Used for:  Chronic pain of right knee        Dose:  25-50 mg   Take 1-2 tablets (25-50 mg) by mouth every 8 hours as needed for itching or anxiety (pain)   Quantity:  20 tablet   Refills:  0       JOBST ACTIVE 20-30MMHG MEDIUM Misc   Used for:  Pedal edema        Dose:  1 Device   1 Device daily as needed Knee high   Quantity:  6 each   Refills:  1       MELATONIN PO        Dose:  3 mg   Take 3 mg by mouth nightly as needed Reported on 5/10/2017   Refills:  0       Multi-vitamin Tabs tablet        Dose:  1 tablet   Take 1 tablet by mouth daily   Refills:  0       NEURONTIN 300 MG capsule   Generic drug:  gabapentin        Dose:  1200 mg   Take 1,200 mg by mouth 3 times daily   Refills:  0       OLANZapine 5 MG tablet   Commonly known as:  zyPREXA   Used for:  Hallucinations        Dose:  5 mg   Take 1 tablet (5 mg) by mouth At Bedtime   Quantity:  30 tablet   Refills:  0       SERTRALINE HCL PO        Dose:  200-300 mg   Take 200-300 mg by mouth daily   Refills:  0       thiamine 100 MG tablet   Used for:  Acute renal failure, unspecified acute renal failure type (H)        Dose:  100 mg   Take 1 tablet (100 mg) by mouth daily   Quantity:  30 tablet   Refills:  0       traZODone 50 MG tablet   Commonly known as:  DESYREL   Used for:  CONNER (generalized anxiety disorder)        Dose:   mg   Take 1-3 tablets ( mg) by mouth nightly as needed for sleep   Quantity:  90 tablet   Refills:  3            Where to get your medicines      These medications were sent to [facility] [place], [place] - [id number] [facility] [address]     Phone:  [phone]     amoxicillin-clavulanate 875-125 MG per tablet         Some of these will need a paper prescription and others can be bought over the counter. Ask your nurse if  you have questions.     Bring a paper prescription for each of these medications     oxyCODONE IR 5 MG tablet                Protect others around you: Learn how to safely use, store and throw away your medicines at www.disposemymeds.org.        ANTIBIOTIC INSTRUCTION     You've Been Prescribed an Antibiotic - Now What?  Your healthcare team thinks that you or your loved one might have an infection. Some infections can be treated with antibiotics, which are powerful, life-saving drugs. Like all medications, antibiotics have side effects and should only be used when necessary. There are some important things you should know about your antibiotic treatment.      Your healthcare team may run tests before you start taking an antibiotic.    Your team may take samples (e.g., from your blood, urine or other areas) to run tests to look for bacteria. These test can be important to determine if you need an antibiotic at all and, if you do, which antibiotic will work best.      Within a few days, your healthcare team might change or even stop your antibiotic.    Your team may start you on an antibiotic while they are working to find out what is making you sick.    Your team might change your antibiotic because test results show that a different antibiotic would be better to treat your infection.    In some cases, once your team has more information, they learn that you do not need an antibiotic at all. They may find out that you don't have an infection, or that the antibiotic you're taking won't work against your infection. For example, an infection caused by a virus can't be treated with antibiotics. Staying on an antibiotic when you don't need it is more likely to be harmful than helpful.      You may experience side effects from your antibiotic.    Like all medications, antibiotics have side effects. Some of these can be serious.    Let you healthcare team know if you have any known allergies when you are admitted to the  hospital.    One significant side effect of nearly all antibiotics is the risk of severe and sometimes deadly diarrhea caused by Clostridium difficile (C. Difficile). This occurs when a person takes antibiotics because some good germs are destroyed. Antibiotic use allows C. diificile to take over, putting patients at high risk for this serious infection.    As a patient or caregiver, it is important to understand your or your loved one's antibiotic treatment. It is especially important for caregivers to speak up when patients can't speak for themselves. Here are some important questions to ask your healthcare team.    What infection is this antibiotic treating and how do you know I have that infection?    What side effects might occur from this antibiotic?    How long will I need to take this antibiotic?    Is it safe to take this antibiotic with other medications or supplements (e.g., vitamins) that I am taking?     Are there any special directions I need to know about taking this antibiotic? For example, should I take it with food?    How will I be monitored to know whether my infection is responding to the antibiotic?    What tests may help to make sure the right antibiotic is prescribed for me?      Information provided by:  www.cdc.gov/getsmart  U.S. Department of Health and Human Services  Centers for disease Control and Prevention  National Center for Emerging and Zoonotic Infectious Diseases  Division of Healthcare Quality Promotion        Information about OPIOIDS     PRESCRIPTION OPIOIDS: WHAT YOU NEED TO KNOW   We gave you an opioid (narcotic) pain medicine. It is important to manage your pain, but opioids are not always the best choice. You should first try all the other options your care team gave you. Take this medicine for as short a time (and as few doses) as possible.     These medicines have risks:    DO NOT drive when on new or higher doses of pain medicine. These medicines can affect your  alertness and reaction times, and you could be arrested for driving under the influence (DUI). If you need to use opioids long-term, talk to your care team about driving.    DO NOT operate heave machinery    DO NOT do any other dangerous activities while taking these medicines.     DO NOT drink any alcohol while taking these medicines.      If the opioid prescribed includes acetaminophen, DO NOT take with any other medicines that contain acetaminophen. Read all labels carefully. Look for the word  acetaminophen  or  Tylenol.  Ask your pharmacist if you have questions or are unsure.    You can get addicted to pain medicines, especially if you have a history of addiction (chemical, alcohol or substance dependence). Talk to your care team about ways to reduce this risk.    Store your pills in a secure place, locked if possible. We will not replace any lost or stolen medicine. If you don t finish your medicine, please throw away (dispose) as directed by your pharmacist. The [place] Pollution Control Agency has more information about safe disposal: https://www.pca.[facility].[place].us/living-green/managing-unwanted-medications.     All opioids tend to cause constipation. Drink plenty of water and eat foods that have a lot of fiber, such as fruits, vegetables, prune juice, apple juice and high-fiber cereal. Take a laxative (Miralax, milk of magnesia, Colace, Senna) if you don t move your bowels at least every other day.              Medication List: This is a list of all your medications and when to take them. Check marks below indicate your daily home schedule. Keep this list as a reference.      Medications           Morning Afternoon Evening Bedtime As Needed    acetaminophen 325 MG tablet   Commonly known as:  TYLENOL   Take 2 tablets (650 mg) by mouth every 8 hours as needed for mild pain   Last time this was given:  650 mg on 8/2/2018  4:26 PM                                amLODIPine 5 MG tablet   Commonly known as:   NORVASC   Take 1 tablet (5 mg) by mouth daily   Last time this was given:  5 mg on 8/3/2018 10:09 AM                                amoxicillin-clavulanate 875-125 MG per tablet   Commonly known as:  AUGMENTIN   Take 1 tablet by mouth 2 times daily for 7 days                                folic acid 1 MG tablet   Commonly known as:  FOLVITE   Take 1 tablet (1 mg) by mouth daily                                hydrOXYzine 25 MG tablet   Commonly known as:  ATARAX   Take 1-2 tablets (25-50 mg) by mouth every 8 hours as needed for itching or anxiety (pain)                                JOBST ACTIVE 20-30MMHG MEDIUM Misc   1 Device daily as needed Knee high                                MELATONIN PO   Take 3 mg by mouth nightly as needed Reported on 5/10/2017                                Multi-vitamin Tabs tablet   Take 1 tablet by mouth daily                                NEURONTIN 300 MG capsule   Take 1,200 mg by mouth 3 times daily   Last time this was given:  1,200 mg on 8/3/2018 10:08 AM   Generic drug:  gabapentin                                OLANZapine 5 MG tablet   Commonly known as:  zyPREXA   Take 1 tablet (5 mg) by mouth At Bedtime   Last time this was given:  5 mg on 8/2/2018 10:51 PM                                oxyCODONE IR 5 MG tablet   Commonly known as:  ROXICODONE   Take 1 tablet (5 mg) by mouth every 4 hours as needed for moderate to severe pain   Last time this was given:  5 mg on 8/2/2018 10:53 PM                                SERTRALINE HCL PO   Take 200-300 mg by mouth daily   Last time this was given:  100 mg on 8/3/2018 10:09 AM                                thiamine 100 MG tablet   Take 1 tablet (100 mg) by mouth daily                                traZODone 50 MG tablet   Commonly known as:  DESYREL   Take 1-3 tablets ( mg) by mouth nightly as needed for sleep

## 2018-07-31 NOTE — PHARMACY-ADMISSION MEDICATION HISTORY
Admission medication history interview status for this patient is complete. See [facility] admission navigator for allergy information, prior to admission medications and immunization status.     Medication history interview source(s):Patient  Medication history resources (including written lists, pill bottles, clinic record):None  Primary pharmacy:  [facility] in [place]    Changes made to PTA medication list:  Added:  None  Deleted:  None  Changed:  Gabapentin 600mg tid ---> 1200mg tid    Actions taken by pharmacist (provider contacted, etc):None     Additional medication history information:None    Medication reconciliation/reorder completed by provider prior to medication history? Yes    Do you take OTC medications (eg tylenol, ibuprofen, fish oil, eye/ear drops, etc)?  Yes, as listed.     For patients on insulin therapy:  No     Prior to Admission medications    Medication Sig Last Dose Taking? Auth Provider   gabapentin (NEURONTIN) 300 MG capsule Take 1,200 mg by mouth 3 times daily 7/30/2018 at pm Yes Unknown, Entered By History   acetaminophen (TYLENOL) 325 MG tablet Take 2 tablets (650 mg) by mouth every 8 hours as needed for mild pain prn  Juan Antonio Medina, DO   amLODIPine (NORVASC) 5 MG tablet Take 1 tablet (5 mg) by mouth daily 7/28/2018  Adolfo Simmons MD   Elastic Bandages & Supports (JOBST ACTIVE 20-30MMHG MEDIUM) MISC 1 Device daily as needed Knee high   Adolfo Simmons MD   folic acid (FOLVITE) 1 MG tablet Take 1 tablet (1 mg) by mouth daily 7/28/2018  Juan Antonio Medina,    hydrOXYzine (ATARAX) 25 MG tablet Take 1-2 tablets (25-50 mg) by mouth every 8 hours as needed for itching or anxiety (pain) prn  Juan Antonio Medina, DO   MELATONIN PO Take 3 mg by mouth nightly as needed Reported on 5/10/2017 prn  Reported, Patient   multivitamin, therapeutic with minerals (MULTI-VITAMIN) TABS Take 1 tablet by mouth daily 7/28/2018  Reported, Patient   OLANZapine (ZYPREXA) 5 MG tablet Take 1  tablet (5 mg) by mouth At Bedtime 7/28/2018 at pm  Juan Antonio Medina, DO   SERTRALINE HCL PO Take 200-300 mg by mouth daily 7/28/2018 at am  Reported, Patient   thiamine 100 MG tablet Take 1 tablet (100 mg) by mouth daily 7/28/2018  Juan Antonio Medina DO   traZODone (DESYREL) 50 MG tablet Take 1-3 tablets ( mg) by mouth nightly as needed for sleep 7/30/2018 at pm  Adolfo Simmons MD

## 2018-07-31 NOTE — ED PROVIDER NOTES
History     Chief Complaint:  Cellulitis      HPI   Aydin Reilly is a 58 year old male who presents to the emergency department today for evaluation of cellulitis. The patient reports yesterday morning he noticed redness and pain on his scrotum that started with an ache. He has had this before and is recurring, although he says it has been occurring farther and farther apart. It has significantly spread and become swollen and more painful. He believes the recurrent problem is due to when he had cancer in his anus in 1999. Due to these symptoms he presented to the emergency department today. He reports that this episode of cellulitis was triggered by the stress he has been under. He reports he did feel feverish but didn't measure his temperature. He took tylenol a couple of hours prior to arrival. He denies chest pain, shortness of breath, abdominal pain. He notes mild discomfort with urination. He denies regular alcohol use anymore and notes his last alcohol drink was 2 days ago.      Allergies:  Seasonal allergies        Medications:    amLODIPine (NORVASC) 5 MG tablet  folic acid (FOLVITE) 1 MG tablet  gabapentin (NEURONTIN) 300 MG capsule  hydrOXYzine (ATARAX) 25 MG tablet  MELATONIN PO  multivitamin, therapeutic with minerals (MULTI-VITAMIN) TABS  OLANZapine (ZYPREXA) 5 MG tablet  SERTRALINE HCL PO  thiamine 100 MG tablet  traZODone (DESYREL) 50 MG tablet    Past Medical History:    Alcohol abuse  Anserine bursitis  Cancer  Chondritis  Elevated blood pressure   Gastric Ulcer  Gastroesophageal reflux  H/O malignant neoplasm of rectum  Hyperlipidemia  Hypogonadism  Lumbar disc herniation  Depression  Rotator cuff injury  Sleep apnea  Urethral stricture      Past Surgical History:    Abdomen Surgery  Back Surgery  Biopsy x2  L4-L5 laminectomy  Squamous Cell CA  Umbilical hernia  Cystoscopy of  Urethral stricture  Colonoscopy x2  Cystoscopy x2  Cystostomy x2  ENT surgery  EGD  Hernia repair  Laparoscopic lysis  adhesions  Laparotomy Explatory  Myringotomy  Tonsillectomy and adenoidectomy  Urethroplasty      Family History:    Suicide      Social History:  The patient was accompanied to the ED by wife.  Smoking Status: Never Smoker  Smokeless Tobacco: Never Used  Alcohol Use: No   Marital Status:  Legally  [3]       Review of Systems   Respiratory: Negative for shortness of breath.    Cardiovascular: Negative for chest pain.   Gastrointestinal: Negative for abdominal pain.   Genitourinary: Positive for dysuria.   Skin: Positive for rash (scrotum).         Physical Exam     Patient Vitals for the past 24 hrs:   BP Temp Temp src Heart Rate Resp SpO2   07/31/18 0517 - - - - - 95 %   07/31/18 0516 - - - - - 98 %   07/31/18 0514 - - - - - 99 %   07/31/18 0513 - - - - - 98 %   07/31/18 0512 - - - - - 99 %   07/31/18 0511 - - - - - 100 %   07/31/18 0400 120/69 - - 117 18 92 %   07/31/18 0345 121/67 - - 117 19 94 %   07/31/18 0330 122/63 - - 117 22 -   07/31/18 0315 119/59 - - - 19 -   07/31/18 0300 130/65 - - 117 27 -   07/31/18 0251 132/67 - - - - -   07/31/18 0231 - 99.1  F (37.3  C) Oral - - -   07/31/18 0218 140/67 - - 122 18 98 %   07/31/18 0216 140/67 - - - - -        Physical Exam  General: Adult male, lying comfortable  Eyes: PERRL, Conjunctive within normal limits  ENT: Moist mucous membranes, oropharynx clear.   CV: Normal S1S2, no murmur, rub or gallop. Tachycardic.  Resp: Clear to auscultation bilaterally, no wheezes, rales or rhonchi. Normal respiratory effort.  GI: Abdomen is soft, nontender and nondistended. No palpable masses. No rebound or guarding. Small reducible umbilical hernia.  : Significant scrotal edema and erythema with thickened skin. Erythema goes into the lower pelvic region.  MSK: No edema. Nontender. Normal active range of motion.  Skin: Warm and dry. No rashes or lesions or ecchymoses on visible skin.  Neuro: Alert and oriented. Responds appropriately to all questions and commands. No  focal findings appreciated. Normal muscle tone.  Psych: Normal mood and affect. Pleasant.     Emergency Department Course     ECG:  Indication: cellulitis  Completed at 0240.  Read at 0243.   Sinus tachycardia. Left axis deviation. Inferior infarct, age underdetermined. Cannot rule out Anterior infarct, age underdetermined. Abnormal ECG.  Rate 118 bpm. OR interval 120. QRS duration 86. QT/QTc 332/465. P-R-T axes 74 -38 50.     Imaging:  Radiology findings were communicated with the patient who voiced understanding of the findings.    US Testicular and Scrotum w Doppler Ltd:  IMPRESSION:  1. Normal testicles.  2. Scrotal skin thickening and edema  Report per radiology      Laboratory:  Laboratory findings were communicated with the patient who voiced understanding of the findings.    CBC: WBC 10.7, HGB 10.6 (L),    CMP: Glucose 143 (H), Creatinine 1.32 (H), GFR Estimate 56 (L), Alkphos 289 (H), Calcium 8.4 (L) o/w WNL.   Lactic Acid: 1.2   Alcohol level blood: <0.01     UA: Yellow and Slightly Cloudy. Leukocyte Esterase Urine Trace, WBC/HPF 13 (H), Bacteria Many, Mucous Urine Present, Amorphous Crystals Few o/w WNL      Blood cultures: Pending   Urine Culture Aerobic Bacterial: Pending       Interventions:  0311 NS Bolus 1,000mL IV   0314 Morphine 4 mg IV       Emergency Department Course:  Nursing notes and vitals reviewed.  0214: I performed an exam of the patient as documented above.   0239 Patient rechecked and updated.    EKG obtained in the ED, see results above.    IV was inserted and blood was drawn for laboratory testing, results above.   The patient provided a urine sample here in the emergency department. This was sent for laboratory testing, findings above.   The patient was sent for a US Testicular and Scrotum while in the emergency department, results above.    0501 Patient rechecked and updated.  He denies any new concerns.   0548 I spoke with Dr. Holland of the Hospitalist service regarding  patient's presentation, findings, and plan of care.   I discussed the treatment plan with the patient. They expressed understanding of this plan and consented to admission. I discussed the patient with Dr. Gibbs, who will admit the patient to a monitored bed for further evaluation and treatment.   I personally reviewed the laboratory and imaging results with the Patient and answered all related questions prior to admission.   Impression & Plan      Medical Decision Making:  Aydin Reilly is a 58 year old male with history of scrotal cellulitis who presents to the emergency department with scrotal pain, swelling, and redness. The has been two days in duration. He notes fever subjective at home. Here he was afebrile but was tachycardic at home. Septic evaluation doesn't show findings consistent with sepsis, blood cultures are pending. He is also having urinary discomfort so a urinalysis was sent with small pyuria and bacteria. Urine culture is pending upon this dictation. His clinical examination is consistent with scrotal cellulitis. Ultrasound didn't show evidence of testicular abnormalities. I don't suspect clinically necrotizing fascitis/ fornes gangrene at this time. On review of previous admissions he was seen by Dr. Duncan of infectious diseases and was placed on Ceftriaxone and will be initiated here in the emergency department (this would likely cover urinary organisms as well). He will be admitted for ongoing IV antibiotics given the significant cellulitic changes and tachycardia. He was comfortable with this plan and all questions were answered prior to admission.    Diagnosis:    ICD-10-CM    1. Cellulitis of scrotum N49.2 UA with Microscopic     Urine Culture   2. Sinus tachycardia R00.0    3. Pyuria N39.0        Disposition:  Admitted under the supervision of Dr. Gibbs of the hospitalist service to a medical bed in stable condition.    Scribe Disclosure:  I, Cristine Calvert, am serving as a scribe at  2:21 AM on 7/31/2018 to document services personally performed by Philly Joseph MD based on my observations and the provider's statements to me.    Cristine Calvert  7/31/2018   [facility] EMERGENCY DEPARTMENT       Philly Joseph MD  07/31/18 0608

## 2018-07-31 NOTE — H&P
[facility]    History and Physical  Hospitalist       Date of Admission:  7/31/2018    Assessment & Plan   Aydin Reilly is a 58 year old male with a past medical history of morbid obesity, essential hypertension, anal cancer in 1999, prior amphetamine and alcohol abuse, GERD and recurrent scrotal cellulitis who presents to the emergency department with 1 day of scrotal cellulitis.    Scrotal cellulitis  -No evidence of Cielo's gangrene on ultrasound or clinically  -Continue ceftriaxone started in the emergency department  -When clinically improved could likely go on a course of oral antibiotics    Essential hypertension  -Continue his amlodipine    # Pain Assessment:  Current Pain Score 7/31/2018   Patient currently in pain? -   Pain score (0-10) 8   Pain location -   Pain descriptors -   - Aydin is experiencing pain due to scrotal cellulitis. Pain management was discussed and the plan was created in a collaborative fashion.  Aydin's response to the current recommendations: mixed response  - tylenol         DVT Prophylaxis: Low Risk/Ambulatory with no VTE prophylaxis indicated  Code Status: Full Code    Disposition: Expected discharge in 2-3 days    Cruz Gibbs III, MD    Primary Care Physician   Adolfo Simmons    Chief Complaint   Scrotal cellulitis    History is obtained from the patient, ED physician and review of records.    History of Present Illness   Aydin Reilly is a 58 year old male with a past medical history of morbid obesity, essential hypertension, anal cancer in 1999, prior amphetamine and alcohol abuse, GERD and recurrent scrotal cellulitis who presents to the emergency department with 1 day of scrotal redness, swelling and pain.    He has intermittently had issues with scrotal cellulitis since his anal cancer treatment in 1999.  He says it has been increasingly less frequent since that time.  Last incidence was about 1 year ago.    For about 1 day he has had  increasing redness and swelling of the scrotum.  Says he normally would take an antibiotic at home but he did not have any.  Came to the emergency department where his creatinine was found to be at baseline and his leukocytosis is high normal at 10.7k.  Ultrasound shows some soft tissue swelling but no evidence of Cielo's gangrene.  He is afebrile and nontoxic and was given a dose of ceftriaxone as he previously received and was admitted to medicine.    Of note the patient says he has had increased lower extremity swelling which is nonpitting and is been going on for about a year and is being worked up by his primary care doctor.    Past Medical History    I have reviewed this patient's medical history and updated it with pertinent information if needed.   Past Medical History:   Diagnosis Date     Alcohol abuse     stopped in 2008     Anserine bursitis 4/19/2016     Benign essential hypertension      Cancer (H) 1999     Chondritis 4/19/2016     Elevated blood pressure reading without diagnosis of hypertension 4/19/2016     Gastric ulcer, unspecified as acute or chronic, without mention of hemorrhage, perforation, or obstruction ~1997    treated non-surgically     Gastro-oesophageal reflux disease      H/O malignant neoplasm of rectum, rectosigmoid junction, and anus      Hyperlipidemia LDL goal < 130 4/6/2010     Hypogonadism 5/24/2010     Lumbar disc herniation with radiculopathy     L4, recurrent episodic pain     Moderate episode of recurrent major depressive disorder (H) 10/31/2017     Rotator cuff injury, right, initial encounter 4/19/2016     Sleep apnea      Urethral stricture unspecified 2002    Following radiation; see PS       Past Surgical History   I have reviewed this patient's surgical history and updated it with pertinent information if needed.  Past Surgical History:   Procedure Laterality Date     ABDOMEN SURGERY       BACK SURGERY       BIOPSY       BIOPSY       C NONSPECIFIC PROCEDURE  1991     L4-L5 laminectomy; disk herniation     C NONSPECIFIC PROCEDURE  1999    squamous cell CA - anus, 27 xrt/3 rounds, 5-FU/cisplatin     C NONSPECIFIC PROCEDURE      umbilical hernia     C NONSPECIFIC PROCEDURE  2002    cystscopy for urethral stricture from radiation therapy     COLONOSCOPY       COLONOSCOPY  4/18/2014    Procedure: Colonoscopy   polyp bx;  Surgeon: Josef Mckeon MD;  Location:  GI     CYSTOSCOPY, CYSTOGRAM, COMBINED N/A 2/26/2015    Procedure: COMBINED CYSTOSCOPY, CYSTOGRAM;  Surgeon: Darell Barrera MD;  Location: [facility] OR     CYSTOSCOPY, INTERNAL URETHROTOMY, COMBINED N/A 12/19/2014    Procedure: COMBINED CYSTOSCOPY, INTERNAL URETHROTOMY;  Surgeon: Buzz Ren MD;  Location:  OR     CYSTOSTOMY, INSERT TUBE SUPRAPUBIC, COMBINED  12/19/2014    Procedure: COMBINED CYSTOSTOMY, INSERT TUBE SUPRAPUBIC;  Surgeon: Buzz Ren MD;  Location:  OR     CYSTOSTOMY, INSERT TUBE SUPRAPUBIC, COMBINED N/A 12/29/2014    Procedure: COMBINED CYSTOSTOMY, INSERT TUBE SUPRAPUBIC;  Surgeon: Buzz Ren MD;  Location:  OR     ENT SURGERY       ESOPHAGOSCOPY, GASTROSCOPY, DUODENOSCOPY (EGD), COMBINED  10/2/2012    Procedure: COMBINED ESOPHAGOSCOPY, GASTROSCOPY, DUODENOSCOPY (EGD);  COMBINED ESOPHAGOSCOPY, GASTROSCOPY, DUODENOSCOPY (EGD) ;  Surgeon: Raj Beltre MD;  Location:  GI     HERNIA REPAIR       LAPAROSCOPIC LYSIS ADHESIONS N/A 12/4/2015    Procedure: LAPAROSCOPIC LYSIS ADHESIONS;  Surgeon: Herbie Sanchez MD;  Location: [facility] OR     LAPAROTOMY EXPLORATORY N/A 12/4/2015    Procedure: LAPAROTOMY EXPLORATORY;  Surgeon: Herbie Sanchez MD;  Location: [facility] OR     MYRINGOTOMY      in childhood     TONSILLECTOMY and adenoidectomy      in childhood     URETHROPLASTY WITH BUCCAL GRAFT N/A 3/27/2015    Procedure: URETHROPLASTY WITH BUCCAL GRAFT;  Surgeon: Darell Barrera MD;  Location: [facility] OR       Prior to Admission Medications   Prior to  Admission Medications   Prescriptions Last Dose Informant Patient Reported? Taking?   Elastic Bandages & Supports (JOBST ACTIVE 20-30MMHG MEDIUM) MISC   No No   Si Device daily as needed Knee high   MELATONIN PO   Yes No   Sig: Take 3 mg by mouth nightly as needed Reported on 5/10/2017   OLANZapine (ZYPREXA) 5 MG tablet   No No   Sig: Take 1 tablet (5 mg) by mouth At Bedtime   SERTRALINE HCL PO  Self Yes No   Sig: Take 200-300 mg by mouth daily   acetaminophen (TYLENOL) 325 MG tablet   No No   Sig: Take 2 tablets (650 mg) by mouth every 8 hours as needed for mild pain   amLODIPine (NORVASC) 5 MG tablet   No No   Sig: Take 1 tablet (5 mg) by mouth daily   folic acid (FOLVITE) 1 MG tablet   No No   Sig: Take 1 tablet (1 mg) by mouth daily   gabapentin (NEURONTIN) 300 MG capsule   No No   Sig: Take 2 capsules (600 mg) by mouth 3 times daily   hydrOXYzine (ATARAX) 25 MG tablet   No No   Sig: Take 1-2 tablets (25-50 mg) by mouth every 8 hours as needed for itching or anxiety (pain)   multivitamin, therapeutic with minerals (MULTI-VITAMIN) TABS   Yes No   Sig: Take 1 tablet by mouth daily   thiamine 100 MG tablet   No No   Sig: Take 1 tablet (100 mg) by mouth daily   traZODone (DESYREL) 50 MG tablet   No No   Sig: Take 1-3 tablets ( mg) by mouth nightly as needed for sleep      Facility-Administered Medications: None     Allergies   Allergies   Allergen Reactions     No Known Allergies      Seasonal Allergies        Social History   I have reviewed this patient's social history and updated it with pertinent information if needed. Aydin Reilly  reports that he has never smoked. He has never used smokeless tobacco. He reports that he drinks alcohol. He reports that he uses illicit drugs, including Methamphetamines and Marijuana.    Family History   I have reviewed this patient's family history and updated it with pertinent information if needed.   Family History   Problem Relation Age of Onset     Adopted: Yes      Unknown/Adopted Mother      Suicide Father      Depression No family hx of      Anxiety Disorder No family hx of      Schizophrenia No family hx of      Bipolar Disorder No family hx of      Substance Abuse No family hx of      Dementia No family hx of      [place] Disease No family hx of      Parkinsonism No family hx of      Autism Spectrum Disorder No family hx of      Intellectual Disability (Mental Retardation) No family hx of      Mental Illness No family hx of        Review of Systems   The 10 point Review of Systems is negative other than noted in the HPI or here.     Physical Exam   Temp: 99.1  F (37.3  C) Temp src: Oral BP: 120/69   Heart Rate: 117 Resp: 18 SpO2: 95 % O2 Device: None (Room air)    Vital Signs with Ranges  Temp:  [99.1  F (37.3  C)] 99.1  F (37.3  C)  Heart Rate:  [117-122] 117  Resp:  [18-27] 18  BP: (119-140)/(59-69) 120/69  SpO2:  [92 %-100 %] 95 %  0 lbs 0 oz    Constitutional: NAD. Comfortable, well-developed   Eyes: Anicteric, no conjunctival injection  ENT: Atraumatic, membranes moist   Neck: Supple, trachea midline  Respiratory: No wheeze or crackles. Breathing comfortably on room air  Cardiovascular: S1S2, bilateral nonpitting lower extremity edema   GI: Abdomen soft, non-tender  Skin: Warm, pink, dry  Neurologic: Alert and oriented. CN II - XII grossly intact  Psychiatric: Pleasant, cooperative  : Scrotum is red, swollen and puffy.  No exudate, lesions or foci are noted    Data   Data reviewed today:  I personally reviewed no images or EKG's today.    Recent Labs  Lab 07/31/18  0305   WBC 10.7   HGB 10.6*   MCV 91         POTASSIUM 3.7   CHLORIDE 108   CO2 24   BUN 22   CR 1.32*   ANIONGAP 8   ELSI 8.4*   *   ALBUMIN 3.5   PROTTOTAL 6.8   BILITOTAL 0.6   ALKPHOS 289*   ALT 22   AST 15       Imaging:  Recent Results (from the past 24 hour(s))   US Testicular & Scrotum w Doppler Ltd    Narrative    US TESTICULAR AND SCROTUM WITH DOPPLER LIMITED  7/31/2018  4:29 AM     HISTORY: Scrotal pain, swelling, redness.     COMPARISON: None.    FINDINGS:  The testicles are normal in size and texture. No testicular  mass. The right testicle measures 3.5 x 1.8 x 1.7 cm. The left  testicle measures 3.8 x 1.9 x 1.4 cm. Color Doppler and Doppler  waveform analysis of the testicles shows normal and symmetric blood  flow. The epididymides appear normal bilaterally. No hydrocele or  varicocele on either side. There is diffuse scrotal skin thickening  and edema.      Impression    IMPRESSION:  1. Normal testicles.  2. Scrotal skin thickening and edema.

## 2018-07-31 NOTE — PROGRESS NOTES
[facility]  Hospitalist Progress Note  Barbi Ruiz MD 07/31/2018    Reason for Stay (Diagnosis): scrotal cellulitis         Assessment and Plan:      Summary of Stay: Aydin Reilly is a 58 year old male with a history of morbid obesity, htn, remote hx of anal cancer, ongoing methamphetamine abuse, GERD, and hx of recurrent scrotal cellulitis admitted on 7/31/2018 with 1 day of scrotal cellulitis  Problem List:   1. Scrotal cellulitis:  No e/o testicular involvement by testicular US.  Cont with IV ceftriaxone  2. Htn:  pta meds: amlodipine 5 mg which has been resumed  3. Hx of methamphetamine abuse:  At risk for withdrawal, discussed with poison control-no specific therapy indicated although at risk of dehydration if not taking in adequate po  4. ANGUS: am creat 1.4- was < 1.0 range per our chart through 2016, then in 6/2018 1.23 and on admit was 1.37-BUN wnl.  ? Dehydration related.  Will monitor while in hospital   DVT Prophylaxis: Pneumatic Compression Devices  Code Status: Full Code    Disposition Plan   Expected discharge in 2-3  days to prior living arrangement once able to switch to or oral abx.     Entered: Barbi Ruiz 07/31/2018, 5:49 PM               Interval History (Subjective):      Sleepy all day but now awake doing ok.  Scrotum still swollen and painful.  Appetite is good                  Physical Exam:      Last Vital Signs:  /82  Temp 99  F (37.2  C)  Resp 16  SpO2 96%    Thickened indurated edematous, moderately erythematous, mildly ttp           Medications:      All current medications were reviewed with changes reflected in problem list.         Data:      All new lab and imaging data was reviewed.   Labs:    Recent Labs  Lab 07/31/18  0305      POTASSIUM 3.7   CHLORIDE 108   CO2 24   ANIONGAP 8   *   BUN 22   CR 1.32*   GFRESTIMATED 56*   GFRESTBLACK 68   ELSI 8.4*       Recent Labs  Lab 07/31/18  0305   WBC 10.7   HGB 10.6*   HCT 31.8*   MCV 91          Imaging:

## 2018-07-31 NOTE — PLAN OF CARE
Problem: Skin and Soft Tissue Infection (Adult)  Goal: Signs and Symptoms of Listed Potential Problems Will be Absent, Minimized or Managed (Skin and Soft Tissue Infection)  Signs and symptoms of listed potential problems will be absent, minimized or managed by discharge/transition of care (reference Skin and Soft Tissue Infection (Adult) CPG).  Outcome: No Change    VS: Temp: 99.4  F (37.4  C) Temp src: Oral BP: 156/81   Heart Rate: 124 Resp: 20 SpO2: 95 % O2 Device: None (Room air)    Cardio: Tachycardic   Neuro: A&O x4, CMS intact   Resp: LS clear, RA, no SOB   GI/: Voiding w/o difficulty; LBM 7/30/18, passing flatus, BS +   Skin: Scrotal region reddened, 3+ edema, tender to touch   Activity: A1 w/ gait belt   Diet: Reg, good appetite   IVs/lines: SL, scheduled abx   Misc: Pain management w/ Tylenol; slept most of the shift   Plan: Continue plan of care

## 2018-07-31 NOTE — LETTER
Transition Communication Hand-off for Care Transitions to Next Level of Care Provider    Name: Aydin Reilly  : 1960  MRN #: [id number]  Primary Care Provider: Adolfo Simmons     Primary Clinic: [id number]  [facility] [id number]  Primary Care Clinic Name: [facility]   Reason for Hospitalization:  Sinus tachycardia [R00.0]  Cellulitis of scrotum [N49.2]  Pyuria [N39.0]  Admit Date/Time: 2018  2:12 AM  Discharge Date: 8/3/2018  Payor Source: Payor: [employer] / Plan: [employer]ARE [place] / Product Type: HMO /     Readmission Assessment Measure (JULIO) Risk Score/category: ELEVATED        Reason for Communication Hand-off Referral: Other readnmission Drug use abuse, nonadherence     Discharge Plan:Home        Concern for non-adherence with plan of care:   Y/N YES   Discharge Needs Assessment:  Needs       Most Recent Value    # of Referrals Placed by CTS Communication hand-offs to next level of Care Providers, Scheduled Follow-up appointments        Follow-up plan:  Future Appointments  Date Time Provider Department Center   2018 2:00 PM Emil Chacon PA-C [facility]                 [place] Recommendations: PLEASE UPDATE PCP CAT with any concerns after pt follows up with Emil on .     CTS following pt 2/2 to ELEVATED JULIO admited with scrotal cellulitis  2/2 to readmission status hx of CA . Meth/ETOH use. Pt was [facility] at [facility] -  for hallucination s 2/2 to Meth and ETOH.   At that DC Oncology follow up was recommended they had been following 2/2 to concerns noted on Head CT   Thickened mottled calvarium which has developed since the prior  exam. This is most likely due to a hemopoietic disorder such as a  blood dyscrasia, anemia, or leukemia. Metastatic disease is also a  possibility but less likely due to the diffuse thickening of the  Calvarium.     Please encourage pt at [facility] follow appointment to schedule a follow up with Oncology   Elle Fletcher    AVS/Discharge  Summary is the source of truth; this is a helpful guide for improved communication of patient story

## 2018-08-01 LAB
ALBUMIN SERPL-MCNC: 3.3 G/DL (ref 3.4–5)
ALP SERPL-CCNC: 269 U/L (ref 40–150)
ALT SERPL W P-5'-P-CCNC: 23 U/L (ref 0–70)
ANION GAP SERPL CALCULATED.3IONS-SCNC: 9 MMOL/L (ref 3–14)
AST SERPL W P-5'-P-CCNC: 27 U/L (ref 0–45)
BASOPHILS # BLD AUTO: 0.1 10E9/L (ref 0–0.2)
BASOPHILS NFR BLD AUTO: 0.6 %
BILIRUB SERPL-MCNC: 0.5 MG/DL (ref 0.2–1.3)
BUN SERPL-MCNC: 16 MG/DL (ref 7–30)
CALCIUM SERPL-MCNC: 8.9 MG/DL (ref 8.5–10.1)
CHLORIDE SERPL-SCNC: 106 MMOL/L (ref 94–109)
CO2 SERPL-SCNC: 22 MMOL/L (ref 20–32)
CREAT SERPL-MCNC: 1.09 MG/DL (ref 0.66–1.25)
DIFFERENTIAL METHOD BLD: ABNORMAL
EOSINOPHIL # BLD AUTO: 0.1 10E9/L (ref 0–0.7)
EOSINOPHIL NFR BLD AUTO: 1.1 %
ERYTHROCYTE [DISTWIDTH] IN BLOOD BY AUTOMATED COUNT: 15 % (ref 10–15)
GFR SERPL CREATININE-BSD FRML MDRD: 70 ML/MIN/1.7M2
GLUCOSE SERPL-MCNC: 105 MG/DL (ref 70–99)
HCT VFR BLD AUTO: 37.9 % (ref 40–53)
HGB BLD-MCNC: 12.3 G/DL (ref 13.3–17.7)
IMM GRANULOCYTES # BLD: 0.2 10E9/L (ref 0–0.4)
IMM GRANULOCYTES NFR BLD: 2.6 %
LACTATE BLD-SCNC: 1.1 MMOL/L (ref 0.7–2)
LYMPHOCYTES # BLD AUTO: 0.8 10E9/L (ref 0.8–5.3)
LYMPHOCYTES NFR BLD AUTO: 9.3 %
MCH RBC QN AUTO: 30 PG (ref 26.5–33)
MCHC RBC AUTO-ENTMCNC: 32.5 G/DL (ref 31.5–36.5)
MCV RBC AUTO: 92 FL (ref 78–100)
MONOCYTES # BLD AUTO: 0.7 10E9/L (ref 0–1.3)
MONOCYTES NFR BLD AUTO: 8.3 %
NEUTROPHILS # BLD AUTO: 6.4 10E9/L (ref 1.6–8.3)
NEUTROPHILS NFR BLD AUTO: 78.1 %
NRBC # BLD AUTO: 0 10*3/UL
NRBC BLD AUTO-RTO: 0 /100
PLATELET # BLD AUTO: 148 10E9/L (ref 150–450)
POTASSIUM SERPL-SCNC: 4.6 MMOL/L (ref 3.4–5.3)
PROT SERPL-MCNC: 7.3 G/DL (ref 6.8–8.8)
RBC # BLD AUTO: 4.1 10E12/L (ref 4.4–5.9)
SODIUM SERPL-SCNC: 137 MMOL/L (ref 133–144)
WBC # BLD AUTO: 8.2 10E9/L (ref 4–11)

## 2018-08-01 PROCEDURE — 36415 COLL VENOUS BLD VENIPUNCTURE: CPT | Performed by: INTERNAL MEDICINE

## 2018-08-01 PROCEDURE — 25000132 ZZH RX MED GY IP 250 OP 250 PS 637: Performed by: INTERNAL MEDICINE

## 2018-08-01 PROCEDURE — 83605 ASSAY OF LACTIC ACID: CPT | Performed by: INTERNAL MEDICINE

## 2018-08-01 PROCEDURE — 87040 BLOOD CULTURE FOR BACTERIA: CPT | Performed by: INTERNAL MEDICINE

## 2018-08-01 PROCEDURE — 25000125 ZZHC RX 250: Performed by: INTERNAL MEDICINE

## 2018-08-01 PROCEDURE — 12000000 ZZH R&B MED SURG/OB

## 2018-08-01 PROCEDURE — 85025 COMPLETE CBC W/AUTO DIFF WBC: CPT | Performed by: INTERNAL MEDICINE

## 2018-08-01 PROCEDURE — 80053 COMPREHEN METABOLIC PANEL: CPT | Performed by: INTERNAL MEDICINE

## 2018-08-01 PROCEDURE — 25000128 H RX IP 250 OP 636: Performed by: INTERNAL MEDICINE

## 2018-08-01 PROCEDURE — 36416 COLLJ CAPILLARY BLOOD SPEC: CPT | Performed by: INTERNAL MEDICINE

## 2018-08-01 PROCEDURE — 99232 SBSQ HOSP IP/OBS MODERATE 35: CPT | Performed by: INTERNAL MEDICINE

## 2018-08-01 PROCEDURE — 99207 ZZC CDG-MDM COMPONENT: MEETS LOW - DOWN CODED: CPT | Performed by: INTERNAL MEDICINE

## 2018-08-01 RX ORDER — OXYCODONE HYDROCHLORIDE 5 MG/1
5 TABLET ORAL EVERY 4 HOURS PRN
Status: DISCONTINUED | OUTPATIENT
Start: 2018-08-01 | End: 2018-08-03 | Stop reason: HOSPADM

## 2018-08-01 RX ORDER — CLINDAMYCIN PHOSPHATE 900 MG/50ML
900 INJECTION, SOLUTION INTRAVENOUS EVERY 8 HOURS
Status: DISCONTINUED | OUTPATIENT
Start: 2018-08-01 | End: 2018-08-03 | Stop reason: HOSPADM

## 2018-08-01 RX ADMIN — ACETAMINOPHEN 650 MG: 325 TABLET, FILM COATED ORAL at 10:28

## 2018-08-01 RX ADMIN — SERTRALINE HYDROCHLORIDE 100 MG: 100 TABLET ORAL at 09:29

## 2018-08-01 RX ADMIN — ACETAMINOPHEN 650 MG: 325 TABLET, FILM COATED ORAL at 16:43

## 2018-08-01 RX ADMIN — CLINDAMYCIN PHOSPHATE 900 MG: 900 INJECTION, SOLUTION INTRAVENOUS at 10:29

## 2018-08-01 RX ADMIN — GABAPENTIN 1200 MG: 300 CAPSULE ORAL at 14:54

## 2018-08-01 RX ADMIN — CEFTRIAXONE SODIUM 1 G: 1 INJECTION, POWDER, FOR SOLUTION INTRAMUSCULAR; INTRAVENOUS at 06:52

## 2018-08-01 RX ADMIN — CALCIUM CARBONATE (ANTACID) CHEW TAB 500 MG 1000 MG: 500 CHEW TAB at 00:28

## 2018-08-01 RX ADMIN — CALCIUM CARBONATE (ANTACID) CHEW TAB 500 MG 1000 MG: 500 CHEW TAB at 10:29

## 2018-08-01 RX ADMIN — ACETAMINOPHEN 650 MG: 325 TABLET, FILM COATED ORAL at 00:28

## 2018-08-01 RX ADMIN — OLANZAPINE 5 MG: 5 TABLET, FILM COATED ORAL at 22:06

## 2018-08-01 RX ADMIN — OXYCODONE HYDROCHLORIDE 5 MG: 5 TABLET ORAL at 14:54

## 2018-08-01 RX ADMIN — ACETAMINOPHEN 650 MG: 325 TABLET, FILM COATED ORAL at 22:06

## 2018-08-01 RX ADMIN — OXYCODONE HYDROCHLORIDE 5 MG: 5 TABLET ORAL at 09:29

## 2018-08-01 RX ADMIN — OXYCODONE HYDROCHLORIDE 5 MG: 5 TABLET ORAL at 19:15

## 2018-08-01 RX ADMIN — GABAPENTIN 1200 MG: 300 CAPSULE ORAL at 09:28

## 2018-08-01 RX ADMIN — AMLODIPINE BESYLATE 5 MG: 5 TABLET ORAL at 09:28

## 2018-08-01 RX ADMIN — GABAPENTIN 1200 MG: 300 CAPSULE ORAL at 19:15

## 2018-08-01 RX ADMIN — CLINDAMYCIN PHOSPHATE 900 MG: 900 INJECTION, SOLUTION INTRAVENOUS at 19:15

## 2018-08-01 ASSESSMENT — ACTIVITIES OF DAILY LIVING (ADL)
ADLS_ACUITY_SCORE: 11
ADLS_ACUITY_SCORE: 12

## 2018-08-01 NOTE — PLAN OF CARE
Problem: Patient Care Overview  Goal: Plan of Care/Patient Progress Review  [place]: A&O x4, pt slept all of shift, pt unable to stay away during nursing cares  VS: /79  Temp 99.8  F (37.7  C)  Resp 16  SpO2 95%, tachy  LS: clear on RA  GI: active, +flatus  : urinal  Skin: scrotum edema, redness. Elevated on towels per MD  Activity: x1   Diet: reg   Pain: c/o 6/10 pain, taking tylenol PRN  Plan: IV rocephin

## 2018-08-01 NOTE — PLAN OF CARE
Problem: Patient Care Overview  Goal: Plan of Care/Patient Progress Review  Patient alert and oriented.  Lung sounds clear.  Bowel sounds active and audible.  Regular diet. Up with standby assistance.  Pain managed with PRN oxycodone.  3+ scrotal edema, scrotal redness, redness now down the right leg down to ankle area.  Will continue to monitor.      Addendum:   MD aware redness and edema has traveled to right leg, the area is now marked and dated.  RLE edema 2+.

## 2018-08-01 NOTE — PLAN OF CARE
Problem: Patient Care Overview  Goal: Plan of Care/Patient Progress Review  Outcome: Improving  Mobility:Assist of one  LS:Clear lung sounds  BS:Active bowel sounds  :Voiding  CMS:+2 edema to bilateral LE.  DRSG:None  PAIN:Controlled with Tylenol.  Patient alert and oriented, High temp at the beginning of the shift 100.8,p122,rr22,b/p161/80. Order for lactic acid 1.1, pending culture.+3 edema to the scrotum,reddened elevated on towels. Passing flatus.Saline lock.On iv Rocephin.

## 2018-08-01 NOTE — PROGRESS NOTES
Infection Prevention:    Patient requires Contact precautions because of ESBL: Urine, 2016. Please contact Infection Prevention with any questions/concerns at *[phone].    Radha Farrell, ICP

## 2018-08-01 NOTE — CONSULTS
1240;Patient has increased temp of 100.8,p121,rr22 and b/p of 161/80. Gave Tylenol and tums.Pt complains of nausea which he has been complaining from the previous shift is A&o. Any orders!

## 2018-08-01 NOTE — PROGRESS NOTES
"[facility]  Hospitalist Progress Note  Barbi Ruiz MD 08/01/2018    Reason for Stay (Diagnosis): scrotal cellulitis         Assessment and Plan:      Summary of Stay: Aydin Reilly is a 58 year old male with a history of morbid obesity, htn, remote hx of anal cancer, ongoing methamphetamine abuse, GERD, and hx of recurrent scrotal cellulitis admitted on 7/31/2018 with 1 day of scrotal cellulitis.    His brief hospitalization is notable for extension of infection into RLE-not contiguous.  I see he carries the diagnosis of \"ESBL\" in urine but on review of cultures it looks like in 11/2016 he had E coli determined NOT to be ESBL.  Will ask for infection prevention to look into this  Problem List:   1. Scrotal cellulitis and now with RLE:  No e/o testicular involvement by testicular US.  Cont with IV ceftriaxone but added in clindamycin.  Niko every day.  Have agreed to limited use of oxycodone due to pain.  We discussed the risks of respiratory depression/death/addiction.  Check CRP  2. Htn:  pta meds: amlodipine 5 mg which has been resumed  3. Hx of methamphetamine abuse:  Only intermittent use and rare per patient, discussed with poison control-no specific therapy indicated although at risk of dehydration if not taking in adequate po  4. ANGUS: am creat 1.4- was < 1.0 range per our chart through 2016, then in 6/2018 1.23 and on admit was 1.37-BUN wnl.  ? Dehydration related.  Will monitor while in hospital - this am was wnl  DVT Prophylaxis: Pneumatic Compression Devices  Code Status: Full Code    Disposition Plan   Expected discharge in 2-3  days to prior living arrangement once able to switch to or oral abx.     Entered: Barbi Ruiz 08/01/2018, 6:34 PM               Interval History (Subjective):      Had fevers overnight, and now noticing diffuse erythema over RLE                  Physical Exam:      Last Vital Signs:  /75 (BP Location: Right arm)  Temp 97.6  F (36.4  C) (Oral)  Resp 18  " SpO2 94%    Thickened indurated edematous, moderately erythematous scrotum, mildly ttp, macular warm/erythematous area on inner thigh and calf-not contiguous with scrotum, ttp.  Pleasant nad looks stated age head nc/at sclera clear lungs ctab nl effort rrr no mrg no CV related edema abd obese s/nt/d alert and oriented affect appropriate blankenship            Medications:      All current medications were reviewed with changes reflected in problem list.         Data:      All new lab and imaging data was reviewed.   Labs:    Recent Labs  Lab 08/01/18  0600      POTASSIUM 4.6   CHLORIDE 106   CO2 22   ANIONGAP 9   *   BUN 16   CR 1.09   GFRESTIMATED 70   GFRESTBLACK 85   ELSI 8.9       Recent Labs  Lab 08/01/18  0600   WBC 8.2   HGB 12.3*   HCT 37.9*   MCV 92   *      Imaging:

## 2018-08-01 NOTE — PROGRESS NOTES
SPIRITUAL HEALTH SERVICES Progress Note  [facility]    Attempted to visit throughout the day in response to Epic  request; pt was either unavailable or sleeping in every instance. I will attempt a visit tomorrow (Thursday, 8/2/18)    Aime Erazo   Intern  Pager [phone]

## 2018-08-02 LAB
ANION GAP SERPL CALCULATED.3IONS-SCNC: 7 MMOL/L (ref 3–14)
BASOPHILS # BLD AUTO: 0.1 10E9/L (ref 0–0.2)
BASOPHILS NFR BLD AUTO: 1 %
BUN SERPL-MCNC: 23 MG/DL (ref 7–30)
CALCIUM SERPL-MCNC: 8.7 MG/DL (ref 8.5–10.1)
CHLORIDE SERPL-SCNC: 107 MMOL/L (ref 94–109)
CO2 SERPL-SCNC: 24 MMOL/L (ref 20–32)
CREAT SERPL-MCNC: 1.19 MG/DL (ref 0.66–1.25)
CRP SERPL-MCNC: 157 MG/L (ref 0–8)
DIFFERENTIAL METHOD BLD: ABNORMAL
EOSINOPHIL # BLD AUTO: 0.2 10E9/L (ref 0–0.7)
EOSINOPHIL NFR BLD AUTO: 4 %
ERYTHROCYTE [DISTWIDTH] IN BLOOD BY AUTOMATED COUNT: 15.3 % (ref 10–15)
GFR SERPL CREATININE-BSD FRML MDRD: 63 ML/MIN/1.7M2
GLUCOSE SERPL-MCNC: 104 MG/DL (ref 70–99)
HCT VFR BLD AUTO: 36.3 % (ref 40–53)
HGB BLD-MCNC: 11.6 G/DL (ref 13.3–17.7)
LYMPHOCYTES # BLD AUTO: 0.5 10E9/L (ref 0.8–5.3)
LYMPHOCYTES NFR BLD AUTO: 9 %
MCH RBC QN AUTO: 30 PG (ref 26.5–33)
MCHC RBC AUTO-ENTMCNC: 32 G/DL (ref 31.5–36.5)
MCV RBC AUTO: 94 FL (ref 78–100)
METAMYELOCYTES # BLD: 0.2 10E9/L
METAMYELOCYTES NFR BLD MANUAL: 4 %
MONOCYTES # BLD AUTO: 0.4 10E9/L (ref 0–1.3)
MONOCYTES NFR BLD AUTO: 7 %
MYELOCYTES # BLD: 0.1 10E9/L
MYELOCYTES NFR BLD MANUAL: 2 %
NEUTROPHILS # BLD AUTO: 3.7 10E9/L (ref 1.6–8.3)
NEUTROPHILS NFR BLD AUTO: 73 %
PLATELET # BLD AUTO: 183 10E9/L (ref 150–450)
PLATELET # BLD EST: ABNORMAL 10*3/UL
POTASSIUM SERPL-SCNC: 4.4 MMOL/L (ref 3.4–5.3)
RBC # BLD AUTO: 3.87 10E12/L (ref 4.4–5.9)
RBC MORPH BLD: ABNORMAL
SODIUM SERPL-SCNC: 138 MMOL/L (ref 133–144)
WBC # BLD AUTO: 5.1 10E9/L (ref 4–11)

## 2018-08-02 PROCEDURE — 25000132 ZZH RX MED GY IP 250 OP 250 PS 637: Performed by: INTERNAL MEDICINE

## 2018-08-02 PROCEDURE — 25000128 H RX IP 250 OP 636: Performed by: INTERNAL MEDICINE

## 2018-08-02 PROCEDURE — 85025 COMPLETE CBC W/AUTO DIFF WBC: CPT | Performed by: INTERNAL MEDICINE

## 2018-08-02 PROCEDURE — 86140 C-REACTIVE PROTEIN: CPT | Performed by: INTERNAL MEDICINE

## 2018-08-02 PROCEDURE — 36415 COLL VENOUS BLD VENIPUNCTURE: CPT | Performed by: INTERNAL MEDICINE

## 2018-08-02 PROCEDURE — 80048 BASIC METABOLIC PNL TOTAL CA: CPT | Performed by: INTERNAL MEDICINE

## 2018-08-02 PROCEDURE — 25000125 ZZHC RX 250: Performed by: INTERNAL MEDICINE

## 2018-08-02 PROCEDURE — 99232 SBSQ HOSP IP/OBS MODERATE 35: CPT | Performed by: INTERNAL MEDICINE

## 2018-08-02 PROCEDURE — 12000000 ZZH R&B MED SURG/OB

## 2018-08-02 RX ADMIN — OXYCODONE HYDROCHLORIDE 5 MG: 5 TABLET ORAL at 18:26

## 2018-08-02 RX ADMIN — OXYCODONE HYDROCHLORIDE 5 MG: 5 TABLET ORAL at 22:53

## 2018-08-02 RX ADMIN — ACETAMINOPHEN 650 MG: 325 TABLET, FILM COATED ORAL at 16:26

## 2018-08-02 RX ADMIN — CLINDAMYCIN PHOSPHATE 900 MG: 900 INJECTION, SOLUTION INTRAVENOUS at 02:36

## 2018-08-02 RX ADMIN — GABAPENTIN 1200 MG: 300 CAPSULE ORAL at 14:18

## 2018-08-02 RX ADMIN — CLINDAMYCIN PHOSPHATE 900 MG: 900 INJECTION, SOLUTION INTRAVENOUS at 09:40

## 2018-08-02 RX ADMIN — SERTRALINE HYDROCHLORIDE 100 MG: 100 TABLET ORAL at 09:37

## 2018-08-02 RX ADMIN — GABAPENTIN 1200 MG: 300 CAPSULE ORAL at 09:37

## 2018-08-02 RX ADMIN — OXYCODONE HYDROCHLORIDE 5 MG: 5 TABLET ORAL at 09:38

## 2018-08-02 RX ADMIN — CLINDAMYCIN PHOSPHATE 900 MG: 900 INJECTION, SOLUTION INTRAVENOUS at 17:34

## 2018-08-02 RX ADMIN — GABAPENTIN 1200 MG: 300 CAPSULE ORAL at 22:51

## 2018-08-02 RX ADMIN — AMLODIPINE BESYLATE 5 MG: 5 TABLET ORAL at 09:37

## 2018-08-02 RX ADMIN — CEFTRIAXONE SODIUM 1 G: 1 INJECTION, POWDER, FOR SOLUTION INTRAMUSCULAR; INTRAVENOUS at 06:22

## 2018-08-02 RX ADMIN — OXYCODONE HYDROCHLORIDE 5 MG: 5 TABLET ORAL at 14:26

## 2018-08-02 RX ADMIN — OLANZAPINE 5 MG: 5 TABLET, FILM COATED ORAL at 22:51

## 2018-08-02 ASSESSMENT — ACTIVITIES OF DAILY LIVING (ADL)
ADLS_ACUITY_SCORE: 11
ADLS_ACUITY_SCORE: 12
ADLS_ACUITY_SCORE: 11
ADLS_ACUITY_SCORE: 12
ADLS_ACUITY_SCORE: 11
ADLS_ACUITY_SCORE: 11
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
## 2018-08-02 NOTE — PROGRESS NOTES
"[facility]  Hospitalist Progress Note  Emiliano Figueredo MD, MD 08/02/2018  (Text Page)  Reason for Stay (Diagnosis): Scrotal and right lower extremity cellulitis         Assessment and Plan:      Summary of Stay: Aydin Reilly is a 58 year old male with a history of morbid obesity, htn, remote hx of anal cancer, ongoing methamphetamine abuse, GERD, and hx of recurrent scrotal cellulitis admitted on 7/31/2018 with 1 day of scrotal cellulitis.     His brief hospitalization is notable for extension of infection into RLE-not contiguous.  I see he carries the diagnosis of \"ESBL\" in urine but on review of cultures it looks like in 11/2016 he had E coli determined NOT to be ESBL.  Will ask for infection prevention to look into this  Problem List:   1. Scrotal cellulitis and now with concomitant RLE cellulitis:  No e/o testicular involvement by testicular US.  Cont with IV ceftriaxone but added intravenous clindamycin earlier.   Have agreed to limited use of oxycodone due to pain.  We discussed the risks of respiratory depression/death/addiction.  Check CRP and showed 157  2. Htn:  pta meds: amlodipine 5 mg which has been resumed  3. Hx of methamphetamine abuse:  Only intermittent use and rare per patient, discussed with poison control earlier by Dr. Ruiz-no specific therapy indicated although at risk of dehydration if not taking in adequate po  4. ANGUS: am creat 1.4- was < 1.0 range per our chart through 2016, now 1.19.      DVT Prophylaxis: Pneumatic Compression Devices  Code Status: Full Code        Disposition Plan     Expected discharge in 1/2  days to prior living arrangement once able to switch to or oral abx          Interval History (Subjective):      I have assumed care today  Seen and examined  Case discussed with nursing service  Martin is feeling fine and thinks that his scrotal swelling and leg erythema has been improving.  He remained afebrile this morning.  Tolerating oral diet.  No reported " diarrhea.  No reported mental status changes.  He denies any chest pain, shortness of breath, palpitations or back pain.              Physical Exam:      Last Vital Signs:  /66 (BP Location: Left arm)  Temp 97.9  F (36.6  C) (Oral)  Resp 16  SpO2 96%    I/O last 3 completed shifts:  In: 546 [P.O.:360; I.V.:186]  Out: 400 [Urine:400]  Wt Readings from Last 1 Encounters:   06/19/18 107 kg (235 lb 12.8 oz)     There were no vitals filed for this visit.    Constitutional: Awake, alert, cooperative, no apparent distress   Respiratory: Clear to auscultation bilaterally, no crackles or wheezing   Cardiovascular: Regular rate and rhythm, normal S1 and S2, and no murmur noted   Abdomen: Normal bowel sounds, soft, non-distended, non-tender   Skin:  Erythema, warm to touch, swelling of right lower extremity up to thigh level but not crossing demarcated margins, scrotal edema but nontender   Neuro: Alert and oriented x3, no weakness, spontaneous and coherent speech   Extremities: No edema, normal range of motion   Other(s): Euthymic mood, not agitated       All other systems: Negative          Medications:      All current medications were reviewed with changes reflected in problem list.         Data:      All new lab and imaging data was reviewed.   Labs:    Recent Labs  Lab 08/01/18  0058 08/01/18  0051 07/31/18  0511 07/31/18  0323 07/31/18  0304   CULT No growth after 1 day No growth after 1 day 50,000 to 100,000 colonies/mLAerococcus urinae*  Susceptibility testing in progress No growth after 2 days No growth after 2 days       Recent Labs  Lab 08/02/18  0659 08/01/18  0600 07/31/18  0305   WBC 5.1 8.2 10.7   HGB 11.6* 12.3* 10.6*   HCT 36.3* 37.9* 31.8*   MCV 94 92 91    148* 151       Recent Labs  Lab 08/02/18  0659 08/01/18  0600 07/31/18  0305    137 140   POTASSIUM 4.4 4.6 3.7   CHLORIDE 107 106 108   CO2 24 22 24   ANIONGAP 7 9 8   * 105* 143*   BUN 23 16 22   CR 1.19 1.09 1.32*    GFRESTIMATED 63 70 56*   GFRESTBLACK 77 85 68   ELSI 8.7 8.9 8.4*   PROTTOTAL  --  7.3 6.8   ALBUMIN  --  3.3* 3.5   BILITOTAL  --  0.5 0.6   ALKPHOS  --  269* 289*   AST  --  27 15   ALT  --  23 22       Recent Labs  Lab 08/02/18  0659   .0*       Recent Labs  Lab 08/02/18  0659 08/01/18  0600 07/31/18  0305   * 105* 143*     No results for input(s): TROPONIN, TROPI, TROPR in the last 168 hours.    Invalid input(s): TROP, TROPONINIES    Recent Labs  Lab 07/31/18  0512   COLOR Yellow   APPEARANCE Slightly Cloudy   URINEGLC Negative   URINEBILI Negative   URINEKETONE Negative   SG 1.027   UBLD Negative   URINEPH 6.0   PROTEIN Negative   NITRITE Negative   LEUKEST Trace*   RBCU 2   WBCU 13*      Imaging:   Results for orders placed or performed during the hospital encounter of 07/31/18   US Testicular & Scrotum w Doppler Ltd    Narrative    US TESTICULAR AND SCROTUM WITH DOPPLER LIMITED  7/31/2018 4:29 AM     HISTORY: Scrotal pain, swelling, redness.     COMPARISON: None.    FINDINGS:  The testicles are normal in size and texture. No testicular  mass. The right testicle measures 3.5 x 1.8 x 1.7 cm. The left  testicle measures 3.8 x 1.9 x 1.4 cm. Color Doppler and Doppler  waveform analysis of the testicles shows normal and symmetric blood  flow. The epididymides appear normal bilaterally. No hydrocele or  varicocele on either side. There is diffuse scrotal skin thickening  and edema.      Impression    IMPRESSION:  1. Normal testicles.  2. Scrotal skin thickening and edema.    JOE MILLS MD     *Note: Due to a large number of results and/or encounters for the requested time period, some results have not been displayed. A complete set of results can be found in Results Review.

## 2018-08-02 NOTE — PLAN OF CARE
Problem: Patient Care Overview  Goal: Plan of Care/Patient Progress Review  Outcome: Improving  Mobility:Assist of one  LS:Clear lung sounds  BS:Active bowel sounds  :Voiding adequate amounts  CMS:+2 edema to bilateral LE.   DRSG:None  PAIN:Controlled with Tylenol and prn Oxycodone  Pt. Is A&Ox4. Tachycardic, otherwise the rest of the VSS.Redness to scrotal area down to R thigh to ankle is receding. Borders marked. Continues IV Rocephin and IV Cleocin. passing flatus, tolerating regular diet well. Contact precautions maintained. Will continue to monitor.

## 2018-08-02 NOTE — PLAN OF CARE
Problem: Patient Care Overview  Goal: Plan of Care/Patient Progress Review  Patient alert and oriented.  Lung sounds clear.  Bowel sounds active and audible.  Regular diet. Up with standby assistance.  Pain managed with PRN oxycodone.  RLE edema 2+, also 2+ scrotal edema, scrotal redness, redness to right leg down to ankle area within marked lines.  Will continue to monitor.

## 2018-08-02 NOTE — PLAN OF CARE
Problem: Patient Care Overview  Goal: Plan of Care/Patient Progress Review  Outcome: No Change  A&Ox4. Tachycardic, (-110s), VSS otherwise. CMS intact. Up w/ SBA in room. Redness and edema to scrotal area, down to R thigh to ankle. Borders marked this AM. Redness has not changed from outlined borders. Continues IV Rocephin and IV Cleocin. Pain managed w/ prn Oxycodone and Tylenol. BS active, passing flatus, tolerating regular diet well. Voiding in adequate amts. Contact precautions maintained. Will continue to monitor.

## 2018-08-02 NOTE — PROGRESS NOTES
Care Transitions Team: Following for CC, discharge planning, and disposition.      Per Chart review pt is identified as ELEVATED JULIO score admitted with Scrotal cellulitis,with readmission status.  6/8 for hallucinations 2/2 to Meth and ETOH, Oncology involvement at that time as well for pancytopenia felt r/t ETOH use.       Met with pt at bedside who is very sleepy and has trouble staying awake during our conversation.   Verified that pt. did follow up with PCP upon discharge as recommended but did not know anything about an Oncology follow up for pancytopenia work up and therefore did not follow up with any oncology.   Pt denies any issues with new medications following DC on 6/13 and reports that he go them filled and is taking them.     Explained to pt that [facility] will add [facility] contact number to AVS and that a handoff to PCP to include the past recommendation for Oncology follow up to be addressed in PCP  Clinic,  at hospital DC follow up appointment. This appointment was scheduled for 8/13 at 1400 with Emil AMARAL as pt PCP is on vacation through the 20th of August.       No further needs   Elle Fletcher RN, BSN, CTS  Care Transitions Team  [phone]

## 2018-08-02 NOTE — DISCHARGE INSTRUCTIONS
RECOMMENDATIONS ON YOUR LAST DISCHARGE WERE TO FOLLOW UP WITH [facility] there number is  call to scheduled at follow up after discussing winter Simmons.  Dr. Simmons is on Vacation until August 20th , so a discharge follow up appointment has been arranged as listed above at [facility].

## 2018-08-03 VITALS
TEMPERATURE: 97.6 F | SYSTOLIC BLOOD PRESSURE: 110 MMHG | OXYGEN SATURATION: 93 % | RESPIRATION RATE: 16 BRPM | DIASTOLIC BLOOD PRESSURE: 73 MMHG

## 2018-08-03 LAB
BACTERIA SPEC CULT: ABNORMAL
Lab: ABNORMAL
SPECIMEN SOURCE: ABNORMAL

## 2018-08-03 PROCEDURE — 25000132 ZZH RX MED GY IP 250 OP 250 PS 637: Performed by: INTERNAL MEDICINE

## 2018-08-03 PROCEDURE — 25000128 H RX IP 250 OP 636: Performed by: INTERNAL MEDICINE

## 2018-08-03 PROCEDURE — 99239 HOSP IP/OBS DSCHRG MGMT >30: CPT | Performed by: INTERNAL MEDICINE

## 2018-08-03 PROCEDURE — 25000125 ZZHC RX 250: Performed by: INTERNAL MEDICINE

## 2018-08-03 RX ORDER — OXYCODONE HYDROCHLORIDE 5 MG/1
5 TABLET ORAL EVERY 4 HOURS PRN
Qty: 10 TABLET | Refills: 0 | Status: SHIPPED | OUTPATIENT
Start: 2018-08-03 | End: 2018-08-10

## 2018-08-03 RX ADMIN — CLINDAMYCIN PHOSPHATE 900 MG: 900 INJECTION, SOLUTION INTRAVENOUS at 02:57

## 2018-08-03 RX ADMIN — GABAPENTIN 1200 MG: 300 CAPSULE ORAL at 10:08

## 2018-08-03 RX ADMIN — SERTRALINE HYDROCHLORIDE 100 MG: 100 TABLET ORAL at 10:09

## 2018-08-03 RX ADMIN — CEFTRIAXONE SODIUM 1 G: 1 INJECTION, POWDER, FOR SOLUTION INTRAMUSCULAR; INTRAVENOUS at 06:35

## 2018-08-03 RX ADMIN — AMLODIPINE BESYLATE 5 MG: 5 TABLET ORAL at 10:09

## 2018-08-03 RX ADMIN — CLINDAMYCIN PHOSPHATE 900 MG: 900 INJECTION, SOLUTION INTRAVENOUS at 10:08

## 2018-08-03 RX ADMIN — OXYCODONE HYDROCHLORIDE 5 MG: 5 TABLET ORAL at 13:21

## 2018-08-03 ASSESSMENT — ACTIVITIES OF DAILY LIVING (ADL)
ADLS_ACUITY_SCORE: 11
ADLS_ACUITY_SCORE: 12
ADLS_ACUITY_SCORE: 11
ADLS_ACUITY_SCORE: 11

## 2018-08-03 NOTE — PROGRESS NOTES
Transition Communication Hand-off for Care Transitions to Next Level of Care Provider    Name: Aydin Reilly  : 1960  MRN #: [id number]  Primary Care Provider: Adolfo Simmons     Primary Clinic: [address]  [facility] [address]  Primary Care Clinic Name: [facility]   Reason for Hospitalization:  Sinus tachycardia [R00.0]  Cellulitis of scrotum [N49.2]  Pyuria [N39.0]  Admit Date/Time: 2018  2:12 AM  Discharge Date: 8/3/2018  Payor Source: Payor: UCARE / Plan: UCARE [place] / Product Type: HMO /     Readmission Assessment Measure (JULIO) Risk Score/category: ELEVATED        Reason for Communication Hand-off Referral: Other readnmission Drug use abuse, nonadherence     Discharge Plan:Home        Concern for non-adherence with plan of care:   Y/N YES   Discharge Needs Assessment:  Needs       Most Recent Value    # of Referrals Placed by CTS Communication hand-offs to next level of Care Providers, Scheduled Follow-up appointments        Follow-up plan:  Future Appointments  Date Time Provider Department Center   2018 2:00 PM Emil Chacon PA-C [facility]                 [place] Recommendations: PLEASE UPDATE PCP CAT with any concerns after pt follows up with Emil on .     CTS following pt 2/2 to ELEVATED JULIO admited with scrotal cellulitis  2/2 to readmission status hx of CA . Meth/ETOH use. Pt was here at [facility] -  for hallucination s 2/2 to Meth and ETOH.   At that DC Oncology follow up was recommended they had been following 2/2 to concerns noted on Head CT   Thickened mottled calvarium which has developed since the prior  exam. This is most likely due to a hemopoietic disorder such as a  blood dyscrasia, anemia, or leukemia. Metastatic disease is also a  possibility but less likely due to the diffuse thickening of the  Calvarium.     Please encourage pt at [facility] follow appointment to schedule a follow up with Oncology   Elle Fletcher    AVS/Discharge  Summary is the source of truth; this is a helpful guide for improved communication of patient story

## 2018-08-03 NOTE — PLAN OF CARE
Problem: Patient Care Overview  Goal: Plan of Care/Patient Progress Review  Outcome: Adequate for Discharge Date Met: 08/03/18  Pt up SBA. Stooling.voiding. Pain managed with oxycodone. All discharge education completed, AVS signed. Discharge medication given. All belongings collected and sent home with pt. Sister here to transport pt home. Adequate for discharge.

## 2018-08-03 NOTE — PLAN OF CARE
Problem: Patient Care Overview  Goal: Plan of Care/Patient Progress Review  Outcome: Improving  Mobility:Standby assist of one  LS:Clear lung sounds  BS:Active bowel sounds  :Voiding adequate amounts  CMS:Edema to bilateral LE.   DRSG:None  PAIN:Controlled with Tylenol and prn Oxycodone  Pt. Is A&Ox4. VSS.Redness/swelling to scrotal area down to R thigh to ankle is receding steadly. Borders marked. Continues IV Rocephin and IV Cleocin. passing flatus, tolerating regular diet. Contact precautions maintained.

## 2018-08-03 NOTE — DISCHARGE SUMMARY
[facility]  Discharge Summary  Name: Aydin Reilly    MRN: [id number]  YOB: 1960    Age: 58 year old  Date of Discharge:  8/3/2018  Date of Admission: 7/31/2018  Primary Care Provider: Adolfo Simmons  Discharge Physician:  Emiliano Figueredo MD  Discharging Service:  Hospitalist      Discharge Diagnosis:  Scrotal cellulitis  Right lower extremity cellulitis  Hypertension  History of methamphetamine abuse  Acute kidney injury resolved     Other Diagnosis:  Past Medical History:   Diagnosis Date     Alcohol abuse     stopped in 2008     Anserine bursitis 4/19/2016     Benign essential hypertension      Cancer (H) 1999     Chondritis 4/19/2016     Elevated blood pressure reading without diagnosis of hypertension 4/19/2016     Gastric ulcer, unspecified as acute or chronic, without mention of hemorrhage, perforation, or obstruction ~1997    treated non-surgically     Gastro-oesophageal reflux disease      H/O malignant neoplasm of rectum, rectosigmoid junction, and anus      Hyperlipidemia LDL goal < 130 4/6/2010     Hypogonadism 5/24/2010     Lumbar disc herniation with radiculopathy     L4, recurrent episodic pain     Moderate episode of recurrent major depressive disorder (H) 10/31/2017     Rotator cuff injury, right, initial encounter 4/19/2016     Sleep apnea      Urethral stricture unspecified 2002    Following radiation; see PSH          Discharge Disposition:  Discharged to home     Allergies:  Allergies   Allergen Reactions     No Known Allergies      Seasonal Allergies         Discharge Medications:   Current Discharge Medication List      START taking these medications    Details   amoxicillin-clavulanate (AUGMENTIN) 875-125 MG per tablet Take 1 tablet by mouth 2 times daily for 7 days  Qty: 14 tablet, Refills: 0    Associated Diagnoses: Cellulitis of scrotum      oxyCODONE IR (ROXICODONE) 5 MG tablet Take 1 tablet (5 mg) by mouth every 4 hours as needed for moderate to severe  pain  Qty: 10 tablet, Refills: 0    Associated Diagnoses: Cellulitis of scrotum         CONTINUE these medications which have NOT CHANGED    Details   gabapentin (NEURONTIN) 300 MG capsule Take 1,200 mg by mouth 3 times daily      acetaminophen (TYLENOL) 325 MG tablet Take 2 tablets (650 mg) by mouth every 8 hours as needed for mild pain  Qty: 100 tablet    Associated Diagnoses: Chronic pain of right knee      amLODIPine (NORVASC) 5 MG tablet Take 1 tablet (5 mg) by mouth daily  Qty: 90 tablet, Refills: 1    Associated Diagnoses: Benign essential hypertension      Elastic Bandages & Supports (JOBST ACTIVE 20-30MMHG MEDIUM) MISC 1 Device daily as needed Knee high  Qty: 6 each, Refills: 1    Associated Diagnoses: Pedal edema      folic acid (FOLVITE) 1 MG tablet Take 1 tablet (1 mg) by mouth daily  Qty: 30 tablet, Refills: 0    Associated Diagnoses: Acute renal failure, unspecified acute renal failure type (H)      hydrOXYzine (ATARAX) 25 MG tablet Take 1-2 tablets (25-50 mg) by mouth every 8 hours as needed for itching or anxiety (pain)  Qty: 20 tablet, Refills: 0    Associated Diagnoses: Chronic pain of right knee      MELATONIN PO Take 3 mg by mouth nightly as needed Reported on 5/10/2017      multivitamin, therapeutic with minerals (MULTI-VITAMIN) TABS Take 1 tablet by mouth daily      OLANZapine (ZYPREXA) 5 MG tablet Take 1 tablet (5 mg) by mouth At Bedtime  Qty: 30 tablet, Refills: 0    Associated Diagnoses: Hallucinations      SERTRALINE HCL PO Take 200-300 mg by mouth daily      thiamine 100 MG tablet Take 1 tablet (100 mg) by mouth daily  Qty: 30 tablet, Refills: 0    Associated Diagnoses: Acute renal failure, unspecified acute renal failure type (H)      traZODone (DESYREL) 50 MG tablet Take 1-3 tablets ( mg) by mouth nightly as needed for sleep  Qty: 90 tablet, Refills: 3    Associated Diagnoses: CONNER (generalized anxiety disorder)              Condition on Discharge:  Discharge condition: Stable    Discharge vitals: Blood pressure 110/73, temperature 97.6  F (36.4  C), temperature source Oral, resp. rate 16, SpO2 93 %.   Code status on discharge: Full Code     History of Present Illness:  See detailed admission note for full details.        Significant Physical Exam Findings Day of Discharge:  HEENT; Atraumatic, normocephalic, pinkish conjuctiva, pupils bilateral reactive   Skin: warm and moist, no rashes, erythema, swelling, warmth to touch on right lower extremity has significantly improved, erythema not crossing previously set demarcated line.  Lymphatics: no cervical or axillary lymphandenopathy  Lungs: equal chest expansion, clear to auscultation, no wheezes, no stridor, no crackles,   Heart: normal rate, normal rhythm, no rubs or gallops.   Abdomen: normal bowel sounds, no tenderness, no peritoneal signs, no guarding  Extremities: no deformities, no edema   Neuro; follow commands, alert and oriented x3, spontaneous speech, coherent, moves all extremities spontaneously  Psych; no hallucination, euthymic mood, not agitated  Still with scrotal erythema but swelling is significantly decrease, mildly tender        Procedures other than Imaging:  none     Imaging:  Results for orders placed or performed during the hospital encounter of 07/31/18   US Testicular & Scrotum w Doppler Ltd    Narrative    US TESTICULAR AND SCROTUM WITH DOPPLER LIMITED  7/31/2018 4:29 AM     HISTORY: Scrotal pain, swelling, redness.     COMPARISON: None.    FINDINGS:  The testicles are normal in size and texture. No testicular  mass. The right testicle measures 3.5 x 1.8 x 1.7 cm. The left  testicle measures 3.8 x 1.9 x 1.4 cm. Color Doppler and Doppler  waveform analysis of the testicles shows normal and symmetric blood  flow. The epididymides appear normal bilaterally. No hydrocele or  varicocele on either side. There is diffuse scrotal skin thickening  and edema.      Impression    IMPRESSION:  1. Normal testicles.  2. Scrotal  skin thickening and edema.    JOE MILLS MD     *Note: Due to a large number of results and/or encounters for the requested time period, some results have not been displayed. A complete set of results can be found in Results Review.        Consultations:  No consultations were requested during this admission.     Recent Lab Results:    Recent Labs  Lab 08/02/18  0659 08/01/18  0600 07/31/18  0305   WBC 5.1 8.2 10.7   HGB 11.6* 12.3* 10.6*   HCT 36.3* 37.9* 31.8*   MCV 94 92 91    148* 151       Recent Labs  Lab 08/01/18  0058 08/01/18  0051 07/31/18  0511 07/31/18  0323 07/31/18  0304   CULT No growth after 2 days No growth after 2 days 50,000 to 100,000 colonies/mLAerococcus urinae* No growth after 3 days No growth after 3 days       Recent Labs  Lab 08/02/18  0659 08/01/18  0600 07/31/18  0305    137 140   POTASSIUM 4.4 4.6 3.7   CHLORIDE 107 106 108   CO2 24 22 24   ANIONGAP 7 9 8   * 105* 143*   BUN 23 16 22   CR 1.19 1.09 1.32*   GFRESTIMATED 63 70 56*   GFRESTBLACK 77 85 68   ELSI 8.7 8.9 8.4*   PROTTOTAL  --  7.3 6.8   ALBUMIN  --  3.3* 3.5   BILITOTAL  --  0.5 0.6   ALKPHOS  --  269* 289*   AST  --  27 15   ALT  --  23 22       Recent Labs  Lab 08/02/18  0659 08/01/18  0600 07/31/18  0305   * 105* 143*       Recent Labs  Lab 07/31/18  0305   LACT 1.2     No results for input(s): TROPONIN, TROPI, TROPR in the last 168 hours.    Invalid input(s): TROP, TROPONINIES    Recent Labs  Lab 07/31/18  0512   COLOR Yellow   APPEARANCE Slightly Cloudy   URINEGLC Negative   URINEBILI Negative   URINEKETONE Negative   SG 1.027   UBLD Negative   URINEPH 6.0   PROTEIN Negative   NITRITE Negative   LEUKEST Trace*   RBCU 2   WBCU 13*          Pending Results:    Unresulted Labs Ordered in the Past 30 Days of this Admission     Date and Time Order Name Status Description    8/1/2018 0045 Blood culture Preliminary     8/1/2018 0045 Blood culture Preliminary     7/31/2018 0308 Blood culture  Preliminary     7/31/2018 0231 Blood culture Preliminary            Discharge Instructions and Follow-Up:   Discharge diet:   Active Diet Order      Combination Diet Regular Diet Adult      Diet   Discharge activity: Activity as tolerated   Discharge follow-up: 1-2 weeks with PCP   Outpatient therapy: None    Other instructions:  I have provided him with a short prescription doses of oxycodone.  I relayed and spoke with his primary care physician over the phone regarding his course of stay here, follow-up care, and indications for several days of oxycodone given he is scrotal edema and cellulitis.  Patient is aware about risk and disadvantages of being on opioid narcotic medications such as recurrence of prior addiction, respiratory depression, even death if misuse.     Hospital Course:  Ayidn is feeling a whole lot better with the right lower extremities cellulitis and scrotal cellulitis symptoms have been improving such as decreasing swelling, erythema and tenderness.  He remained ambulatory.  He is afebrile with stable hemodynamics.  He is tolerating oral diet with no complaints of nausea, vomiting or diarrhea.  Blood cultures numerous sets has demonstrated no significant growth.  He is currently requesting for home discharge and will proceed with it.  He will need to finish the course of oral antibiotics as prescribed.     Total time spent in face to face contact with the patient and coordinating discharge was:  > 30 Minutes.

## 2018-08-03 NOTE — PLAN OF CARE
Problem: Patient Care Overview  Goal: Plan of Care/Patient Progress Review  Outcome: Improving  A&Ox4. Tachycardic, (HR low 100s), VSS otherwise. CMS intact. Up w/ SBA w/ gait belt in room. Redness and edema to scrotal area, down to R thigh to ankle. Slight recession from original borders marked. Continues IV Rocephin and IV Cleocin. Pain managed w/ prn Oxycodone and Tylenol. BS active, passing flatus, tolerating regular diet well, BMx1 today. Voiding in adequate amts. Contact precautions maintained. Possible DC in 1-2 days. Will continue to monitor.

## 2018-08-06 ENCOUNTER — PATIENT OUTREACH (OUTPATIENT)
Dept: CARE COORDINATION | Facility: CLINIC | Age: 58
End: 2018-08-06

## 2018-08-06 LAB
BACTERIA SPEC CULT: NO GROWTH
BACTERIA SPEC CULT: NO GROWTH
Lab: NORMAL
Lab: NORMAL
SPECIMEN SOURCE: NORMAL
SPECIMEN SOURCE: NORMAL

## 2018-08-06 ASSESSMENT — ACTIVITIES OF DAILY LIVING (ADL): DEPENDENT_IADLS:: INDEPENDENT

## 2018-08-06 NOTE — LETTER
[place] CARE COORDINATION  [facility]   [address]   [place] [address]    August 7, 2018    Aydin Reilly  [address]  [facility] [address]      Dear Aydin,    I am a clinic care coordinator who works with Adolfo Simmons MD at [facility]. I recently tried to call and was unable to reach you. I wanted to introduce myself and provide you with my contact information so that you can call me with questions or concerns about your health care. Below is a description of clinic care coordination and how I can further assist you.     The clinic care coordinator is a registered nurse and/or  who understand the health care system. The goal of clinic care coordination is to help you manage your health and improve access to the [facility] system in the most efficient manner. The registered nurse can assist you in meeting your health care goals by providing education, coordinating services, and strengthening the communication among your providers. The  can assist you with financial, behavioral, psychosocial, chemical dependency, counseling, and/or psychiatric resources.    Please feel free to contact me at [phone], with any questions or concerns. We at [facility] are focused on providing you with the highest-quality healthcare experience possible and that all starts with you.     Sincerely,     Marga Monae Care Coordinator RN  [facility]  [phone]  August 7, 2018
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
## 2018-08-07 ASSESSMENT — ACTIVITIES OF DAILY LIVING (ADL): DEPENDENT_IADLS:: INDEPENDENT

## 2018-08-10 ENCOUNTER — RADIANT APPOINTMENT (OUTPATIENT)
Dept: GENERAL RADIOLOGY | Facility: CLINIC | Age: 58
End: 2018-08-10
Attending: PHYSICIAN ASSISTANT
Payer: COMMERCIAL

## 2018-08-10 ENCOUNTER — OFFICE VISIT (OUTPATIENT)
Dept: FAMILY MEDICINE | Facility: CLINIC | Age: 58
End: 2018-08-10
Payer: COMMERCIAL

## 2018-08-10 VITALS
RESPIRATION RATE: 18 BRPM | SYSTOLIC BLOOD PRESSURE: 122 MMHG | BODY MASS INDEX: 36.74 KG/M2 | OXYGEN SATURATION: 97 % | HEART RATE: 96 BPM | WEIGHT: 241.6 LBS | TEMPERATURE: 98.6 F | DIASTOLIC BLOOD PRESSURE: 72 MMHG

## 2018-08-10 DIAGNOSIS — M25.511 ACUTE PAIN OF RIGHT SHOULDER: ICD-10-CM

## 2018-08-10 DIAGNOSIS — M25.511 ACUTE PAIN OF RIGHT SHOULDER: Primary | ICD-10-CM

## 2018-08-10 PROCEDURE — 73030 X-RAY EXAM OF SHOULDER: CPT | Mod: RT

## 2018-08-10 PROCEDURE — 73000 X-RAY EXAM OF COLLAR BONE: CPT | Mod: RT

## 2018-08-10 PROCEDURE — 99213 OFFICE O/P EST LOW 20 MIN: CPT | Performed by: PHYSICIAN ASSISTANT

## 2018-08-10 RX ORDER — NABUMETONE 500 MG/1
500 TABLET, FILM COATED ORAL DAILY
Refills: 0 | COMMUNITY
Start: 2018-07-08 | End: 2018-08-18

## 2018-08-10 NOTE — MR AVS SNAPSHOT
After Visit Summary   8/10/2018    Aydin Reilly    MRN: [id number]           Patient Information     Date Of Birth          1960        Visit Information        Provider Department      8/10/2018 3:20 PM Khris Covington PA-C [facility]        Today's Diagnoses     Acute pain of right shoulder    -  1      Care Instructions    Try taking your Flexeril at bedtime.    Continue to use over the counter medications and Relafen as needed during the day.    Rest the shoulder until feeling better.    Continue to ice for the next 1-2 days, 3-4 times a day for 20 minutes at a day.    After 1-2 days, you can switch to heat.      Shoulder Pain with Uncertain Cause  Shoulder pain can have many causes. Pain often comes from the structures that surround the shoulder joint. These are the joint capsule, ligaments, tendons, muscles, and bursa. Pain can also come from cartilage in the joint. Cartilage can become worn out or injured. It s important to know what s causing your pain so the healthcare provider can use the correct treatment. But sometimes it s difficult to find the exact cause of shoulder pain. You may need to see a specialist (orthopedist). You may also need special tests such as a CT scan or MRI. The provider may need to use special tools to look inside the joint (arthroscopy).  Shoulder pain can be treated with a sling or a device that keeps your shoulder from moving. You can take an anti-inflammatory medicine such as ibuprofen to ease pain. You may need to do special shoulder exercises. Follow up with a specialist if the pain is severe or doesn t go away after a few weeks.  Home care  Follow these tips when caring for yourself at home:    If a sling was given to you, leave it in place for the time advised by your healthcare provider. If you aren t sure how long to wear it, ask for advice. If the sling becomes loose, adjust it so that your forearm is level with the ground. Your  shoulder should feel well supported.    Put an ice pack on the injured area for 20 minutes every 1 to 2 hours the first day. You can make your own ice pack by putting ice cubes in a plastic bag. Wrap the bag in a thin towel. Continue with ice packs 3 to 4 times a day for the next 2 days. Then use the pack as needed to ease pain and swelling.    You may use acetaminophen or ibuprofen to control pain, unless another pain medicine was prescribed. If you have chronic liver or kidney disease, talk with your healthcare provider before using these medicines. Also talk with your provider if you ve ever had a stomach ulcer or GI bleeding.    Shoulder pain may seem worse at night, when there is less to distract you from the pain. If you sleep on your side, try to keep weight off your painful shoulder. Propping pillows behind you may stop you from rolling over onto that shoulder during sleep.     Shoulder and elbow joints can become stiff if left in a sling for too long. You should start range of motion exercises about 7 to 10 days after the injury. Talk with your provider to find out what type of exercises to do and how soon to start.    You can take the sling off to shower or bathe.  Follow-up care  Follow up with your healthcare provider if you don t start to get better in the next 5 days.  When to seek medical advice  Call your healthcare provider right away if any of these occur:    Pain or swelling gets worse or continues for more than a few days    Your hand or fingers become cold, blue, numb, or tingly    Large amount of bruising on your shoulder or upper arm    Difficulty moving your hand or fingers    Weakness in your hand or fingers    Your shoulder becomes stiff    It feels like your shoulder is popping out    You are less able to do your daily activities  Date Last Reviewed: 10/1/2016    0837-9987 [employer]. [address] [place] [address], [place] [address]. All rights reserved. This information is not  intended as a substitute for professional medical care. Always follow your healthcare professional's instructions.                Follow-ups after your visit        Your next 10 appointments already scheduled     Aug 13, 2018  2:00 PM CDT   Office Visit with Emil [facility]acon PA-C   [facility]    [address] [place] [address], [address]  [facility] [address] [phone]           Bring a current list of meds and any records pertaining to this visit. For Physicals, please bring immunization records and any forms needing to be filled out. Please arrive 10 minutes early to complete paperwork.              Who to contact     If you have questions or need follow up information about today's clinic visit or your schedule please contact [facility] directly at [phone].  Normal or non-critical lab and imaging results will be communicated to you by MyChart, letter or phone within 4 business days after the clinic has received the results. If you do not hear from us within 7 days, please contact the clinic through [employer] or phone. If you have a critical or abnormal lab result, we will notify you by phone as soon as possible.  Submit refill requests through [employer] or call your pharmacy and they will forward the refill request to us. Please allow 3 business days for your refill to be completed.          Additional Information About Your Visit        [employer] Information     [employer] gives you secure access to your electronic health record. If you see a primary care provider, you can also send messages to your care team and make appointments. If you have questions, please call your primary care clinic.  If you do not have a primary care provider, please call [phone] and they will assist you.        Care EveryWhere ID     This is your Care EveryWhere ID. This could be used by other organizations to access your [facility] medical records  [id number]         Your Vitals Were     Pulse Temperature Respirations Pulse Oximetry BMI (Body Mass Index)       96 98.6  F (37  C) (Oral) 18 97% 36.74 kg/m2        Blood Pressure from Last 3 Encounters:   08/10/18 122/72   08/03/18 110/73   06/19/18 122/60    Weight from Last 3 Encounters:   08/10/18 241 lb 9.6 oz (109.6 kg)   06/19/18 235 lb 12.8 oz (107 kg)   06/11/18 246 lb (111.6 kg)               Primary Care Provider Office Phone # Fax #    Adolfo Ronn Simmons [phone]       [id number]  [facility] [address]        Equal Access to Services     JIGAR GARCÍA : Hadii florencio bello Sonicola, waaxda luqadaha, qaybta kaalmada adejamieyada, gogo donovan. So [facility] [phone].    ATENCIÓN: Si habla español, tiene a lyn disposición servicios gratuitos de asistencia lingüística. [facility] [phone].    We comply with applicable federal civil rights laws and [place] laws. We do not discriminate on the basis of race, color, national origin, age, disability, sex, sexual orientation, or gender identity.            Thank you!     Thank you for choosing [facility]  for your care. Our goal is always to provide you with excellent care. Hearing back from our patients is one way we can continue to improve our services. Please take a few minutes to complete the written survey that you may receive in the mail after your visit with us. Thank you!             Your Updated Medication List - Protect others around you: Learn how to safely use, store and throw away your medicines at www.disposemymeds.org.          This list is accurate as of 8/10/18  4:12 PM.  Always use your most recent med list.                   Brand Name Dispense Instructions for use Diagnosis    acetaminophen 325 MG tablet    TYLENOL    100 tablet    Take 2 tablets (650 mg) by mouth every 8 hours as needed for mild pain    Chronic pain of right knee       amLODIPine 5 MG tablet    NORVASC    90 tablet     Take 1 tablet (5 mg) by mouth daily    Benign essential hypertension       amoxicillin-clavulanate 875-125 MG per tablet    AUGMENTIN    14 tablet    Take 1 tablet by mouth 2 times daily for 7 days    Cellulitis of scrotum       folic acid 1 MG tablet    FOLVITE    30 tablet    Take 1 tablet (1 mg) by mouth daily    Acute renal failure, unspecified acute renal failure type (H)       hydrOXYzine 25 MG tablet    ATARAX    20 tablet    Take 1-2 tablets (25-50 mg) by mouth every 8 hours as needed for itching or anxiety (pain)    Chronic pain of right knee       JOBST ACTIVE 20-30MMHG MEDIUM Misc     6 each    1 Device daily as needed Knee high    Pedal edema       MELATONIN PO      Take 3 mg by mouth nightly as needed Reported on 5/10/2017        Multi-vitamin Tabs tablet      Take 1 tablet by mouth daily        nabumetone 500 MG tablet    RELAFEN     Take 500 mg by mouth daily        NEURONTIN 300 MG capsule   Generic drug:  gabapentin      Take 1,200 mg by mouth 3 times daily        OLANZapine 5 MG tablet    zyPREXA    30 tablet    Take 1 tablet (5 mg) by mouth At Bedtime    Hallucinations       SERTRALINE HCL PO      Take 200-300 mg by mouth daily        thiamine 100 MG tablet     30 tablet    Take 1 tablet (100 mg) by mouth daily    Acute renal failure, unspecified acute renal failure type (H)       traZODone 50 MG tablet    DESYREL    90 tablet    Take 1-3 tablets ( mg) by mouth nightly as needed for sleep    CONNER (generalized anxiety disorder)

## 2018-08-10 NOTE — PROGRESS NOTES
SUBJECTIVE:   Aydin Reilly is a 58 year old male who presents to clinic today for the following health issues:      Joint Pain    Onset: 8/8/18    Description:   Location: right shoulder  Character: Sharp    Intensity: severe    Progression of Symptoms: worse    Accompanying Signs & Symptoms:  Other symptoms: radiation of pain to right fingers, numbness down to fingers (4th and 5th), tingling and weakness of right arm    History:   Previous similar pain: no       Precipitating factors:   Trauma or overuse: YES- moving     Alleviating factors:  Improved by: ice and lidocaine rub    Therapies Tried and outcome: ice, relafen,and Tylenol which don't help much and lidocaine rub which helps some      He has been moving and has been lifting boxes and furniture. No known specific injury.    Problem list and histories reviewed & adjusted, as indicated.  Additional history: as documented    Patient Active Problem List   Diagnosis     H/O malignant neoplasm of rectum, rectosigmoid junction, and anus     Cellulitis of scrotum     Lumbar Radiculopathy, SEE FYI     Back Pain w/ Radiation, SEE FYI     Chronic abdominal pain     Hyperlipidemia with target LDL less than 130     Hypogonadism     Scrotal pain     Erectile dysfunction     Drug abuse, opioid type     GIB (gastrointestinal bleeding)     MAGALY (obstructive sleep apnea)     Generalized anxiety disorder     Urethral stricture     Alcohol abuse     History of urethral stricture     Chronic pain     Edema     Anemia of chronic disease     Other mononeuropathy     Hx SBO     Health Care Home     Benign essential hypertension     Rotator cuff injury, right, initial encounter     Chondritis     Anserine bursitis     Substance abuse     Chronic pain of right knee     Intertriginous candidiasis     Gastroesophageal reflux disease, esophagitis presence not specified     Morbid obesity (H)     Moderate episode of recurrent major depressive disorder (H)     Hallucinations, visual      Cellulitis of scrotum     Past Surgical History:   Procedure Laterality Date     ABDOMEN SURGERY       BACK SURGERY       BIOPSY       BIOPSY       C NONSPECIFIC PROCEDURE  1991    L4-L5 laminectomy; disk herniation     C NONSPECIFIC PROCEDURE  1999    squamous cell CA - anus, 27 xrt/3 rounds, 5-FU/cisplatin     C NONSPECIFIC PROCEDURE      umbilical hernia     C NONSPECIFIC PROCEDURE  2002    cystscopy for urethral stricture from radiation therapy     COLONOSCOPY       COLONOSCOPY  4/18/2014    Procedure: Colonoscopy   polyp bx;  Surgeon: Josef Mckeon MD;  Location:  GI     CYSTOSCOPY, CYSTOGRAM, COMBINED N/A 2/26/2015    Procedure: COMBINED CYSTOSCOPY, CYSTOGRAM;  Surgeon: Darell Barrera MD;  Location: [facility] OR     CYSTOSCOPY, INTERNAL URETHROTOMY, COMBINED N/A 12/19/2014    Procedure: COMBINED CYSTOSCOPY, INTERNAL URETHROTOMY;  Surgeon: Buzz Ren MD;  Location:  OR     CYSTOSTOMY, INSERT TUBE SUPRAPUBIC, COMBINED  12/19/2014    Procedure: COMBINED CYSTOSTOMY, INSERT TUBE SUPRAPUBIC;  Surgeon: Buzz Ren MD;  Location:  OR     CYSTOSTOMY, INSERT TUBE SUPRAPUBIC, COMBINED N/A 12/29/2014    Procedure: COMBINED CYSTOSTOMY, INSERT TUBE SUPRAPUBIC;  Surgeon: Buzz Ren MD;  Location:  OR     ENT SURGERY       ESOPHAGOSCOPY, GASTROSCOPY, DUODENOSCOPY (EGD), COMBINED  10/2/2012    Procedure: COMBINED ESOPHAGOSCOPY, GASTROSCOPY, DUODENOSCOPY (EGD);  COMBINED ESOPHAGOSCOPY, GASTROSCOPY, DUODENOSCOPY (EGD) ;  Surgeon: Raj Beltre MD;  Location:  GI     HERNIA REPAIR       LAPAROSCOPIC LYSIS ADHESIONS N/A 12/4/2015    Procedure: LAPAROSCOPIC LYSIS ADHESIONS;  Surgeon: Herbie Sanchez MD;  Location: [facility] OR     LAPAROTOMY EXPLORATORY N/A 12/4/2015    Procedure: LAPAROTOMY EXPLORATORY;  Surgeon: Herbie Sanchez MD;  Location: [facility] OR     MYRINGOTOMY      in childhood     TONSILLECTOMY and adenoidectomy      in childhood     URETHROPLASTY WITH  BUCCAL GRAFT N/A 3/27/2015    Procedure: URETHROPLASTY WITH BUCCAL GRAFT;  Surgeon: Darell Barrera MD;  Location:  OR       Social History   Substance Use Topics     Smoking status: Never Smoker     Smokeless tobacco: Never Used     Alcohol use 0.0 oz/week     0 Standard drinks or equivalent per week      Comment: quit june 30, 2008/ Quit 1/22/15. drinks daily      Family History   Problem Relation Age of Onset     Adopted: Yes     Unknown/Adopted Mother      Suicide Father      Depression No family hx of      Anxiety Disorder No family hx of      Schizophrenia No family hx of      Bipolar Disorder No family hx of      Substance Abuse No family hx of      Dementia No family hx of      Nora Disease No family hx of      Parkinsonism No family hx of      Autism Spectrum Disorder No family hx of      Intellectual Disability (Mental Retardation) No family hx of      Mental Illness No family hx of          Current Outpatient Prescriptions   Medication Sig Dispense Refill     acetaminophen (TYLENOL) 325 MG tablet Take 2 tablets (650 mg) by mouth every 8 hours as needed for mild pain 100 tablet      amLODIPine (NORVASC) 5 MG tablet Take 1 tablet (5 mg) by mouth daily 90 tablet 1     amoxicillin-clavulanate (AUGMENTIN) 875-125 MG per tablet Take 1 tablet by mouth 2 times daily for 7 days 14 tablet 0     folic acid (FOLVITE) 1 MG tablet Take 1 tablet (1 mg) by mouth daily 30 tablet 0     gabapentin (NEURONTIN) 300 MG capsule Take 1,200 mg by mouth 3 times daily       hydrOXYzine (ATARAX) 25 MG tablet Take 1-2 tablets (25-50 mg) by mouth every 8 hours as needed for itching or anxiety (pain) 20 tablet 0     MELATONIN PO Take 3 mg by mouth nightly as needed Reported on 5/10/2017       multivitamin, therapeutic with minerals (MULTI-VITAMIN) TABS Take 1 tablet by mouth daily       nabumetone (RELAFEN) 500 MG tablet Take 500 mg by mouth daily  0     OLANZapine (ZYPREXA) 5 MG tablet Take 1 tablet (5 mg) by mouth At  Bedtime 30 tablet 0     SERTRALINE HCL PO Take 200-300 mg by mouth daily       traZODone (DESYREL) 50 MG tablet Take 1-3 tablets ( mg) by mouth nightly as needed for sleep 90 tablet 3     Elastic Bandages & Supports (JOBST ACTIVE 20-30MMHG MEDIUM) MISC 1 Device daily as needed Knee high (Patient not taking: Reported on 8/10/2018) 6 each 1     thiamine 100 MG tablet Take 1 tablet (100 mg) by mouth daily (Patient not taking: Reported on 8/10/2018) 30 tablet 0     Allergies   Allergen Reactions     No Known Allergies      Seasonal Allergies        Reviewed and updated as needed this visit by clinical staff  Tobacco  Allergies  Meds  Med Hx  Surg Hx  Fam Hx  Soc Hx      Reviewed and updated as needed this visit by Provider         ROS:  Constitutional, HEENT, cardiovascular, pulmonary, gi and gu systems are negative, except as otherwise noted.    OBJECTIVE:     /72 (BP Location: Right arm, Patient Position: Chair, Cuff Size: Adult Large)  Pulse 96  Temp 98.6  F (37  C) (Oral)  Resp 18  Wt 241 lb 9.6 oz (109.6 kg)  SpO2 97%  BMI 36.74 kg/m2  Body mass index is 36.74 kg/(m^2).  GENERAL: healthy, alert and no distress  EYES: Eyes grossly normal to inspection, PERRL and conjunctivae and sclerae normal  MS: no gross musculoskeletal defects noted, no edema  SKIN: no suspicious lesions or rashes  NEURO: Normal strength and tone, mentation intact and speech normal  PSYCH: mentation appears normal, affect normal/bright  Right shoulder: There is no erythema, edema, or ecchymosis. Tender to palpation along clavicle at about mid-way point. Otherwise non-tender to palpation. Flexion and abduction are limited to about 90 degrees. He is unable to reach behind his back. Internal and external rotation are intact but external rotation causes pain.  strength is equal bilaterally. 5/5 strength bilaterally at 90 degrees of flexion but pain on right side. CMS intact.     Diagnostic Test Results:  Xray - Normal  alignment and no sign of fracture per my read. Await read by radiology.    ASSESSMENT/PLAN:       (M22.735) Acute pain of right shoulder  (primary encounter diagnosis)    Comment: Likely muscular related to moving. He has Flexeril at home and will try this at bedtime.    Plan: XR Shoulder Right G/E 3 Views, XR Clavicle         Right              Patient Instructions   Try taking your Flexeril at bedtime.    Continue to use over the counter medications and Relafen as needed during the day.    Rest the shoulder until feeling better.    Continue to ice for the next 1-2 days, 3-4 times a day for 20 minutes at a day.    After 1-2 days, you can switch to heat.    Follow-up with primary care provider if not improving in 2 weeks or sooner if worsening.      Shoulder Pain with Uncertain Cause  Shoulder pain can have many causes. Pain often comes from the structures that surround the shoulder joint. These are the joint capsule, ligaments, tendons, muscles, and bursa. Pain can also come from cartilage in the joint. Cartilage can become worn out or injured. It s important to know what s causing your pain so the healthcare provider can use the correct treatment. But sometimes it s difficult to find the exact cause of shoulder pain. You may need to see a specialist (orthopedist). You may also need special tests such as a CT scan or MRI. The provider may need to use special tools to look inside the joint (arthroscopy).  Shoulder pain can be treated with a sling or a device that keeps your shoulder from moving. You can take an anti-inflammatory medicine such as ibuprofen to ease pain. You may need to do special shoulder exercises. Follow up with a specialist if the pain is severe or doesn t go away after a few weeks.  Home care  Follow these tips when caring for yourself at home:    If a sling was given to you, leave it in place for the time advised by your healthcare provider. If you aren t sure how long to wear it, ask for  advice. If the sling becomes loose, adjust it so that your forearm is level with the ground. Your shoulder should feel well supported.    Put an ice pack on the injured area for 20 minutes every 1 to 2 hours the first day. You can make your own ice pack by putting ice cubes in a plastic bag. Wrap the bag in a thin towel. Continue with ice packs 3 to 4 times a day for the next 2 days. Then use the pack as needed to ease pain and swelling.    You may use acetaminophen or ibuprofen to control pain, unless another pain medicine was prescribed. If you have chronic liver or kidney disease, talk with your healthcare provider before using these medicines. Also talk with your provider if you ve ever had a stomach ulcer or GI bleeding.    Shoulder pain may seem worse at night, when there is less to distract you from the pain. If you sleep on your side, try to keep weight off your painful shoulder. Propping pillows behind you may stop you from rolling over onto that shoulder during sleep.     Shoulder and elbow joints can become stiff if left in a sling for too long. You should start range of motion exercises about 7 to 10 days after the injury. Talk with your provider to find out what type of exercises to do and how soon to start.    You can take the sling off to shower or bathe.  Follow-up care  Follow up with your healthcare provider if you don t start to get better in the next 5 days.  When to seek medical advice  Call your healthcare provider right away if any of these occur:    Pain or swelling gets worse or continues for more than a few days    Your hand or fingers become cold, blue, numb, or tingly    Large amount of bruising on your shoulder or upper arm    Difficulty moving your hand or fingers    Weakness in your hand or fingers    Your shoulder becomes stiff    It feels like your shoulder is popping out    You are less able to do your daily activities  Date Last Reviewed: 10/1/2016    2908-6996 The \[facility] [place] [facility]\""  [employer], [employer]. [address] [place] [address], [place] [address]. All rights reserved. This information is not intended as a substitute for professional medical care. Always follow your healthcare professional's instructions.            Khris Covington PA-C  [facility]

## 2018-08-10 NOTE — PATIENT INSTRUCTIONS
Try taking your Flexeril at bedtime.    Continue to use over the counter medications and Relafen as needed during the day.    Rest the shoulder until feeling better.    Continue to ice for the next 1-2 days, 3-4 times a day for 20 minutes at a day.    After 1-2 days, you can switch to heat.    Follow-up with primary care provider if not improving in 2 weeks or sooner if worsening.      Shoulder Pain with Uncertain Cause  Shoulder pain can have many causes. Pain often comes from the structures that surround the shoulder joint. These are the joint capsule, ligaments, tendons, muscles, and bursa. Pain can also come from cartilage in the joint. Cartilage can become worn out or injured. It s important to know what s causing your pain so the healthcare provider can use the correct treatment. But sometimes it s difficult to find the exact cause of shoulder pain. You may need to see a specialist (orthopedist). You may also need special tests such as a CT scan or MRI. The provider may need to use special tools to look inside the joint (arthroscopy).  Shoulder pain can be treated with a sling or a device that keeps your shoulder from moving. You can take an anti-inflammatory medicine such as ibuprofen to ease pain. You may need to do special shoulder exercises. Follow up with a specialist if the pain is severe or doesn t go away after a few weeks.  Home care  Follow these tips when caring for yourself at home:    If a sling was given to you, leave it in place for the time advised by your healthcare provider. If you aren t sure how long to wear it, ask for advice. If the sling becomes loose, adjust it so that your forearm is level with the ground. Your shoulder should feel well supported.    Put an ice pack on the injured area for 20 minutes every 1 to 2 hours the first day. You can make your own ice pack by putting ice cubes in a plastic bag. Wrap the bag in a thin towel. Continue with ice packs 3 to 4 times a day for the  next 2 days. Then use the pack as needed to ease pain and swelling.    You may use acetaminophen or ibuprofen to control pain, unless another pain medicine was prescribed. If you have chronic liver or kidney disease, talk with your healthcare provider before using these medicines. Also talk with your provider if you ve ever had a stomach ulcer or GI bleeding.    Shoulder pain may seem worse at night, when there is less to distract you from the pain. If you sleep on your side, try to keep weight off your painful shoulder. Propping pillows behind you may stop you from rolling over onto that shoulder during sleep.     Shoulder and elbow joints can become stiff if left in a sling for too long. You should start range of motion exercises about 7 to 10 days after the injury. Talk with your provider to find out what type of exercises to do and how soon to start.    You can take the sling off to shower or bathe.  Follow-up care  Follow up with your healthcare provider if you don t start to get better in the next 5 days.  When to seek medical advice  Call your healthcare provider right away if any of these occur:    Pain or swelling gets worse or continues for more than a few days    Your hand or fingers become cold, blue, numb, or tingly    Large amount of bruising on your shoulder or upper arm    Difficulty moving your hand or fingers    Weakness in your hand or fingers    Your shoulder becomes stiff    It feels like your shoulder is popping out    You are less able to do your daily activities  Date Last Reviewed: 10/1/2016    4729-5483 [employer]. [address] [place] [address], [place] [address]. All rights reserved. This information is not intended as a substitute for professional medical care. Always follow your healthcare professional's instructions.

## 2018-08-18 ENCOUNTER — OFFICE VISIT (OUTPATIENT)
Dept: FAMILY MEDICINE | Facility: CLINIC | Age: 58
End: 2018-08-18
Payer: COMMERCIAL

## 2018-08-18 VITALS — WEIGHT: 240 LBS | BODY MASS INDEX: 36.49 KG/M2 | HEART RATE: 95 BPM | TEMPERATURE: 98 F | OXYGEN SATURATION: 96 %

## 2018-08-18 DIAGNOSIS — M75.21 BICIPITAL TENDINITIS, RIGHT: Primary | ICD-10-CM

## 2018-08-18 DIAGNOSIS — M19.011 PRIMARY OSTEOARTHRITIS OF RIGHT SHOULDER: ICD-10-CM

## 2018-08-18 PROCEDURE — 99213 OFFICE O/P EST LOW 20 MIN: CPT | Performed by: FAMILY MEDICINE

## 2018-08-18 RX ORDER — NABUMETONE 500 MG/1
500 TABLET, FILM COATED ORAL 2 TIMES DAILY
Qty: 60 TABLET | Refills: 1 | Status: SHIPPED | OUTPATIENT
Start: 2018-08-18 | End: 2018-11-19

## 2018-08-18 NOTE — MR AVS SNAPSHOT
After Visit Summary   8/18/2018    Aydin Reilly    MRN: [id number]           Patient Information     Date Of Birth          1960        Visit Information        Provider Department      8/18/2018 11:45 AM Buzz Loaiza MD [facility]        Today's Diagnoses     Bicipital tendinitis, right    -  1    Primary osteoarthritis of right shoulder          Care Instructions    Patient is already on nabumetone but only taking it once daily.  I refilled the medication today for him to take it twice daily.  He will ice his shoulder down 3-4 times a day.  If he is not seeing some significant improvement over the next couple of weeks he will contact us and I will refer him to orthopedics.  He would prefer the [facility] orthopedic office over by [facility].  He will treat symptomatically otherwise.  He can use the right arm as discomfort allows him.          Follow-ups after your visit        Your next 10 appointments already scheduled     Aug 20, 2018  2:20 PM CDT   Office Visit with Emil Chacon PA-C   [facility] ([facility]    [address] [place] [address], [address]  [facility] [address] [phone]           Bring a current list of meds and any records pertaining to this visit. For Physicals, please bring immunization records and any forms needing to be filled out. Please arrive 10 minutes early to complete paperwork.              Who to contact     If you have questions or need follow up information about today's clinic visit or your schedule please contact [facility] directly at [phone].  Normal or non-critical lab and imaging results will be communicated to you by MyChart, letter or phone within 4 business days after the clinic has received the results. If you do not hear from us within 7 days, please contact the clinic through MyChart or phone. If you have a critical or  abnormal lab result, we will notify you by phone as soon as possible.  Submit refill requests through [employer] or call your pharmacy and they will forward the refill request to us. Please allow 3 business days for your refill to be completed.          Additional Information About Your Visit        [employer] Information     [employer] gives you secure access to your electronic health record. If you see a primary care provider, you can also send messages to your care team and make appointments. If you have questions, please call your primary care clinic.  If you do not have a primary care provider, please call [phone] and they will assist you.        Care EveryWhere ID     This is your Care EveryWhere ID. This could be used by other organizations to access your [facility] medical records  [id number]        Your Vitals Were     Pulse Temperature Pulse Oximetry BMI (Body Mass Index)          95 98  F (36.7  C) (Tympanic) 96% 36.49 kg/m2         Blood Pressure from Last 3 Encounters:   08/10/18 122/72   08/03/18 110/73   06/19/18 122/60    Weight from Last 3 Encounters:   08/18/18 240 lb (108.9 kg)   08/10/18 241 lb 9.6 oz (109.6 kg)   06/19/18 235 lb 12.8 oz (107 kg)              Today, you had the following     No orders found for display         Today's Medication Changes          These changes are accurate as of 8/18/18 12:48 PM.  If you have any questions, ask your nurse or doctor.               These medicines have changed or have updated prescriptions.        Dose/Directions    nabumetone 500 MG tablet   Commonly known as:  RELAFEN   This may have changed:  when to take this   Used for:  Bicipital tendinitis, right   Changed by:  Buzz Loaiza MD        Dose:  500 mg   Take 1 tablet (500 mg) by mouth 2 times daily   Quantity:  60 tablet   Refills:  1            Where to get your medicines      These medications were sent to [facility] [address] [place], [place] - [address],  [facility] [address]     Phone:  [phone]     nabumetone 500 MG tablet                Primary Care Provider Office Phone # Fax #    Adolfo Simmons [phone]  [facility] [address]        Equal Access to Services     JIGAR GARCÍA : Hadii aad ku hadasho Soomaali, waaxda luqadaha, qaybta kaalmada adeegyada, waxay idiin hayaan adejamie marshalljean carloslynn lagini donovan. So [facility] [phone].    ATENCIÓN: Si habla español, tiene a lyn disposición servicios gratuitos de asistencia lingüística. Eva al [phone].    We comply with applicable federal civil rights laws and [place] laws. We do not discriminate on the basis of race, color, national origin, age, disability, sex, sexual orientation, or gender identity.            Thank you!     Thank you for choosing [facility]  for your care. Our goal is always to provide you with excellent care. Hearing back from our patients is one way we can continue to improve our services. Please take a few minutes to complete the written survey that you may receive in the mail after your visit with us. Thank you!             Your Updated Medication List - Protect others around you: Learn how to safely use, store and throw away your medicines at www.disposemymeds.org.          This list is accurate as of 8/18/18 12:48 PM.  Always use your most recent med list.                   Brand Name Dispense Instructions for use Diagnosis    acetaminophen 325 MG tablet    TYLENOL    100 tablet    Take 2 tablets (650 mg) by mouth every 8 hours as needed for mild pain    Chronic pain of right knee       amLODIPine 5 MG tablet    NORVASC    90 tablet    Take 1 tablet (5 mg) by mouth daily    Benign essential hypertension       folic acid 1 MG tablet    FOLVITE    30 tablet    Take 1 tablet (1 mg) by mouth daily    Acute renal failure, unspecified acute renal failure type (H)       hydrOXYzine 25 MG tablet    ATARAX    20 tablet    Take  1-2 tablets (25-50 mg) by mouth every 8 hours as needed for itching or anxiety (pain)    Chronic pain of right knee       JOBST ACTIVE 20-30MMHG MEDIUM Misc     6 each    1 Device daily as needed Knee high    Pedal edema       MELATONIN PO      Take 3 mg by mouth nightly as needed Reported on 5/10/2017        Multi-vitamin Tabs tablet      Take 1 tablet by mouth daily        nabumetone 500 MG tablet    RELAFEN    60 tablet    Take 1 tablet (500 mg) by mouth 2 times daily    Bicipital tendinitis, right       NEURONTIN 300 MG capsule   Generic drug:  gabapentin      Take 1,200 mg by mouth 3 times daily        OLANZapine 5 MG tablet    zyPREXA    30 tablet    Take 1 tablet (5 mg) by mouth At Bedtime    Hallucinations       SERTRALINE HCL PO      Take 200-300 mg by mouth daily        thiamine 100 MG tablet     30 tablet    Take 1 tablet (100 mg) by mouth daily    Acute renal failure, unspecified acute renal failure type (H)       traZODone 50 MG tablet    DESYREL    90 tablet    Take 1-3 tablets ( mg) by mouth nightly as needed for sleep    CONNER (generalized anxiety disorder)

## 2018-08-18 NOTE — PATIENT INSTRUCTIONS
Patient is already on nabumetone but only taking it once daily.  I refilled the medication today for him to take it twice daily.  He will ice his shoulder down 3-4 times a day.  If he is not seeing some significant improvement over the next couple of weeks he will contact us and I will refer him to orthopedics.  He would prefer the [facility] orthopedic office over by [facility].  He will treat symptomatically otherwise.  He can use the right arm as discomfort allows him.

## 2018-08-18 NOTE — PROGRESS NOTES
SUBJECTIVE:   Aydin Reilly is a 58 year old male who presents to clinic today for the following health issues:      Musculoskeletal problem/pain      Duration: 2 weeks    Description  Location: right shoulder    Intensity:  moderate    Accompanying signs and symptoms: pain    History  Previous similar problem: no   Previous evaluation:  x-ray    Precipitating or alleviating factors:  Trauma or overuse: no , not that can think about  Aggravating factors include: overuse    Therapies tried and outcome: nothing          Problem list and histories reviewed & adjusted, as indicated.  Additional history: The patient has seen an orthopedist about his shoulder in the past.  X-rays from last week show some degenerative changes at the AC joint.    Patient Active Problem List   Diagnosis     H/O malignant neoplasm of rectum, rectosigmoid junction, and anus     Cellulitis of scrotum     Lumbar Radiculopathy, SEE FYI     Back Pain w/ Radiation, SEE FYI     Chronic abdominal pain     Hyperlipidemia with target LDL less than 130     Hypogonadism     Scrotal pain     Erectile dysfunction     Drug abuse, opioid type     GIB (gastrointestinal bleeding)     MAGALY (obstructive sleep apnea)     Generalized anxiety disorder     Urethral stricture     Alcohol abuse     History of urethral stricture     Chronic pain     Edema     Anemia of chronic disease     Other mononeuropathy     Hx [facility]     Health Care Home     Benign essential hypertension     Rotator cuff injury, right, initial encounter     Chondritis     Anserine bursitis     Substance abuse     Chronic pain of right knee     Intertriginous candidiasis     Gastroesophageal reflux disease, esophagitis presence not specified     Morbid obesity (H)     Moderate episode of recurrent major depressive disorder (H)     Hallucinations, visual     Cellulitis of scrotum     Past Surgical History:   Procedure Laterality Date     ABDOMEN SURGERY       BACK SURGERY       BIOPSY       BIOPSY        C NONSPECIFIC PROCEDURE  1991    L4-L5 laminectomy; disk herniation     C NONSPECIFIC PROCEDURE  1999    squamous cell CA - anus, 27 xrt/3 rounds, 5-FU/cisplatin     C NONSPECIFIC PROCEDURE      umbilical hernia     C NONSPECIFIC PROCEDURE  2002    cystscopy for urethral stricture from radiation therapy     COLONOSCOPY       COLONOSCOPY  4/18/2014    Procedure: Colonoscopy   polyp bx;  Surgeon: Josef Mckeon MD;  Location:  GI     CYSTOSCOPY, CYSTOGRAM, COMBINED N/A 2/26/2015    Procedure: COMBINED CYSTOSCOPY, CYSTOGRAM;  Surgeon: Darell Barrera MD;  Location: [facility]     CYSTOSCOPY, INTERNAL URETHROTOMY, COMBINED N/A 12/19/2014    Procedure: COMBINED CYSTOSCOPY, INTERNAL URETHROTOMY;  Surgeon: Buzz Ren MD;  Location:  OR     CYSTOSTOMY, INSERT TUBE SUPRAPUBIC, COMBINED  12/19/2014    Procedure: COMBINED CYSTOSTOMY, INSERT TUBE SUPRAPUBIC;  Surgeon: Buzz Ren MD;  Location:  OR     CYSTOSTOMY, INSERT TUBE SUPRAPUBIC, COMBINED N/A 12/29/2014    Procedure: COMBINED CYSTOSTOMY, INSERT TUBE SUPRAPUBIC;  Surgeon: Buzz Ren MD;  Location:  OR     ENT SURGERY       ESOPHAGOSCOPY, GASTROSCOPY, DUODENOSCOPY (EGD), COMBINED  10/2/2012    Procedure: COMBINED ESOPHAGOSCOPY, GASTROSCOPY, DUODENOSCOPY (EGD);  COMBINED ESOPHAGOSCOPY, GASTROSCOPY, DUODENOSCOPY (EGD) ;  Surgeon: Raj Beltre MD;  Location:  GI     HERNIA REPAIR       LAPAROSCOPIC LYSIS ADHESIONS N/A 12/4/2015    Procedure: LAPAROSCOPIC LYSIS ADHESIONS;  Surgeon: Herbie Sanchez MD;  Location:  OR     LAPAROTOMY EXPLORATORY N/A 12/4/2015    Procedure: LAPAROTOMY EXPLORATORY;  Surgeon: Herbie Sanchez MD;  Location:  OR     MYRINGOTOMY      in childhood     TONSILLECTOMY and adenoidectomy      in childhood     URETHROPLASTY WITH BUCCAL GRAFT N/A 3/27/2015    Procedure: URETHROPLASTY WITH BUCCAL GRAFT;  Surgeon: Darell Barrera MD;  Location:  OR       Social  History   Substance Use Topics     Smoking status: Never Smoker     Smokeless tobacco: Never Used     Alcohol use 0.0 oz/week     0 Standard drinks or equivalent per week      Comment: quit june 30, 2008/ Quit 1/22/15. drinks daily      Family History   Problem Relation Age of Onset     Adopted: Yes     Unknown/Adopted Mother      Suicide Father      Schizophrenia No family hx of      Bipolar Disorder No family hx of      Dementia No family hx of      [place] Disease No family hx of      Parkinsonism No family hx of      Autism Spectrum Disorder No family hx of      Intellectual Disability (Mental Retardation) No family hx of            Reviewed and updated as needed this visit by clinical staff  Tobacco  Allergies  Meds       Reviewed and updated as needed this visit by Provider         ROS:  Constitutional, HEENT, cardiovascular, pulmonary, gi and gu systems are negative, except as otherwise noted.    OBJECTIVE:                                                    Pulse 95  Temp 98  F (36.7  C) (Tympanic)  Wt 240 lb (108.9 kg)  SpO2 96%  BMI 36.49 kg/m2  Body mass index is 36.49 kg/(m^2).  GENERAL APPEARANCE: healthy, alert and no distress  ORTHO: On shoulder exam on the right there is decreased range of motion due to discomfort.  There is marked tenderness to palpation over the right bicipital tendon and over the right AC joint.  CMS is intact to both hands.  The left shoulder is unremarkable.  NEURO: Normal strength and tone, mentation intact and speech normal         ASSESSMENT/PLAN:                                                        ICD-10-CM    1. Bicipital tendinitis, right M75.21 nabumetone (RELAFEN) 500 MG tablet   2. Primary osteoarthritis of right shoulder M19.011        Patient Instructions   Patient is already on nabumetone but only taking it once daily.  I refilled the medication today for him to take it twice daily.  He will ice his shoulder down 3-4 times a day.  If he is not seeing some  significant improvement over the next couple of weeks he will contact us and I will refer him to orthopedics.  He would prefer the [facility] orthopedic office over by [facility].  He will treat symptomatically otherwise.  He can use the right arm as discomfort allows him.      Buzz Loaiza MD  [facility]

## 2018-09-09 ENCOUNTER — HOSPITAL ENCOUNTER (EMERGENCY)
Facility: CLINIC | Age: 58
Discharge: HOME | End: 2018-09-10
Attending: EMERGENCY MEDICINE | Admitting: EMERGENCY MEDICINE
Payer: COMMERCIAL

## 2018-09-09 DIAGNOSIS — F10.929 ALCOHOLIC INTOXICATION WITH COMPLICATION (H): ICD-10-CM

## 2018-09-09 PROCEDURE — 96361 HYDRATE IV INFUSION ADD-ON: CPT

## 2018-09-09 PROCEDURE — 96374 THER/PROPH/DIAG INJ IV PUSH: CPT

## 2018-09-09 PROCEDURE — 99285 EMERGENCY DEPT VISIT HI MDM: CPT | Mod: 25

## 2018-09-09 PROCEDURE — 93005 ELECTROCARDIOGRAM TRACING: CPT

## 2018-09-09 NOTE — ED AVS SNAPSHOT
Emergency Department    [address] [address]    Phone:  [phone]    Fax:  [phone]                                       Aydin Reilly   MRN: [id number]    Department:   Emergency Department   Date of Visit:  9/9/2018           Patient Information     Date Of Birth          1960        Your diagnoses for this visit were:     Alcoholic intoxication with complication (H)        You were seen by Kaiser Duran DO.      Follow-up Information     Follow up with Adolfo Simmons MD In 3 days.    Specialty:  Family Practice    Contact information:    [address]  [facility] [address]  [phone]          Follow up with  Emergency Department.    Specialty:  EMERGENCY MEDICINE    Why:  If symptoms worsen    Contact information:    [address] [facility] [id number] [phone]        Discharge Instructions         Alcohol Intoxication  Alcohol intoxication occurs when you drink alcohol faster than your liver can remove it from your system. The following facts are important to remember:    It can take 10 minutes or more to start to feel the effects of a drink, so you can easily get more intoxicated than you intended.    One drink may be more than 1 serving of alcohol. Depending on the drink, it can be 2 to 4 servings.    It takes about an hour for your body to metabolize (clear) 1 serving. If you have more than 1 drink, it can take a couple of hours or more.    Many things affect how drinks will affect you, including whether you ve eaten, how fast you drink, your size, how much you normally drink (or not), medicines you take, chronic diseases you have, and gender.  Signs and symptoms of alcohol poisoning  The following are signs and symptoms of alcohol poisoning:  Mild impairment    Reduced inhibitions    Slurred speech    Drowsiness    Decreased fine motor skills  Moderate impairment    Erratic behavior, aggression,  "depression    Impaired judgment    Confusion    Concentration difficulties    Coordination problems  Severe impairment    Vomiting    Seizures    Unconsciousness    Cold, clammy    Slow or irregular breathing    Hypothermia (low body temperature)    Coma  Health effects  Alcohol abuse causes health problems. Sometimes this can happen after only drinking a  little.\" There is no set number of drinks or amount of alcohol that defines too much. The more you drink at one time, and the more frequently you drink determine both the short-term and long-term health effects. It affects all parts of your body and your health, including your:    Brain. Alcohol is a central nervous system depressant. It can damage parts of the brain that affect your balance, memory, thinking, and emotions. It can cause memory loss, blackouts, depression, agitation, sleep cycle changes, and seizures. These changes may or may not be reversible.    Heart and vascular system. Alcohol affects multiple areas. It can damage heart muscle causing cardiomyopathy, which is a weakening and stretching of the heart muscle. This can lead to trouble breathing, an irregular heartbeat, atrial fibrillation, leg swelling, and heart failure. It makes the blood vessels stiffen causing hypertension (high blood pressure). All of these problems increase your risk of having heart attacks or strokes.    Liver. Alcohol causes fat to build up in the liver, affecting its normal function. This increases the risk for hepatitis, leading to abdominal pain, appetite loss, jaundice, bleeding problems, liver fibrosis, and cirrhosis. This in turn can affect your ability to fight off infections, and can cause diabetes. The liver changes prevent it from removing toxins in your blood that can cause encephalopathy. Signs of this are confusion, altered level of consciousness, personality changes, memory loss, seizures, coma, and death.    Pancreas. Alcohol can cause inflammation of the " pancreas, or pancreatitis. This can cause pain in your abdomen, fever, and diabetes.    Immune system. Alcohol weakens your immune system in a number of ways. It suppresses your immune system making it harder to fight off infections and colds. You will also have a higher risk of certain infections like pneumonia and tuberculosis.    Cancer risk. Alcohol raises your risk of cancer of the mouth, esophagus, pharynx, larynx, liver, and breast.    Sexual function. Alcohol abuse can also lead to sexual problems.  Alcohol use during pregnancy may cause permanent damage to the growing baby.  Home care  The following guidelines will help you care for yourself at home:    Don't drink any more alcohol.    Don't drive until all effects of the alcohol have worn off.    Don't operate machinery that can cause injuries.    Get lots of rest over the next few days. Drink plenty of water and other non-alcoholic liquids. Try to eat regular meals.    If you have been drinking heavily on a daily basis, you may go through alcohol withdrawal. The usual symptoms last 3 to 4 days and may include nervousness, shakiness, nausea, sweating, sleeplessness, and can even cause seizures and a serious withdrawal symptom called delirium tremens, or DTs. During this time, it is best that you stay with family or friends who can help and support you. You can also admit yourself to a residential detox program. If your symptoms are severe (seizures, severe shakiness, confusion), contact your doctor or call an ambulance for help (see below).   Follow-up care  If alcohol is a problem in your life, these are some organizations that can help you:    Alcoholics Anonymous offers support through a self-help fellowship. There are no dues or fees. See the Yellow Pages and call for time and place of meetings. Find AA online at www.aa.org.    [employer] offers support to families of alcohol users. Contact 735-682-8422, or online at www.al-anolazaro.org.    National [place]  on Alcoholism and Drug Dependence can be reached at 325-776-5965, or online at www.ncadd.org.    There are also inpatient and residential alcohol detox programs. Check the Internet or phonebook Yellow Pages under  Drug Abuse and Treatment Centers.   Call 911  Call 911 if any of these occur:    Trouble breathing or slow irregular breathing    Chest pain    Sudden weakness on one side of your body or sudden trouble speaking    Heavy bleeding or vomiting blood    Very drowsy or trouble awakening    Fainting or loss of consciousness    Rapid heart rate    Seizure  When to seek medical advice  Call your healthcare provider right away if any of these occur:    Severe shakiness     Fever of 100.4 F (38 C) or higher, or as directed by your healthcare provider    Confusion or hallucinations (seeing, hearing, or feeling things that are not there)    Pain in your upper abdomen that gets worse    Repeated vomiting  Date Last Reviewed: 6/1/2016 2000-2017 [employer]. [address] [place] [address]. All rights reserved. This information is not intended as a substitute for professional medical care. Always follow your healthcare professional's instructions.          24 Hour Appointment Hotline       To make an appointment at any [facility], call [phone] ([phone]). If you don't have a family doctor or clinic, we will help you find one. [facility] clinics are conveniently located to serve the needs of you and your family.             Review of your medicines      START taking        Dose / Directions Last dose taken    LORazepam 1 MG tablet   Commonly known as:  ATIVAN   Dose:  0.5-1 mg   Quantity:  12 tablet        Take 0.5-1 tablets (0.5-1 mg) by mouth every 8 hours as needed for anxiety   Refills:  0          Our records show that you are taking the medicines listed below. If these are incorrect, please call your family doctor or clinic.        Dose / Directions Last dose taken     acetaminophen 325 MG tablet   Commonly known as:  TYLENOL   Dose:  650 mg   Quantity:  100 tablet        Take 2 tablets (650 mg) by mouth every 8 hours as needed for mild pain   Refills:  0        amLODIPine 5 MG tablet   Commonly known as:  NORVASC   Dose:  5 mg   Quantity:  90 tablet        Take 1 tablet (5 mg) by mouth daily   Refills:  1        folic acid 1 MG tablet   Commonly known as:  FOLVITE   Dose:  1 mg   Quantity:  30 tablet        Take 1 tablet (1 mg) by mouth daily   Refills:  0        hydrOXYzine 25 MG tablet   Commonly known as:  ATARAX   Dose:  25-50 mg   Quantity:  20 tablet        Take 1-2 tablets (25-50 mg) by mouth every 8 hours as needed for itching or anxiety (pain)   Refills:  0        JOBST ACTIVE 20-30MMHG MEDIUM Misc   Dose:  1 Device   Quantity:  6 each        1 Device daily as needed Knee high   Refills:  1        MELATONIN PO   Dose:  3 mg        Take 3 mg by mouth nightly as needed Reported on 5/10/2017   Refills:  0        Multi-vitamin Tabs tablet   Dose:  1 tablet        Take 1 tablet by mouth daily   Refills:  0        nabumetone 500 MG tablet   Commonly known as:  RELAFEN   Dose:  500 mg   Quantity:  60 tablet        Take 1 tablet (500 mg) by mouth 2 times daily   Refills:  1        NEURONTIN 300 MG capsule   Dose:  1200 mg   Generic drug:  gabapentin        Take 1,200 mg by mouth 3 times daily   Refills:  0        OLANZapine 5 MG tablet   Commonly known as:  zyPREXA   Dose:  5 mg   Quantity:  30 tablet        Take 1 tablet (5 mg) by mouth At Bedtime   Refills:  0        SERTRALINE HCL PO   Dose:  200-300 mg        Take 200-300 mg by mouth daily   Refills:  0        thiamine 100 MG tablet   Dose:  100 mg   Quantity:  30 tablet        Take 1 tablet (100 mg) by mouth daily   Refills:  0        traZODone 50 MG tablet   Commonly known as:  DESYREL   Dose:   mg   Quantity:  90 tablet        Take 1-3 tablets ( mg) by mouth nightly as needed for sleep   Refills:  3                 Prescriptions were sent or printed at these locations (1 Prescription)                   Other Prescriptions                Printed at Department/Unit printer (1 of 1)         LORazepam (ATIVAN) 1 MG tablet                Procedures and tests performed during your visit     Alcohol breath test POCT    EKG 12-lead, tracing only      Orders Needing Specimen Collection     None      Pending Results     No orders found for last 3 day(s).            Pending Culture Results     No orders found for last 3 day(s).            Pending Results Instructions     If you had any lab results that were not finalized at the time of your Discharge, you can call the ED Lab Result RN at [phone]. You will be contacted by this team for any positive Lab results or changes in treatment. The nurses are available 7 days a week from 10A to 6:30P.  You can leave a message 24 hours per day and they will return your call.        Test Results From Your Hospital Stay        9/10/2018 12:18 AM      Component Results     Component Value Ref Range & Units Status    Alcohol Breath Test 0.129 (A) 0.00 - 0.01 Final                Clinical Quality Measure: Blood Pressure Screening     Your blood pressure was checked while you were in the emergency department today. The last reading we obtained was  BP: 150/79 . Please read the guidelines below about what these numbers mean and what you should do about them.  If your systolic blood pressure (the top number) is less than 120 and your diastolic blood pressure (the bottom number) is less than 80, then your blood pressure is normal. There is nothing more that you need to do about it.  If your systolic blood pressure (the top number) is 120-139 or your diastolic blood pressure (the bottom number) is 80-89, your blood pressure may be higher than it should be. You should have your blood pressure rechecked within a year by a primary care provider.  If your systolic blood pressure (the top number) is 140  or greater or your diastolic blood pressure (the bottom number) is 90 or greater, you may have high blood pressure. High blood pressure is treatable, but if left untreated over time it can put you at risk for heart attack, stroke, or kidney failure. You should have your blood pressure rechecked by a primary care provider within the next 4 weeks.  If your provider in the emergency department today gave you specific instructions to follow-up with your doctor or provider even sooner than that, you should follow that instruction and not wait for up to 4 weeks for your follow-up visit.        Thank you for choosing [facility]       Thank you for choosing [facility] for your care. Our goal is always to provide you with excellent care. Hearing back from our patients is one way we can continue to improve our services. Please take a few minutes to complete the written survey that you may receive in the mail after you visit with us. Thank you!        [employer] Information     [employer] gives you secure access to your electronic health record. If you see a primary care provider, you can also send messages to your care team and make appointments. If you have questions, please call your primary care clinic.  If you do not have a primary care provider, please call [phone] and they will assist you.        Care EveryWhere ID     This is your Care EveryWhere ID. This could be used by other organizations to access your [facility] medical records  [id number]        Equal Access to Services     JIAGR GARCÍA : Hadii florencio Fraga, waaxda lusuriadaha, qaybta kaalmada sharon, gogo donovan. So [facility] [phone].    ATENCIÓN: Si habla español, tiene a lyn disposición servicios gratuitos de asistencia lingüística. Llame al [phone].    We comply with applicable federal civil rights laws and [place] laws. We do not discriminate on the basis of race, color, national origin, age, disability, sex, sexual  orientation, or gender identity.            After Visit Summary       This is your record. Keep this with you and show to your community pharmacist(s) and doctor(s) at your next visit.

## 2018-09-09 NOTE — ED AVS SNAPSHOT
Emergency Department    [address] [place] [address]    Phone:  [phone]    Fax:  [phone]                                       Aydin Reilly   MRN: [id number]    Department:   Emergency Department   Date of Visit:  9/9/2018           After Visit Summary Signature Page     I have received my discharge instructions, and my questions have been answered. I have discussed any challenges I see with this plan with the nurse or doctor.    ..........................................................................................................................................  Patient/Patient Representative Signature      ..........................................................................................................................................  Patient Representative Print Name and Relationship to Patient    ..................................................               ................................................  Date                                            Time    ..........................................................................................................................................  Reviewed by Signature/Title    ...................................................              ..............................................  Date                                                            Time          22EPIC Rev 08/18

## 2018-09-10 ENCOUNTER — PATIENT OUTREACH (OUTPATIENT)
Dept: CARE COORDINATION | Facility: CLINIC | Age: 58
End: 2018-09-10

## 2018-09-10 VITALS
DIASTOLIC BLOOD PRESSURE: 79 MMHG | RESPIRATION RATE: 20 BRPM | HEART RATE: 121 BPM | TEMPERATURE: 98.3 F | SYSTOLIC BLOOD PRESSURE: 150 MMHG | HEIGHT: 68 IN | WEIGHT: 260 LBS | OXYGEN SATURATION: 95 % | BODY MASS INDEX: 39.4 KG/M2

## 2018-09-10 LAB — ALCOHOL BREATH TEST: 0.13 (ref 0–0.01)

## 2018-09-10 PROCEDURE — 25000132 ZZH RX MED GY IP 250 OP 250 PS 637: Performed by: EMERGENCY MEDICINE

## 2018-09-10 PROCEDURE — 25000128 H RX IP 250 OP 636: Performed by: EMERGENCY MEDICINE

## 2018-09-10 RX ORDER — MAGNESIUM OXIDE 400 MG/1
800 TABLET ORAL ONCE
Status: COMPLETED | OUTPATIENT
Start: 2018-09-10 | End: 2018-09-10

## 2018-09-10 RX ORDER — MULTIVITAMIN,THERAPEUTIC
1 TABLET ORAL ONCE
Status: COMPLETED | OUTPATIENT
Start: 2018-09-10 | End: 2018-09-10

## 2018-09-10 RX ORDER — LORAZEPAM 2 MG/ML
1 INJECTION INTRAMUSCULAR ONCE
Status: COMPLETED | OUTPATIENT
Start: 2018-09-10 | End: 2018-09-10

## 2018-09-10 RX ORDER — LORAZEPAM 1 MG/1
1 TABLET ORAL ONCE
Status: COMPLETED | OUTPATIENT
Start: 2018-09-10 | End: 2018-09-10

## 2018-09-10 RX ORDER — FOLIC ACID 1 MG/1
1 TABLET ORAL ONCE
Status: COMPLETED | OUTPATIENT
Start: 2018-09-10 | End: 2018-09-10

## 2018-09-10 RX ORDER — SODIUM CHLORIDE 9 MG/ML
1000 INJECTION, SOLUTION INTRAVENOUS CONTINUOUS
Status: DISCONTINUED | OUTPATIENT
Start: 2018-09-10 | End: 2018-09-10 | Stop reason: HOSPADM

## 2018-09-10 RX ORDER — LORAZEPAM 1 MG/1
.5-1 TABLET ORAL EVERY 8 HOURS PRN
Qty: 12 TABLET | Refills: 0 | Status: SHIPPED | OUTPATIENT
Start: 2018-09-10 | End: 2019-06-19

## 2018-09-10 RX ORDER — LANOLIN ALCOHOL/MO/W.PET/CERES
100 CREAM (GRAM) TOPICAL ONCE
Status: COMPLETED | OUTPATIENT
Start: 2018-09-10 | End: 2018-09-10

## 2018-09-10 RX ORDER — MAGNESIUM OXIDE 400 MG/1
400 TABLET ORAL DAILY
Status: DISCONTINUED | OUTPATIENT
Start: 2018-09-10 | End: 2018-09-10 | Stop reason: CLARIF

## 2018-09-10 RX ADMIN — Medication 100 MG: at 02:28

## 2018-09-10 RX ADMIN — LORAZEPAM 1 MG: 1 TABLET ORAL at 00:36

## 2018-09-10 RX ADMIN — LORAZEPAM 1 MG: 2 INJECTION INTRAMUSCULAR; INTRAVENOUS at 02:26

## 2018-09-10 RX ADMIN — THERA TABS 1 TABLET: TAB at 02:28

## 2018-09-10 RX ADMIN — FOLIC ACID 1 MG: 1 TABLET ORAL at 02:28

## 2018-09-10 RX ADMIN — Medication 800 MG: at 02:30

## 2018-09-10 RX ADMIN — SODIUM CHLORIDE 1000 ML: 9 INJECTION, SOLUTION INTRAVENOUS at 00:35

## 2018-09-10 ASSESSMENT — ENCOUNTER SYMPTOMS
NERVOUS/ANXIOUS: 1
SHORTNESS OF BREATH: 0
FEVER: 0
NAUSEA: 0
PALPITATIONS: 1
VOMITING: 0
ABDOMINAL PAIN: 0

## 2018-09-10 ASSESSMENT — ACTIVITIES OF DAILY LIVING (ADL): DEPENDENT_IADLS:: INDEPENDENT
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
## 2018-09-10 NOTE — ED NOTES
"Pt is very anxious \"somthings not right\". Complains of headache. Pt had a large loose stool in the restroom.  Up steady able to walk and behavior appropriate to situation. No tremors noted. VS stable.   "

## 2018-09-10 NOTE — LETTER
[place] CARE COORDINATION  [facility]   [address]   [place] [address]    September 11, 2018    Aydin Reilly  [address]    [facility] [address]      Dear Aydin,    I am a clinic care coordinator who works with Adolfo Simmons MD at [facility]. I have been trying to reach you recently to introduce Clinic Care Coordination and to see if there was anything I could assist you with.  I recently tried to call and was unable to reach you. I wanted to introduce myself and provide you with my contact information so that you can call me with questions or concerns about your health care. Below is a description of clinic care coordination and how I can further assist you.     The clinic care coordinator is a registered nurse and/or  who understand the health care system. The goal of clinic care coordination is to help you manage your health and improve access to the [facility] system in the most efficient manner. The registered nurse can assist you in meeting your health care goals by providing education, coordinating services, and strengthening the communication among your providers. The  can assist you with financial, behavioral, psychosocial, chemical dependency, counseling, and/or psychiatric resources.    Please feel free to contact me at [phone], with any questions or concerns. We at [facility] are focused on providing you with the highest-quality healthcare experience possible and that all starts with you.     Sincerely,     Marga Monae Care Coordinator RN  [facility]  [phone]  September 11, 2018

## 2018-09-10 NOTE — PROGRESS NOTES
Clinic Care Coordination Contact   [facility]/Voicemail    Referral Source: IP Handoff  Clinical Data: Care Coordinator Outreach - ER f/u     Patient met criteria for Care Coordination outreach based on number of ER/IP visits and elevated JULIO score 68%     Patient in ER on 9/9/18 with alcohol intoxication with complication   Patient admitted to drinking 1 L of vodka x 9 days prior to ER visit yesterday for palpitations     Outreach attempted x 1. CCRN asked for patient when male answered phone and phone disconnected - returned call and no answer     Plan: Care Coordinator will reach out to patient in 1 business day - if no contact will mail unable to contact letter (CCRN has mailed in the past and most recently approx 1 month ago)     Care Coordinator will try to reach patient again in 1-2 business days.    Marga Monae Care Coordinator RN  [facility]  [phone]  September 10, 2018

## 2018-09-10 NOTE — ED NOTES
Bed: ED01  Expected date: 9/9/18  Expected time: 11:50 PM  Means of arrival: Ambulance  Comments:  Phylicia 536 58M tachycardia

## 2018-09-10 NOTE — ED NOTES
Pt up and able to dress himself states he feels better. Able to eat and drink without vomiting. Discussed home medication. States he will take a cab home.
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
## 2018-09-10 NOTE — DISCHARGE INSTRUCTIONS
"  Alcohol Intoxication  Alcohol intoxication occurs when you drink alcohol faster than your liver can remove it from your system. The following facts are important to remember:    It can take 10 minutes or more to start to feel the effects of a drink, so you can easily get more intoxicated than you intended.    One drink may be more than 1 serving of alcohol. Depending on the drink, it can be 2 to 4 servings.    It takes about an hour for your body to metabolize (clear) 1 serving. If you have more than 1 drink, it can take a couple of hours or more.    Many things affect how drinks will affect you, including whether you ve eaten, how fast you drink, your size, how much you normally drink (or not), medicines you take, chronic diseases you have, and gender.  Signs and symptoms of alcohol poisoning  The following are signs and symptoms of alcohol poisoning:  Mild impairment    Reduced inhibitions    Slurred speech    Drowsiness    Decreased fine motor skills  Moderate impairment    Erratic behavior, aggression, depression    Impaired judgment    Confusion    Concentration difficulties    Coordination problems  Severe impairment    Vomiting    Seizures    Unconsciousness    Cold, clammy    Slow or irregular breathing    Hypothermia (low body temperature)    Coma  Health effects  Alcohol abuse causes health problems. Sometimes this can happen after only drinking a  little.\" There is no set number of drinks or amount of alcohol that defines too much. The more you drink at one time, and the more frequently you drink determine both the short-term and long-term health effects. It affects all parts of your body and your health, including your:    Brain. Alcohol is a central nervous system depressant. It can damage parts of the brain that affect your balance, memory, thinking, and emotions. It can cause memory loss, blackouts, depression, agitation, sleep cycle changes, and seizures. These changes may or may not be " reversible.    Heart and vascular system. Alcohol affects multiple areas. It can damage heart muscle causing cardiomyopathy, which is a weakening and stretching of the heart muscle. This can lead to trouble breathing, an irregular heartbeat, atrial fibrillation, leg swelling, and heart failure. It makes the blood vessels stiffen causing hypertension (high blood pressure). All of these problems increase your risk of having heart attacks or strokes.    Liver. Alcohol causes fat to build up in the liver, affecting its normal function. This increases the risk for hepatitis, leading to abdominal pain, appetite loss, jaundice, bleeding problems, liver fibrosis, and cirrhosis. This in turn can affect your ability to fight off infections, and can cause diabetes. The liver changes prevent it from removing toxins in your blood that can cause encephalopathy. Signs of this are confusion, altered level of consciousness, personality changes, memory loss, seizures, coma, and death.    Pancreas. Alcohol can cause inflammation of the pancreas, or pancreatitis. This can cause pain in your abdomen, fever, and diabetes.    Immune system. Alcohol weakens your immune system in a number of ways. It suppresses your immune system making it harder to fight off infections and colds. You will also have a higher risk of certain infections like pneumonia and tuberculosis.    Cancer risk. Alcohol raises your risk of cancer of the mouth, esophagus, pharynx, larynx, liver, and breast.    Sexual function. Alcohol abuse can also lead to sexual problems.  Alcohol use during pregnancy may cause permanent damage to the growing baby.  Home care  The following guidelines will help you care for yourself at home:    Don't drink any more alcohol.    Don't drive until all effects of the alcohol have worn off.    Don't operate machinery that can cause injuries.    Get lots of rest over the next few days. Drink plenty of water and other non-alcoholic liquids.  Try to eat regular meals.    If you have been drinking heavily on a daily basis, you may go through alcohol withdrawal. The usual symptoms last 3 to 4 days and may include nervousness, shakiness, nausea, sweating, sleeplessness, and can even cause seizures and a serious withdrawal symptom called delirium tremens, or DTs. During this time, it is best that you stay with family or friends who can help and support you. You can also admit yourself to a residential detox program. If your symptoms are severe (seizures, severe shakiness, confusion), contact your doctor or call an ambulance for help (see below).   Follow-up care  If alcohol is a problem in your life, these are some organizations that can help you:    Alcoholics Anonymous offers support through a self-help fellowship. There are no dues or fees. See the Yellow Pages and call for time and place of meetings. Find AA online at www.aa.org.    [employer] offers support to families of alcohol users. Contact 811-064-9890, or online at www.al-anolazaro.org.    National [place] on Alcoholism and Drug Dependence can be reached at 830-201-7277, or online at www.ncadd.org.    There are also inpatient and residential alcohol detox programs. Check the Internet or phonebook Yellow Pages under  Drug Abuse and Treatment Centers.   Call 911  Call 911 if any of these occur:    Trouble breathing or slow irregular breathing    Chest pain    Sudden weakness on one side of your body or sudden trouble speaking    Heavy bleeding or vomiting blood    Very drowsy or trouble awakening    Fainting or loss of consciousness    Rapid heart rate    Seizure  When to seek medical advice  Call your healthcare provider right away if any of these occur:    Severe shakiness     Fever of 100.4 F (38 C) or higher, or as directed by your healthcare provider    Confusion or hallucinations (seeing, hearing, or feeling things that are not there)    Pain in your upper abdomen that gets worse    Repeated  vomiting  Date Last Reviewed: 6/1/2016 2000-2017 [employer]. [address] [place] [address], [place] [address]. All rights reserved. This information is not intended as a substitute for professional medical care. Always follow your healthcare professional's instructions.

## 2018-09-10 NOTE — ED NOTES
Pt up to restroom. He has diarrhea and states he has it frequently because of multiple abd surgeries

## 2018-09-11 ASSESSMENT — ACTIVITIES OF DAILY LIVING (ADL): DEPENDENT_IADLS:: INDEPENDENT

## 2018-09-11 NOTE — PROGRESS NOTES
Clinic Care Coordination Contact  [facility]/Voicemail    Referral Source: ED Follow-Up  Clinical Data: Care Coordinator Outreach -   Patient met criteria for Care Coordination outreach based on number of ER/IP visits and elevated JULIO score 68%     Patient in ER on 9/9/18 with alcohol intoxication with complication   Patient admitted to drinking 1 L of vodka x 9 days prior to ER visit yesterday for palpitations     Outreach attempted x 2. Unable to leave message as voicemail is full and unable to accept messages.     Plan: Care Coordinator will mail unable to contact letter. Care Coordinator will do no further outreaches at this time.    Marga Monae Care Coordinator RN  [facility]  [phone]  September 11, 2018

## 2018-09-18 ENCOUNTER — OFFICE VISIT (OUTPATIENT)
Dept: FAMILY MEDICINE | Facility: CLINIC | Age: 58
End: 2018-09-18
Payer: COMMERCIAL

## 2018-09-18 ENCOUNTER — RADIANT APPOINTMENT (OUTPATIENT)
Dept: GENERAL RADIOLOGY | Facility: CLINIC | Age: 58
End: 2018-09-18
Attending: PHYSICIAN ASSISTANT
Payer: COMMERCIAL

## 2018-09-18 VITALS
BODY MASS INDEX: 35.28 KG/M2 | WEIGHT: 232 LBS | RESPIRATION RATE: 16 BRPM | TEMPERATURE: 98.1 F | SYSTOLIC BLOOD PRESSURE: 130 MMHG | DIASTOLIC BLOOD PRESSURE: 80 MMHG | HEART RATE: 94 BPM

## 2018-09-18 DIAGNOSIS — S42.021D CLOSED DISPLACED FRACTURE OF SHAFT OF RIGHT CLAVICLE WITH ROUTINE HEALING, SUBSEQUENT ENCOUNTER: ICD-10-CM

## 2018-09-18 DIAGNOSIS — E66.01 MORBID OBESITY (H): ICD-10-CM

## 2018-09-18 DIAGNOSIS — F33.1 MODERATE EPISODE OF RECURRENT MAJOR DEPRESSIVE DISORDER (H): ICD-10-CM

## 2018-09-18 DIAGNOSIS — F10.10 ALCOHOL ABUSE: ICD-10-CM

## 2018-09-18 DIAGNOSIS — H65.91 OME (OTITIS MEDIA WITH EFFUSION), RIGHT: ICD-10-CM

## 2018-09-18 DIAGNOSIS — T78.3XXA ANGIOEDEMA, INITIAL ENCOUNTER: Primary | ICD-10-CM

## 2018-09-18 PROCEDURE — 99214 OFFICE O/P EST MOD 30 MIN: CPT | Performed by: PHYSICIAN ASSISTANT

## 2018-09-18 PROCEDURE — 73000 X-RAY EXAM OF COLLAR BONE: CPT | Mod: RT

## 2018-09-18 RX ORDER — PREDNISONE 20 MG/1
40 TABLET ORAL ONCE
Qty: 2 TABLET | Refills: 3 | Status: SHIPPED | OUTPATIENT
Start: 2018-09-18 | End: 2018-09-18

## 2018-09-18 RX ORDER — AMOXICILLIN 500 MG/1
500 CAPSULE ORAL 3 TIMES DAILY
Qty: 30 CAPSULE | Refills: 0 | Status: SHIPPED | OUTPATIENT
Start: 2018-09-18 | End: 2018-10-17

## 2018-09-18 ASSESSMENT — ENCOUNTER SYMPTOMS
GASTROINTESTINAL NEGATIVE: 1
ROS SKIN COMMENTS: AS IN HPI
ENDOCRINE NEGATIVE: 1
NEUROLOGICAL NEGATIVE: 1
CARDIOVASCULAR NEGATIVE: 1
PSYCHIATRIC NEGATIVE: 1
EYES NEGATIVE: 1

## 2018-09-18 NOTE — MR AVS SNAPSHOT
After Visit Summary   9/18/2018    Aydin Reilly    MRN: [id number]           Patient Information     Date Of Birth          1960        Visit Information        Provider Department      9/18/2018 11:50 AM Mandy Pabon PA-C [facility]        Today's Diagnoses     Angioedema, initial encounter    -  1    OME (otitis media with effusion), right        Closed displaced fracture of shaft of right clavicle with routine healing, subsequent encounter        Alcohol abuse        Morbid obesity (H)        Moderate episode of recurrent major depressive disorder (H)           Follow-ups after your visit        Who to contact     If you have questions or need follow up information about today's clinic visit or your schedule please contact [facility] directly at [phone].  Normal or non-critical lab and imaging results will be communicated to you by MyChart, letter or phone within 4 business days after the clinic has received the results. If you do not hear from us within 7 days, please contact the clinic through MyChart or phone. If you have a critical or abnormal lab result, we will notify you by phone as soon as possible.  Submit refill requests through [employer] or call your pharmacy and they will forward the refill request to us. Please allow 3 business days for your refill to be completed.          Additional Information About Your Visit        MyChart Information     [employer] gives you secure access to your electronic health record. If you see a primary care provider, you can also send messages to your care team and make appointments. If you have questions, please call your primary care clinic.  If you do not have a primary care provider, please call [phone] and they will assist you.        Care EveryWhere ID     This is your Care EveryWhere ID. This could be used by other organizations to access your [facility]  records  [id number]        Your Vitals Were     Pulse Temperature Respirations BMI (Body Mass Index)          94 98.1  F (36.7  C) (Tympanic) 16 35.28 kg/m2         Blood Pressure from Last 3 Encounters:   09/18/18 130/80   09/10/18 150/79   08/10/18 122/72    Weight from Last 3 Encounters:   09/18/18 232 lb (105.2 kg)   09/10/18 260 lb (117.9 kg)   08/18/18 240 lb (108.9 kg)              We Performed the Following     XR Clavicle Right          Today's Medication Changes          These changes are accurate as of 9/18/18  3:10 PM.  If you have any questions, ask your nurse or doctor.               Start taking these medicines.        Dose/Directions    amoxicillin 500 MG capsule   Commonly known as:  AMOXIL   Used for:  OME (otitis media with effusion), right   Started by:  Mandy Pabon PA-C        Dose:  500 mg   Take 1 capsule (500 mg) by mouth 3 times daily   Quantity:  30 capsule   Refills:  0       predniSONE 20 MG tablet   Commonly known as:  DELTASONE   Used for:  Angioedema, initial encounter   Started by:  Mandy Pabon PA-C        Dose:  40 mg   Take 2 tablets (40 mg) by mouth once for 1 dose Use for lip swelling   Quantity:  2 tablet   Refills:  3            Where to get your medicines      These medications were sent to [facility] [address] [place] [address] [address]     Phone:  [phone]     amoxicillin 500 MG capsule    predniSONE 20 MG tablet                Primary Care Provider Office Phone # Fax #    Adolfo Simmons [phone]       [id number]  [facility] [address]        Equal Access to Services     [facility] AH: Hadii aad ku hadasho Soomaali, waaxda luqadaha, qaybta kaalmada adeegyada, gogo donovan. So [facility] [phone].    ATENCIÓN: Si habla español, tiene a lyn disposición servicios gratuitos de asistencia lingüística. Llame al [phone].    We comply with  applicable federal civil rights laws and [place] laws. We do not discriminate on the basis of race, color, national origin, age, disability, sex, sexual orientation, or gender identity.            Thank you!     Thank you for choosing [facility]  for your care. Our goal is always to provide you with excellent care. Hearing back from our patients is one way we can continue to improve our services. Please take a few minutes to complete the written survey that you may receive in the mail after your visit with us. Thank you!             Your Updated Medication List - Protect others around you: Learn how to safely use, store and throw away your medicines at www.disposemymeds.org.          This list is accurate as of 9/18/18  3:10 PM.  Always use your most recent med list.                   Brand Name Dispense Instructions for use Diagnosis    acetaminophen 325 MG tablet    TYLENOL    100 tablet    Take 2 tablets (650 mg) by mouth every 8 hours as needed for mild pain    Chronic pain of right knee       amLODIPine 5 MG tablet    NORVASC    90 tablet    Take 1 tablet (5 mg) by mouth daily    Benign essential hypertension       amoxicillin 500 MG capsule    AMOXIL    30 capsule    Take 1 capsule (500 mg) by mouth 3 times daily    OME (otitis media with effusion), right       folic acid 1 MG tablet    FOLVITE    30 tablet    Take 1 tablet (1 mg) by mouth daily    Acute renal failure, unspecified acute renal failure type (H)       hydrOXYzine 25 MG tablet    ATARAX    20 tablet    Take 1-2 tablets (25-50 mg) by mouth every 8 hours as needed for itching or anxiety (pain)    Chronic pain of right knee       JOBST ACTIVE 20-30MMHG MEDIUM Misc     6 each    1 Device daily as needed Knee high    Pedal edema       LORazepam 1 MG tablet    ATIVAN    12 tablet    Take 0.5-1 tablets (0.5-1 mg) by mouth every 8 hours as needed for anxiety        MELATONIN PO      Take 3 mg by mouth nightly as needed  Reported on 5/10/2017        Multi-vitamin Tabs tablet      Take 1 tablet by mouth daily        nabumetone 500 MG tablet    RELAFEN    60 tablet    Take 1 tablet (500 mg) by mouth 2 times daily    Bicipital tendinitis, right       NEURONTIN 300 MG capsule   Generic drug:  gabapentin      Take 1,200 mg by mouth 3 times daily        OLANZapine 5 MG tablet    zyPREXA    30 tablet    Take 1 tablet (5 mg) by mouth At Bedtime    Hallucinations       predniSONE 20 MG tablet    DELTASONE    2 tablet    Take 2 tablets (40 mg) by mouth once for 1 dose Use for lip swelling    Angioedema, initial encounter       SERTRALINE HCL PO      Take 200-300 mg by mouth daily        thiamine 100 MG tablet     30 tablet    Take 1 tablet (100 mg) by mouth daily    Acute renal failure, unspecified acute renal failure type (H)       traZODone 50 MG tablet    DESYREL    90 tablet    Take 1-3 tablets ( mg) by mouth nightly as needed for sleep    CONNER (generalized anxiety disorder)

## 2018-09-18 NOTE — Clinical Note
I feel like with the amount of things we addressed at this visit, it is a level 5.  Let me know what you think.

## 2018-09-18 NOTE — PROGRESS NOTES
SUBJECTIVE:   Aydin Reilly is a 58 year old male who presents to clinic today for the following health issues:      Ear Problem      Duration: 3 days    Description (location/character/radiation): rt ear    Intensity:  moderate    Accompanying signs and symptoms: started itching now has shooting pain on and off    History (similar episodes/previous evaluation):     Precipitating or alleviating factors: frequent ear infections as a child    Therapies tried and outcome: ibuprofen     He has had a cold with congestion for the past 2 days as well, correlating with this ear pain      Musculoskeletal problem/pain      Duration: 6 weeks?    Description  Location: right shoulder/collarbone    Intensity:  moderate    Accompanying signs and symptoms: pain with right arm movement    History  Previous similar problem: no   Previous evaluation:  x-ray (negative for fracture on 8/10/2018)    Precipitating or alleviating factors:  Trauma or overuse: unknown (patient has history of alcohol intoxication)  Aggravating factors include: lifting and overuse    Therapies tried and outcome: nothing    Lip swelling    This started this morning.  He thinks it may be from exposure to cleaning products.  This has happened in the past  No difficulty breathing  No rash   Upper and lower lip swelling, started quickly  Improvement with Benadryl    Problem list and histories reviewed & adjusted, as indicated.  Additional history: as documented    Patient Active Problem List   Diagnosis     H/O malignant neoplasm of rectum, rectosigmoid junction, and anus     Cellulitis of scrotum     Lumbar Radiculopathy, SEE FYI     Back Pain w/ Radiation, SEE FYI     Chronic abdominal pain     Hyperlipidemia with target LDL less than 130     Hypogonadism     Scrotal pain     Erectile dysfunction     Drug abuse, opioid type     GIB (gastrointestinal bleeding)     MAGALY (obstructive sleep apnea)     Generalized anxiety disorder     Urethral stricture      Alcohol abuse     History of urethral stricture     Chronic pain     Edema     Anemia of chronic disease     Other mononeuropathy     Hx SBO     Health Care Home     Benign essential hypertension     Rotator cuff injury, right, initial encounter     Chondritis     Anserine bursitis     Substance abuse     Chronic pain of right knee     Intertriginous candidiasis     Gastroesophageal reflux disease, esophagitis presence not specified     Morbid obesity (H)     Moderate episode of recurrent major depressive disorder (H)     Hallucinations, visual     Cellulitis of scrotum     Past Surgical History:   Procedure Laterality Date     ABDOMEN SURGERY       BACK SURGERY       BIOPSY       BIOPSY       C NONSPECIFIC PROCEDURE  1991    L4-L5 laminectomy; disk herniation     C NONSPECIFIC PROCEDURE  1999    squamous cell CA - anus, 27 xrt/3 rounds, 5-FU/cisplatin     C NONSPECIFIC PROCEDURE      umbilical hernia     C NONSPECIFIC PROCEDURE  2002    cystscopy for urethral stricture from radiation therapy     COLONOSCOPY       COLONOSCOPY  4/18/2014    Procedure: Colonoscopy   polyp bx;  Surgeon: Josef Mckeon MD;  Location:  GI     CYSTOSCOPY, CYSTOGRAM, COMBINED N/A 2/26/2015    Procedure: COMBINED CYSTOSCOPY, CYSTOGRAM;  Surgeon: Darell Barrera MD;  Location: [facility] OR     CYSTOSCOPY, INTERNAL URETHROTOMY, COMBINED N/A 12/19/2014    Procedure: COMBINED CYSTOSCOPY, INTERNAL URETHROTOMY;  Surgeon: Buzz Ren MD;  Location:  OR     CYSTOSTOMY, INSERT TUBE SUPRAPUBIC, COMBINED  12/19/2014    Procedure: COMBINED CYSTOSTOMY, INSERT TUBE SUPRAPUBIC;  Surgeon: Buzz Ren MD;  Location:  OR     CYSTOSTOMY, INSERT TUBE SUPRAPUBIC, COMBINED N/A 12/29/2014    Procedure: COMBINED CYSTOSTOMY, INSERT TUBE SUPRAPUBIC;  Surgeon: Buzz Ren MD;  Location:  OR     ENT SURGERY       ESOPHAGOSCOPY, GASTROSCOPY, DUODENOSCOPY (EGD), COMBINED  10/2/2012    Procedure: COMBINED ESOPHAGOSCOPY,  GASTROSCOPY, DUODENOSCOPY (EGD);  COMBINED ESOPHAGOSCOPY, GASTROSCOPY, DUODENOSCOPY (EGD) ;  Surgeon: Raj Beltre MD;  Location: [facility] GI     HERNIA REPAIR       LAPAROSCOPIC LYSIS ADHESIONS N/A 12/4/2015    Procedure: LAPAROSCOPIC LYSIS ADHESIONS;  Surgeon: Herbie Sanchez MD;  Location: [facility] OR     LAPAROTOMY EXPLORATORY N/A 12/4/2015    Procedure: LAPAROTOMY EXPLORATORY;  Surgeon: Herbie Sanchez MD;  Location: [facility] OR     MYRINGOTOMY      in childhood     TONSILLECTOMY and adenoidectomy      in childhood     URETHROPLASTY WITH BUCCAL GRAFT N/A 3/27/2015    Procedure: URETHROPLASTY WITH BUCCAL GRAFT;  Surgeon: Darell Barrera MD;  Location: [facility] OR       Social History   Substance Use Topics     Smoking status: Never Smoker     Smokeless tobacco: Never Used     Alcohol use 0.0 oz/week     0 Standard drinks or equivalent per week      Comment: quit june 30, 2008/ Quit 1/22/15. drinks daily      Family History   Problem Relation Age of Onset     Adopted: Yes     Unknown/Adopted Mother      Suicide Father      Schizophrenia No family hx of      Bipolar Disorder No family hx of      Dementia No family hx of      Nora Disease No family hx of      Parkinsonism No family hx of      Autism Spectrum Disorder No family hx of      Intellectual Disability (Mental Retardation) No family hx of          Current Outpatient Prescriptions   Medication Sig Dispense Refill     acetaminophen (TYLENOL) 325 MG tablet Take 2 tablets (650 mg) by mouth every 8 hours as needed for mild pain 100 tablet      amLODIPine (NORVASC) 5 MG tablet Take 1 tablet (5 mg) by mouth daily 90 tablet 1     amoxicillin (AMOXIL) 500 MG capsule Take 1 capsule (500 mg) by mouth 3 times daily 30 capsule 0     Elastic Bandages & Supports (JOBST ACTIVE 20-30MMHG MEDIUM) MISC 1 Device daily as needed Knee high 6 each 1     folic acid (FOLVITE) 1 MG tablet Take 1 tablet (1 mg) by mouth daily 30 tablet 0     gabapentin (NEURONTIN) 300 MG  capsule Take 1,200 mg by mouth 3 times daily       hydrOXYzine (ATARAX) 25 MG tablet Take 1-2 tablets (25-50 mg) by mouth every 8 hours as needed for itching or anxiety (pain) 20 tablet 0     MELATONIN PO Take 3 mg by mouth nightly as needed Reported on 5/10/2017       multivitamin, therapeutic with minerals (MULTI-VITAMIN) TABS Take 1 tablet by mouth daily       nabumetone (RELAFEN) 500 MG tablet Take 1 tablet (500 mg) by mouth 2 times daily 60 tablet 1     OLANZapine (ZYPREXA) 5 MG tablet Take 1 tablet (5 mg) by mouth At Bedtime 30 tablet 0     predniSONE (DELTASONE) 20 MG tablet Take 2 tablets (40 mg) by mouth once for 1 dose Use for lip swelling 2 tablet 3     SERTRALINE HCL PO Take 200-300 mg by mouth daily       thiamine 100 MG tablet Take 1 tablet (100 mg) by mouth daily 30 tablet 0     traZODone (DESYREL) 50 MG tablet Take 1-3 tablets ( mg) by mouth nightly as needed for sleep 90 tablet 3     LORazepam (ATIVAN) 1 MG tablet Take 0.5-1 tablets (0.5-1 mg) by mouth every 8 hours as needed for anxiety (Patient not taking: Reported on 9/18/2018) 12 tablet 0     Allergies   Allergen Reactions     No Known Allergies      Seasonal Allergies        Reviewed and updated as needed this visit by clinical staff  Tobacco  Allergies  Meds  Med Hx  Surg Hx  Fam Hx  Soc Hx      Reviewed and updated as needed this visit by Provider         Review of Systems   Constitutional:        As in HPI   HENT:        As in HPI   Eyes: Negative.    Respiratory:        As in HPI   Cardiovascular: Negative.    Gastrointestinal: Negative.    Endocrine: Negative.    Genitourinary: Negative.    Musculoskeletal:        As in HPI   Skin:        As in HPI   Neurological: Negative.    Psychiatric/Behavioral: Negative.        OBJECTIVE:     /80  Pulse 94  Temp 98.1  F (36.7  C) (Tympanic)  Resp 16  Wt 232 lb (105.2 kg)  BMI 35.28 kg/m2  Body mass index is 35.28 kg/(m^2).    Physical Exam   Constitutional: He is oriented to  person, place, and time. He appears well-developed and well-nourished. No distress.   HENT:   Head: Normocephalic.   Right Ear: External ear normal. Tympanic membrane is scarred and erythematous. A middle ear effusion is present.   Left Ear: External ear normal. Tympanic membrane is scarred.   Nose: Rhinorrhea present. Right sinus exhibits no maxillary sinus tenderness and no frontal sinus tenderness. Left sinus exhibits no maxillary sinus tenderness and no frontal sinus tenderness.   Mouth/Throat: Uvula is midline. No uvula swelling. No oropharyngeal exudate, posterior oropharyngeal edema or posterior oropharyngeal erythema.   Right upper lip and left lower lip are swollen, no erythema.     Eyes: Conjunctivae and EOM are normal.   Neck: Normal range of motion.   Pulmonary/Chest: Effort normal.   Musculoskeletal:        Right shoulder: He exhibits decreased range of motion, bony tenderness (Right midclavicle, palpable bony deformity. ), swelling (Midclavicle) and deformity.   Neurological: He is alert and oriented to person, place, and time.   Skin: No rash noted. He is not diaphoretic.   Psychiatric: He has a normal mood and affect. Judgment normal.     XR right clavicle -   My findings: midshaft displaced shortened right clavicle fracture with bony callus formation visualized superior to the fracture.  Appears to be an old fracture.  When compared to films on 8/10/2018 - the fracture is new.     No results found. However, due to the size of the patient record, not all encounters were searched. Please check Results Review for a complete set of results.    ASSESSMENT/PLAN:       ICD-10-CM    1. Angioedema, initial encounter T78.3XXA predniSONE (DELTASONE) 20 MG tablet   2. OME (otitis media with effusion), right H65.91 amoxicillin (AMOXIL) 500 MG capsule   3. Closed displaced fracture of shaft of right clavicle with routine healing, subsequent encounter S42.021D XR Clavicle Right   4. Alcohol abuse F10.10    5.  Morbid obesity (H) E66.01    6. Moderate episode of recurrent major depressive disorder (H) F33.1       1. 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  Patient was given an Rx for prednisone.  He will take this today.  Also, he should always have 40 mg of prednisone and 50 mg Benadryl on hand in case this happens in the future.  If he ever has airway compromise, he will go to the ED right away.     2. For the right ear, amoxicillin Rx sent    3. For the clavicle fracture.  He is a poor candidate for surgery due to alcoholism and delayed diagnosis of the fracture.  I recommend activities as tolerated.  He will have a deformity at the right clavicle, which may improve with time but will not go away.  This is likely to heal in place, there is a small chance it will cause ongoing pain.     There are no Patient Instructions on file for this visit.    Georges Pabon PA-C  [facility]

## 2018-10-14 DIAGNOSIS — G58.8 OTHER MONONEUROPATHY: Primary | ICD-10-CM

## 2018-10-15 RX ORDER — GABAPENTIN 300 MG/1
CAPSULE ORAL
Qty: 360 CAPSULE | Refills: 0 | Status: SHIPPED | OUTPATIENT
Start: 2018-10-15 | End: 2018-11-19

## 2018-10-15 NOTE — TELEPHONE ENCOUNTER
gabapentin (NEURONTIN) 300 MG capsule       (Historical)        Last Office Visit with [facility] or [facility] prescribing provider: 9/18/2018  Future Office Visit:            Creatinine   Date Value Ref Range Status   08/02/2018 1.19 0.66 - 1.25 mg/dL Final     Lab Results   Component Value Date    AST 27 08/01/2018     Lab Results   Component Value Date    ALT 23 08/01/2018     BP Readings from Last 3 Encounters:   09/18/18 130/80   09/10/18 150/79   08/10/18 122/72         Routing refill request to provider for review/approval because:  Drug not on the [facility] or [employer] refill protocol or controlled substance    GRACIELA Flores  October 15, 2018  9:28 AM

## 2018-10-17 ENCOUNTER — OFFICE VISIT (OUTPATIENT)
Dept: FAMILY MEDICINE | Facility: CLINIC | Age: 58
End: 2018-10-17
Payer: COMMERCIAL

## 2018-10-17 ENCOUNTER — RADIANT APPOINTMENT (OUTPATIENT)
Dept: GENERAL RADIOLOGY | Facility: CLINIC | Age: 58
End: 2018-10-17
Attending: PHYSICIAN ASSISTANT
Payer: COMMERCIAL

## 2018-10-17 VITALS
WEIGHT: 228 LBS | HEART RATE: 92 BPM | RESPIRATION RATE: 16 BRPM | TEMPERATURE: 98 F | BODY MASS INDEX: 34.67 KG/M2 | DIASTOLIC BLOOD PRESSURE: 80 MMHG | SYSTOLIC BLOOD PRESSURE: 134 MMHG

## 2018-10-17 DIAGNOSIS — S42.021D CLOSED DISPLACED FRACTURE OF SHAFT OF RIGHT CLAVICLE WITH ROUTINE HEALING, SUBSEQUENT ENCOUNTER: ICD-10-CM

## 2018-10-17 DIAGNOSIS — N63.0 LUMP OR MASS IN BREAST: ICD-10-CM

## 2018-10-17 DIAGNOSIS — M54.50 CHRONIC LEFT-SIDED LOW BACK PAIN WITHOUT SCIATICA: Primary | ICD-10-CM

## 2018-10-17 DIAGNOSIS — G89.29 CHRONIC LEFT-SIDED LOW BACK PAIN WITHOUT SCIATICA: Primary | ICD-10-CM

## 2018-10-17 PROCEDURE — 73000 X-RAY EXAM OF COLLAR BONE: CPT | Mod: RT

## 2018-10-17 PROCEDURE — 99214 OFFICE O/P EST MOD 30 MIN: CPT | Performed by: PHYSICIAN ASSISTANT

## 2018-10-17 ASSESSMENT — ENCOUNTER SYMPTOMS
RESPIRATORY NEGATIVE: 1
PSYCHIATRIC NEGATIVE: 1
GASTROINTESTINAL NEGATIVE: 1
EYES NEGATIVE: 1
CARDIOVASCULAR NEGATIVE: 1
CONSTITUTIONAL NEGATIVE: 1
NEUROLOGICAL NEGATIVE: 1

## 2018-10-17 NOTE — PROGRESS NOTES
SUBJECTIVE:   Aydin Reilly is a 58 year old male who presents to clinic today for the following health issues:    Musculoskeletal problem/pain      Duration: 6 months    Description  Location: low back - lt side    Intensity:  Mild 1/10    Accompanying signs and symptoms: none    History  Previous similar problem: no   Previous evaluation:  none    Precipitating or alleviating factors:  Trauma or overuse: no   Aggravating factors include: sitting and reaching for things    Therapies tried and outcome: stretching and Ibuprofen    Patient has a history of chronic back pain  Denies sciatica  Has done PT in the past but not consistently    Musculoskeletal problem/pain      Duration: 2 months    Description  Location:right clavicle fracture - need to recheck today     Intensity:  0/10 pain    Accompanying signs and symptoms: tingling - right arm since previous shoulder XR when arm was placed above his head    History  Previous similar problem: no   Previous evaluation:  x-ray    Precipitating or alleviating factors:  Trauma or overuse: YES  Aggravating factors include: none    Therapies tried and outcome: rest/inactivity      Breast pain and lump - patient has new bilateral breast pain       Duration: 2 months    Description  Location: under the nipples with a deep lump in both breasts    Intensity:  moderate    Accompanying signs and symptoms: denies nipple discharge    History  Previous similar problem: no.  Patient has a history of squamous cell carcinoma of the anus.   Previous evaluation:  none    Precipitating or alleviating factors:  Aggravating factors include: putting a shirt on or lifting the arms     Therapies tried and outcome: nothing    Problem list and histories reviewed & adjusted, as indicated.  Additional history: none    Patient Active Problem List   Diagnosis     H/O malignant neoplasm of rectum, rectosigmoid junction, and anus     Cellulitis of scrotum     Lumbar Radiculopathy, SEE FYI     Back  Pain w/ Radiation, SEE FYI     Chronic abdominal pain     Hyperlipidemia with target LDL less than 130     Hypogonadism     Scrotal pain     Erectile dysfunction     Drug abuse, opioid type (H)     GIB (gastrointestinal bleeding)     MAGALY (obstructive sleep apnea)     Generalized anxiety disorder     Urethral stricture     Alcohol abuse     History of urethral stricture     Chronic pain     Edema     Anemia of chronic disease     Other mononeuropathy     Hx SBO     Health Care Home     Benign essential hypertension     Rotator cuff injury, right, initial encounter     Chondritis     Anserine bursitis     Substance abuse (H)     Chronic pain of right knee     Intertriginous candidiasis     Gastroesophageal reflux disease, esophagitis presence not specified     Morbid obesity (H)     Moderate episode of recurrent major depressive disorder (H)     Hallucinations, visual     Cellulitis of scrotum     Past Surgical History:   Procedure Laterality Date     ABDOMEN SURGERY       BACK SURGERY       BIOPSY       BIOPSY       C NONSPECIFIC PROCEDURE  1991    L4-L5 laminectomy; disk herniation     C NONSPECIFIC PROCEDURE  1999    squamous cell CA - anus, 27 xrt/3 rounds, 5-FU/cisplatin     C NONSPECIFIC PROCEDURE      umbilical hernia     C NONSPECIFIC PROCEDURE  2002    cystscopy for urethral stricture from radiation therapy     COLONOSCOPY       COLONOSCOPY  4/18/2014    Procedure: Colonoscopy   polyp bx;  Surgeon: Josef Mckeon MD;  Location:  GI     CYSTOSCOPY, CYSTOGRAM, COMBINED N/A 2/26/2015    Procedure: COMBINED CYSTOSCOPY, CYSTOGRAM;  Surgeon: Darell Barrera MD;  Location: [facility]     CYSTOSCOPY, INTERNAL URETHROTOMY, COMBINED N/A 12/19/2014    Procedure: COMBINED CYSTOSCOPY, INTERNAL URETHROTOMY;  Surgeon: Buzz Ren MD;  Location: [facility] OR     CYSTOSTOMY, INSERT TUBE SUPRAPUBIC, COMBINED  12/19/2014    Procedure: COMBINED CYSTOSTOMY, INSERT TUBE SUPRAPUBIC;  Surgeon: Buzz Ren,  MD;  Location: [facility] OR     CYSTOSTOMY, INSERT TUBE SUPRAPUBIC, COMBINED N/A 12/29/2014    Procedure: COMBINED CYSTOSTOMY, INSERT TUBE SUPRAPUBIC;  Surgeon: Buzz Ren MD;  Location: [facility] OR     ENT SURGERY       ESOPHAGOSCOPY, GASTROSCOPY, DUODENOSCOPY (EGD), COMBINED  10/2/2012    Procedure: COMBINED ESOPHAGOSCOPY, GASTROSCOPY, DUODENOSCOPY (EGD);  COMBINED ESOPHAGOSCOPY, GASTROSCOPY, DUODENOSCOPY (EGD) ;  Surgeon: Raj Beltre MD;  Location: [facility] GI     HERNIA REPAIR       LAPAROSCOPIC LYSIS ADHESIONS N/A 12/4/2015    Procedure: LAPAROSCOPIC LYSIS ADHESIONS;  Surgeon: Herbie Sanchez MD;  Location: [facility] OR     LAPAROTOMY EXPLORATORY N/A 12/4/2015    Procedure: LAPAROTOMY EXPLORATORY;  Surgeon: Herbie Sanchez MD;  Location: [facility] OR     MYRINGOTOMY      in childhood     TONSILLECTOMY and adenoidectomy      in childhood     URETHROPLASTY WITH BUCCAL GRAFT N/A 3/27/2015    Procedure: URETHROPLASTY WITH BUCCAL GRAFT;  Surgeon: Darell Barrera MD;  Location: [facility]       Social History   Substance Use Topics     Smoking status: Never Smoker     Smokeless tobacco: Never Used     Alcohol use 0.0 oz/week     0 Standard drinks or equivalent per week      Comment: quit june 30, 2008/ Quit 1/22/15. drinks daily      Family History   Problem Relation Age of Onset     Adopted: Yes     Unknown/Adopted Mother      Suicide Father      Schizophrenia No family hx of      Bipolar Disorder No family hx of      Dementia No family hx of      [place] Disease No family hx of      Parkinsonism No family hx of      Autism Spectrum Disorder No family hx of      Intellectual Disability (Mental Retardation) No family hx of          Current Outpatient Prescriptions   Medication Sig Dispense Refill     acetaminophen (TYLENOL) 325 MG tablet Take 2 tablets (650 mg) by mouth every 8 hours as needed for mild pain 100 tablet      amLODIPine (NORVASC) 5 MG tablet Take 1 tablet (5 mg) by mouth daily 90 tablet 1      Elastic Bandages & Supports (JOBST ACTIVE 20-30MMHG MEDIUM) MISC 1 Device daily as needed Knee high 6 each 1     folic acid (FOLVITE) 1 MG tablet Take 1 tablet (1 mg) by mouth daily 30 tablet 0     gabapentin (NEURONTIN) 300 MG capsule TAKE 4 CAPSULES (1,200 MG) BY MOUTH 3 TIMES DAILY 360 capsule 0     hydrOXYzine (ATARAX) 25 MG tablet Take 1-2 tablets (25-50 mg) by mouth every 8 hours as needed for itching or anxiety (pain) 20 tablet 0     MELATONIN PO Take 3 mg by mouth nightly as needed Reported on 5/10/2017       multivitamin, therapeutic with minerals (MULTI-VITAMIN) TABS Take 1 tablet by mouth daily       nabumetone (RELAFEN) 500 MG tablet Take 1 tablet (500 mg) by mouth 2 times daily 60 tablet 1     SERTRALINE HCL PO Take 200-300 mg by mouth daily       thiamine 100 MG tablet Take 1 tablet (100 mg) by mouth daily 30 tablet 0     traZODone (DESYREL) 50 MG tablet Take 1-3 tablets ( mg) by mouth nightly as needed for sleep 90 tablet 3     LORazepam (ATIVAN) 1 MG tablet Take 0.5-1 tablets (0.5-1 mg) by mouth every 8 hours as needed for anxiety (Patient not taking: Reported on 9/18/2018) 12 tablet 0     OLANZapine (ZYPREXA) 5 MG tablet Take 1 tablet (5 mg) by mouth At Bedtime (Patient not taking: Reported on 10/17/2018) 30 tablet 0     Allergies   Allergen Reactions     No Known Allergies      Seasonal Allergies        Reviewed and updated as needed this visit by clinical staff  Tobacco  Allergies  Meds  Med Hx  Surg Hx  Fam Hx  Soc Hx      Reviewed and updated as needed this visit by Provider         Review of Systems   Constitutional: Negative.    HENT: Negative.    Eyes: Negative.    Respiratory: Negative.    Cardiovascular: Negative.    Gastrointestinal: Negative.    Genitourinary: Negative.    Musculoskeletal:        As in HPI   Skin: Negative.    Neurological: Negative.    Psychiatric/Behavioral: Negative.          OBJECTIVE:     /80 (Cuff Size: Adult Large)  Pulse 92  Temp 98  F (36.7   C) (Tympanic)  Resp 16  Wt 228 lb (103.4 kg)  BMI 34.67 kg/m2  Body mass index is 34.67 kg/(m^2).    Physical Exam   Constitutional: He is oriented to person, place, and time. He appears well-developed and well-nourished. No distress.   HENT:   Head: Normocephalic and atraumatic.   Nose: Nose normal.   Eyes: Conjunctivae and EOM are normal.   Neck: Normal range of motion.   Pulmonary/Chest: Effort normal. Right breast exhibits mass and tenderness. Right breast exhibits no nipple discharge. Left breast exhibits mass and tenderness. Left breast exhibits no nipple discharge. Breasts are symmetrical.   Musculoskeletal:        Lumbar back: He exhibits tenderness. He exhibits normal range of motion, no bony tenderness and no spasm.        Back:    Neurological: He is alert and oriented to person, place, and time.   Skin: Skin is warm and dry.   Psychiatric: He has a normal mood and affect. Judgment normal.     XR - 2 views of the right clavicle   My findings: Fracture alignment is maintained from previous films, significant bony callus formation at the fracture     No results found. However, due to the size of the patient record, not all encounters were searched. Please check Results Review for a complete set of results.    ASSESSMENT/PLAN:       ICD-10-CM    1. Chronic left-sided low back pain without sciatica M54.5 [facility] PT, HAND, AND CHIROPRACTIC REFERRAL    G89.29    2. Lump or mass in breast N63.0 US Breast Bilateral Complete 4 Quadrants     MA Diagnostic Digital Bilateral   3. Closed displaced fracture of shaft of right clavicle with routine healing, subsequent encounter S42.021D XR Clavicle Right      MDM  Number of Diagnoses or Management Options  Chronic left-sided low back pain without sciatica:   Closed displaced fracture of shaft of right clavicle with routine healing, subsequent encounter:   Lump or mass in breast:   Diagnosis management comments: 1. For the chronic low back pain, I recommend PT.   Integrated spine service was ordered, and patient would like to schedule at [facility]  2. For the clavicle fracture, there is significant bony callus on XR, and patient is asymptomatic.  Follow up only indicated if symptoms return.   3. For the breast lumps, these are most likely benign since they are bilateral.  However, I would prefer to get imaging to rule out malignancy.  Will pursue further workup as needed after imaging is complete.       There are no Patient Instructions on file for this visit.    Georges Pabon PA-C  [facility]

## 2018-10-17 NOTE — MR AVS SNAPSHOT
After Visit Summary   10/17/2018    Aydin Reilly    MRN: [id number]           Patient Information     Date Of Birth          1960        Visit Information        Provider Department      10/17/2018 1:10 PM Mandy Pabon PA-C [facility]        Today's Diagnoses     Chronic left-sided low back pain without sciatica    -  1    Lump or mass in breast        Closed displaced fracture of shaft of right clavicle with routine healing, subsequent encounter           Follow-ups after your visit        Additional Services     [facility] PT, HAND, AND CHIROPRACTIC REFERRAL       Physical Therapy, Hand Therapy and Chiropractic Care are available through:  *[facility]  *Hand Therapy (Occupational Therapy or Physical Therapy)  *[facility]    Call one number to schedule at any of the above locations: [phone].    Physical therapy, Hand therapy and/or Chiropractic care has been recommended by your physician as an excellent treatment option to reduce pain and help people return to normal activities, including sports.  Therapy and/or chiropractic care services are a great complement or alternative to expensive and invasive surgery, injections, or long-term use of prescription medications. The primary goal is to identify the underlying problem and provide you the tools to manage your condition on your own.     Please be aware that coverage of these services is subject to the terms and limitations of your health insurance plan.  Call member services at your health plan with any benefit or coverage questions.      Please bring the following to your appointment:  *Your personal calendar for scheduling future appointments  *Comfortable clothing                  Future tests that were ordered for you today     Open Future Orders        Priority Expected Expires Ordered    XR Clavicle Right Routine 10/17/2018 10/17/2019 10/17/2018     MA Diagnostic Digital Bilateral Routine  10/17/2019 10/17/2018    US Breast Bilateral Complete 4 Quadrants Routine  10/17/2019 10/17/2018    [facility] PT, HAND, AND CHIROPRACTIC REFERRAL Routine  10/17/2019 10/17/2018            Who to contact     If you have questions or need follow up information about today's clinic visit or your schedule please contact [facility] directly at [phone].  Normal or non-critical lab and imaging results will be communicated to you by [employer], letter or phone within 4 business days after the clinic has received the results. If you do not hear from us within 7 days, please contact the clinic through [employer] or phone. If you have a critical or abnormal lab result, we will notify you by phone as soon as possible.  Submit refill requests through [employer] or call your pharmacy and they will forward the refill request to us. Please allow 3 business days for your refill to be completed.          Additional Information About Your Visit        [employer] Information     [employer] gives you secure access to your electronic health record. If you see a primary care provider, you can also send messages to your care team and make appointments. If you have questions, please call your primary care clinic.  If you do not have a primary care provider, please call [phone] and they will assist you.        Care EveryWhere ID     This is your Care EveryWhere ID. This could be used by other organizations to access your [place] medical records  [id number]        Your Vitals Were     Pulse Temperature Respirations BMI (Body Mass Index)          92 98  F (36.7  C) (Tympanic) 16 34.67 kg/m2         Blood Pressure from Last 3 Encounters:   10/17/18 134/80   09/18/18 130/80   09/10/18 150/79    Weight from Last 3 Encounters:   10/17/18 228 lb (103.4 kg)   09/18/18 232 lb (105.2 kg)   09/10/18 260 lb (117.9 kg)               Primary Care Provider Office Phone # Fax #    Mandy  Paula Pabon PA-C [phone]       [address] [facility] [address]        Equal Access to Services     JIGAR GARCÍA : Hadii florencio bhatt arabellao Sojoeali, waaxda luqadaha, qaybta kaalmada adediyada, gogo sanchez lakareenlazaro donovan. So [facility] [phone].    ATENCIÓN: Si habla español, tiene a lyn disposición servicios gratuitos de asistencia lingüística. Llame al [phone].    We comply with applicable federal civil rights laws and [place] laws. We do not discriminate on the basis of race, color, national origin, age, disability, sex, sexual orientation, or gender identity.            Thank you!     Thank you for choosing [facility]  for your care. Our goal is always to provide you with excellent care. Hearing back from our patients is one way we can continue to improve our services. Please take a few minutes to complete the written survey that you may receive in the mail after your visit with us. Thank you!             Your Updated Medication List - Protect others around you: Learn how to safely use, store and throw away your medicines at www.disposemymeds.org.          This list is accurate as of 10/17/18  1:55 PM.  Always use your most recent med list.                   Brand Name Dispense Instructions for use Diagnosis    acetaminophen 325 MG tablet    TYLENOL    100 tablet    Take 2 tablets (650 mg) by mouth every 8 hours as needed for mild pain    Chronic pain of right knee       amLODIPine 5 MG tablet    NORVASC    90 tablet    Take 1 tablet (5 mg) by mouth daily    Benign essential hypertension       folic acid 1 MG tablet    FOLVITE    30 tablet    Take 1 tablet (1 mg) by mouth daily    Acute renal failure, unspecified acute renal failure type (H)       gabapentin 300 MG capsule    NEURONTIN    360 capsule    TAKE 4 CAPSULES (1,200 MG) BY MOUTH 3 TIMES DAILY    Other mononeuropathy       hydrOXYzine 25 MG tablet    ATARAX    20 tablet    Take  1-2 tablets (25-50 mg) by mouth every 8 hours as needed for itching or anxiety (pain)    Chronic pain of right knee       JOBST ACTIVE 20-30MMHG MEDIUM Misc     6 each    1 Device daily as needed Knee high    Pedal edema       LORazepam 1 MG tablet    ATIVAN    12 tablet    Take 0.5-1 tablets (0.5-1 mg) by mouth every 8 hours as needed for anxiety        MELATONIN PO      Take 3 mg by mouth nightly as needed Reported on 5/10/2017        Multi-vitamin Tabs tablet      Take 1 tablet by mouth daily        nabumetone 500 MG tablet    RELAFEN    60 tablet    Take 1 tablet (500 mg) by mouth 2 times daily    Bicipital tendinitis, right       OLANZapine 5 MG tablet    zyPREXA    30 tablet    Take 1 tablet (5 mg) by mouth At Bedtime    Hallucinations       SERTRALINE HCL PO      Take 200-300 mg by mouth daily        thiamine 100 MG tablet     30 tablet    Take 1 tablet (100 mg) by mouth daily    Acute renal failure, unspecified acute renal failure type (H)       traZODone 50 MG tablet    DESYREL    90 tablet    Take 1-3 tablets ( mg) by mouth nightly as needed for sleep    CONNER (generalized anxiety disorder)

## 2018-11-19 DIAGNOSIS — G58.8 OTHER MONONEUROPATHY: ICD-10-CM

## 2018-11-19 DIAGNOSIS — M75.21 BICIPITAL TENDINITIS, RIGHT: ICD-10-CM

## 2018-11-19 RX ORDER — GABAPENTIN 300 MG/1
CAPSULE ORAL
Qty: 360 CAPSULE | Refills: 0 | Status: SHIPPED | OUTPATIENT
Start: 2018-11-19 | End: 2018-12-19

## 2018-11-19 NOTE — TELEPHONE ENCOUNTER
gabapentin (NEURONTIN) 300 MG capsule     Sig: TAKE 4 CAPSULES (1,200 MG) BY MOUTH 3 TIMES DAILY   Last Written Prescription Date: 10/15/18  Last Quantity: 360, # refills: 0  Last Office Visit with [facility], [facility] or  [facility] prescribing provider: 10/17/2018       Creatinine   Date Value Ref Range Status   08/02/2018 1.19 0.66 - 1.25 mg/dL Final     Lab Results   Component Value Date    AST 27 08/01/2018     Lab Results   Component Value Date    ALT 23 08/01/2018     BP Readings from Last 3 Encounters:   10/17/18 134/80   09/18/18 130/80   09/10/18 150/79     Associated Diagnoses   Other mononeuropathy [G58.8]  - Primary             Routing refill request to provider for review/approval because:  Drug not on the [facility], [facility] or  [facility] refill protocol or controlled substance    GRACIELA Flores  November 19, 2018  2:02 PM

## 2018-11-19 NOTE — TELEPHONE ENCOUNTER
"Requested Prescriptions   Pending Prescriptions Disp Refills     nabumetone (RELAFEN) 500 MG tablet  Last Written Prescription Date:  08/18/2018  Last Fill Quantity: 60,  # refills: 1   Last office visit: 10/17/2018 with prescribing provider:  SVETA   Future Office Visit:     60 tablet 1     Sig: Take 1 tablet (500 mg) by mouth 2 times daily    NSAID Medications Passed    11/19/2018  3:46 PM       Passed - Blood pressure under 140/90 in past 12 months    BP Readings from Last 3 Encounters:   10/17/18 134/80   09/18/18 130/80   09/10/18 150/79                Passed - Normal ALT on file in past 12 months    Recent Labs   Lab Test  08/01/18   0600   ALT  23            Passed - Normal AST on file in past 12 months    Recent Labs   Lab Test  08/01/18   0600   AST  27            Passed - Recent (12 mo) or future (30 days) visit within the authorizing provider's specialty    Patient had office visit in the last 12 months or has a visit in the next 30 days with authorizing provider or within the authorizing provider's specialty.  See \"Patient Info\" tab in inbasket, or \"Choose Columns\" in Meds & Orders section of the refill encounter.             Passed - Patient is age 6-64 years       Passed - Normal CBC on file in past 12 months    Recent Labs   Lab Test  08/02/18   0659   WBC  5.1   RBC  3.87*   HGB  11.6*   HCT  36.3*   PLT  183                Passed - Normal serum creatinine on file in past 12 months    Recent Labs   Lab Test  08/02/18   0659   CR  1.19               "

## 2018-11-20 RX ORDER — NABUMETONE 500 MG/1
500 TABLET, FILM COATED ORAL 2 TIMES DAILY
Qty: 60 TABLET | Refills: 1 | Status: SHIPPED | OUTPATIENT
Start: 2018-11-20 | End: 2019-07-15

## 2018-11-23 ENCOUNTER — TELEPHONE (OUTPATIENT)
Dept: FAMILY MEDICINE | Facility: CLINIC | Age: 58
End: 2018-11-23

## 2018-11-23 DIAGNOSIS — N52.8 OTHER MALE ERECTILE DYSFUNCTION: ICD-10-CM

## 2018-11-23 RX ORDER — SILDENAFIL 100 MG/1
TABLET, FILM COATED ORAL
Qty: 12 TABLET | Refills: 11 | Status: ON HOLD | OUTPATIENT
Start: 2018-11-23 | End: 2019-01-01

## 2018-11-23 NOTE — TELEPHONE ENCOUNTER
Patient called today looking for a refill on his Viagra medication. Dr Simmons is not PCP and the Rx needs to be approved to Mandy Pabon, who seems to be PCP now.     Can patient get a refill today?  Wants it sent to [facility]      Tiffanie Ortega/BRIDGETT

## 2018-12-19 DIAGNOSIS — G58.8 OTHER MONONEUROPATHY: ICD-10-CM

## 2018-12-19 DIAGNOSIS — I10 BENIGN ESSENTIAL HYPERTENSION: ICD-10-CM

## 2018-12-19 NOTE — TELEPHONE ENCOUNTER
"gabapentin (NEURONTIN) 300 MG capsule      Sig: TAKE 4 CAPSULES (1,200 MG) BY MOUTH 3 TIMES DAILY  Last Written Prescription Date: 11/19/18  Last Quantity: 360, # refills: 0  Last Office Visit with [facility], [facility] or  "[employer] prescribing provider: 10/17/2018       Creatinine   Date Value Ref Range Status   08/02/2018 1.19 0.66 - 1.25 mg/dL Final     Lab Results   Component Value Date    AST 27 08/01/2018     Lab Results   Component Value Date    ALT 23 08/01/2018     BP Readings from Last 3 Encounters:   10/17/18 134/80   09/18/18 130/80   09/10/18 150/79     Associated Diagnoses     Other mononeuropathy [G58.8]             Routing refill request to provider for review/approval because:  Drug not on the [facility], [facility] or  "[employer] refill protocol or controlled substance    _________________________________________________________________________________________________________________________    Requested Prescriptions   Pending Prescriptions Disp Refills     amLODIPine (NORVASC) 5 MG tablet [Pharmacy Med Name: AMLODIPINE BESYLATE 5 MG TAB] 90 tablet 0    Last Written Prescription Date:  6/19/18  Last Fill Quantity: 90,  # refills: 1   Last Office Visit: 10/17/2018 [facility]      Return in about 1 week (around 10/24/2018) for Mammogram, Imaging.       Future Office Visit:      Sig: TAKE 1 TABLET BY MOUTH EVERY DAY    Calcium Channel Blockers Protocol  Passed - 12/19/2018  4:24 PM       Passed - Blood pressure under 140/90 in past 12 months    BP Readings from Last 3 Encounters:   10/17/18 134/80   09/18/18 130/80   09/10/18 150/79                Passed - Recent (12 mo) or future (30 days) visit within the authorizing provider's specialty    Patient had office visit in the last 12 months or has a visit in the next 30 days with authorizing provider or within the authorizing provider's specialty.  See \"Patient Info\" tab in inbasket, or \"Choose Columns\" in Meds & Orders section of the refill encounter.          "    Passed - Patient is age 18 or older       Passed - Normal serum creatinine on file in past 12 months    Recent Labs   Lab Test 08/02/18  0659   CR 1.19

## 2018-12-21 RX ORDER — GABAPENTIN 300 MG/1
CAPSULE ORAL
Qty: 360 CAPSULE | Refills: 0 | Status: SHIPPED | OUTPATIENT
Start: 2018-12-21 | End: 2019-04-02

## 2018-12-21 RX ORDER — AMLODIPINE BESYLATE 5 MG/1
TABLET ORAL
Qty: 90 TABLET | Refills: 0 | Status: ON HOLD | OUTPATIENT
Start: 2018-12-21 | End: 2019-01-01

## 2018-12-21 NOTE — TELEPHONE ENCOUNTER
Refills of medications provided for 90 days.  He needs to schedule a recheck in order to complete a new non-opioid CSA and for updated PHQ-9.  Please have him schedule an appointment sometime in the next 3 months.     Georges Pabon PA-C

## 2018-12-21 NOTE — TELEPHONE ENCOUNTER
Routing refill request to provider for review/approval because:  Drug not on the [facility] refill protocol   Looks like he changed PCP    Anna Cota RN, BS  Clinical Nurse Triage.

## 2018-12-30 ENCOUNTER — NURSE TRIAGE (OUTPATIENT)
Dept: NURSING | Facility: CLINIC | Age: 58
End: 2018-12-30

## 2018-12-30 NOTE — TELEPHONE ENCOUNTER
"Patient calling stating, \"I have a history of lower back pain.\" Left sided low back pain starting in past 2 days.  Patient difficult to understand during triage reporting he had lost his voice \"from yelling last night at a concert.\" Stating \"my butt is burning.\" Patient reporting severe pain down left leg, with tingling. Reports history of sciatica. Stating he has taken Flexeril and Aspirin today with no improvement.   Patient agrees to go into [facility] this morning and have someone drive him.  Reason for Disposition    [1] SEVERE back pain (e.g., excruciating, unable to do any normal activities) AND [2] not improved 2 hours after pain medicine    Additional Information    Negative: Passed out (i.e., lost consciousness, collapsed and was not responding)    Negative: Shock suspected (e.g., cold/pale/clammy skin, too weak to stand, low BP, rapid pulse)    Negative: Sounds like a life-threatening emergency to the triager    Negative: Major injury to the back (e.g., MVA, fall > 10 feet or 3 meters, penetrating injury, etc.)    Negative: Followed a tailbone injury    Negative: [1] Pain in the upper back over the ribs (rib cage) AND [2] radiates (travels, goes) into chest    Negative: [1] Pain in the upper back over the ribs (rib cage) AND [2] worsened by coughing (or clearly increases with breathing)    Negative: Back pain during pregnancy    Negative: Pain mainly in flank (i.e., in the side, over the lower ribs or just below the ribs)    Negative: [1] SEVERE back pain (e.g., excruciating) AND [2] sudden onset AND [3] age > 60    Negative: [1] Unable to urinate (or only a few drops) > 4 hours AND     [2] bladder feels very full (e.g., palpable bladder or strong urge to urinate)    Negative: [1] Urinary or bowel incontinence (i.e., loss of bladder or bowel control) AND [2] new onset    Negative: Numbness in groin or rectal area (i.e., loss of sensation)    Negative: [1] SEVERE abdominal pain AND [2] present " > 1 hour    Negative: [1] Abdominal pain AND [2] age > 60    Negative: Weakness of a leg or foot (e.g., unable to bear weight, dragging foot)    Negative: Unable to walk    Negative: Patient sounds very sick or weak to the triager    Protocols used: BACK PAIN-ADULT-AH

## 2019-01-01 ENCOUNTER — HOSPITAL ENCOUNTER (INPATIENT)
Facility: CLINIC | Age: 59
LOS: 9 days | Discharge: PSYCHIATRIC OR SUBSTANCE USE FACILITY | DRG: 885 | End: 2019-10-17
Attending: PSYCHIATRY & NEUROLOGY | Admitting: PSYCHIATRY & NEUROLOGY
Payer: COMMERCIAL

## 2019-01-01 ENCOUNTER — APPOINTMENT (OUTPATIENT)
Dept: GENERAL RADIOLOGY | Facility: CLINIC | Age: 59
DRG: 885 | End: 2019-01-01
Attending: PHYSICIAN ASSISTANT
Payer: COMMERCIAL

## 2019-01-01 ENCOUNTER — HEALTH MAINTENANCE LETTER (OUTPATIENT)
Age: 59
End: 2019-01-01

## 2019-01-01 ENCOUNTER — HOSPITAL ENCOUNTER (OUTPATIENT)
Facility: CLINIC | Age: 59
Setting detail: OBSERVATION
Discharge: PSYCHIATRIC OR SUBSTANCE USE FACILITY | End: 2019-10-08
Attending: EMERGENCY MEDICINE | Admitting: HOSPITALIST
Payer: COMMERCIAL

## 2019-01-01 ENCOUNTER — MYC MEDICAL ADVICE (OUTPATIENT)
Dept: FAMILY MEDICINE | Facility: CLINIC | Age: 59
End: 2019-01-01

## 2019-01-01 ENCOUNTER — PATIENT OUTREACH (OUTPATIENT)
Dept: FAMILY MEDICINE | Facility: CLINIC | Age: 59
End: 2019-01-01

## 2019-01-01 ENCOUNTER — HOSPITAL ENCOUNTER (EMERGENCY)
Facility: CLINIC | Age: 59
Discharge: HOME | End: 2019-09-24
Attending: EMERGENCY MEDICINE | Admitting: EMERGENCY MEDICINE
Payer: COMMERCIAL

## 2019-01-01 ENCOUNTER — APPOINTMENT (OUTPATIENT)
Dept: CARDIOLOGY | Facility: CLINIC | Age: 59
DRG: 885 | End: 2019-01-01
Attending: PHYSICIAN ASSISTANT
Payer: COMMERCIAL

## 2019-01-01 VITALS
WEIGHT: 241 LBS | TEMPERATURE: 97.5 F | BODY MASS INDEX: 36.53 KG/M2 | DIASTOLIC BLOOD PRESSURE: 82 MMHG | RESPIRATION RATE: 16 BRPM | HEART RATE: 86 BPM | SYSTOLIC BLOOD PRESSURE: 141 MMHG | OXYGEN SATURATION: 94 % | HEIGHT: 68 IN

## 2019-01-01 VITALS
HEIGHT: 69 IN | HEART RATE: 102 BPM | OXYGEN SATURATION: 96 % | WEIGHT: 236.3 LBS | SYSTOLIC BLOOD PRESSURE: 144 MMHG | BODY MASS INDEX: 35 KG/M2 | DIASTOLIC BLOOD PRESSURE: 80 MMHG | RESPIRATION RATE: 18 BRPM | TEMPERATURE: 98.6 F

## 2019-01-01 VITALS
SYSTOLIC BLOOD PRESSURE: 182 MMHG | TEMPERATURE: 98.4 F | HEART RATE: 112 BPM | RESPIRATION RATE: 20 BRPM | OXYGEN SATURATION: 97 % | DIASTOLIC BLOOD PRESSURE: 92 MMHG

## 2019-01-01 DIAGNOSIS — G89.29 CHRONIC ABDOMINAL PAIN: ICD-10-CM

## 2019-01-01 DIAGNOSIS — R45.851 SUICIDAL IDEATION: ICD-10-CM

## 2019-01-01 DIAGNOSIS — J30.1 ALLERGIC RHINITIS DUE TO POLLEN, UNSPECIFIED SEASONALITY: Primary | ICD-10-CM

## 2019-01-01 DIAGNOSIS — F10.10 ALCOHOL ABUSE: ICD-10-CM

## 2019-01-01 DIAGNOSIS — R07.9 CHEST PAIN, UNSPECIFIED TYPE: ICD-10-CM

## 2019-01-01 DIAGNOSIS — M54.9 BACK PAIN WITH RADIATION: ICD-10-CM

## 2019-01-01 DIAGNOSIS — F32.A DEPRESSION, UNSPECIFIED DEPRESSION TYPE: ICD-10-CM

## 2019-01-01 DIAGNOSIS — F33.1 MODERATE EPISODE OF RECURRENT MAJOR DEPRESSIVE DISORDER (H): ICD-10-CM

## 2019-01-01 DIAGNOSIS — G89.29 CHRONIC PAIN OF RIGHT KNEE: ICD-10-CM

## 2019-01-01 DIAGNOSIS — F10.10 ALCOHOL ABUSE: Primary | ICD-10-CM

## 2019-01-01 DIAGNOSIS — R10.9 CHRONIC ABDOMINAL PAIN: ICD-10-CM

## 2019-01-01 DIAGNOSIS — F41.1 GENERALIZED ANXIETY DISORDER: ICD-10-CM

## 2019-01-01 DIAGNOSIS — N17.9 ACUTE RENAL FAILURE, UNSPECIFIED ACUTE RENAL FAILURE TYPE (H): ICD-10-CM

## 2019-01-01 DIAGNOSIS — F19.10 SUBSTANCE ABUSE (H): ICD-10-CM

## 2019-01-01 DIAGNOSIS — M25.561 CHRONIC PAIN OF RIGHT KNEE: ICD-10-CM

## 2019-01-01 DIAGNOSIS — F10.929 ALCOHOLIC INTOXICATION WITH COMPLICATION (H): ICD-10-CM

## 2019-01-01 DIAGNOSIS — F10.920 ALCOHOLIC INTOXICATION WITHOUT COMPLICATION (H): ICD-10-CM

## 2019-01-01 DIAGNOSIS — N52.8 OTHER MALE ERECTILE DYSFUNCTION: ICD-10-CM

## 2019-01-01 DIAGNOSIS — I10 BENIGN ESSENTIAL HYPERTENSION: ICD-10-CM

## 2019-01-01 DIAGNOSIS — E87.20 LACTIC ACIDOSIS: ICD-10-CM

## 2019-01-01 DIAGNOSIS — M54.16 LUMBAR RADICULOPATHY: ICD-10-CM

## 2019-01-01 LAB
ALBUMIN SERPL-MCNC: 3.5 G/DL (ref 3.4–5)
ALBUMIN SERPL-MCNC: 3.9 G/DL (ref 3.4–5)
ALBUMIN SERPL-MCNC: 3.9 G/DL (ref 3.4–5)
ALP SERPL-CCNC: 77 U/L (ref 40–150)
ALP SERPL-CCNC: 89 U/L (ref 40–150)
ALP SERPL-CCNC: 94 U/L (ref 40–150)
ALT SERPL W P-5'-P-CCNC: 19 U/L (ref 0–70)
ALT SERPL W P-5'-P-CCNC: 21 U/L (ref 0–70)
ALT SERPL W P-5'-P-CCNC: 31 U/L (ref 0–70)
AMPHETAMINES UR QL SCN: NEGATIVE
ANION GAP SERPL CALCULATED.3IONS-SCNC: 12 MMOL/L (ref 3–14)
ANION GAP SERPL CALCULATED.3IONS-SCNC: 15 MMOL/L (ref 3–14)
ANION GAP SERPL CALCULATED.3IONS-SCNC: 8 MMOL/L (ref 3–14)
AST SERPL W P-5'-P-CCNC: 19 U/L (ref 0–45)
AST SERPL W P-5'-P-CCNC: 21 U/L (ref 0–45)
AST SERPL W P-5'-P-CCNC: 30 U/L (ref 0–45)
BARBITURATES UR QL: NEGATIVE
BASOPHILS # BLD AUTO: 0 10E9/L (ref 0–0.2)
BASOPHILS # BLD AUTO: 0 10E9/L (ref 0–0.2)
BASOPHILS NFR BLD AUTO: 0.8 %
BASOPHILS NFR BLD AUTO: 0.9 %
BENZODIAZ UR QL: NEGATIVE
BILIRUB SERPL-MCNC: 0.4 MG/DL (ref 0.2–1.3)
BUN SERPL-MCNC: 18 MG/DL (ref 7–30)
BUN SERPL-MCNC: 19 MG/DL (ref 7–30)
BUN SERPL-MCNC: 19 MG/DL (ref 7–30)
CALCIUM SERPL-MCNC: 8.2 MG/DL (ref 8.5–10.1)
CALCIUM SERPL-MCNC: 8.8 MG/DL (ref 8.5–10.1)
CALCIUM SERPL-MCNC: 9.4 MG/DL (ref 8.5–10.1)
CANNABINOIDS UR QL SCN: NEGATIVE
CHLORIDE SERPL-SCNC: 104 MMOL/L (ref 94–109)
CHLORIDE SERPL-SCNC: 106 MMOL/L (ref 94–109)
CHLORIDE SERPL-SCNC: 108 MMOL/L (ref 94–109)
CO2 SERPL-SCNC: 17 MMOL/L (ref 20–32)
CO2 SERPL-SCNC: 23 MMOL/L (ref 20–32)
CO2 SERPL-SCNC: 26 MMOL/L (ref 20–32)
COCAINE UR QL: NEGATIVE
CREAT SERPL-MCNC: 0.84 MG/DL (ref 0.66–1.25)
CREAT SERPL-MCNC: 0.95 MG/DL (ref 0.66–1.25)
CREAT SERPL-MCNC: 1.01 MG/DL (ref 0.66–1.25)
DIFFERENTIAL METHOD BLD: ABNORMAL
DIFFERENTIAL METHOD BLD: ABNORMAL
EOSINOPHIL # BLD AUTO: 0 10E9/L (ref 0–0.7)
EOSINOPHIL # BLD AUTO: 0.1 10E9/L (ref 0–0.7)
EOSINOPHIL NFR BLD AUTO: 0.2 %
EOSINOPHIL NFR BLD AUTO: 1.5 %
ERYTHROCYTE [DISTWIDTH] IN BLOOD BY AUTOMATED COUNT: 14.7 % (ref 10–15)
ERYTHROCYTE [DISTWIDTH] IN BLOOD BY AUTOMATED COUNT: 14.7 % (ref 10–15)
ERYTHROCYTE [DISTWIDTH] IN BLOOD BY AUTOMATED COUNT: 14.9 % (ref 10–15)
ERYTHROCYTE [DISTWIDTH] IN BLOOD BY AUTOMATED COUNT: 15 % (ref 10–15)
ERYTHROCYTE [DISTWIDTH] IN BLOOD BY AUTOMATED COUNT: 15.2 % (ref 10–15)
ETHANOL SERPL-MCNC: 0.12 G/DL
ETHANOL SERPL-MCNC: 0.22 G/DL
ETHANOL SERPL-MCNC: 0.29 G/DL
GFR SERPL CREATININE-BSD FRML MDRD: 82 ML/MIN/{1.73_M2}
GFR SERPL CREATININE-BSD FRML MDRD: 88 ML/MIN/{1.73_M2}
GFR SERPL CREATININE-BSD FRML MDRD: >90 ML/MIN/{1.73_M2}
GLUCOSE BLDC GLUCOMTR-MCNC: 101 MG/DL (ref 70–99)
GLUCOSE BLDC GLUCOMTR-MCNC: 107 MG/DL (ref 70–99)
GLUCOSE BLDC GLUCOMTR-MCNC: 108 MG/DL (ref 70–99)
GLUCOSE BLDC GLUCOMTR-MCNC: 109 MG/DL (ref 70–99)
GLUCOSE BLDC GLUCOMTR-MCNC: 113 MG/DL (ref 70–99)
GLUCOSE BLDC GLUCOMTR-MCNC: 115 MG/DL (ref 70–99)
GLUCOSE BLDC GLUCOMTR-MCNC: 117 MG/DL (ref 70–99)
GLUCOSE BLDC GLUCOMTR-MCNC: 120 MG/DL (ref 70–99)
GLUCOSE BLDC GLUCOMTR-MCNC: 121 MG/DL (ref 70–99)
GLUCOSE BLDC GLUCOMTR-MCNC: 123 MG/DL (ref 70–99)
GLUCOSE BLDC GLUCOMTR-MCNC: 129 MG/DL (ref 70–99)
GLUCOSE BLDC GLUCOMTR-MCNC: 141 MG/DL (ref 70–99)
GLUCOSE BLDC GLUCOMTR-MCNC: 144 MG/DL (ref 70–99)
GLUCOSE BLDC GLUCOMTR-MCNC: 149 MG/DL (ref 70–99)
GLUCOSE BLDC GLUCOMTR-MCNC: 238 MG/DL (ref 70–99)
GLUCOSE BLDC GLUCOMTR-MCNC: 78 MG/DL (ref 70–99)
GLUCOSE BLDC GLUCOMTR-MCNC: 88 MG/DL (ref 70–99)
GLUCOSE BLDC GLUCOMTR-MCNC: 92 MG/DL (ref 70–99)
GLUCOSE BLDC GLUCOMTR-MCNC: 92 MG/DL (ref 70–99)
GLUCOSE BLDC GLUCOMTR-MCNC: 94 MG/DL (ref 70–99)
GLUCOSE BLDC GLUCOMTR-MCNC: 97 MG/DL (ref 70–99)
GLUCOSE BLDC GLUCOMTR-MCNC: 99 MG/DL (ref 70–99)
GLUCOSE SERPL-MCNC: 160 MG/DL (ref 70–99)
GLUCOSE SERPL-MCNC: 78 MG/DL (ref 70–99)
GLUCOSE SERPL-MCNC: 81 MG/DL (ref 70–99)
HBA1C MFR BLD: 4.6 % (ref 0–5.6)
HCT VFR BLD AUTO: 34.2 % (ref 40–53)
HCT VFR BLD AUTO: 37.9 % (ref 40–53)
HCT VFR BLD AUTO: 38.8 % (ref 40–53)
HCT VFR BLD AUTO: 38.9 % (ref 40–53)
HCT VFR BLD AUTO: 39.4 % (ref 40–53)
HEMOCCULT STL QL IA: NEGATIVE
HEMOCCULT STL QL: NEGATIVE
HGB BLD-MCNC: 11.1 G/DL (ref 13.3–17.7)
HGB BLD-MCNC: 11.9 G/DL (ref 13.3–17.7)
HGB BLD-MCNC: 12.2 G/DL (ref 13.3–17.7)
HGB BLD-MCNC: 12.2 G/DL (ref 13.3–17.7)
HGB BLD-MCNC: 12.6 G/DL (ref 13.3–17.7)
IMM GRANULOCYTES # BLD: 0 10E9/L (ref 0–0.4)
IMM GRANULOCYTES # BLD: 0 10E9/L (ref 0–0.4)
IMM GRANULOCYTES NFR BLD: 0.4 %
IMM GRANULOCYTES NFR BLD: 0.9 %
INTERPRETATION ECG - MUSE: NORMAL
LACTATE BLD-SCNC: 0.6 MMOL/L (ref 0.7–2)
LACTATE BLD-SCNC: 1.9 MMOL/L (ref 0.7–2)
LACTATE BLD-SCNC: 3.7 MMOL/L (ref 0.7–2)
LACTATE BLD-SCNC: 4.1 MMOL/L (ref 0.7–2)
LACTATE BLD-SCNC: 4.3 MMOL/L (ref 0.7–2)
LYMPHOCYTES # BLD AUTO: 1.1 10E9/L (ref 0.8–5.3)
LYMPHOCYTES # BLD AUTO: 1.4 10E9/L (ref 0.8–5.3)
LYMPHOCYTES NFR BLD AUTO: 25.6 %
LYMPHOCYTES NFR BLD AUTO: 30.6 %
MCH RBC QN AUTO: 28.1 PG (ref 26.5–33)
MCH RBC QN AUTO: 28.6 PG (ref 26.5–33)
MCH RBC QN AUTO: 29 PG (ref 26.5–33)
MCH RBC QN AUTO: 29.1 PG (ref 26.5–33)
MCH RBC QN AUTO: 29.2 PG (ref 26.5–33)
MCHC RBC AUTO-ENTMCNC: 30.6 G/DL (ref 31.5–36.5)
MCHC RBC AUTO-ENTMCNC: 31.4 G/DL (ref 31.5–36.5)
MCHC RBC AUTO-ENTMCNC: 32 G/DL (ref 31.5–36.5)
MCHC RBC AUTO-ENTMCNC: 32.2 G/DL (ref 31.5–36.5)
MCHC RBC AUTO-ENTMCNC: 32.5 G/DL (ref 31.5–36.5)
MCV RBC AUTO: 90 FL (ref 78–100)
MCV RBC AUTO: 91 FL (ref 78–100)
MCV RBC AUTO: 92 FL (ref 78–100)
MONOCYTES # BLD AUTO: 0.4 10E9/L (ref 0–1.3)
MONOCYTES # BLD AUTO: 0.5 10E9/L (ref 0–1.3)
MONOCYTES NFR BLD AUTO: 9.4 %
MONOCYTES NFR BLD AUTO: 9.6 %
NEUTROPHILS # BLD AUTO: 2.7 10E9/L (ref 1.6–8.3)
NEUTROPHILS # BLD AUTO: 2.7 10E9/L (ref 1.6–8.3)
NEUTROPHILS NFR BLD AUTO: 57.1 %
NEUTROPHILS NFR BLD AUTO: 63 %
NRBC # BLD AUTO: 0 10*3/UL
NRBC # BLD AUTO: 0 10*3/UL
NRBC BLD AUTO-RTO: 0 /100
NRBC BLD AUTO-RTO: 0 /100
OPIATES UR QL SCN: NEGATIVE
PCP UR QL SCN: NEGATIVE
PLATELET # BLD AUTO: 127 10E9/L (ref 150–450)
PLATELET # BLD AUTO: 133 10E9/L (ref 150–450)
PLATELET # BLD AUTO: 144 10E9/L (ref 150–450)
PLATELET # BLD AUTO: 194 10E9/L (ref 150–450)
PLATELET # BLD AUTO: 196 10E9/L (ref 150–450)
POTASSIUM SERPL-SCNC: 3.4 MMOL/L (ref 3.4–5.3)
POTASSIUM SERPL-SCNC: 3.7 MMOL/L (ref 3.4–5.3)
POTASSIUM SERPL-SCNC: 3.8 MMOL/L (ref 3.4–5.3)
PROT SERPL-MCNC: 7 G/DL (ref 6.8–8.8)
PROT SERPL-MCNC: 7.4 G/DL (ref 6.8–8.8)
PROT SERPL-MCNC: 7.4 G/DL (ref 6.8–8.8)
RBC # BLD AUTO: 3.8 10E12/L (ref 4.4–5.9)
RBC # BLD AUTO: 4.19 10E12/L (ref 4.4–5.9)
RBC # BLD AUTO: 4.23 10E12/L (ref 4.4–5.9)
RBC # BLD AUTO: 4.26 10E12/L (ref 4.4–5.9)
RBC # BLD AUTO: 4.35 10E12/L (ref 4.4–5.9)
SODIUM SERPL-SCNC: 139 MMOL/L (ref 133–144)
SODIUM SERPL-SCNC: 140 MMOL/L (ref 133–144)
SODIUM SERPL-SCNC: 140 MMOL/L (ref 133–144)
TROPONIN I SERPL-MCNC: <0.015 UG/L (ref 0–0.04)
WBC # BLD AUTO: 4.3 10E9/L (ref 4–11)
WBC # BLD AUTO: 4.7 10E9/L (ref 4–11)
WBC # BLD AUTO: 4.7 10E9/L (ref 4–11)
WBC # BLD AUTO: 5.7 10E9/L (ref 4–11)
WBC # BLD AUTO: 5.8 10E9/L (ref 4–11)

## 2019-01-01 PROCEDURE — 12400002 ZZH R&B MH SENIOR/ADOLESCENT

## 2019-01-01 PROCEDURE — 25000132 ZZH RX MED GY IP 250 OP 250 PS 637: Performed by: PSYCHIATRY & NEUROLOGY

## 2019-01-01 PROCEDURE — 25000128 H RX IP 250 OP 636: Performed by: PSYCHIATRY & NEUROLOGY

## 2019-01-01 PROCEDURE — 96361 HYDRATE IV INFUSION ADD-ON: CPT

## 2019-01-01 PROCEDURE — 00000146 ZZHCL STATISTIC GLUCOSE BY METER IP

## 2019-01-01 PROCEDURE — 99238 HOSP IP/OBS DSCHRG MGMT 30/<: CPT | Performed by: NURSE PRACTITIONER

## 2019-01-01 PROCEDURE — 99220 ZZC INITIAL OBSERVATION CARE,LEVL III: CPT | Performed by: HOSPITALIST

## 2019-01-01 PROCEDURE — 99207 ZZC NON-BILLABLE SERV PER CHARTING: CPT | Performed by: PSYCHOLOGIST

## 2019-01-01 PROCEDURE — G0378 HOSPITAL OBSERVATION PER HR: HCPCS

## 2019-01-01 PROCEDURE — 25000132 ZZH RX MED GY IP 250 OP 250 PS 637: Performed by: NURSE PRACTITIONER

## 2019-01-01 PROCEDURE — 80053 COMPREHEN METABOLIC PANEL: CPT | Performed by: EMERGENCY MEDICINE

## 2019-01-01 PROCEDURE — 96365 THER/PROPH/DIAG IV INF INIT: CPT

## 2019-01-01 PROCEDURE — 93010 ELECTROCARDIOGRAM REPORT: CPT | Performed by: INTERNAL MEDICINE

## 2019-01-01 PROCEDURE — 25000132 ZZH RX MED GY IP 250 OP 250 PS 637: Performed by: PHYSICIAN ASSISTANT

## 2019-01-01 PROCEDURE — G0177 OPPS/PHP; TRAIN & EDUC SERV: HCPCS

## 2019-01-01 PROCEDURE — 25800030 ZZH RX IP 258 OP 636: Performed by: EMERGENCY MEDICINE

## 2019-01-01 PROCEDURE — 90791 PSYCH DIAGNOSTIC EVALUATION: CPT

## 2019-01-01 PROCEDURE — 83605 ASSAY OF LACTIC ACID: CPT | Performed by: EMERGENCY MEDICINE

## 2019-01-01 PROCEDURE — 99285 EMERGENCY DEPT VISIT HI MDM: CPT | Mod: 25

## 2019-01-01 PROCEDURE — 80320 DRUG SCREEN QUANTALCOHOLS: CPT | Performed by: EMERGENCY MEDICINE

## 2019-01-01 PROCEDURE — 25800030 ZZH RX IP 258 OP 636: Performed by: HOSPITALIST

## 2019-01-01 PROCEDURE — 25000128 H RX IP 250 OP 636: Performed by: EMERGENCY MEDICINE

## 2019-01-01 PROCEDURE — 96375 TX/PRO/DX INJ NEW DRUG ADDON: CPT

## 2019-01-01 PROCEDURE — 85025 COMPLETE CBC W/AUTO DIFF WBC: CPT | Performed by: EMERGENCY MEDICINE

## 2019-01-01 PROCEDURE — 25000132 ZZH RX MED GY IP 250 OP 250 PS 637: Performed by: EMERGENCY MEDICINE

## 2019-01-01 PROCEDURE — 40000275 ZZH STATISTIC RCP TIME EA 10 MIN

## 2019-01-01 PROCEDURE — 96374 THER/PROPH/DIAG INJ IV PUSH: CPT

## 2019-01-01 PROCEDURE — HZ2ZZZZ DETOXIFICATION SERVICES FOR SUBSTANCE ABUSE TREATMENT: ICD-10-PCS | Performed by: PSYCHIATRY & NEUROLOGY

## 2019-01-01 PROCEDURE — 94660 CPAP INITIATION&MGMT: CPT

## 2019-01-01 PROCEDURE — 25000128 H RX IP 250 OP 636: Performed by: HOSPITALIST

## 2019-01-01 PROCEDURE — 36415 COLL VENOUS BLD VENIPUNCTURE: CPT | Performed by: EMERGENCY MEDICINE

## 2019-01-01 PROCEDURE — H0001 ALCOHOL AND/OR DRUG ASSESS: HCPCS | Mod: HF

## 2019-01-01 PROCEDURE — 93005 ELECTROCARDIOGRAM TRACING: CPT

## 2019-01-01 PROCEDURE — 90682 RIV4 VACC RECOMBINANT DNA IM: CPT | Performed by: PSYCHIATRY & NEUROLOGY

## 2019-01-01 PROCEDURE — 25000132 ZZH RX MED GY IP 250 OP 250 PS 637: Performed by: HOSPITALIST

## 2019-01-01 PROCEDURE — 71046 X-RAY EXAM CHEST 2 VIEWS: CPT

## 2019-01-01 PROCEDURE — 96366 THER/PROPH/DIAG IV INF ADDON: CPT

## 2019-01-01 PROCEDURE — 99232 SBSQ HOSP IP/OBS MODERATE 35: CPT | Performed by: NURSE PRACTITIONER

## 2019-01-01 PROCEDURE — 85027 COMPLETE CBC AUTOMATED: CPT | Performed by: PHYSICIAN ASSISTANT

## 2019-01-01 PROCEDURE — 83605 ASSAY OF LACTIC ACID: CPT | Performed by: HOSPITALIST

## 2019-01-01 PROCEDURE — 25000125 ZZHC RX 250: Performed by: PHYSICIAN ASSISTANT

## 2019-01-01 PROCEDURE — 99226 ZZC SUBSEQUENT OBSERVATION CARE,LEVEL III: CPT | Performed by: HOSPITALIST

## 2019-01-01 PROCEDURE — H2032 ACTIVITY THERAPY, PER 15 MIN: HCPCS

## 2019-01-01 PROCEDURE — 36415 COLL VENOUS BLD VENIPUNCTURE: CPT | Performed by: PHYSICIAN ASSISTANT

## 2019-01-01 PROCEDURE — 36415 COLL VENOUS BLD VENIPUNCTURE: CPT | Performed by: HOSPITALIST

## 2019-01-01 PROCEDURE — 83036 HEMOGLOBIN GLYCOSYLATED A1C: CPT | Performed by: PHYSICIAN ASSISTANT

## 2019-01-01 PROCEDURE — 99232 SBSQ HOSP IP/OBS MODERATE 35: CPT | Performed by: PSYCHIATRY & NEUROLOGY

## 2019-01-01 PROCEDURE — 93306 TTE W/DOPPLER COMPLETE: CPT | Mod: 26 | Performed by: INTERNAL MEDICINE

## 2019-01-01 PROCEDURE — 82272 OCCULT BLD FECES 1-3 TESTS: CPT | Performed by: EMERGENCY MEDICINE

## 2019-01-01 PROCEDURE — 99233 SBSQ HOSP IP/OBS HIGH 50: CPT | Performed by: INTERNAL MEDICINE

## 2019-01-01 PROCEDURE — 93306 TTE W/DOPPLER COMPLETE: CPT

## 2019-01-01 PROCEDURE — 85027 COMPLETE CBC AUTOMATED: CPT | Performed by: HOSPITALIST

## 2019-01-01 PROCEDURE — 80307 DRUG TEST PRSMV CHEM ANLYZR: CPT | Performed by: EMERGENCY MEDICINE

## 2019-01-01 PROCEDURE — 96376 TX/PRO/DX INJ SAME DRUG ADON: CPT

## 2019-01-01 PROCEDURE — 84484 ASSAY OF TROPONIN QUANT: CPT | Performed by: PHYSICIAN ASSISTANT

## 2019-01-01 PROCEDURE — 99222 1ST HOSP IP/OBS MODERATE 55: CPT | Mod: AI | Performed by: PSYCHIATRY & NEUROLOGY

## 2019-01-01 PROCEDURE — 80053 COMPREHEN METABOLIC PANEL: CPT | Performed by: PHYSICIAN ASSISTANT

## 2019-01-01 PROCEDURE — 93010 ELECTROCARDIOGRAM REPORT: CPT | Mod: 76 | Performed by: INTERNAL MEDICINE

## 2019-01-01 PROCEDURE — 84484 ASSAY OF TROPONIN QUANT: CPT | Performed by: EMERGENCY MEDICINE

## 2019-01-01 PROCEDURE — 25000131 ZZH RX MED GY IP 250 OP 636 PS 637: Performed by: HOSPITALIST

## 2019-01-01 PROCEDURE — 99207 ZZC APP CREDIT; MD BILLING SHARED VISIT: CPT | Performed by: PHYSICIAN ASSISTANT

## 2019-01-01 PROCEDURE — 25000125 ZZHC RX 250: Performed by: EMERGENCY MEDICINE

## 2019-01-01 RX ORDER — LIDOCAINE 40 MG/G
CREAM TOPICAL
Status: DISCONTINUED | OUTPATIENT
Start: 2019-01-01 | End: 2019-01-01 | Stop reason: HOSPADM

## 2019-01-01 RX ORDER — DIAZEPAM 5 MG
5-20 TABLET ORAL EVERY 30 MIN PRN
Status: DISCONTINUED | OUTPATIENT
Start: 2019-01-01 | End: 2019-01-01

## 2019-01-01 RX ORDER — GABAPENTIN 100 MG/1
100 CAPSULE ORAL 3 TIMES DAILY PRN
Status: DISCONTINUED | OUTPATIENT
Start: 2019-01-01 | End: 2019-01-01 | Stop reason: HOSPADM

## 2019-01-01 RX ORDER — TRAZODONE HYDROCHLORIDE 50 MG/1
50 TABLET, FILM COATED ORAL AT BEDTIME
Status: DISCONTINUED | OUTPATIENT
Start: 2019-01-01 | End: 2019-01-01 | Stop reason: HOSPADM

## 2019-01-01 RX ORDER — GABAPENTIN 300 MG/1
300 CAPSULE ORAL 3 TIMES DAILY
Qty: 90 CAPSULE | Refills: 0 | Status: SHIPPED | OUTPATIENT
Start: 2019-01-01 | End: 2019-01-01

## 2019-01-01 RX ORDER — NALOXONE HYDROCHLORIDE 0.4 MG/ML
.1-.4 INJECTION, SOLUTION INTRAMUSCULAR; INTRAVENOUS; SUBCUTANEOUS
Status: DISCONTINUED | OUTPATIENT
Start: 2019-01-01 | End: 2019-01-01 | Stop reason: HOSPADM

## 2019-01-01 RX ORDER — ALUMINA, MAGNESIA, AND SIMETHICONE 2400; 2400; 240 MG/30ML; MG/30ML; MG/30ML
30 SUSPENSION ORAL EVERY 4 HOURS PRN
Status: DISCONTINUED | OUTPATIENT
Start: 2019-01-01 | End: 2019-01-01 | Stop reason: HOSPADM

## 2019-01-01 RX ORDER — GABAPENTIN 300 MG/1
600 CAPSULE ORAL 3 TIMES DAILY
Status: ON HOLD | COMMUNITY
End: 2019-01-01

## 2019-01-01 RX ORDER — AMOXICILLIN 250 MG
2 CAPSULE ORAL 2 TIMES DAILY PRN
Status: DISCONTINUED | OUTPATIENT
Start: 2019-01-01 | End: 2019-01-01 | Stop reason: HOSPADM

## 2019-01-01 RX ORDER — DIAZEPAM 5 MG
10 TABLET ORAL ONCE
Status: COMPLETED | OUTPATIENT
Start: 2019-01-01 | End: 2019-01-01

## 2019-01-01 RX ORDER — POTASSIUM CHLORIDE 29.8 MG/ML
20 INJECTION INTRAVENOUS
Status: DISCONTINUED | OUTPATIENT
Start: 2019-01-01 | End: 2019-01-01 | Stop reason: HOSPADM

## 2019-01-01 RX ORDER — DIPHENHYDRAMINE HCL 25 MG
25 CAPSULE ORAL EVERY 4 HOURS PRN
Qty: 30 CAPSULE | Refills: 0 | Status: SHIPPED | OUTPATIENT
Start: 2019-01-01 | End: 2020-01-01

## 2019-01-01 RX ORDER — AMLODIPINE BESYLATE 5 MG/1
5 TABLET ORAL DAILY
Status: DISCONTINUED | OUTPATIENT
Start: 2019-01-01 | End: 2019-01-01 | Stop reason: HOSPADM

## 2019-01-01 RX ORDER — LORAZEPAM 2 MG/ML
1 INJECTION INTRAMUSCULAR ONCE
Status: COMPLETED | OUTPATIENT
Start: 2019-01-01 | End: 2019-01-01

## 2019-01-01 RX ORDER — BISACODYL 10 MG
10 SUPPOSITORY, RECTAL RECTAL DAILY PRN
Status: DISCONTINUED | OUTPATIENT
Start: 2019-01-01 | End: 2019-01-01 | Stop reason: HOSPADM

## 2019-01-01 RX ORDER — POTASSIUM CHLORIDE 1500 MG/1
20-40 TABLET, EXTENDED RELEASE ORAL
Status: DISCONTINUED | OUTPATIENT
Start: 2019-01-01 | End: 2019-01-01 | Stop reason: HOSPADM

## 2019-01-01 RX ORDER — SILDENAFIL 100 MG/1
TABLET, FILM COATED ORAL
Qty: 12 TABLET | Refills: 11 | Status: SHIPPED | OUTPATIENT
Start: 2019-01-01 | End: 2020-01-01

## 2019-01-01 RX ORDER — LANOLIN ALCOHOL/MO/W.PET/CERES
3 CREAM (GRAM) TOPICAL
Status: DISCONTINUED | OUTPATIENT
Start: 2019-01-01 | End: 2019-01-01 | Stop reason: HOSPADM

## 2019-01-01 RX ORDER — LORAZEPAM 1 MG/1
1 TABLET ORAL ONCE
Status: COMPLETED | OUTPATIENT
Start: 2019-01-01 | End: 2019-01-01

## 2019-01-01 RX ORDER — ACETAMINOPHEN 325 MG/1
650 TABLET ORAL EVERY 8 HOURS PRN
Qty: 100 TABLET | Refills: 0 | Status: SHIPPED | OUTPATIENT
Start: 2019-01-01 | End: 2020-01-01

## 2019-01-01 RX ORDER — TRAZODONE HYDROCHLORIDE 50 MG/1
50 TABLET, FILM COATED ORAL AT BEDTIME
Status: ON HOLD | DISCHARGE
Start: 2019-01-01 | End: 2019-01-01

## 2019-01-01 RX ORDER — FOLIC ACID 1 MG/1
1 TABLET ORAL DAILY
Status: DISCONTINUED | OUTPATIENT
Start: 2019-01-01 | End: 2019-01-01 | Stop reason: HOSPADM

## 2019-01-01 RX ORDER — METOCLOPRAMIDE HYDROCHLORIDE 5 MG/ML
10 INJECTION INTRAMUSCULAR; INTRAVENOUS ONCE
Status: COMPLETED | OUTPATIENT
Start: 2019-01-01 | End: 2019-01-01

## 2019-01-01 RX ORDER — POTASSIUM CHLORIDE 1.5 G/1.58G
20-40 POWDER, FOR SOLUTION ORAL
Status: DISCONTINUED | OUTPATIENT
Start: 2019-01-01 | End: 2019-01-01 | Stop reason: HOSPADM

## 2019-01-01 RX ORDER — LANOLIN ALCOHOL/MO/W.PET/CERES
100 CREAM (GRAM) TOPICAL DAILY
Status: DISCONTINUED | OUTPATIENT
Start: 2019-01-01 | End: 2019-01-01 | Stop reason: HOSPADM

## 2019-01-01 RX ORDER — MULTIPLE VITAMINS W/ MINERALS TAB 9MG-400MCG
1 TAB ORAL DAILY
Status: DISCONTINUED | OUTPATIENT
Start: 2019-01-01 | End: 2019-01-01 | Stop reason: HOSPADM

## 2019-01-01 RX ORDER — ACETAMINOPHEN 500 MG
500 TABLET ORAL EVERY 4 HOURS PRN
Status: DISCONTINUED | OUTPATIENT
Start: 2019-01-01 | End: 2019-01-01 | Stop reason: HOSPADM

## 2019-01-01 RX ORDER — DIAZEPAM 10 MG/2ML
2.5 INJECTION, SOLUTION INTRAMUSCULAR; INTRAVENOUS ONCE
Status: COMPLETED | OUTPATIENT
Start: 2019-01-01 | End: 2019-01-01

## 2019-01-01 RX ORDER — AMLODIPINE BESYLATE 5 MG/1
5 TABLET ORAL EVERY EVENING
Status: DISCONTINUED | OUTPATIENT
Start: 2019-01-01 | End: 2019-01-01 | Stop reason: HOSPADM

## 2019-01-01 RX ORDER — MULTIPLE VITAMINS W/ MINERALS TAB 9MG-400MCG
1 TAB ORAL DAILY
Qty: 30 TABLET | Refills: 0 | Status: SHIPPED | OUTPATIENT
Start: 2019-01-01 | End: 2019-01-01

## 2019-01-01 RX ORDER — LORAZEPAM 2 MG/ML
1-2 INJECTION INTRAMUSCULAR EVERY 30 MIN PRN
Status: DISCONTINUED | OUTPATIENT
Start: 2019-01-01 | End: 2019-01-01

## 2019-01-01 RX ORDER — GABAPENTIN 300 MG/1
600 CAPSULE ORAL 3 TIMES DAILY
Status: DISCONTINUED | OUTPATIENT
Start: 2019-01-01 | End: 2019-01-01 | Stop reason: HOSPADM

## 2019-01-01 RX ORDER — LORAZEPAM 0.5 MG/1
.5-1 TABLET ORAL EVERY 4 HOURS PRN
Status: DISCONTINUED | OUTPATIENT
Start: 2019-01-01 | End: 2019-01-01 | Stop reason: HOSPADM

## 2019-01-01 RX ORDER — MULTIPLE VITAMINS W/ MINERALS TAB 9MG-400MCG
1 TAB ORAL DAILY
Qty: 30 TABLET | Refills: 0 | Status: SHIPPED | OUTPATIENT
Start: 2019-01-01 | End: 2020-01-01

## 2019-01-01 RX ORDER — MAGNESIUM SULFATE HEPTAHYDRATE 40 MG/ML
4 INJECTION, SOLUTION INTRAVENOUS EVERY 4 HOURS PRN
Status: DISCONTINUED | OUTPATIENT
Start: 2019-01-01 | End: 2019-01-01 | Stop reason: HOSPADM

## 2019-01-01 RX ORDER — ACETAMINOPHEN 325 MG/1
650 TABLET ORAL EVERY 8 HOURS PRN
Qty: 100 TABLET | Refills: 0 | Status: SHIPPED | OUTPATIENT
Start: 2019-01-01 | End: 2019-01-01

## 2019-01-01 RX ORDER — LANOLIN ALCOHOL/MO/W.PET/CERES
100 CREAM (GRAM) TOPICAL DAILY
Qty: 30 TABLET | Refills: 0 | Status: SHIPPED | OUTPATIENT
Start: 2019-01-01 | End: 2019-01-01

## 2019-01-01 RX ORDER — TRAZODONE HYDROCHLORIDE 50 MG/1
50 TABLET, FILM COATED ORAL AT BEDTIME
Qty: 30 TABLET | Refills: 0 | Status: SHIPPED | OUTPATIENT
Start: 2019-01-01 | End: 2019-01-01

## 2019-01-01 RX ORDER — ACETAMINOPHEN 325 MG/1
650 TABLET ORAL EVERY 4 HOURS PRN
Status: DISCONTINUED | OUTPATIENT
Start: 2019-01-01 | End: 2019-01-01 | Stop reason: HOSPADM

## 2019-01-01 RX ORDER — PROCHLORPERAZINE 25 MG
25 SUPPOSITORY, RECTAL RECTAL EVERY 12 HOURS PRN
Status: DISCONTINUED | OUTPATIENT
Start: 2019-01-01 | End: 2019-01-01 | Stop reason: HOSPADM

## 2019-01-01 RX ORDER — AMOXICILLIN 250 MG
1 CAPSULE ORAL 2 TIMES DAILY PRN
Status: DISCONTINUED | OUTPATIENT
Start: 2019-01-01 | End: 2019-01-01 | Stop reason: HOSPADM

## 2019-01-01 RX ORDER — POTASSIUM CHLORIDE 7.45 MG/ML
10 INJECTION INTRAVENOUS
Status: DISCONTINUED | OUTPATIENT
Start: 2019-01-01 | End: 2019-01-01 | Stop reason: HOSPADM

## 2019-01-01 RX ORDER — GABAPENTIN 300 MG/1
300 CAPSULE ORAL 3 TIMES DAILY
Status: DISCONTINUED | OUTPATIENT
Start: 2019-01-01 | End: 2019-01-01

## 2019-01-01 RX ORDER — FOLIC ACID 1 MG/1
1 TABLET ORAL DAILY
Qty: 30 TABLET | Refills: 0 | Status: SHIPPED | OUTPATIENT
Start: 2019-01-01 | End: 2020-01-01

## 2019-01-01 RX ORDER — FOLIC ACID 1 MG/1
1 TABLET ORAL DAILY
Qty: 30 TABLET | Refills: 0 | Status: SHIPPED | OUTPATIENT
Start: 2019-01-01 | End: 2019-01-01

## 2019-01-01 RX ORDER — SODIUM CHLORIDE, SODIUM LACTATE, POTASSIUM CHLORIDE, CALCIUM CHLORIDE 600; 310; 30; 20 MG/100ML; MG/100ML; MG/100ML; MG/100ML
1000 INJECTION, SOLUTION INTRAVENOUS CONTINUOUS
Status: DISCONTINUED | OUTPATIENT
Start: 2019-01-01 | End: 2019-01-01

## 2019-01-01 RX ORDER — PANTOPRAZOLE SODIUM 40 MG/1
40 TABLET, DELAYED RELEASE ORAL
Status: DISCONTINUED | OUTPATIENT
Start: 2019-01-01 | End: 2019-01-01 | Stop reason: HOSPADM

## 2019-01-01 RX ORDER — HYDROXYZINE HYDROCHLORIDE 25 MG/1
25 TABLET, FILM COATED ORAL EVERY 4 HOURS PRN
Status: DISCONTINUED | OUTPATIENT
Start: 2019-01-01 | End: 2019-01-01 | Stop reason: HOSPADM

## 2019-01-01 RX ORDER — ACETAMINOPHEN 325 MG/1
650 TABLET ORAL EVERY 8 HOURS PRN
Status: DISCONTINUED | OUTPATIENT
Start: 2019-01-01 | End: 2019-01-01 | Stop reason: HOSPADM

## 2019-01-01 RX ORDER — TRAZODONE HYDROCHLORIDE 50 MG/1
50 TABLET, FILM COATED ORAL AT BEDTIME
Qty: 30 TABLET | Refills: 0 | Status: SHIPPED | OUTPATIENT
Start: 2019-01-01 | End: 2020-01-01

## 2019-01-01 RX ORDER — GABAPENTIN 100 MG/1
200 CAPSULE ORAL 3 TIMES DAILY
Status: DISCONTINUED | OUTPATIENT
Start: 2019-01-01 | End: 2019-01-01

## 2019-01-01 RX ORDER — ACETAMINOPHEN 650 MG/1
650 SUPPOSITORY RECTAL EVERY 4 HOURS PRN
Status: DISCONTINUED | OUTPATIENT
Start: 2019-01-01 | End: 2019-01-01 | Stop reason: HOSPADM

## 2019-01-01 RX ORDER — LORAZEPAM 1 MG/1
1-2 TABLET ORAL EVERY 30 MIN PRN
Status: DISCONTINUED | OUTPATIENT
Start: 2019-01-01 | End: 2019-01-01

## 2019-01-01 RX ORDER — AMLODIPINE BESYLATE 5 MG/1
5 TABLET ORAL DAILY
Qty: 30 TABLET | Refills: 0 | Status: SHIPPED | OUTPATIENT
Start: 2019-01-01 | End: 2020-01-01

## 2019-01-01 RX ORDER — SODIUM CHLORIDE 9 MG/ML
INJECTION, SOLUTION INTRAVENOUS CONTINUOUS
Status: DISCONTINUED | OUTPATIENT
Start: 2019-01-01 | End: 2019-01-01

## 2019-01-01 RX ORDER — POLYETHYLENE GLYCOL 3350 17 G/17G
17 POWDER, FOR SOLUTION ORAL DAILY PRN
Status: DISCONTINUED | OUTPATIENT
Start: 2019-01-01 | End: 2019-01-01 | Stop reason: HOSPADM

## 2019-01-01 RX ORDER — POTASSIUM CL/LIDO/0.9 % NACL 10MEQ/0.1L
10 INTRAVENOUS SOLUTION, PIGGYBACK (ML) INTRAVENOUS
Status: DISCONTINUED | OUTPATIENT
Start: 2019-01-01 | End: 2019-01-01 | Stop reason: HOSPADM

## 2019-01-01 RX ORDER — GABAPENTIN 100 MG/1
200 CAPSULE ORAL 3 TIMES DAILY
Qty: 180 CAPSULE | Refills: 0 | Status: SHIPPED | OUTPATIENT
Start: 2019-01-01 | End: 2019-01-01

## 2019-01-01 RX ORDER — DIPHENHYDRAMINE HCL 25 MG
25 CAPSULE ORAL EVERY 4 HOURS PRN
Status: DISCONTINUED | OUTPATIENT
Start: 2019-01-01 | End: 2019-01-01 | Stop reason: HOSPADM

## 2019-01-01 RX ORDER — LANOLIN ALCOHOL/MO/W.PET/CERES
100 CREAM (GRAM) TOPICAL DAILY
Status: ON HOLD | DISCHARGE
Start: 2019-01-01 | End: 2019-01-01

## 2019-01-01 RX ORDER — NITROGLYCERIN 0.4 MG/1
0.4 TABLET SUBLINGUAL EVERY 5 MIN PRN
Status: DISCONTINUED | OUTPATIENT
Start: 2019-01-01 | End: 2019-01-01 | Stop reason: HOSPADM

## 2019-01-01 RX ORDER — LANOLIN ALCOHOL/MO/W.PET/CERES
100 CREAM (GRAM) TOPICAL DAILY
Qty: 30 TABLET | Refills: 0 | Status: SHIPPED | OUTPATIENT
Start: 2019-01-01 | End: 2020-01-01

## 2019-01-01 RX ORDER — PROCHLORPERAZINE MALEATE 10 MG
10 TABLET ORAL EVERY 6 HOURS PRN
Status: DISCONTINUED | OUTPATIENT
Start: 2019-01-01 | End: 2019-01-01 | Stop reason: HOSPADM

## 2019-01-01 RX ORDER — ACETAMINOPHEN 325 MG/1
650 TABLET ORAL ONCE
Status: COMPLETED | OUTPATIENT
Start: 2019-01-01 | End: 2019-01-01

## 2019-01-01 RX ORDER — ONDANSETRON 4 MG/1
4 TABLET, ORALLY DISINTEGRATING ORAL EVERY 6 HOURS PRN
Status: DISCONTINUED | OUTPATIENT
Start: 2019-01-01 | End: 2019-01-01 | Stop reason: HOSPADM

## 2019-01-01 RX ORDER — ONDANSETRON 2 MG/ML
4 INJECTION INTRAMUSCULAR; INTRAVENOUS EVERY 6 HOURS PRN
Status: DISCONTINUED | OUTPATIENT
Start: 2019-01-01 | End: 2019-01-01 | Stop reason: HOSPADM

## 2019-01-01 RX ORDER — GABAPENTIN 300 MG/1
600 CAPSULE ORAL 3 TIMES DAILY
Qty: 180 CAPSULE | Refills: 0 | Status: SHIPPED | OUTPATIENT
Start: 2019-01-01 | End: 2020-01-01

## 2019-01-01 RX ORDER — HYDROXYZINE HYDROCHLORIDE 25 MG/1
25 TABLET, FILM COATED ORAL EVERY 6 HOURS PRN
Status: DISCONTINUED | OUTPATIENT
Start: 2019-01-01 | End: 2019-01-01 | Stop reason: HOSPADM

## 2019-01-01 RX ADMIN — GABAPENTIN 300 MG: 300 CAPSULE ORAL at 22:40

## 2019-01-01 RX ADMIN — TRAZODONE HYDROCHLORIDE 50 MG: 50 TABLET ORAL at 22:28

## 2019-01-01 RX ADMIN — GABAPENTIN 500 MG: 400 CAPSULE ORAL at 14:24

## 2019-01-01 RX ADMIN — Medication 100 MG: at 07:38

## 2019-01-01 RX ADMIN — LORAZEPAM 1 MG: 2 INJECTION INTRAMUSCULAR; INTRAVENOUS at 13:48

## 2019-01-01 RX ADMIN — GABAPENTIN 300 MG: 300 CAPSULE ORAL at 13:28

## 2019-01-01 RX ADMIN — ACETAMINOPHEN 650 MG: 325 TABLET, FILM COATED ORAL at 20:33

## 2019-01-01 RX ADMIN — GABAPENTIN 300 MG: 300 CAPSULE ORAL at 21:57

## 2019-01-01 RX ADMIN — FOLIC ACID 1 MG: 1 TABLET ORAL at 08:45

## 2019-01-01 RX ADMIN — DIPHENHYDRAMINE HYDROCHLORIDE 25 MG: 25 CAPSULE ORAL at 21:15

## 2019-01-01 RX ADMIN — ACETAMINOPHEN 650 MG: 325 TABLET, FILM COATED ORAL at 07:12

## 2019-01-01 RX ADMIN — TRAZODONE HYDROCHLORIDE 50 MG: 50 TABLET ORAL at 21:56

## 2019-01-01 RX ADMIN — HYDROXYZINE HYDROCHLORIDE 25 MG: 25 TABLET, FILM COATED ORAL at 14:56

## 2019-01-01 RX ADMIN — LORAZEPAM 2 MG: 1 TABLET ORAL at 09:20

## 2019-01-01 RX ADMIN — LORAZEPAM 2 MG: 2 INJECTION INTRAMUSCULAR; INTRAVENOUS at 13:21

## 2019-01-01 RX ADMIN — OMEPRAZOLE 20 MG: 20 CAPSULE, DELAYED RELEASE ORAL at 07:59

## 2019-01-01 RX ADMIN — LORAZEPAM 2 MG: 2 INJECTION INTRAMUSCULAR; INTRAVENOUS at 15:07

## 2019-01-01 RX ADMIN — MULTIPLE VITAMINS W/ MINERALS TAB 1 TABLET: TAB at 08:08

## 2019-01-01 RX ADMIN — DIAZEPAM 2.5 MG: 5 INJECTION, SOLUTION INTRAMUSCULAR; INTRAVENOUS at 15:02

## 2019-01-01 RX ADMIN — AMLODIPINE BESYLATE 5 MG: 5 TABLET ORAL at 23:50

## 2019-01-01 RX ADMIN — GABAPENTIN 500 MG: 400 CAPSULE ORAL at 20:50

## 2019-01-01 RX ADMIN — THIAMINE HCL TAB 100 MG 100 MG: 100 TAB at 08:35

## 2019-01-01 RX ADMIN — LORAZEPAM 1 MG: 1 TABLET ORAL at 20:26

## 2019-01-01 RX ADMIN — ACETAMINOPHEN 650 MG: 325 TABLET, FILM COATED ORAL at 14:56

## 2019-01-01 RX ADMIN — HYDROXYZINE HYDROCHLORIDE 25 MG: 25 TABLET, FILM COATED ORAL at 15:54

## 2019-01-01 RX ADMIN — ACETAMINOPHEN 650 MG: 325 TABLET, FILM COATED ORAL at 16:07

## 2019-01-01 RX ADMIN — HYDROXYZINE HYDROCHLORIDE 25 MG: 25 TABLET, FILM COATED ORAL at 11:47

## 2019-01-01 RX ADMIN — SERTRALINE HYDROCHLORIDE 50 MG: 50 TABLET ORAL at 08:10

## 2019-01-01 RX ADMIN — GABAPENTIN 500 MG: 400 CAPSULE ORAL at 20:55

## 2019-01-01 RX ADMIN — OMEPRAZOLE 20 MG: 20 CAPSULE, DELAYED RELEASE ORAL at 07:48

## 2019-01-01 RX ADMIN — PANTOPRAZOLE SODIUM 40 MG: 40 TABLET, DELAYED RELEASE ORAL at 06:38

## 2019-01-01 RX ADMIN — AMLODIPINE BESYLATE 5 MG: 5 TABLET ORAL at 08:45

## 2019-01-01 RX ADMIN — ACETAMINOPHEN 650 MG: 325 TABLET, FILM COATED ORAL at 12:09

## 2019-01-01 RX ADMIN — GABAPENTIN 300 MG: 300 CAPSULE ORAL at 20:18

## 2019-01-01 RX ADMIN — TRAZODONE HYDROCHLORIDE 50 MG: 50 TABLET ORAL at 21:15

## 2019-01-01 RX ADMIN — OMEPRAZOLE 20 MG: 20 CAPSULE, DELAYED RELEASE ORAL at 08:46

## 2019-01-01 RX ADMIN — LORAZEPAM 2 MG: 2 INJECTION INTRAMUSCULAR; INTRAVENOUS at 11:22

## 2019-01-01 RX ADMIN — LORAZEPAM 1 MG: 1 TABLET ORAL at 04:37

## 2019-01-01 RX ADMIN — AMLODIPINE BESYLATE 5 MG: 5 TABLET ORAL at 09:25

## 2019-01-01 RX ADMIN — SERTRALINE HYDROCHLORIDE 50 MG: 50 TABLET ORAL at 07:48

## 2019-01-01 RX ADMIN — AMLODIPINE BESYLATE 5 MG: 5 TABLET ORAL at 20:09

## 2019-01-01 RX ADMIN — DIPHENHYDRAMINE HYDROCHLORIDE 25 MG: 25 CAPSULE ORAL at 21:51

## 2019-01-01 RX ADMIN — FOLIC ACID 1 MG: 1 TABLET ORAL at 07:39

## 2019-01-01 RX ADMIN — GABAPENTIN 300 MG: 300 CAPSULE ORAL at 08:03

## 2019-01-01 RX ADMIN — AMLODIPINE BESYLATE 5 MG: 5 TABLET ORAL at 20:26

## 2019-01-01 RX ADMIN — OMEPRAZOLE 20 MG: 20 CAPSULE, DELAYED RELEASE ORAL at 08:16

## 2019-01-01 RX ADMIN — MULTIPLE VITAMINS W/ MINERALS TAB 1 TABLET: TAB at 09:19

## 2019-01-01 RX ADMIN — SERTRALINE HYDROCHLORIDE 50 MG: 50 TABLET ORAL at 10:25

## 2019-01-01 RX ADMIN — GABAPENTIN 100 MG: 100 CAPSULE ORAL at 15:11

## 2019-01-01 RX ADMIN — TRAZODONE HYDROCHLORIDE 50 MG: 50 TABLET ORAL at 22:11

## 2019-01-01 RX ADMIN — ACETAMINOPHEN 650 MG: 325 TABLET, FILM COATED ORAL at 20:07

## 2019-01-01 RX ADMIN — GABAPENTIN 500 MG: 400 CAPSULE ORAL at 13:41

## 2019-01-01 RX ADMIN — MULTIPLE VITAMINS W/ MINERALS TAB 1 TABLET: TAB at 07:59

## 2019-01-01 RX ADMIN — DIPHENHYDRAMINE HYDROCHLORIDE 25 MG: 25 CAPSULE ORAL at 11:47

## 2019-01-01 RX ADMIN — TRAZODONE HYDROCHLORIDE 50 MG: 50 TABLET ORAL at 20:51

## 2019-01-01 RX ADMIN — FOLIC ACID 1 MG: 1 TABLET ORAL at 07:59

## 2019-01-01 RX ADMIN — HYDROXYZINE HYDROCHLORIDE 25 MG: 25 TABLET, FILM COATED ORAL at 16:07

## 2019-01-01 RX ADMIN — HYDROXYZINE HYDROCHLORIDE 25 MG: 25 TABLET, FILM COATED ORAL at 11:22

## 2019-01-01 RX ADMIN — SODIUM CHLORIDE, POTASSIUM CHLORIDE, SODIUM LACTATE AND CALCIUM CHLORIDE 1000 ML: 600; 310; 30; 20 INJECTION, SOLUTION INTRAVENOUS at 16:04

## 2019-01-01 RX ADMIN — MULTIPLE VITAMINS W/ MINERALS TAB 1 TABLET: TAB at 08:45

## 2019-01-01 RX ADMIN — ACETAMINOPHEN 650 MG: 325 TABLET, FILM COATED ORAL at 06:29

## 2019-01-01 RX ADMIN — FOLIC ACID 1 MG: 1 TABLET ORAL at 08:10

## 2019-01-01 RX ADMIN — LORAZEPAM 2 MG: 2 INJECTION INTRAMUSCULAR; INTRAVENOUS at 16:21

## 2019-01-01 RX ADMIN — FOLIC ACID 1 MG: 1 TABLET ORAL at 08:16

## 2019-01-01 RX ADMIN — ACETAMINOPHEN 650 MG: 325 TABLET, FILM COATED ORAL at 22:28

## 2019-01-01 RX ADMIN — SERTRALINE HYDROCHLORIDE 50 MG: 50 TABLET ORAL at 08:16

## 2019-01-01 RX ADMIN — DIAZEPAM 10 MG: 5 TABLET ORAL at 13:47

## 2019-01-01 RX ADMIN — MULTIPLE VITAMINS W/ MINERALS TAB 1 TABLET: TAB at 08:38

## 2019-01-01 RX ADMIN — GABAPENTIN 300 MG: 300 CAPSULE ORAL at 08:09

## 2019-01-01 RX ADMIN — GABAPENTIN 300 MG: 300 CAPSULE ORAL at 13:52

## 2019-01-01 RX ADMIN — FOLIC ACID 1 MG: 1 TABLET ORAL at 09:47

## 2019-01-01 RX ADMIN — ACETAMINOPHEN 650 MG: 325 TABLET, FILM COATED ORAL at 08:45

## 2019-01-01 RX ADMIN — MULTIPLE VITAMINS W/ MINERALS TAB 1 TABLET: TAB at 08:10

## 2019-01-01 RX ADMIN — AMLODIPINE BESYLATE 5 MG: 5 TABLET ORAL at 07:48

## 2019-01-01 RX ADMIN — OMEPRAZOLE 20 MG: 20 CAPSULE, DELAYED RELEASE ORAL at 08:38

## 2019-01-01 RX ADMIN — ACETAMINOPHEN 500 MG: 500 TABLET, FILM COATED ORAL at 18:07

## 2019-01-01 RX ADMIN — ACETAMINOPHEN 650 MG: 325 TABLET, FILM COATED ORAL at 10:20

## 2019-01-01 RX ADMIN — AMLODIPINE BESYLATE 5 MG: 5 TABLET ORAL at 08:10

## 2019-01-01 RX ADMIN — SODIUM CHLORIDE 1000 ML: 9 INJECTION, SOLUTION INTRAVENOUS at 11:12

## 2019-01-01 RX ADMIN — PANTOPRAZOLE SODIUM 40 MG: 40 TABLET, DELAYED RELEASE ORAL at 06:31

## 2019-01-01 RX ADMIN — DIAZEPAM 10 MG: 5 TABLET ORAL at 15:22

## 2019-01-01 RX ADMIN — DIPHENHYDRAMINE HYDROCHLORIDE 25 MG: 25 CAPSULE ORAL at 22:06

## 2019-01-01 RX ADMIN — LORAZEPAM 1 MG: 0.5 TABLET ORAL at 13:16

## 2019-01-01 RX ADMIN — TRAZODONE HYDROCHLORIDE 50 MG: 50 TABLET ORAL at 21:49

## 2019-01-01 RX ADMIN — GABAPENTIN 300 MG: 300 CAPSULE ORAL at 07:48

## 2019-01-01 RX ADMIN — SERTRALINE HYDROCHLORIDE 50 MG: 50 TABLET ORAL at 08:38

## 2019-01-01 RX ADMIN — FOLIC ACID: 5 INJECTION, SOLUTION INTRAMUSCULAR; INTRAVENOUS; SUBCUTANEOUS at 13:48

## 2019-01-01 RX ADMIN — HYDROXYZINE HYDROCHLORIDE 25 MG: 25 TABLET, FILM COATED ORAL at 20:54

## 2019-01-01 RX ADMIN — ACETAMINOPHEN 650 MG: 325 TABLET, FILM COATED ORAL at 09:53

## 2019-01-01 RX ADMIN — FOLIC ACID 1 MG: 1 TABLET ORAL at 09:20

## 2019-01-01 RX ADMIN — GABAPENTIN 300 MG: 300 CAPSULE ORAL at 08:35

## 2019-01-01 RX ADMIN — PANTOPRAZOLE SODIUM 40 MG: 40 TABLET, DELAYED RELEASE ORAL at 06:42

## 2019-01-01 RX ADMIN — DIPHENHYDRAMINE HYDROCHLORIDE 25 MG: 25 CAPSULE ORAL at 22:42

## 2019-01-01 RX ADMIN — MULTIPLE VITAMINS W/ MINERALS TAB 1 TABLET: TAB at 09:47

## 2019-01-01 RX ADMIN — FOLIC ACID 1 MG: 1 TABLET ORAL at 08:03

## 2019-01-01 RX ADMIN — MULTIPLE VITAMINS W/ MINERALS TAB 1 TABLET: TAB at 08:35

## 2019-01-01 RX ADMIN — INFLUENZA A VIRUS A/BRISBANE/02/2018 (H1N1) RECOMBINANT HEMAGGLUTININ ANTIGEN, INFLUENZA A VIRUS A/KANSAS/14/2017 (H3N2) RECOMBINANT HEMAGGLUTININ ANTIGEN, INFLUENZA B VIRUS B/PHUKET/3073/2013 RECOMBINANT HEMAGGLUTININ ANTIGEN, AND INFLUENZA B VIRUS B/MARYLAND/15/2016 RECOMBINANT HEMAGGLUTININ ANTIGEN 0.5 ML: 45; 45; 45; 45 INJECTION INTRAMUSCULAR at 20:30

## 2019-01-01 RX ADMIN — ACETAMINOPHEN 650 MG: 325 TABLET, FILM COATED ORAL at 08:38

## 2019-01-01 RX ADMIN — LORAZEPAM 2 MG: 2 INJECTION INTRAMUSCULAR; INTRAVENOUS at 10:36

## 2019-01-01 RX ADMIN — SERTRALINE HYDROCHLORIDE 50 MG: 50 TABLET ORAL at 07:59

## 2019-01-01 RX ADMIN — GABAPENTIN 500 MG: 400 CAPSULE ORAL at 08:16

## 2019-01-01 RX ADMIN — LORAZEPAM 2 MG: 1 TABLET ORAL at 23:50

## 2019-01-01 RX ADMIN — ACETAMINOPHEN 650 MG: 325 TABLET, FILM COATED ORAL at 20:50

## 2019-01-01 RX ADMIN — FOLIC ACID 1 MG: 1 TABLET ORAL at 08:38

## 2019-01-01 RX ADMIN — ACETAMINOPHEN 650 MG: 325 TABLET, FILM COATED ORAL at 08:35

## 2019-01-01 RX ADMIN — TRAZODONE HYDROCHLORIDE 50 MG: 50 TABLET ORAL at 20:23

## 2019-01-01 RX ADMIN — LORAZEPAM 1 MG: 1 TABLET ORAL at 19:38

## 2019-01-01 RX ADMIN — METOCLOPRAMIDE 10 MG: 5 INJECTION, SOLUTION INTRAMUSCULAR; INTRAVENOUS at 18:07

## 2019-01-01 RX ADMIN — LIDOCAINE HYDROCHLORIDE 30 ML: 20 SOLUTION ORAL; TOPICAL at 15:51

## 2019-01-01 RX ADMIN — GABAPENTIN 300 MG: 300 CAPSULE ORAL at 14:03

## 2019-01-01 RX ADMIN — HYDROXYZINE HYDROCHLORIDE 25 MG: 25 TABLET, FILM COATED ORAL at 14:30

## 2019-01-01 RX ADMIN — HYDROXYZINE HYDROCHLORIDE 25 MG: 25 TABLET, FILM COATED ORAL at 09:53

## 2019-01-01 RX ADMIN — SERTRALINE HYDROCHLORIDE 50 MG: 50 TABLET ORAL at 08:08

## 2019-01-01 RX ADMIN — GABAPENTIN 500 MG: 400 CAPSULE ORAL at 08:38

## 2019-01-01 RX ADMIN — LORAZEPAM 1 MG: 1 TABLET ORAL at 03:47

## 2019-01-01 RX ADMIN — ACETAMINOPHEN 650 MG: 325 TABLET, FILM COATED ORAL at 20:28

## 2019-01-01 RX ADMIN — SERTRALINE HYDROCHLORIDE 50 MG: 50 TABLET ORAL at 08:03

## 2019-01-01 RX ADMIN — ACETAMINOPHEN 650 MG: 325 TABLET, FILM COATED ORAL at 09:26

## 2019-01-01 RX ADMIN — LORAZEPAM 1 MG: 0.5 TABLET ORAL at 20:09

## 2019-01-01 RX ADMIN — GABAPENTIN 500 MG: 400 CAPSULE ORAL at 07:59

## 2019-01-01 RX ADMIN — MULTIPLE VITAMINS W/ MINERALS TAB 1 TABLET: TAB at 07:48

## 2019-01-01 RX ADMIN — SERTRALINE HYDROCHLORIDE 50 MG: 50 TABLET ORAL at 08:46

## 2019-01-01 RX ADMIN — SODIUM CHLORIDE 1000 ML: 9 INJECTION, SOLUTION INTRAVENOUS at 13:48

## 2019-01-01 RX ADMIN — ACETAMINOPHEN 650 MG: 325 TABLET, FILM COATED ORAL at 19:13

## 2019-01-01 RX ADMIN — THIAMINE HCL TAB 100 MG 100 MG: 100 TAB at 08:16

## 2019-01-01 RX ADMIN — HYDROXYZINE HYDROCHLORIDE 25 MG: 25 TABLET, FILM COATED ORAL at 20:22

## 2019-01-01 RX ADMIN — ACETAMINOPHEN 650 MG: 325 TABLET, FILM COATED ORAL at 13:23

## 2019-01-01 RX ADMIN — AMLODIPINE BESYLATE 5 MG: 5 TABLET ORAL at 08:39

## 2019-01-01 RX ADMIN — GABAPENTIN 300 MG: 300 CAPSULE ORAL at 08:46

## 2019-01-01 RX ADMIN — DIAZEPAM 10 MG: 5 TABLET ORAL at 21:30

## 2019-01-01 RX ADMIN — ONDANSETRON HYDROCHLORIDE 4 MG: 2 INJECTION, SOLUTION INTRAMUSCULAR; INTRAVENOUS at 13:21

## 2019-01-01 RX ADMIN — THIAMINE HCL TAB 100 MG 100 MG: 100 TAB at 08:10

## 2019-01-01 RX ADMIN — LORAZEPAM 1 MG: 1 TABLET ORAL at 07:38

## 2019-01-01 RX ADMIN — THIAMINE HCL TAB 100 MG 100 MG: 100 TAB at 08:08

## 2019-01-01 RX ADMIN — GABAPENTIN 500 MG: 400 CAPSULE ORAL at 13:32

## 2019-01-01 RX ADMIN — THIAMINE HCL TAB 100 MG 100 MG: 100 TAB at 08:03

## 2019-01-01 RX ADMIN — ACETAMINOPHEN 650 MG: 325 TABLET, FILM COATED ORAL at 21:15

## 2019-01-01 RX ADMIN — HYDROXYZINE HYDROCHLORIDE 25 MG: 25 TABLET, FILM COATED ORAL at 19:13

## 2019-01-01 RX ADMIN — HYDROXYZINE HYDROCHLORIDE 25 MG: 25 TABLET, FILM COATED ORAL at 15:47

## 2019-01-01 RX ADMIN — LORAZEPAM 1 MG: 0.5 TABLET ORAL at 12:26

## 2019-01-01 RX ADMIN — OMEPRAZOLE 20 MG: 20 CAPSULE, DELAYED RELEASE ORAL at 08:08

## 2019-01-01 RX ADMIN — LORAZEPAM 1 MG: 1 TABLET ORAL at 01:00

## 2019-01-01 RX ADMIN — FOLIC ACID 1 MG: 1 TABLET ORAL at 08:08

## 2019-01-01 RX ADMIN — THIAMINE HCL TAB 100 MG 100 MG: 100 TAB at 08:39

## 2019-01-01 RX ADMIN — SODIUM CHLORIDE, POTASSIUM CHLORIDE, SODIUM LACTATE AND CALCIUM CHLORIDE 1000 ML: 600; 310; 30; 20 INJECTION, SOLUTION INTRAVENOUS at 18:58

## 2019-01-01 RX ADMIN — THIAMINE HCL TAB 100 MG 100 MG: 100 TAB at 08:45

## 2019-01-01 RX ADMIN — THIAMINE HCL TAB 100 MG 100 MG: 100 TAB at 07:58

## 2019-01-01 RX ADMIN — GABAPENTIN 300 MG: 300 CAPSULE ORAL at 14:05

## 2019-01-01 RX ADMIN — Medication 100 MG: at 09:20

## 2019-01-01 RX ADMIN — OMEPRAZOLE 20 MG: 20 CAPSULE, DELAYED RELEASE ORAL at 08:03

## 2019-01-01 RX ADMIN — GABAPENTIN 300 MG: 300 CAPSULE ORAL at 14:28

## 2019-01-01 RX ADMIN — SERTRALINE HYDROCHLORIDE 50 MG: 50 TABLET ORAL at 09:47

## 2019-01-01 RX ADMIN — GABAPENTIN 300 MG: 300 CAPSULE ORAL at 20:23

## 2019-01-01 RX ADMIN — SODIUM CHLORIDE: 9 INJECTION, SOLUTION INTRAVENOUS at 23:54

## 2019-01-01 RX ADMIN — GABAPENTIN 300 MG: 300 CAPSULE ORAL at 21:15

## 2019-01-01 RX ADMIN — MULTIPLE VITAMINS W/ MINERALS TAB 1 TABLET: TAB at 07:39

## 2019-01-01 RX ADMIN — DIPHENHYDRAMINE HYDROCHLORIDE 25 MG: 25 CAPSULE ORAL at 08:45

## 2019-01-01 RX ADMIN — TRAZODONE HYDROCHLORIDE 50 MG: 50 TABLET ORAL at 22:41

## 2019-01-01 RX ADMIN — ACETAMINOPHEN 650 MG: 325 TABLET, FILM COATED ORAL at 15:47

## 2019-01-01 RX ADMIN — LORAZEPAM 1 MG: 1 TABLET ORAL at 02:15

## 2019-01-01 RX ADMIN — HYDROXYZINE HYDROCHLORIDE 25 MG: 25 TABLET, FILM COATED ORAL at 10:21

## 2019-01-01 RX ADMIN — GABAPENTIN 300 MG: 300 CAPSULE ORAL at 08:10

## 2019-01-01 RX ADMIN — TRAZODONE HYDROCHLORIDE 50 MG: 50 TABLET ORAL at 21:03

## 2019-01-01 RX ADMIN — LORAZEPAM 1 MG: 0.5 TABLET ORAL at 18:18

## 2019-01-01 RX ADMIN — MULTIPLE VITAMINS W/ MINERALS TAB 1 TABLET: TAB at 08:16

## 2019-01-01 RX ADMIN — AMLODIPINE BESYLATE 5 MG: 5 TABLET ORAL at 07:59

## 2019-01-01 RX ADMIN — MULTIPLE VITAMINS W/ MINERALS TAB 1 TABLET: TAB at 08:03

## 2019-01-01 RX ADMIN — FOLIC ACID 1 MG: 1 TABLET ORAL at 08:35

## 2019-01-01 RX ADMIN — SERTRALINE HYDROCHLORIDE 50 MG: 50 TABLET ORAL at 08:35

## 2019-01-01 RX ADMIN — THIAMINE HCL TAB 100 MG 100 MG: 100 TAB at 07:48

## 2019-01-01 RX ADMIN — GABAPENTIN 600 MG: 300 CAPSULE ORAL at 13:32

## 2019-01-01 RX ADMIN — ONDANSETRON 4 MG: 4 TABLET, ORALLY DISINTEGRATING ORAL at 06:34

## 2019-01-01 RX ADMIN — LORAZEPAM 2 MG: 2 INJECTION INTRAMUSCULAR; INTRAVENOUS at 12:35

## 2019-01-01 RX ADMIN — Medication 100 MG: at 09:47

## 2019-01-01 RX ADMIN — FOLIC ACID 1 MG: 1 TABLET ORAL at 07:48

## 2019-01-01 RX ADMIN — OMEPRAZOLE 20 MG: 20 CAPSULE, DELAYED RELEASE ORAL at 08:35

## 2019-01-01 RX ADMIN — HYDROXYZINE HYDROCHLORIDE 25 MG: 25 TABLET, FILM COATED ORAL at 20:07

## 2019-01-01 RX ADMIN — AMLODIPINE BESYLATE 5 MG: 5 TABLET ORAL at 08:16

## 2019-01-01 RX ADMIN — GABAPENTIN 500 MG: 400 CAPSULE ORAL at 21:48

## 2019-01-01 RX ADMIN — HYDROXYZINE HYDROCHLORIDE 25 MG: 25 TABLET, FILM COATED ORAL at 09:51

## 2019-01-01 RX ADMIN — OMEPRAZOLE 20 MG: 20 CAPSULE, DELAYED RELEASE ORAL at 08:10

## 2019-01-01 RX ADMIN — TRAZODONE HYDROCHLORIDE 50 MG: 50 TABLET ORAL at 21:05

## 2019-01-01 RX ADMIN — HYDROXYZINE HYDROCHLORIDE 25 MG: 25 TABLET, FILM COATED ORAL at 21:06

## 2019-01-01 RX ADMIN — GABAPENTIN 300 MG: 300 CAPSULE ORAL at 22:28

## 2019-01-01 RX ADMIN — LORAZEPAM 1 MG: 2 INJECTION INTRAMUSCULAR; INTRAVENOUS at 18:14

## 2019-01-01 RX ADMIN — HYDROXYZINE HYDROCHLORIDE 25 MG: 25 TABLET, FILM COATED ORAL at 20:25

## 2019-01-01 RX ADMIN — LORAZEPAM 2 MG: 2 INJECTION INTRAMUSCULAR; INTRAVENOUS at 18:05

## 2019-01-01 ASSESSMENT — ACTIVITIES OF DAILY LIVING (ADL)
HYGIENE/GROOMING: INDEPENDENT
DRESS: INDEPENDENT
HYGIENE/GROOMING: INDEPENDENT
LAUNDRY: UNABLE TO COMPLETE
HYGIENE/GROOMING: INDEPENDENT
LAUNDRY: UNABLE TO COMPLETE
LAUNDRY: WITH SUPERVISION
ORAL_HYGIENE: INDEPENDENT
DRESS: INDEPENDENT
ORAL_HYGIENE: INDEPENDENT
RETIRED_EATING: 0-->INDEPENDENT
DRESS: INDEPENDENT
LAUNDRY: UNABLE TO COMPLETE
FALL_HISTORY_WITHIN_LAST_SIX_MONTHS: NO
BATHING: 0-->INDEPENDENT
HYGIENE/GROOMING: INDEPENDENT
LAUNDRY: WITH SUPERVISION
DRESS: INDEPENDENT
ORAL_HYGIENE: INDEPENDENT
HYGIENE/GROOMING: INDEPENDENT
RETIRED_COMMUNICATION: 0-->UNDERSTANDS/COMMUNICATES WITHOUT DIFFICULTY
LAUNDRY: UNABLE TO COMPLETE
HYGIENE/GROOMING: INDEPENDENT
ORAL_HYGIENE: INDEPENDENT
DRESS: INDEPENDENT
AMBULATION: 0-->INDEPENDENT
HYGIENE/GROOMING: INDEPENDENT
HYGIENE/GROOMING: SHOWER;INDEPENDENT
HYGIENE/GROOMING: INDEPENDENT
DRESS: INDEPENDENT;SCRUBS (BEHAVIORAL HEALTH)
LAUNDRY: UNABLE TO COMPLETE
ORAL_HYGIENE: INDEPENDENT
DRESS: INDEPENDENT;SCRUBS (BEHAVIORAL HEALTH)
COGNITION: 0 - NO COGNITION ISSUES REPORTED
HYGIENE/GROOMING: INDEPENDENT
HYGIENE/GROOMING: INDEPENDENT
DRESS: INDEPENDENT
DRESS: SCRUBS (BEHAVIORAL HEALTH)
TOILETING: 0-->INDEPENDENT
ORAL_HYGIENE: INDEPENDENT
DRESS: INDEPENDENT
ORAL_HYGIENE: INDEPENDENT
DRESS: INDEPENDENT
ORAL_HYGIENE: INDEPENDENT
DRESS: INDEPENDENT
LAUNDRY: WITH SUPERVISION
ORAL_HYGIENE: INDEPENDENT
ORAL_HYGIENE: INDEPENDENT
LAUNDRY: WITH SUPERVISION
DRESS: INDEPENDENT
HYGIENE/GROOMING: INDEPENDENT
ORAL_HYGIENE: INDEPENDENT
DRESS: INDEPENDENT;SCRUBS (BEHAVIORAL HEALTH)
SWALLOWING: 0-->SWALLOWS FOODS/LIQUIDS WITHOUT DIFFICULTY
ORAL_HYGIENE: INDEPENDENT
DRESS: INDEPENDENT
ORAL_HYGIENE: INDEPENDENT
ORAL_HYGIENE: INDEPENDENT
DRESS: INDEPENDENT
DRESS: INDEPENDENT
LAUNDRY: UNABLE TO COMPLETE
HYGIENE/GROOMING: INDEPENDENT
TRANSFERRING: 0-->INDEPENDENT
HYGIENE/GROOMING: INDEPENDENT
DRESS: 0-->INDEPENDENT
HYGIENE/GROOMING: SHOWER
HYGIENE/GROOMING: INDEPENDENT
HYGIENE/GROOMING: INDEPENDENT

## 2019-01-01 ASSESSMENT — ENCOUNTER SYMPTOMS
ABDOMINAL PAIN: 1
DIARRHEA: 0
ABDOMINAL PAIN: 0
FEVER: 0
VOMITING: 0
BLOOD IN STOOL: 0
BLOOD IN STOOL: 0
DYSPHORIC MOOD: 1
VOMITING: 0

## 2019-01-01 ASSESSMENT — MIFFLIN-ST. JEOR
SCORE: 1869.99
SCORE: 1892.67
SCORE: 1886.32
SCORE: 1883.6
SCORE: 1881.33
SCORE: 1887.23

## 2019-01-09 DIAGNOSIS — R60.0 PEDAL EDEMA: ICD-10-CM

## 2019-01-09 NOTE — TELEPHONE ENCOUNTER
Routing refill request to provider for review/approval because:  Drug not active on patient's medication list  A break in medication.  Ilene Horner RN

## 2019-01-09 NOTE — TELEPHONE ENCOUNTER
"Requested Prescriptions   Pending Prescriptions Disp Refills     furosemide (LASIX) 40 MG tablet 30 tablet 1     (Discontinued)      Last Written Prescription Date:  5/9/18    END:6/13/18  Reason for Discontinue: Stop at Discharge  Last Fill Quantity: 30,   # refills: 1  Last Office Visit with [facility], [facility] or [facility] prescribing provider: 10/17/2018 Fitterer ([facility])      Return in about 1 week (around 10/24/2018) for Mammogram, Imaging.     Future Office Visit:        Sig: Take 1 tablet (40 mg) by mouth daily as needed for edema    Diuretics (Including Combos) Protocol Failed - 1/9/2019 11:02 AM       Failed - Medication is active on med list       Passed - Blood pressure under 140/90 in past 12 months    BP Readings from Last 3 Encounters:   10/17/18 134/80   09/18/18 130/80   09/10/18 150/79                Passed - Recent (12 mo) or future (30 days) visit within the authorizing provider's specialty    Patient had office visit in the last 12 months or has a visit in the next 30 days with authorizing provider or within the authorizing provider's specialty.  See \"Patient Info\" tab in inbasket, or \"Choose Columns\" in Meds & Orders section of the refill encounter.             Passed - Patient is age 18 or older       Passed - Normal serum creatinine on file in past 12 months    Recent Labs   Lab Test 08/02/18  0659   CR 1.19             Passed - Normal serum potassium on file in past 12 months    Recent Labs   Lab Test 08/02/18  0659   POTASSIUM 4.4                   Passed - Normal serum sodium on file in past 12 months    Recent Labs   Lab Test 08/02/18  0659                 "

## 2019-01-11 RX ORDER — FUROSEMIDE 40 MG
40 TABLET ORAL DAILY PRN
Qty: 30 TABLET | Refills: 1 | Status: SHIPPED | OUTPATIENT
Start: 2019-01-11 | End: 2019-03-28

## 2019-01-22 ENCOUNTER — OFFICE VISIT (OUTPATIENT)
Dept: FAMILY MEDICINE | Facility: CLINIC | Age: 59
End: 2019-01-22
Payer: COMMERCIAL

## 2019-01-22 VITALS
TEMPERATURE: 98 F | WEIGHT: 228.5 LBS | HEIGHT: 67 IN | SYSTOLIC BLOOD PRESSURE: 138 MMHG | DIASTOLIC BLOOD PRESSURE: 68 MMHG | HEART RATE: 104 BPM | RESPIRATION RATE: 20 BRPM | BODY MASS INDEX: 35.87 KG/M2

## 2019-01-22 DIAGNOSIS — F33.1 MODERATE EPISODE OF RECURRENT MAJOR DEPRESSIVE DISORDER (H): ICD-10-CM

## 2019-01-22 DIAGNOSIS — M54.16 LUMBAR RADICULOPATHY: ICD-10-CM

## 2019-01-22 DIAGNOSIS — Z23 NEED FOR PROPHYLACTIC VACCINATION AND INOCULATION AGAINST INFLUENZA: Primary | ICD-10-CM

## 2019-01-22 DIAGNOSIS — M54.50 ACUTE LEFT-SIDED LOW BACK PAIN WITHOUT SCIATICA: ICD-10-CM

## 2019-01-22 PROCEDURE — 99214 OFFICE O/P EST MOD 30 MIN: CPT | Performed by: FAMILY MEDICINE

## 2019-01-22 ASSESSMENT — MIFFLIN-ST. JEOR: SCORE: 1817.16

## 2019-01-22 ASSESSMENT — ANXIETY QUESTIONNAIRES
GAD7 TOTAL SCORE: 0
7. FEELING AFRAID AS IF SOMETHING AWFUL MIGHT HAPPEN: NOT AT ALL
2. NOT BEING ABLE TO STOP OR CONTROL WORRYING: NOT AT ALL
IF YOU CHECKED OFF ANY PROBLEMS ON THIS QUESTIONNAIRE, HOW DIFFICULT HAVE THESE PROBLEMS MADE IT FOR YOU TO DO YOUR WORK, TAKE CARE OF THINGS AT HOME, OR GET ALONG WITH OTHER PEOPLE: NOT DIFFICULT AT ALL
6. BECOMING EASILY ANNOYED OR IRRITABLE: NOT AT ALL
5. BEING SO RESTLESS THAT IT IS HARD TO SIT STILL: NOT AT ALL
1. FEELING NERVOUS, ANXIOUS, OR ON EDGE: NOT AT ALL
3. WORRYING TOO MUCH ABOUT DIFFERENT THINGS: NOT AT ALL

## 2019-01-22 ASSESSMENT — ENCOUNTER SYMPTOMS
DOUBLE VISION: 0
TREMORS: 0
SENSORY CHANGE: 0
HEADACHES: 0
BACK PAIN: 1
CONSTITUTIONAL NEGATIVE: 1
BLURRED VISION: 0
DIZZINESS: 0
FOCAL WEAKNESS: 0

## 2019-01-22 ASSESSMENT — PATIENT HEALTH QUESTIONNAIRE - PHQ9
SUM OF ALL RESPONSES TO PHQ QUESTIONS 1-9: 0
5. POOR APPETITE OR OVEREATING: NOT AT ALL

## 2019-01-22 NOTE — PROGRESS NOTES
"HPI    SUBJECTIVE:   Aydin Reilly is a 58 year old male who presents to clinic today for the following health issues:    Back Pain       Duration: ongoing for 2 years        Specific cause: lifting    Description:   Location of pain: low back left  Character of pain: sharp and stabbing  Pain radiation: in the general area  New numbness or weakness in legs, not attributed to pain:  no     Intensity: Currently 5/10, At its worst 9/10    History:   Pain interferes with job: Not applicable  History of back problems: previous herniated disc  Any previous MRI or X-rays: Yes- at [place].  Date approximately 1993.  Sees a specialist for back pain:  No  Therapies tried without relief:     Alleviating factors:   Improved by: Accupressure and rest      Precipitating factors:  Worsened by: Lifting or reaching to the floot        Accompanying Signs & Symptoms:  Risk of Fracture:  None  Risk of Cauda Equina:  None  Risk of Infection:  None  Risk of Cancer:  None  Risk of Ankylosing Spondylitis:  Onset at age <35, male, AND morning back stiffness. no     Still semi-homeless, staying in a hotel in [place].  Is substitute teaching.  (Ex)wife still drinking, lots of stress.  Martin is drinking, but \"very in control\".  No other drugs, no meth.       Long standing back issues, aggravated while trying move out.  Essentially would like referral to back specialist.   Has used ortho for recent collar bone fracture, but has never been evaluated for his back.  Has not done much back therapy for back, tried a doing few stretches he saw on the internet.  NO numbness/weakness in legs, no bowl'/bladder issues.  Did initially have some sciatic sx, but these have resolved. L sided only.  Flexeril makes him very sleepy, will use at night.    Review of Systems   Constitutional: Negative.    Eyes: Negative for blurred vision and double vision.   Musculoskeletal: Positive for back pain.   Neurological: Negative for dizziness, tremors, sensory change, " focal weakness and headaches.         Physical Exam   Constitutional: He is oriented to person, place, and time.   Cardiovascular: Normal rate, regular rhythm and normal heart sounds.   Pulmonary/Chest: Effort normal.   Musculoskeletal:        Lumbar back: He exhibits decreased range of motion, tenderness and spasm. He exhibits no bony tenderness.   Neurological: He is alert and oriented to person, place, and time. He has normal strength and normal reflexes.   Skin: Skin is warm and dry.   Vitals reviewed.    (Z23) Need for prophylactic vaccination and inoculation against influenza  (primary encounter diagnosis)  Comment:   Plan:     (F33.1) Moderate episode of recurrent major depressive disorder (H)  Comment: stable  Plan:     (M54.16) Lumbar Radiculopathy, SEE FYI  Comment: no significant today  Plan:     (M54.5) Acute left-sided low back pain without sciatica  Comment: would benefit from PT, also recurrent with history of back surgery, will refer to ortho  Plan: ORTHO  REFERRAL, order for DME, VIK         PT, HAND, AND CHIROPRACTIC REFERRAL              RTC in     Adolfo Simmons MD

## 2019-01-23 ASSESSMENT — ANXIETY QUESTIONNAIRES: GAD7 TOTAL SCORE: 0

## 2019-01-24 ENCOUNTER — OFFICE VISIT (OUTPATIENT)
Dept: ORTHOPEDICS | Facility: CLINIC | Age: 59
End: 2019-01-24
Payer: COMMERCIAL

## 2019-01-24 VITALS
SYSTOLIC BLOOD PRESSURE: 122 MMHG | DIASTOLIC BLOOD PRESSURE: 80 MMHG | HEIGHT: 67 IN | WEIGHT: 225 LBS | BODY MASS INDEX: 35.31 KG/M2

## 2019-01-24 DIAGNOSIS — M51.369 LUMBAR DEGENERATIVE DISC DISEASE: ICD-10-CM

## 2019-01-24 DIAGNOSIS — G89.29 CHRONIC LEFT-SIDED LOW BACK PAIN WITHOUT SCIATICA: Primary | ICD-10-CM

## 2019-01-24 DIAGNOSIS — M48.00 CENTRAL STENOSIS OF SPINAL CANAL: ICD-10-CM

## 2019-01-24 DIAGNOSIS — M48.061 FORAMINAL STENOSIS OF LUMBAR REGION: ICD-10-CM

## 2019-01-24 DIAGNOSIS — M54.50 CHRONIC LEFT-SIDED LOW BACK PAIN WITHOUT SCIATICA: Primary | ICD-10-CM

## 2019-01-24 PROCEDURE — 99244 OFF/OP CNSLTJ NEW/EST MOD 40: CPT | Performed by: FAMILY MEDICINE

## 2019-01-24 RX ORDER — METHYLPREDNISOLONE 4 MG
TABLET, DOSE PACK ORAL
Qty: 21 TABLET | Refills: 0 | Status: SHIPPED | OUTPATIENT
Start: 2019-01-24 | End: 2019-04-02

## 2019-01-24 ASSESSMENT — MIFFLIN-ST. JEOR: SCORE: 1801.28

## 2019-01-24 NOTE — LETTER
1/24/2019         RE: Aydin Reilly  [id number]  [place] [id number]        Dear Colleague,    Thank you for referring your patient, Aydin Reilly, to the [facility]. Please see a copy of my visit note below.    ASSESSMENT & PLAN    1. Chronic left-sided low back pain without sciatica    2. Lumbar degenerative disc disease    3. Central stenosis of spinal canal    4. Foraminal stenosis of lumbar region      Discussed care - pain related to likely a disc / degenerative disc disease  Reviewed previous MRI that shows L2 level protrusions with facet arthropathy and varying degrees of stenosis  Rx for medrol/steroid. Take in the AM and with food. No other anti-inflammatories (Ibuprofen, Advil, Aleve, Motrin) while you're taking this.  Physical therapy: [facility] - [phone]. Keep appointment with Arian in [place] for tomorrow.    Follow-up after finishing the steroid and at least 2-3 therapy visits.    -----    SUBJECTIVE  Aydin Reilly is a/an 58 year old male who is seen in consultation at the request of  Adolfo Simmons M.D. for evaluation of chronic low back pain. The patient is seen by themselves.    Onset: 2-3 years(s) ago with worsening in the past few weeks. Notes worsening symptoms over the past few weeks while he has been trying to move out of his condo  Location of Pain: left lower back pain  Rating of Pain at worst: 9/10  Rating of Pain Currently: 5/10  Worsened by: getting into bed, getting in and out of car, bending, putting on sock and shoes  Better with: rest  Treatments tried: ice, heat, ibuprofen, Aleve, other medications: Gabapentin  Quality: sharp, stabbing  Red flags: Weakness: No, bowel/bladder loss: No, foot drop: No  Associated symptoms: no current distal numbness or tingling; denies swelling or warmth  Orthopedic history: chronic nerve pain in both feet following rectal cancer, chronic lower back pain  Relevant  "surgical history: L4-5 laminectomy 1990  Patient Social History: works as a     Patient's past medical, surgical, social, and family histories were reviewed today and no changes are noted.    REVIEW OF SYSTEMS:  10 point ROS is negative other than symptoms noted above in HPI, Past Medical History or as stated below  Constitutional: NEGATIVE for fever, chills, change in weight  Skin: NEGATIVE for worrisome rashes, moles or lesions  GI/: NEGATIVE for bowel or bladder changes  Neuro: NEGATIVE for weakness, dizziness or paresthesias    OBJECTIVE:  /80   Ht 1.689 m (5' 6.5\")   Wt 102.1 kg (225 lb)   BMI 35.77 kg/m      General: healthy, alert and in no distress  HEENT: no scleral icterus or conjunctival erythema  Skin: no suspicious lesions or rash. No jaundice.  CV: no pedal edema  Resp: normal respiratory effort without conversational dyspnea   Psych: normal mood and affect  Gait: normal steady gait with appropriate coordination and balance  Neuro: normal light touch sensory exam of the bilateral lower extremities.  DTR's 2 + patella and achilles bilaterally.  MSK:  THORACIC/LUMBAR SPINE  Inspection:    No gross deformity/asymmetry  Palpation:    Tender about the lumbar facet joints, left para lumbar muscles and right para lumbar muscles. Otherwise remainder of landmarks are nontender.  Range of Motion:     Lumbar flexion limited by pain    Lumbar extension limited by pain  Strength:    able to heel walk, able to toe walk  Special Tests:    Negative: slump test (bilateral)    Independent visualization of the below image:  MR LUMBAR SPINE WITHOUT CONTRAST 4/22/2015 6:03 PM      HISTORY: Thoracic or lumbosacral neuritis or radiculitis, unspecified.  Left lower extremity pain tingling and numbness.     TECHNIQUE: Multiplanar multisequence images were obtained through the  lumbar spine without contrast.     COMPARISON: 3/27/2009.     FINDINGS: Five lumbar-type vertebral bodies are presumed. " There is  normal posterior alignment. Disc spacing narrowing is present at  L1-L2, L2-L3, L3-L4, L4-L5, and L5-S1. There is a Schmorl's node  deformity in the superior endplate of L2 which is chronic. Bone marrow  signal intensity is within normal limits. The conus medullaris and  cauda equina nerve roots are normal.     L1-L2: There is a new left paramedian disc protrusion superimposed  upon progressive broad-based disc bulging and some facet hypertrophy.  Findings are causing some mild central canal stenosis and mild to  moderate left-sided foraminal stenosis.     L2-L3: Broad-based disc protrusion asymmetric to the left is present  along with facet hypertrophy. Findings are causing mild central canal  stenosis and mild to moderate left-sided foraminal stenosis.     L3-L4: Broad-based disc protrusion is present along with facet  hypertrophy. Findings are causing mild to moderate central canal  stenosis and mild to moderate bilateral foraminal stenosis.     L4-L5: Broad-based disc protrusion and moderate facet hypertrophy are  present causing moderate bilateral neural frontal stenosis, but no  central canal stenosis. The patient has had prior posterior surgical  procedure at this level with presumed right-sided hemilaminotomy.     L5-S1: Minimal disc bulge and facet hypertrophy. No stenosis.     Paraspinal soft tissues: Unremarkable as visualized.     IMPRESSION  IMPRESSION:  1. New left paramedian disc protrusion superimposed upon degenerative  disc and facet disease at L1-L2, resulting in some mild central canal  stenosis and mild to moderate left-sided foraminal stenosis.  2. Multilevel degenerative disc and facet disease most advanced at  L3-L4 where there is mild to moderate central canal and  mild-to-moderate bilateral neural foraminal stenosis. There is also  moderate bilateral neural foraminal stenosis at L4-L5.  3. Previous posterior surgical procedure at L4-L5.     PAYAL OLIVARES MD      Patient's  conditions were thoroughly discussed during today's visit with greater than 50% of the visit spent counseling the patient with total time spent face-to-face with the patient being 25 minutes.    Clayton Fontenot DO [facility]      Again, thank you for allowing me to participate in the care of your patient.        Sincerely,        Clayton Fontenot, DO

## 2019-01-24 NOTE — PROGRESS NOTES
ASSESSMENT & PLAN    1. Chronic left-sided low back pain without sciatica    2. Lumbar degenerative disc disease    3. Central stenosis of spinal canal    4. Foraminal stenosis of lumbar region      Discussed care - pain related to likely a disc / degenerative disc disease  Reviewed previous MRI that shows L2 level protrusions with facet arthropathy and varying degrees of stenosis  Rx for medrol/steroid. Take in the AM and with food. No other anti-inflammatories (Ibuprofen, Advil, Aleve, Motrin) while you're taking this.  Physical therapy: [facility] - [phone]. Keep appointment with Arian in [place] for tomorrow.    Follow-up after finishing the steroid and at least 2-3 therapy visits.    -----    SUBJECTIVE  Aydin Reilly is a/an 58 year old male who is seen in consultation at the request of  Adolfo Simmons M.D. for evaluation of chronic low back pain. The patient is seen by themselves.    Onset: 2-3 years(s) ago with worsening in the past few weeks. Notes worsening symptoms over the past few weeks while he has been trying to move out of his condo  Location of Pain: left lower back pain  Rating of Pain at worst: 9/10  Rating of Pain Currently: 5/10  Worsened by: getting into bed, getting in and out of car, bending, putting on sock and shoes  Better with: rest  Treatments tried: ice, heat, ibuprofen, Aleve, other medications: Gabapentin  Quality: sharp, stabbing  Red flags: Weakness: No, bowel/bladder loss: No, foot drop: No  Associated symptoms: no current distal numbness or tingling; denies swelling or warmth  Orthopedic history: chronic nerve pain in both feet following rectal cancer, chronic lower back pain  Relevant surgical history: L4-5 laminectomy 1990  Patient Social History: works as a     Patient's past medical, surgical, social, and family histories were reviewed today and no changes are noted.    REVIEW OF SYSTEMS:  10 point ROS is negative other  "than symptoms noted above in HPI, Past Medical History or as stated below  Constitutional: NEGATIVE for fever, chills, change in weight  Skin: NEGATIVE for worrisome rashes, moles or lesions  GI/: NEGATIVE for bowel or bladder changes  Neuro: NEGATIVE for weakness, dizziness or paresthesias    OBJECTIVE:  /80   Ht 1.689 m (5' 6.5\")   Wt 102.1 kg (225 lb)   BMI 35.77 kg/m     General: healthy, alert and in no distress  HEENT: no scleral icterus or conjunctival erythema  Skin: no suspicious lesions or rash. No jaundice.  CV: no pedal edema  Resp: normal respiratory effort without conversational dyspnea   Psych: normal mood and affect  Gait: normal steady gait with appropriate coordination and balance  Neuro: normal light touch sensory exam of the bilateral lower extremities.  DTR's 2 + patella and achilles bilaterally.  MSK:  THORACIC/LUMBAR SPINE  Inspection:    No gross deformity/asymmetry  Palpation:    Tender about the lumbar facet joints, left para lumbar muscles and right para lumbar muscles. Otherwise remainder of landmarks are nontender.  Range of Motion:     Lumbar flexion limited by pain    Lumbar extension limited by pain  Strength:    able to heel walk, able to toe walk  Special Tests:    Negative: slump test (bilateral)    Independent visualization of the below image:  MR LUMBAR SPINE WITHOUT CONTRAST 4/22/2015 6:03 PM      HISTORY: Thoracic or lumbosacral neuritis or radiculitis, unspecified.  Left lower extremity pain tingling and numbness.     TECHNIQUE: Multiplanar multisequence images were obtained through the  lumbar spine without contrast.     COMPARISON: 3/27/2009.     FINDINGS: Five lumbar-type vertebral bodies are presumed. There is  normal posterior alignment. Disc spacing narrowing is present at  L1-L2, L2-L3, L3-L4, L4-L5, and L5-S1. There is a Schmorl's node  deformity in the superior endplate of L2 which is chronic. Bone marrow  signal intensity is within normal limits. The conus " medullaris and  cauda equina nerve roots are normal.     L1-L2: There is a new left paramedian disc protrusion superimposed  upon progressive broad-based disc bulging and some facet hypertrophy.  Findings are causing some mild central canal stenosis and mild to  moderate left-sided foraminal stenosis.     L2-L3: Broad-based disc protrusion asymmetric to the left is present  along with facet hypertrophy. Findings are causing mild central canal  stenosis and mild to moderate left-sided foraminal stenosis.     L3-L4: Broad-based disc protrusion is present along with facet  hypertrophy. Findings are causing mild to moderate central canal  stenosis and mild to moderate bilateral foraminal stenosis.     L4-L5: Broad-based disc protrusion and moderate facet hypertrophy are  present causing moderate bilateral neural frontal stenosis, but no  central canal stenosis. The patient has had prior posterior surgical  procedure at this level with presumed right-sided hemilaminotomy.     L5-S1: Minimal disc bulge and facet hypertrophy. No stenosis.     Paraspinal soft tissues: Unremarkable as visualized.     IMPRESSION  IMPRESSION:  1. New left paramedian disc protrusion superimposed upon degenerative  disc and facet disease at L1-L2, resulting in some mild central canal  stenosis and mild to moderate left-sided foraminal stenosis.  2. Multilevel degenerative disc and facet disease most advanced at  L3-L4 where there is mild to moderate central canal and  mild-to-moderate bilateral neural foraminal stenosis. There is also  moderate bilateral neural foraminal stenosis at L4-L5.  3. Previous posterior surgical procedure at L4-L5.     PAYAL OLIVARES MD      Patient's conditions were thoroughly discussed during today's visit with greater than 50% of the visit spent counseling the patient with total time spent face-to-face with the patient being 25 minutes.    Clayton Fontenot,  [facility]

## 2019-01-24 NOTE — PATIENT INSTRUCTIONS
1. Chronic left-sided low back pain without sciatica    2. Lumbar degenerative disc disease    3. Central stenosis of spinal canal    4. Foraminal stenosis of lumbar region      Discussed care - pain related to likely a disc / degenerative disc disease  Rx for medrol/steroid. Take in the AM and with food. No other anti-inflammatories (Ibuprofen, Advil, Aleve, Motrin) while you're taking this.  Physical therapy: [facility] - [phone]. Keep appointment with rAian in [place] for tomorrow.    Follow-up after finishing the steroid and at least 2-3 therapy visits.

## 2019-02-04 ENCOUNTER — TELEPHONE (OUTPATIENT)
Dept: ORTHOPEDICS | Facility: CLINIC | Age: 59
End: 2019-02-04

## 2019-02-04 NOTE — TELEPHONE ENCOUNTER
Reviewed chart. Patient has no-showed for 2 PT appointments. Plan was to take medrol and participate in 2-3 therapy sessions prior to follow-up.  Using medrol alone was never expected to fully resolve his pain long-term.    Yes, ok to use nsaid's now that he's done with medrol. However he really needs to get into PT.    Clayton Fontenot DO, [facility]
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
## 2019-02-04 NOTE — TELEPHONE ENCOUNTER
Phone call to patient and informed he may take NSAIDS. Suggested he try Tylenol and Ibuprofen first. If those are effective for pain relief than doesn't need to take Relafen. Discussed that it was important that he follow up with physical therapy to help with his back pain. He verbalized understanding.     FRANSISCA Amador RN

## 2019-02-12 ENCOUNTER — THERAPY VISIT (OUTPATIENT)
Dept: PHYSICAL THERAPY | Facility: CLINIC | Age: 59
End: 2019-02-12
Payer: COMMERCIAL

## 2019-02-12 DIAGNOSIS — M48.061 FORAMINAL STENOSIS OF LUMBAR REGION: ICD-10-CM

## 2019-02-12 DIAGNOSIS — G89.29 CHRONIC LEFT-SIDED LOW BACK PAIN WITHOUT SCIATICA: ICD-10-CM

## 2019-02-12 DIAGNOSIS — M51.369 LUMBAR DEGENERATIVE DISC DISEASE: ICD-10-CM

## 2019-02-12 DIAGNOSIS — M54.50 CHRONIC LEFT-SIDED LOW BACK PAIN WITHOUT SCIATICA: ICD-10-CM

## 2019-02-12 DIAGNOSIS — M54.50 LUMBAGO: Primary | ICD-10-CM

## 2019-02-12 DIAGNOSIS — M48.00 CENTRAL STENOSIS OF SPINAL CANAL: ICD-10-CM

## 2019-02-12 PROCEDURE — 97161 PT EVAL LOW COMPLEX 20 MIN: CPT | Mod: GP | Performed by: PHYSICAL THERAPIST

## 2019-02-12 PROCEDURE — 97110 THERAPEUTIC EXERCISES: CPT | Mod: GP | Performed by: PHYSICAL THERAPIST

## 2019-02-12 NOTE — PROGRESS NOTES
[facility] Initial Evaluation  Subjective:  The history is provided by the patient and a parent. No  was used.   Aydin Reilly is a 58 year old male with a lumbar condition.  Condition occurred with:  Insidious onset.  Condition occurred: for unknown reasons.  This is a recurrent condition  Patient reports a flare-up of chronic/re-current LBP on 12/30/2018 with bending/lifting/twisting moving activity.  Patient c/o pain with lifting, bending, twisting, and transfers. Physical therapy was ordered 1/24/2019.    Patient reports pain:  Lumbar spine left.  Radiates to:  No radiation.  Pain is described as sharp and aching and is intermittent and reported as 8/10.  Associated symptoms:  Loss of motion/stiffness. Pain is the same all the time.  Symptoms are exacerbated by twisting, bending, lifting and sitting and relieved by ice, NSAID's, analgesics and activity/movement.  Since onset symptoms are gradually improving.  Special tests:  MRI.  Previous treatment includes physical therapy (previously).  There was moderate improvement following previous treatment.  General health as reported by patient is good.  Pertinent medical history includes:  Cancer and other (B feet neuropathy).  Medical allergies: no.  Other surgeries include:  Cancer surgery and orthopedic surgery (L4-5 decompression 1993).  Current medications:  Anti-inflammatory and pain medication.  Current occupation is Self Employed.        Barriers include:  None as reported by the patient.    Red flags:  None as reported by the patient.                        Objective:  Standing Alignment:        Lumbar:  Lordosis decr            Gait:    Gait Type:  Normal         Flexibility/Screens:       Lower Extremity:  Decreased left lower extremity flexibility:Hip Flexors and Hamstrings    Decreased right lower extremity flexibility:  Hip Flexors and Hamstrings               Lumbar/SI Evaluation  ROM:    AROM Lumbar:    Flexion:          Min Loss  Ext:                    Mod Loss - Pain and ROM improve with repeated movement in standing and with press-up   Side Bend:        Left:     Right:   Rotation:           Left:     Right:   Side Glide:        Left:  Min Loss    Right:  Min Loss        Strength: Fair core strength  Lumbar Myotomes:    T12-L3 (Hip Flex):  Left: 5    Right: 5  L2-4 (Quads):  Left:  5    Right:  5  L4 (Ankle DF):  Left:  5    Right:  5  L5 (Great Toe Ext): Left: 5    Right: 5   S1 (Toe Raise):  Left: 5    Right: 5        Neural Tension/Mobility:      Left side:SLR or SLR w/DF  negative.     Right side:   SLR w/DF or SLR  negative.         Spinal Segmental Conclusions: Multi-segmental hypomobility                                                       General     ROS    Assessment/Plan:    Patient is a 58 year old male with lumbar complaints.    Patient has the following significant findings with corresponding treatment plan.                Diagnosis 1:  Lumbar derangement   Pain -  self management, education, directional preference exercise and home program  Decreased ROM/flexibility - therapeutic exercise, therapeutic activity and home program  Decreased strength - therapeutic exercise, therapeutic activities and home program  Impaired muscle performance - neuro re-education and home program  Decreased function - therapeutic activities and home program  Impaired posture - neuro re-education, therapeutic activities and home program    Therapy Evaluation Codes:   1) History comprised of:   Personal factors that impact the plan of care:      None.    Comorbidity factors that impact the plan of care are:      Cancer.     Medications impacting care: Anti-inflammatory and Pain.  2) Examination of Body Systems comprised of:   Body structures and functions that impact the plan of care:      Lumbar spine.   Activity limitations that impact the plan of care are:      Bending, Lifting and Sitting.  3) Clinical  presentation characteristics are:   Stable/Uncomplicated.  4) Decision-Making    Low complexity using standardized patient assessment instrument and/or measureable assessment of functional outcome.  Cumulative Therapy Evaluation is: Low complexity.    Previous and current functional limitations:  (See Goal Flow Sheet for this information)    Short term and Long term goals: (See Goal Flow Sheet for this information)     Communication ability:  Patient appears to be able to clearly communicate and understand verbal and written communication and follow directions correctly.  Treatment Explanation - The following has been discussed with the patient:   RX ordered/plan of care  Anticipated outcomes  Possible risks and side effects  This patient would benefit from PT intervention to resume normal activities.   Rehab potential is good.    Frequency:  1 X week, once daily  Duration:  for 6 weeks  Discharge Plan:  Achieve all LTG.  Independent in home treatment program.  Reach maximal therapeutic benefit.    Please refer to the daily flowsheet for treatment today, total treatment time and time spent performing 1:1 timed codes.

## 2019-04-02 ENCOUNTER — OFFICE VISIT (OUTPATIENT)
Dept: FAMILY MEDICINE | Facility: CLINIC | Age: 59
End: 2019-04-02
Payer: COMMERCIAL

## 2019-04-02 VITALS
RESPIRATION RATE: 16 BRPM | SYSTOLIC BLOOD PRESSURE: 138 MMHG | WEIGHT: 238 LBS | DIASTOLIC BLOOD PRESSURE: 84 MMHG | BODY MASS INDEX: 37.84 KG/M2 | HEART RATE: 106 BPM | TEMPERATURE: 97.8 F

## 2019-04-02 DIAGNOSIS — Z79.899 ENCOUNTER FOR LONG-TERM (CURRENT) USE OF MEDICATIONS: ICD-10-CM

## 2019-04-02 DIAGNOSIS — F33.1 MODERATE EPISODE OF RECURRENT MAJOR DEPRESSIVE DISORDER (H): Primary | ICD-10-CM

## 2019-04-02 DIAGNOSIS — G58.8 OTHER MONONEUROPATHY: ICD-10-CM

## 2019-04-02 PROCEDURE — 99214 OFFICE O/P EST MOD 30 MIN: CPT | Performed by: PHYSICIAN ASSISTANT

## 2019-04-02 PROCEDURE — 36415 COLL VENOUS BLD VENIPUNCTURE: CPT | Performed by: PHYSICIAN ASSISTANT

## 2019-04-02 PROCEDURE — 82565 ASSAY OF CREATININE: CPT | Performed by: PHYSICIAN ASSISTANT

## 2019-04-02 RX ORDER — GABAPENTIN 300 MG/1
CAPSULE ORAL
Qty: 360 CAPSULE | Refills: 3 | Status: SHIPPED | OUTPATIENT
Start: 2019-04-02 | End: 2019-06-19

## 2019-04-02 ASSESSMENT — ENCOUNTER SYMPTOMS
MUSCULOSKELETAL NEGATIVE: 1
CONSTITUTIONAL NEGATIVE: 1
RESPIRATORY NEGATIVE: 1
NEUROLOGICAL NEGATIVE: 1
CARDIOVASCULAR NEGATIVE: 1
EYES NEGATIVE: 1
GASTROINTESTINAL NEGATIVE: 1

## 2019-04-02 ASSESSMENT — ANXIETY QUESTIONNAIRES
IF YOU CHECKED OFF ANY PROBLEMS ON THIS QUESTIONNAIRE, HOW DIFFICULT HAVE THESE PROBLEMS MADE IT FOR YOU TO DO YOUR WORK, TAKE CARE OF THINGS AT HOME, OR GET ALONG WITH OTHER PEOPLE: NOT DIFFICULT AT ALL
7. FEELING AFRAID AS IF SOMETHING AWFUL MIGHT HAPPEN: NOT AT ALL
2. NOT BEING ABLE TO STOP OR CONTROL WORRYING: NOT AT ALL
5. BEING SO RESTLESS THAT IT IS HARD TO SIT STILL: NOT AT ALL
6. BECOMING EASILY ANNOYED OR IRRITABLE: NOT AT ALL
GAD7 TOTAL SCORE: 0
3. WORRYING TOO MUCH ABOUT DIFFERENT THINGS: NOT AT ALL
1. FEELING NERVOUS, ANXIOUS, OR ON EDGE: NOT AT ALL

## 2019-04-02 ASSESSMENT — PATIENT HEALTH QUESTIONNAIRE - PHQ9
5. POOR APPETITE OR OVEREATING: NOT AT ALL
SUM OF ALL RESPONSES TO PHQ QUESTIONS 1-9: 0
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
## 2019-04-03 LAB
CREAT SERPL-MCNC: 0.94 MG/DL (ref 0.66–1.25)
GFR SERPL CREATININE-BSD FRML MDRD: 90 ML/MIN/{1.73_M2}

## 2019-04-03 ASSESSMENT — ANXIETY QUESTIONNAIRES: GAD7 TOTAL SCORE: 0

## 2019-04-03 NOTE — RESULT ENCOUNTER NOTE
Martin    Your lab tests are complete and I have reviewed the results.     - Your lab results look great; everything is normal.    If you have any questions or concerns, please feel free to call or send a [employer] message.    Sincerely,  Georges Pabon PA-C

## 2019-04-05 ENCOUNTER — MYC MEDICAL ADVICE (OUTPATIENT)
Dept: FAMILY MEDICINE | Facility: CLINIC | Age: 59
End: 2019-04-05

## 2019-06-12 ENCOUNTER — TELEPHONE (OUTPATIENT)
Dept: FAMILY MEDICINE | Facility: CLINIC | Age: 59
End: 2019-06-12

## 2019-06-12 NOTE — TELEPHONE ENCOUNTER
Patient called to see if we had received paperwork for [employer] for Ubaldo DOYLE with [employer] in [place]. Patient was informed that we don't have anything as of yet. Will keep an eye out for release of information so we can communicate with Ubaldo. (This has something to do with a housing situation). ICD code is needed for depression and anxiety. Patient will have Ubaldo fax the paperwork again, and will call us tomorrow to make sure we received it.        Patient wanted writer to inform PCP that next Wednesday, he is seeing mental health. Also wanted to thank PCP for always being kind and listening to him.

## 2019-06-12 NOTE — TELEPHONE ENCOUNTER
Reason for Call:  Other discuss anxiety and depression diagnosis    Detailed comments: needs diagnosis codes    Phone Number Patient can be reached at: Home number on file [phone] (home)    Best Time: any    Can we leave a detailed message on this number? YES    Call taken on 6/12/2019 at 2:10 PM by LAURA PIEDRA

## 2019-06-17 NOTE — TELEPHONE ENCOUNTER
Contacted patient to notify unable to locate fax. He will talk with Ubaldo and have refaxed to clinic.

## 2019-06-18 NOTE — PROGRESS NOTES
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 based upon his responses to  (CONNER)7, PHQ-9 or clinical interview, and is able to function well via the WHODAS assessment. It appears that his symptoms have been worse due to stressors and using substances to cope. He is not sure about starting a psychotropic medication again. Neurontin (gabapentin) may help with pain and anxiety and would need to monitor this closely. We can restart this. He was on 1200 mg TID in the past.     Patient can be referred back to Primary Care Provider for ongoing management.     Treatment Plan:    Start Neurontin (gabapentin) 300 mg by mouth 3 times per day for anxiety and pain. Can consider dose increase if needed.      Continue all other medications as reviewed per electronic medical record today.     Safety plan reviewed. To the Emergency Department as needed or call after hours crisis line at [phone] or [phone]. [place] Crisis Text Line: Text [place] to 849450  or  Suicide LifeLine Chat: suicidepreventionlifeline.org/chat    To schedule individual or family therapy, call [facility] at [phone]. I have referred you to therapy through [facility] They will call you, but you can also call them at  [phone] to schedule.    Thank you for our work together in the Psychiatry Collaborative Care Model at [facility]. This is our last visit and I am returning your care back to your Primary Care Provider Mandy Pabon PA-C. If you are not doing well, please contact your Primary Care Provider office.     Follow up with primary care provider in 4 weeks or sooner for acute medical concerns.    Call the psychiatric nurse line with medication questions or concerns at [phone].    [employer] may be used to communicate with your provider, but this is not intended to be used for emergencies.    Community Resources:    National Suicide Prevention Lifeline: 472.909.6631 (TTY: 947.353.3685). Call anytime for help.  (www.suicidepreventionlifeline.org)  National [place] on Mental Illness (www.bridger.org): 906.554.3473 or 451-751-8529.   Mental Health Association (www.mentalhealth.org): 412.775.2941 or 203-086-6694.  [place] Crisis Text Line: Text [place] to 930614  Suicide LifeLine Chat: suicidepreventionGeaComline.org/chat    Administrative Billing:   Time spent with patient was 60 minutes and greater than 50% of time or 50 minutes was spent in counseling and coordination of care regarding above diagnoses and treatment plan.    Patient Status:  The patient is being returned to the referring provider for ongoing care and medication prescribing.  The patient can be referred back to this service for further consultation as needed.    Signed:   Lalita Bustamante, PhD, APRN, CNP  Psychiatry

## 2019-06-19 ENCOUNTER — OFFICE VISIT (OUTPATIENT)
Dept: PSYCHIATRY | Facility: CLINIC | Age: 59
End: 2019-06-19
Attending: PHYSICIAN ASSISTANT

## 2019-06-19 VITALS
RESPIRATION RATE: 18 BRPM | DIASTOLIC BLOOD PRESSURE: 78 MMHG | WEIGHT: 228 LBS | TEMPERATURE: 98.8 F | SYSTOLIC BLOOD PRESSURE: 123 MMHG | BODY MASS INDEX: 35.79 KG/M2 | OXYGEN SATURATION: 98 % | HEIGHT: 67 IN | HEART RATE: 94 BPM

## 2019-06-19 DIAGNOSIS — F19.10 SUBSTANCE ABUSE (H): ICD-10-CM

## 2019-06-19 DIAGNOSIS — F41.1 GENERALIZED ANXIETY DISORDER: Primary | ICD-10-CM

## 2019-06-19 DIAGNOSIS — F11.10 DRUG ABUSE, OPIOID TYPE (H): ICD-10-CM

## 2019-06-19 PROBLEM — R44.1 HALLUCINATIONS, VISUAL: Status: RESOLVED | Noted: 2018-06-09 | Resolved: 2019-06-19

## 2019-06-19 PROCEDURE — 90792 PSYCH DIAG EVAL W/MED SRVCS: CPT | Performed by: NURSE PRACTITIONER

## 2019-06-19 RX ORDER — GABAPENTIN 300 MG/1
300 CAPSULE ORAL 3 TIMES DAILY
Qty: 90 CAPSULE | Refills: 1 | Status: SHIPPED | OUTPATIENT
Start: 2019-06-19 | End: 2019-07-24

## 2019-06-19 ASSESSMENT — ANXIETY QUESTIONNAIRES
3. WORRYING TOO MUCH ABOUT DIFFERENT THINGS: SEVERAL DAYS
2. NOT BEING ABLE TO STOP OR CONTROL WORRYING: NOT AT ALL
5. BEING SO RESTLESS THAT IT IS HARD TO SIT STILL: NOT AT ALL
GAD7 TOTAL SCORE: 2
7. FEELING AFRAID AS IF SOMETHING AWFUL MIGHT HAPPEN: NOT AT ALL
4. TROUBLE RELAXING: NOT AT ALL
GAD7 TOTAL SCORE: 2
6. BECOMING EASILY ANNOYED OR IRRITABLE: NOT AT ALL
GAD7 TOTAL SCORE: 2
7. FEELING AFRAID AS IF SOMETHING AWFUL MIGHT HAPPEN: NOT AT ALL
1. FEELING NERVOUS, ANXIOUS, OR ON EDGE: SEVERAL DAYS

## 2019-06-19 ASSESSMENT — PAIN SCALES - GENERAL: PAINLEVEL: NO PAIN (0)

## 2019-06-19 ASSESSMENT — PATIENT HEALTH QUESTIONNAIRE - PHQ9
SUM OF ALL RESPONSES TO PHQ QUESTIONS 1-9: 2
10. IF YOU CHECKED OFF ANY PROBLEMS, HOW DIFFICULT HAVE THESE PROBLEMS MADE IT FOR YOU TO DO YOUR WORK, TAKE CARE OF THINGS AT HOME, OR GET ALONG WITH OTHER PEOPLE: NOT DIFFICULT AT ALL
SUM OF ALL RESPONSES TO PHQ QUESTIONS 1-9: 2

## 2019-06-19 ASSESSMENT — MIFFLIN-ST. JEOR: SCORE: 1814.89
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
## 2019-06-19 NOTE — PATIENT INSTRUCTIONS
Treatment Plan:    Start Neurontin (gabapentin) 300 mg by mouth 3 times per day for anxiety and pain. Can consider dose increase if needed.      Continue all other medications as reviewed per electronic medical record today.     Safety plan reviewed. To the Emergency Department as needed or call after hours crisis line at [phone] or [phone]. [place] Crisis Text [place]: Text [place] to 281287  or  Suicide LifeLine Chat: suicidepreventionlifeline.org/chat    To schedule individual or family therapy, call [facility] at [phone]. I have referred you to therapy through [facility] They will call you, but you can also call them at [phone] to schedule.    Thank you for our work together in the Psychiatry Collaborative Care Model at [facility]. This is our last visit and I am returning your care back to your Primary Care Provider Mandy Pabon PA-C. If you are not doing well, please contact your Primary Care Provider office.     Follow up with primary care provider in 4 weeks or sooner for acute medical concerns.    Call the psychiatric nurse line with medication questions or concerns at [phone].    MyChart may be used to communicate with your provider, but this is not intended to be used for emergencies.

## 2019-06-19 NOTE — Clinical Note
Mandy,Psychiatry Consult. I am sending care back to you for ongoing care and medication prescribing.  Future medication refills will come from you. I recommended that the patient follow up with you in about 4 weeks. More details about treatment plan are in my note. If you have any questions or concerns, please let me know. The patient can be referred back to this service for further consultation as needed. I have referred to therapy and addiction medicine. Lalita

## 2019-06-20 ENCOUNTER — TELEPHONE (OUTPATIENT)
Dept: PSYCHIATRY | Facility: CLINIC | Age: 59
End: 2019-06-20

## 2019-06-20 ENCOUNTER — TELEPHONE (OUTPATIENT)
Dept: FAMILY MEDICINE | Facility: CLINIC | Age: 59
End: 2019-06-20

## 2019-06-20 ASSESSMENT — ANXIETY QUESTIONNAIRES: GAD7 TOTAL SCORE: 2

## 2019-06-20 ASSESSMENT — PATIENT HEALTH QUESTIONNAIRE - PHQ9: SUM OF ALL RESPONSES TO PHQ QUESTIONS 1-9: 2

## 2019-06-20 NOTE — TELEPHONE ENCOUNTER
----- Message from Meredith Patterson sent at 6/20/2019  9:26 AM CDT -----  Regarding: Mental Health Order  Patient was contacted by [facility]. Patient was scheduled for therapy services with Ewelina Levi on 6/26 at 11am.    Meredith Patterson  , [facility]

## 2019-06-20 NOTE — TELEPHONE ENCOUNTER
Talked with patient this AM.     States that he will call [facility] to have this faxed to the clinic because we still have not received at this time.

## 2019-06-20 NOTE — TELEPHONE ENCOUNTER
Reason for Call:  Form, our goal is to have forms completed with 72 hours, however, some forms may require a visit or additional information.    Type of letter, form or note:  [employer]    Who is the form from?: Patient    Where did the form come from: form was faxed in    What clinic location was the form placed at?: [facility]    Where the form was placed: box Box/Folder    What number is listed as a contact on the form?: [phone]       Additional comments: please fax back    Call taken on 6/20/2019 at 1:25 PM by LAURA PIEDRA

## 2019-07-09 ENCOUNTER — OFFICE VISIT (OUTPATIENT)
Dept: ADDICTION MEDICINE | Facility: CLINIC | Age: 59
End: 2019-07-09
Payer: MEDICAID

## 2019-07-09 VITALS
WEIGHT: 233.5 LBS | TEMPERATURE: 98.7 F | OXYGEN SATURATION: 97 % | BODY MASS INDEX: 36.65 KG/M2 | RESPIRATION RATE: 16 BRPM | DIASTOLIC BLOOD PRESSURE: 72 MMHG | HEART RATE: 73 BPM | HEIGHT: 67 IN | SYSTOLIC BLOOD PRESSURE: 120 MMHG

## 2019-07-09 DIAGNOSIS — F10.20 UNCOMPLICATED ALCOHOL DEPENDENCE (H): Primary | ICD-10-CM

## 2019-07-09 PROCEDURE — 99204 OFFICE O/P NEW MOD 45 MIN: CPT | Performed by: FAMILY MEDICINE

## 2019-07-09 RX ORDER — NALTREXONE HYDROCHLORIDE 50 MG/1
50 TABLET, FILM COATED ORAL DAILY
Qty: 30 TABLET | Refills: 1 | Status: ON HOLD | OUTPATIENT
Start: 2019-07-09 | End: 2019-01-01

## 2019-07-09 RX ORDER — DISULFIRAM 250 MG/1
250 TABLET ORAL DAILY
Qty: 30 TABLET | Refills: 1 | Status: ON HOLD | OUTPATIENT
Start: 2019-07-09 | End: 2019-01-01

## 2019-07-09 ASSESSMENT — MIFFLIN-ST. JEOR: SCORE: 1839.84

## 2019-07-12 DIAGNOSIS — M75.21 BICIPITAL TENDINITIS, RIGHT: ICD-10-CM

## 2019-07-12 RX ORDER — NABUMETONE 500 MG/1
500 TABLET, FILM COATED ORAL 2 TIMES DAILY
Qty: 60 TABLET | Refills: 1 | Status: CANCELLED | OUTPATIENT
Start: 2019-07-12

## 2019-07-12 NOTE — TELEPHONE ENCOUNTER
Call attempted to patients phone number. Recording said number you are trying to call is not reachable. Left voice message with emergency contact asking pt to call triage back.  Triage, when pt calls back please help pt schedule earlier OV or provider UC information.

## 2019-07-12 NOTE — TELEPHONE ENCOUNTER
Reason for Call:  Medication or medication refill:    Do you use a [facility]?  Name of the pharmacy and phone number for the current request:  [facility]    Name of the medication requested:nabumetone (RELAFEN) 500 MG tablet     Other request:has appointment with Donny 7/18 but needs this as started new job and body hurts-please call pt if will or will not be filled needs asap    Can we leave a detailed message on this number? YES    Phone number patient can be reached at: Home number on file [phone] (home)    Best Time: asap    Call taken on 7/12/2019 at 2:45 PM by TAMERA MORGAN

## 2019-07-15 ENCOUNTER — OFFICE VISIT (OUTPATIENT)
Dept: FAMILY MEDICINE | Facility: CLINIC | Age: 59
End: 2019-07-15
Payer: MEDICAID

## 2019-07-15 VITALS
HEART RATE: 87 BPM | RESPIRATION RATE: 16 BRPM | HEIGHT: 67 IN | TEMPERATURE: 99.2 F | OXYGEN SATURATION: 96 % | WEIGHT: 233.5 LBS | DIASTOLIC BLOOD PRESSURE: 70 MMHG | BODY MASS INDEX: 36.65 KG/M2 | SYSTOLIC BLOOD PRESSURE: 124 MMHG

## 2019-07-15 DIAGNOSIS — Z87.448 HISTORY OF URETHRAL STRICTURE: ICD-10-CM

## 2019-07-15 DIAGNOSIS — K42.9 UMBILICAL HERNIA WITHOUT OBSTRUCTION AND WITHOUT GANGRENE: ICD-10-CM

## 2019-07-15 DIAGNOSIS — F10.20 UNCOMPLICATED ALCOHOL DEPENDENCE (H): ICD-10-CM

## 2019-07-15 DIAGNOSIS — I10 BENIGN ESSENTIAL HYPERTENSION: Primary | ICD-10-CM

## 2019-07-15 DIAGNOSIS — E66.01 MORBID OBESITY (H): ICD-10-CM

## 2019-07-15 DIAGNOSIS — M77.12 LATERAL EPICONDYLITIS OF LEFT ELBOW: ICD-10-CM

## 2019-07-15 DIAGNOSIS — M75.21 BICIPITAL TENDINITIS, RIGHT: ICD-10-CM

## 2019-07-15 PROCEDURE — 99214 OFFICE O/P EST MOD 30 MIN: CPT | Performed by: PHYSICIAN ASSISTANT

## 2019-07-15 PROCEDURE — 36415 COLL VENOUS BLD VENIPUNCTURE: CPT | Performed by: PHYSICIAN ASSISTANT

## 2019-07-15 PROCEDURE — 80053 COMPREHEN METABOLIC PANEL: CPT | Performed by: PHYSICIAN ASSISTANT

## 2019-07-15 RX ORDER — NABUMETONE 500 MG/1
500 TABLET, FILM COATED ORAL 2 TIMES DAILY
Qty: 60 TABLET | Refills: 1 | Status: ON HOLD | OUTPATIENT
Start: 2019-07-15 | End: 2019-01-01

## 2019-07-15 ASSESSMENT — MIFFLIN-ST. JEOR: SCORE: 1839.84

## 2019-07-15 NOTE — TELEPHONE ENCOUNTER
Per chart review, pt has appt scheduled 7/15 at 3:50.    Routing to provider as FYI for office visit.

## 2019-07-15 NOTE — PROGRESS NOTES
Subjective     Aydin Reilly is a 58 year old male who presents to clinic today for the following health issues:    HPI   Hypertension Follow-up      Do you check your blood pressure regularly outside of the clinic? No     Are you following a low salt diet? Yes    Are your blood pressures ever more than 140 on the top number (systolic) OR more   than 90 on the bottom number (diastolic), for example 140/90? No  Anxiety Follow-Up    How are you doing with your anxiety since your last visit? Improved     Are you having other symptoms that might be associated with anxiety?     Have you had a significant life event? No     Are you feeling depressed? No    Do you have any concerns with your use of alcohol or other drugs? No    Social History     Tobacco Use     Smoking status: Never Smoker     Smokeless tobacco: Never Used   Substance Use Topics     Alcohol use: Not Currently     Alcohol/week: 0.0 oz     Comment:  quit last consumed 5/30/2019 pt going to AA     Drug use: Not Currently     Types: Methamphetamines, Marijuana     Comment: last used 5/1/2019     CONNER-7 SCORE 1/22/2019 4/2/2019 6/19/2019   Total Score - - -   Total Score - - 2 (minimal anxiety)   Total Score 0 0 2     PHQ 1/22/2019 4/2/2019 6/19/2019   PHQ-9 Total Score 0 0 2   Q9: Thoughts of better off dead/self-harm past 2 weeks Not at all Not at all Not at all     Martin is currently in AA and working with his sponsor daily  He is looking for sober living, and right now unfortunately still living with his ex  He denies alcohol or methamphetamine use since Memorial Day  Still using THC  He has a new job which is going well  Recently established care with addiction medicine - prescribed antabuse, which he has not started taking    Hernia      Duration: months    Description (location/character/radiation): umbilical, reducible, tender       Nausea/vomitting: no        BM - normal    Musculoskeletal problem/pain      Duration: 3 weeks    Description  Location:  left elbow    Intensity:  moderate    Accompanying signs and symptoms: radiation of pain to forearm    History  Previous similar problem: no   Previous evaluation:  none    Precipitating or alleviating factors:  Trauma or overuse: YES- just started a new job with a lot of lifting  Aggravating factors include: overuse    Therapies tried and outcome: nothing    Genitourinary symptoms      History of urinary stricture, requesting urology referral for recurrent symptoms      Patient Active Problem List   Diagnosis     H/O malignant neoplasm of rectum, rectosigmoid junction, and anus     Cellulitis of scrotum     Lumbago     Lumbar Radiculopathy, SEE FYI     Back Pain w/ Radiation, SEE FYI     Chronic abdominal pain     Hyperlipidemia with target LDL less than 130     Hypogonadism     Scrotal pain     Erectile dysfunction     Drug abuse, opioid type (H)     GIB (gastrointestinal bleeding)     MAGALY (obstructive sleep apnea)     Generalized anxiety disorder     Urethral stricture     Alcohol abuse     History of urethral stricture     Chronic pain     Edema     Anemia of chronic disease     Other mononeuropathy     Hx SBO     Health Care Home     Benign essential hypertension     Rotator cuff injury, right, initial encounter     Chondritis     Anserine bursitis     Substance abuse (H)     Chronic pain of right knee     Intertriginous candidiasis     Gastroesophageal reflux disease, esophagitis presence not specified     Morbid obesity (H)     Moderate episode of recurrent major depressive disorder (H)     Cellulitis of scrotum     Past Surgical History:   Procedure Laterality Date     ABDOMEN SURGERY       BACK SURGERY       BIOPSY       BIOPSY       C NONSPECIFIC PROCEDURE  1991    L4-L5 laminectomy; disk herniation     C NONSPECIFIC PROCEDURE  1999    squamous cell CA - anus, 27 xrt/3 rounds, 5-FU/cisplatin     C NONSPECIFIC PROCEDURE      umbilical hernia     C NONSPECIFIC PROCEDURE  2002    cystscopy for urethral  stricture from radiation therapy     COLONOSCOPY       COLONOSCOPY  4/18/2014    Procedure: Colonoscopy   polyp bx;  Surgeon: Josef Mckeon MD;  Location: [facility]     CYSTOSCOPY, CYSTOGRAM, COMBINED N/A 2/26/2015    Procedure: COMBINED CYSTOSCOPY, CYSTOGRAM;  Surgeon: Darell Barrera MD;  Location: [facility]     CYSTOSCOPY, INTERNAL URETHROTOMY, COMBINED N/A 12/19/2014    Procedure: COMBINED CYSTOSCOPY, INTERNAL URETHROTOMY;  Surgeon: Buzz Ren MD;  Location: [facility]     CYSTOSTOMY, INSERT TUBE SUPRAPUBIC, COMBINED  12/19/2014    Procedure: COMBINED CYSTOSTOMY, INSERT TUBE SUPRAPUBIC;  Surgeon: Buzz Ren MD;  Location: [facility]     CYSTOSTOMY, INSERT TUBE SUPRAPUBIC, COMBINED N/A 12/29/2014    Procedure: COMBINED CYSTOSTOMY, INSERT TUBE SUPRAPUBIC;  Surgeon: Buzz Ren MD;  Location:  OR     ENT SURGERY       ESOPHAGOSCOPY, GASTROSCOPY, DUODENOSCOPY (EGD), COMBINED  10/2/2012    Procedure: COMBINED ESOPHAGOSCOPY, GASTROSCOPY, DUODENOSCOPY (EGD);  COMBINED ESOPHAGOSCOPY, GASTROSCOPY, DUODENOSCOPY (EGD) ;  Surgeon: Raj Beltre MD;  Location: [facility]     HERNIA REPAIR       LAPAROSCOPIC LYSIS ADHESIONS N/A 12/4/2015    Procedure: LAPAROSCOPIC LYSIS ADHESIONS;  Surgeon: Herbie Sanchez MD;  Location: [facility] OR     LAPAROTOMY EXPLORATORY N/A 12/4/2015    Procedure: LAPAROTOMY EXPLORATORY;  Surgeon: Herbie Sanchez MD;  Location: [facility] OR     MYRINGOTOMY      in childhood     TONSILLECTOMY and adenoidectomy      in childhood     URETHROPLASTY WITH BUCCAL GRAFT N/A 3/27/2015    Procedure: URETHROPLASTY WITH BUCCAL GRAFT;  Surgeon: Darell Barrera MD;  Location: [facility]       Social History     Tobacco Use     Smoking status: Never Smoker     Smokeless tobacco: Never Used   Substance Use Topics     Alcohol use: Not Currently     Alcohol/week: 0.0 oz     Comment:  quit last consumed 5/30/2019 pt going to AA     Family History   Adopted: Yes   Problem Relation Age  of Onset     Unknown/Adopted Mother      Suicide Father      Schizophrenia No family hx of      Bipolar Disorder No family hx of      Dementia No family hx of      Callahan Disease No family hx of      Parkinsonism No family hx of      Autism Spectrum Disorder No family hx of      Intellectual Disability (Mental Retardation) No family hx of          Current Outpatient Medications   Medication Sig Dispense Refill     acetaminophen (TYLENOL) 325 MG tablet Take 2 tablets (650 mg) by mouth every 8 hours as needed for mild pain 100 tablet      amLODIPine (NORVASC) 5 MG tablet TAKE 1 TABLET BY MOUTH EVERY DAY 90 tablet 0     disulfiram (ANTABUSE) 250 MG tablet Take 1 tablet (250 mg) by mouth daily 30 tablet 1     Elastic Bandages & Supports (JOBST ACTIVE 20-30MMHG MEDIUM) MISC 1 Device daily as needed Knee high 6 each 1     folic acid (FOLVITE) 1 MG tablet Take 1 tablet (1 mg) by mouth daily 30 tablet 0     furosemide (LASIX) 40 MG tablet TAKE 1 TABLET (40 MG) BY MOUTH DAILY AS NEEDED FOR EDEMA 30 tablet 1     gabapentin (NEURONTIN) 300 MG capsule Take 1 capsule (300 mg) by mouth 3 times daily 90 capsule 1     multivitamin, therapeutic with minerals (MULTI-VITAMIN) TABS Take 1 tablet by mouth daily       nabumetone (RELAFEN) 500 MG tablet Take 1 tablet (500 mg) by mouth 2 times daily 60 tablet 1     naltrexone (DEPADE/REVIA) 50 MG tablet Take 1 tablet (50 mg) by mouth daily 30 tablet 1     order for DME Equipment being ordered: Splint - left wrist splint 1 Device 0     order for DME Equipment being ordered: grabber/reach extender 1 Device 0     sildenafil (VIAGRA) 100 MG tablet 30 min - 4 hr before intercourse take 0.5-1 tablets PO PRN ED Never use with nitroglycerin, terazosin or doxazosin. 12 tablet 11     Allergies   Allergen Reactions     No Known Allergies      Seasonal Allergies          Reviewed and updated as needed this visit by Provider         Review of Systems   Constitutional: Negative.    HENT: Negative.   "  Eyes: Negative.    Respiratory: Negative.    Cardiovascular: Negative.    Gastrointestinal:        As in HPI   Genitourinary:        As in HPI   Musculoskeletal:        As in HPI   Skin: Negative.    Neurological: Negative.    Psychiatric/Behavioral:        As in HPI         Objective    /70 (Patient Position: Sitting, Cuff Size: Adult Regular)   Pulse 87   Temp 99.2  F (37.3  C) (Tympanic)   Resp 16   Ht 1.689 m (5' 6.5\")   Wt 105.9 kg (233 lb 8 oz)   SpO2 96%   BMI 37.12 kg/m    Physical Exam   Constitutional: He is oriented to person, place, and time. He appears well-developed and well-nourished. No distress.   HENT:   Head: Normocephalic and atraumatic.   Nose: Nose normal.   Eyes: Conjunctivae and EOM are normal.   Neck: Normal range of motion.   Pulmonary/Chest: Effort normal.   Abdominal: Soft. He exhibits distension. A hernia is present. Hernia confirmed positive in the ventral area (umbilical).   Diastasis recti   Musculoskeletal:        Left elbow: He exhibits normal range of motion, no swelling and no deformity. Tenderness found. Lateral epicondyle tenderness noted.   + long finger isolation, pain with  in extension   Neurological: He is alert and oriented to person, place, and time.   Skin: Skin is warm and dry.   Psychiatric: He has a normal mood and affect. Judgment normal.       Diagnostic Test Results:  No results found. However, due to the size of the patient record, not all encounters were searched. Please check Results Review for a complete set of results.        Assessment & Plan   Problem List Items Addressed This Visit        Digestive    Morbid obesity (H)       Circulatory    Benign essential hypertension - Primary    Relevant Orders    Comprehensive metabolic panel (BMP + Alb, Alk Phos, ALT, AST, Total. Bili, TP) (Completed)       Other    History of urethral stricture    Relevant Orders    UROLOGY ADULT REFERRAL      Other Visit Diagnoses     Uncomplicated alcohol " "dependence (H)        Relevant Orders    Comprehensive metabolic panel (BMP + Alb, Alk Phos, ALT, AST, Total. Bili, TP) (Completed)    Bicipital tendinitis, right        Relevant Medications    nabumetone (RELAFEN) 500 MG tablet    Lateral epicondylitis of left elbow        Relevant Medications    nabumetone (RELAFEN) 500 MG tablet    order for DME    Umbilical hernia without obstruction and without gangrene        Relevant Orders    GENERAL SURG ADULT REFERRAL         Orders as above    BMI:   Estimated body mass index is 37.12 kg/m  as calculated from the following:    Height as of this encounter: 1.689 m (5' 6.5\").    Weight as of this encounter: 105.9 kg (233 lb 8 oz).   Weight management plan: Discussed healthy diet and exercise guidelines        Patient Instructions       Patient Education     1. Medication refill    2. Elbow stretches and wrist brace     3. Follow up with surgeon for hernias      Understanding Lateral Epicondylitis    Tendons are strong bands of tissue that connect muscles to bones. Lateral epicondylitis affects the tendons that connect muscles in the forearm to the lateral epicondyle. This is the bony knob on the outer side of the elbow. The condition occurs if the extensor tendons of the wrist become red and swollen (irritated). This can cause pain in the elbow, forearm, and wrist. Because the condition is sometimes caused by playing tennis, it is also known as  tennis elbow.   How to say it  LA-tuhr-inna [id number]-[id number]   What causes lateral epicondylitis?  The condition most often occurs because of overuse. This can be from any activity that repeatedly puts stress on the forearm extensor muscles or tendons and wrist. For instance, playing tennis, lifting weights, cutting meat, painting, and typing can all cause the condition. Wear and tear of the tendons from aging or an injury to the tendons can also cause the condition.  Symptoms of lateral epicondylitis  The most common symptom is " pain. You may feel it on the outer side of the elbow and down the back of the forearm. It may be worse when moving or using the elbow, forearm, or wrist. You may also feel pain when gripping or lifting things.  Treatment for lateral epicondylitis  Treatments may include:    Resting the elbow, forearm, and wrist. You ll need to avoid movements that can make your symptoms worse. You also may need to avoid certain sports and types of work for a time. This helps relieve symptoms and prevent further damage to the tendons.    Changing the action that caused the problem. For instance, if the tendons were damaged from playing tennis, it may help to change your playing technique or use different equipment. This helps prevent further damage to the tendons.    Using cold packs. Putting an ice pack on the injured area can help reduce pain and swelling.    Taking pain medicines. Taking prescription or over-the-counter pain medicines may help reduce pain and swelling.      Wearing a brace. This helps reduce strain on the muscles and tendons in the forearm, which may relieve symptoms. It is very important to wear the brace properly.    Doing exercises and physical therapy. These help improve strength and range of motion in the elbow, forearm, and wrist.    Getting shots of medicine into the injured area. These may help relieve symptoms for a time.    Having surgery. This may be an option if other treatments fail to relieve symptoms. In many cases, the surgeon removes the damaged tissue.  Possible complications of lateral epicondylitis  If the tendons involved don t heal properly, symptoms may return or get worse. To help prevent this, follow your treatment plan as directed.  When to call your healthcare provider  Call your healthcare provider right away if you have any of these:    Fever of 100.4 F (38 C) or higher, or as directed    Redness, swelling, or warmth in the elbow or forearm that gets worse    Symptoms that don t get  better with treatment, or get worse    New symptoms   Date Last Reviewed: 3/10/2016    [id number] [employer]. [address], [place] [address]. All rights reserved. This information is not intended as a substitute for professional medical care. Always follow your healthcare professional's instructions.               Return in about 2 weeks (around 7/29/2019) for Specialist Appointment.    Mandy Pabon PA-C  [facility]

## 2019-07-15 NOTE — PATIENT INSTRUCTIONS
Patient Education     1. Medication refill    2. Elbow stretches and wrist brace     3. Follow up with surgeon for hernias      Understanding Lateral Epicondylitis    Tendons are strong bands of tissue that connect muscles to bones. Lateral epicondylitis affects the tendons that connect muscles in the forearm to the lateral epicondyle. This is the bony knob on the outer side of the elbow. The condition occurs if the extensor tendons of the wrist become red and swollen (irritated). This can cause pain in the elbow, forearm, and wrist. Because the condition is sometimes caused by playing tennis, it is also known as  tennis elbow.   How to say it  LA-tuhr-inna te-ny-QYI-duh-LY-tis   What causes lateral epicondylitis?  The condition most often occurs because of overuse. This can be from any activity that repeatedly puts stress on the forearm extensor muscles or tendons and wrist. For instance, playing tennis, lifting weights, cutting meat, painting, and typing can all cause the condition. Wear and tear of the tendons from aging or an injury to the tendons can also cause the condition.  Symptoms of lateral epicondylitis  The most common symptom is pain. You may feel it on the outer side of the elbow and down the back of the forearm. It may be worse when moving or using the elbow, forearm, or wrist. You may also feel pain when gripping or lifting things.  Treatment for lateral epicondylitis  Treatments may include:    Resting the elbow, forearm, and wrist. You ll need to avoid movements that can make your symptoms worse. You also may need to avoid certain sports and types of work for a time. This helps relieve symptoms and prevent further damage to the tendons.    Changing the action that caused the problem. For instance, if the tendons were damaged from playing tennis, it may help to change your playing technique or use different equipment. This helps prevent further damage to the tendons.    Using cold packs. Putting  an ice pack on the injured area can help reduce pain and swelling.    Taking pain medicines. Taking prescription or over-the-counter pain medicines may help reduce pain and swelling.      Wearing a brace. This helps reduce strain on the muscles and tendons in the forearm, which may relieve symptoms. It is very important to wear the brace properly.    Doing exercises and physical therapy. These help improve strength and range of motion in the elbow, forearm, and wrist.    Getting shots of medicine into the injured area. These may help relieve symptoms for a time.    Having surgery. This may be an option if other treatments fail to relieve symptoms. In many cases, the surgeon removes the damaged tissue.  Possible complications of lateral epicondylitis  If the tendons involved don t heal properly, symptoms may return or get worse. To help prevent this, follow your treatment plan as directed.  When to call your healthcare provider  Call your healthcare provider right away if you have any of these:    Fever of 100.4 F (38 C) or higher, or as directed    Redness, swelling, or warmth in the elbow or forearm that gets worse    Symptoms that don t get better with treatment, or get worse    New symptoms   Date Last Reviewed: 3/10/2016    9524-8459 [employer]. [address] [place] [address], [place] [address]. All rights reserved. This information is not intended as a substitute for professional medical care. Always follow your healthcare professional's instructions.

## 2019-07-16 LAB
ALBUMIN SERPL-MCNC: 4.3 G/DL (ref 3.4–5)
ALP SERPL-CCNC: 87 U/L (ref 40–150)
ALT SERPL W P-5'-P-CCNC: 23 U/L (ref 0–70)
ANION GAP SERPL CALCULATED.3IONS-SCNC: 7 MMOL/L (ref 3–14)
AST SERPL W P-5'-P-CCNC: 20 U/L (ref 0–45)
BILIRUB SERPL-MCNC: 0.3 MG/DL (ref 0.2–1.3)
BUN SERPL-MCNC: 27 MG/DL (ref 7–30)
CALCIUM SERPL-MCNC: 9.4 MG/DL (ref 8.5–10.1)
CHLORIDE SERPL-SCNC: 111 MMOL/L (ref 94–109)
CO2 SERPL-SCNC: 24 MMOL/L (ref 20–32)
CREAT SERPL-MCNC: 1.15 MG/DL (ref 0.66–1.25)
GFR SERPL CREATININE-BSD FRML MDRD: 70 ML/MIN/{1.73_M2}
GLUCOSE SERPL-MCNC: 82 MG/DL (ref 70–99)
POTASSIUM SERPL-SCNC: 4.1 MMOL/L (ref 3.4–5.3)
PROT SERPL-MCNC: 7.3 G/DL (ref 6.8–8.8)
SODIUM SERPL-SCNC: 142 MMOL/L (ref 133–144)

## 2019-07-18 NOTE — RESULT ENCOUNTER NOTE
Martin    Your lab tests are complete and I have reviewed the results.     - Your lab results look great; everything is normal.    If you have any questions or concerns, please feel free to call or send a [employer] message.    Sincerely,  Georges Pabon PA-C

## 2019-07-21 ASSESSMENT — ENCOUNTER SYMPTOMS
RESPIRATORY NEGATIVE: 1
EYES NEGATIVE: 1
NEUROLOGICAL NEGATIVE: 1
ROS GI COMMENTS: AS IN HPI
CARDIOVASCULAR NEGATIVE: 1
CONSTITUTIONAL NEGATIVE: 1

## 2019-07-23 ENCOUNTER — MYC MEDICAL ADVICE (OUTPATIENT)
Dept: FAMILY MEDICINE | Facility: CLINIC | Age: 59
End: 2019-07-23

## 2019-07-23 DIAGNOSIS — G89.29 OTHER CHRONIC PAIN: Primary | ICD-10-CM

## 2019-07-23 DIAGNOSIS — F41.1 GENERALIZED ANXIETY DISORDER: ICD-10-CM

## 2019-07-23 DIAGNOSIS — F10.10 ALCOHOL ABUSE: ICD-10-CM

## 2019-07-24 RX ORDER — GABAPENTIN 300 MG/1
600 CAPSULE ORAL 3 TIMES DAILY
Qty: 180 CAPSULE | Refills: 1 | Status: ON HOLD | OUTPATIENT
Start: 2019-07-24 | End: 2019-01-01

## 2019-08-20 ENCOUNTER — OFFICE VISIT (OUTPATIENT)
Dept: FAMILY MEDICINE | Facility: CLINIC | Age: 59
End: 2019-08-20
Payer: MEDICAID

## 2019-08-20 VITALS
OXYGEN SATURATION: 100 % | SYSTOLIC BLOOD PRESSURE: 136 MMHG | WEIGHT: 235 LBS | DIASTOLIC BLOOD PRESSURE: 80 MMHG | HEART RATE: 88 BPM | BODY MASS INDEX: 37.36 KG/M2 | RESPIRATION RATE: 14 BRPM | TEMPERATURE: 98.1 F

## 2019-08-20 DIAGNOSIS — N49.2 CELLULITIS, SCROTUM: Primary | ICD-10-CM

## 2019-08-20 PROCEDURE — 99214 OFFICE O/P EST MOD 30 MIN: CPT | Performed by: FAMILY MEDICINE

## 2019-08-20 RX ORDER — AMOXICILLIN AND CLAVULANATE POTASSIUM 500; 125 MG/1; MG/1
1 TABLET, FILM COATED ORAL 2 TIMES DAILY
Qty: 20 TABLET | Refills: 0 | Status: SHIPPED | OUTPATIENT
Start: 2019-08-20 | End: 2019-09-09

## 2019-08-20 ASSESSMENT — ENCOUNTER SYMPTOMS
CHILLS: 1
MYALGIAS: 1
DIAPHORESIS: 1
FEVER: 0
GASTROINTESTINAL NEGATIVE: 1

## 2019-08-20 NOTE — PROGRESS NOTES
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  Has been having some chills and sweats, but no amie fever.  Ibuprofen for pain.      86 days of sobriety.  Attending meetings 3-4 times weekly.  Looking to get back into schools.  Also officiating volleyball.         Review of Systems   Constitutional: Positive for chills and diaphoresis. Negative for fever.   Gastrointestinal: Negative.    Genitourinary: Positive for penile pain, penile swelling and scrotal swelling.   Musculoskeletal: Positive for myalgias.        Objective    /80 (BP Location: Right arm, Patient Position: Chair, Cuff Size: Adult Regular)   Pulse 88   Temp 98.1  F (36.7  C) (Oral)   Resp 14   Wt 106.6 kg (235 lb)   SpO2 100%   BMI 37.36 kg/m    Body mass index is 37.36 kg/m .  Physical Exam   Constitutional: He is oriented to person, place, and time.   Eyes: Conjunctivae and EOM are normal.   Cardiovascular: Normal rate, regular rhythm and normal heart sounds.   Pulmonary/Chest: Effort normal and breath sounds normal.   Genitourinary: Right testis shows swelling. Left testis shows swelling.   Genitourinary Comments: Redness and warmth, swelling of prepuce and scrotum.     Musculoskeletal: He exhibits no edema.   Neurological: He is alert and oriented to person, place, and time.   Skin: Skin is warm and dry.   Vitals reviewed.     (N49.2) Cellulitis, scrotum  (primary encounter diagnosis)  Comment: recurrent, some mild constitutional sx.  Advise  heading in to ED if not improving in 24-36 hours  Plan: amoxicillin-clavulanate (AUGMENTIN) 500-125 MG         tablet              RTC in 1w prn    Adolfo Simmons MD

## 2019-09-03 NOTE — CONSULTS
6/10/2018    CD consult acknowledged. Per chart review, patient has no listed funding. Patient will need Rule 25 funding for chem dep services through their county of residence. Social work can provide resources.    Camilla Mendez, [facility]  [phone]      Over the last 2 weeks, how often have you been bothered by the following problems?          PHQ2 Score:  0  1. Little interest or pleasure in doing things?:  0  2. Feeling down, depressed, or hopeless?:  0           Health Maintenance Due   Topic Date Due   • Shingles Vaccine (2 of 3) 02/26/2015   • Influenza Vaccine (1) 09/01/2019       Patient is due for topics as listed above but is not proceeding with Immunization(s) Influenza and Shingles at this time. Pt is on waiting list for the shingles vaccine. The influenza is unavailable in the clinic at this time.

## 2019-09-04 ENCOUNTER — MYC MEDICAL ADVICE (OUTPATIENT)
Dept: FAMILY MEDICINE | Facility: CLINIC | Age: 59
End: 2019-09-04

## 2019-09-05 NOTE — TELEPHONE ENCOUNTER
The patient returned a call regarding his cellulitis and he would like a call back from a nurse whenever someone is available to discuss this as he has not heard anything back

## 2019-09-06 NOTE — TELEPHONE ENCOUNTER
Patient Contact    Attempt # 1    Was call answered?  No.  Left message on voicemail with information to call triage back.    See HWhart message.

## 2019-09-09 ENCOUNTER — OFFICE VISIT (OUTPATIENT)
Dept: FAMILY MEDICINE | Facility: CLINIC | Age: 59
End: 2019-09-09
Payer: COMMERCIAL

## 2019-09-09 VITALS
RESPIRATION RATE: 16 BRPM | TEMPERATURE: 98.6 F | WEIGHT: 253 LBS | BODY MASS INDEX: 40.22 KG/M2 | OXYGEN SATURATION: 98 % | DIASTOLIC BLOOD PRESSURE: 78 MMHG | SYSTOLIC BLOOD PRESSURE: 126 MMHG | HEART RATE: 85 BPM

## 2019-09-09 DIAGNOSIS — N49.2 CELLULITIS, SCROTUM: Primary | ICD-10-CM

## 2019-09-09 DIAGNOSIS — R82.90 ABNORMAL URINE FINDINGS: ICD-10-CM

## 2019-09-09 DIAGNOSIS — G89.29 OTHER CHRONIC PAIN: ICD-10-CM

## 2019-09-09 DIAGNOSIS — E66.01 MORBID OBESITY (H): ICD-10-CM

## 2019-09-09 DIAGNOSIS — M77.11 LATERAL EPICONDYLITIS OF RIGHT ELBOW: ICD-10-CM

## 2019-09-09 DIAGNOSIS — K92.1 MELENA: ICD-10-CM

## 2019-09-09 DIAGNOSIS — F10.10 ALCOHOL ABUSE: ICD-10-CM

## 2019-09-09 LAB
ALBUMIN UR-MCNC: NEGATIVE MG/DL
APPEARANCE UR: CLEAR
BACTERIA #/AREA URNS HPF: ABNORMAL /HPF
BILIRUB UR QL STRIP: NEGATIVE
COLOR UR AUTO: YELLOW
ERYTHROCYTE [DISTWIDTH] IN BLOOD BY AUTOMATED COUNT: 14.9 % (ref 10–15)
GLUCOSE UR STRIP-MCNC: NEGATIVE MG/DL
HCT VFR BLD AUTO: 31.8 % (ref 40–53)
HGB BLD-MCNC: 10.4 G/DL (ref 13.3–17.7)
HGB UR QL STRIP: NEGATIVE
KETONES UR STRIP-MCNC: NEGATIVE MG/DL
LEUKOCYTE ESTERASE UR QL STRIP: ABNORMAL
MCH RBC QN AUTO: 30.1 PG (ref 26.5–33)
MCHC RBC AUTO-ENTMCNC: 32.7 G/DL (ref 31.5–36.5)
MCV RBC AUTO: 92 FL (ref 78–100)
NITRATE UR QL: POSITIVE
NON-SQ EPI CELLS #/AREA URNS LPF: ABNORMAL /LPF
PH UR STRIP: 5.5 PH (ref 5–7)
PLATELET # BLD AUTO: 188 10E9/L (ref 150–450)
RBC # BLD AUTO: 3.45 10E12/L (ref 4.4–5.9)
RBC #/AREA URNS AUTO: ABNORMAL /HPF
SOURCE: ABNORMAL
SP GR UR STRIP: 1.02 (ref 1–1.03)
UROBILINOGEN UR STRIP-ACNC: 0.2 EU/DL (ref 0.2–1)
WBC # BLD AUTO: 5.4 10E9/L (ref 4–11)
WBC #/AREA URNS AUTO: ABNORMAL /HPF

## 2019-09-09 PROCEDURE — 81001 URINALYSIS AUTO W/SCOPE: CPT | Performed by: PHYSICIAN ASSISTANT

## 2019-09-09 PROCEDURE — 85027 COMPLETE CBC AUTOMATED: CPT | Performed by: PHYSICIAN ASSISTANT

## 2019-09-09 PROCEDURE — 36415 COLL VENOUS BLD VENIPUNCTURE: CPT | Performed by: PHYSICIAN ASSISTANT

## 2019-09-09 PROCEDURE — 99214 OFFICE O/P EST MOD 30 MIN: CPT | Performed by: PHYSICIAN ASSISTANT

## 2019-09-09 PROCEDURE — 87086 URINE CULTURE/COLONY COUNT: CPT | Performed by: PHYSICIAN ASSISTANT

## 2019-09-09 PROCEDURE — 87186 SC STD MICRODIL/AGAR DIL: CPT | Performed by: PHYSICIAN ASSISTANT

## 2019-09-09 PROCEDURE — 87088 URINE BACTERIA CULTURE: CPT | Performed by: PHYSICIAN ASSISTANT

## 2019-09-09 RX ORDER — GABAPENTIN 300 MG/1
600 CAPSULE ORAL 3 TIMES DAILY
Qty: 180 CAPSULE | Refills: 1 | Status: CANCELLED | OUTPATIENT
Start: 2019-09-09

## 2019-09-09 ASSESSMENT — ENCOUNTER SYMPTOMS
NEUROLOGICAL NEGATIVE: 1
CARDIOVASCULAR NEGATIVE: 1
PSYCHIATRIC NEGATIVE: 1
CONSTITUTIONAL NEGATIVE: 1
EYES NEGATIVE: 1
RESPIRATORY NEGATIVE: 1
GASTROINTESTINAL NEGATIVE: 1

## 2019-09-09 NOTE — PROGRESS NOTES
Subjective     Aydin Reilly is a 59 year old male who presents to clinic today for the following health issues:    HPI   Medication Followup of Gabapentin    Taking Medication as prescribed: yes    Side Effects:  None    Medication Helping Symptoms:  Yes but wants to discuss increasing dose    Radiculopathy in his feet is getting worse over the past few months  He has gained 18 pounds in three weeks  Lowest weight was 225 in January, however he was using methamphetamines at the time     Cellulitis of the scrotum flared up last week - this happens recurrently due to history of radiation  Took 5 days of antibiotics then had improved so he stopped  1.5 weeks later, symptoms worsened, and he took the rest of it  Now is just has a mild achy feeling    Took too many ibuprofen and had black tarry stool - better within a day  No belly pain today    Muscle aches  Improving    He now has pain in the right elbow, identical to the pain he had on the left  He would like a brace for this, since the left elbow is now better        Patient Active Problem List   Diagnosis     H/O malignant neoplasm of rectum, rectosigmoid junction, and anus     Cellulitis of scrotum     Lumbago     Lumbar Radiculopathy, SEE FYI     Back Pain w/ Radiation, SEE FYI     Chronic abdominal pain     Hyperlipidemia with target LDL less than 130     Hypogonadism     Scrotal pain     Erectile dysfunction     Drug abuse, opioid type (H)     GIB (gastrointestinal bleeding)     MAGALY (obstructive sleep apnea)     Generalized anxiety disorder     Urethral stricture     Alcohol abuse     History of urethral stricture     Chronic pain     Edema     Anemia of chronic disease     Other mononeuropathy     Hx SBO     Health Care Home     Benign essential hypertension     Rotator cuff injury, right, initial encounter     Chondritis     Anserine bursitis     Substance abuse (H)     Chronic pain of right knee     Intertriginous candidiasis     Gastroesophageal reflux  disease, esophagitis presence not specified     Morbid obesity (H)     Moderate episode of recurrent major depressive disorder (H)     Cellulitis of scrotum     Past Surgical History:   Procedure Laterality Date     ABDOMEN SURGERY       BACK SURGERY       BIOPSY       BIOPSY       C NONSPECIFIC PROCEDURE  1991    L4-L5 laminectomy; disk herniation     C NONSPECIFIC PROCEDURE  1999    squamous cell CA - anus, 27 xrt/3 rounds, 5-FU/cisplatin     C NONSPECIFIC PROCEDURE      umbilical hernia     C NONSPECIFIC PROCEDURE  2002    cystscopy for urethral stricture from radiation therapy     COLONOSCOPY       COLONOSCOPY  4/18/2014    Procedure: Colonoscopy   polyp bx;  Surgeon: Josef Mckeon MD;  Location:  GI     CYSTOSCOPY, CYSTOGRAM, COMBINED N/A 2/26/2015    Procedure: COMBINED CYSTOSCOPY, CYSTOGRAM;  Surgeon: Darell Barrera MD;  Location: [facility] OR     CYSTOSCOPY, INTERNAL URETHROTOMY, COMBINED N/A 12/19/2014    Procedure: COMBINED CYSTOSCOPY, INTERNAL URETHROTOMY;  Surgeon: Buzz Ren MD;  Location:  OR     CYSTOSTOMY, INSERT TUBE SUPRAPUBIC, COMBINED  12/19/2014    Procedure: COMBINED CYSTOSTOMY, INSERT TUBE SUPRAPUBIC;  Surgeon: Buzz Ren MD;  Location:  OR     CYSTOSTOMY, INSERT TUBE SUPRAPUBIC, COMBINED N/A 12/29/2014    Procedure: COMBINED CYSTOSTOMY, INSERT TUBE SUPRAPUBIC;  Surgeon: Buzz Ren MD;  Location:  OR     ENT SURGERY       ESOPHAGOSCOPY, GASTROSCOPY, DUODENOSCOPY (EGD), COMBINED  10/2/2012    Procedure: COMBINED ESOPHAGOSCOPY, GASTROSCOPY, DUODENOSCOPY (EGD);  COMBINED ESOPHAGOSCOPY, GASTROSCOPY, DUODENOSCOPY (EGD) ;  Surgeon: Raj Beltre MD;  Location:  GI     HERNIA REPAIR       LAPAROSCOPIC LYSIS ADHESIONS N/A 12/4/2015    Procedure: LAPAROSCOPIC LYSIS ADHESIONS;  Surgeon: Herbie Sanchez MD;  Location:  OR     LAPAROTOMY EXPLORATORY N/A 12/4/2015    Procedure: LAPAROTOMY EXPLORATORY;  Surgeon: Herbie Sanchez  MD;  Location: [facility] OR     MYRINGOTOMY      in childhood     TONSILLECTOMY and adenoidectomy      in childhood     URETHROPLASTY WITH BUCCAL GRAFT N/A 3/27/2015    Procedure: URETHROPLASTY WITH BUCCAL GRAFT;  Surgeon: Darell Barrera MD;  Location:  OR       Social History     Tobacco Use     Smoking status: Never Smoker     Smokeless tobacco: Never Used   Substance Use Topics     Alcohol use: Not Currently     Alcohol/week: 0.0 oz     Comment:  quit last consumed 5/30/2019 pt going to AA     Family History   Adopted: Yes   Problem Relation Age of Onset     Unknown/Adopted Mother      Suicide Father      Schizophrenia No family hx of      Bipolar Disorder No family hx of      Dementia No family hx of      Nora Disease No family hx of      Parkinsonism No family hx of      Autism Spectrum Disorder No family hx of      Intellectual Disability (Mental Retardation) No family hx of          Current Outpatient Medications   Medication Sig Dispense Refill     acetaminophen (TYLENOL) 325 MG tablet Take 2 tablets (650 mg) by mouth every 8 hours as needed for mild pain 100 tablet      amoxicillin-clavulanate (AUGMENTIN) 875-125 MG tablet Take 1 tablet by mouth 2 times daily for 7 days 14 tablet 0     Elastic Bandages & Supports (JOBST ACTIVE 20-30MMHG MEDIUM) MISC 1 Device daily as needed Knee high 6 each 1     folic acid (FOLVITE) 1 MG tablet Take 1 tablet (1 mg) by mouth daily 30 tablet 0     furosemide (LASIX) 40 MG tablet TAKE 1 TABLET (40 MG) BY MOUTH DAILY AS NEEDED FOR EDEMA 30 tablet 1     gabapentin (NEURONTIN) 300 MG capsule Take 2 capsules (600 mg) by mouth 3 times daily 180 capsule 1     multivitamin, therapeutic with minerals (MULTI-VITAMIN) TABS Take 1 tablet by mouth daily       nabumetone (RELAFEN) 500 MG tablet Take 1 tablet (500 mg) by mouth 2 times daily 60 tablet 1     order for DME Equipment being ordered: Splint - left wrist splint 1 Device 0     order for DME Equipment being ordered:  grabber/reach extender 1 Device 0     sildenafil (VIAGRA) 100 MG tablet 30 min - 4 hr before intercourse take 0.5-1 tablets PO PRN ED Never use with nitroglycerin, terazosin or doxazosin. 12 tablet 11     amLODIPine (NORVASC) 5 MG tablet TAKE 1 TABLET BY MOUTH EVERY DAY (Patient not taking: Reported on 9/9/2019) 90 tablet 0     disulfiram (ANTABUSE) 250 MG tablet Take 1 tablet (250 mg) by mouth daily (Patient not taking: Reported on 9/9/2019) 30 tablet 1     naltrexone (DEPADE/REVIA) 50 MG tablet Take 1 tablet (50 mg) by mouth daily (Patient not taking: Reported on 9/9/2019) 30 tablet 1     Allergies   Allergen Reactions     No Known Allergies      Seasonal Allergies        Reviewed and updated as needed this visit by Provider         Review of Systems   Constitutional: Negative.    HENT: Negative.    Eyes: Negative.    Respiratory: Negative.    Cardiovascular: Negative.    Gastrointestinal: Negative.    Genitourinary: Negative.    Musculoskeletal:        As in HPI   Skin: Negative.    Neurological: Negative.    Psychiatric/Behavioral: Negative.          Objective    /78 (Cuff Size: Adult Large)   Pulse 85   Temp 98.6  F (37  C) (Tympanic)   Resp 16   Wt 114.8 kg (253 lb)   SpO2 98%   BMI 40.22 kg/m    Physical Exam   Constitutional: He is oriented to person, place, and time. He appears well-developed and well-nourished. No distress.   HENT:   Head: Normocephalic and atraumatic.   Nose: Nose normal.   Eyes: Conjunctivae and EOM are normal.   Neck: Normal range of motion.   Pulmonary/Chest: Effort normal.   Abdominal: Soft. He exhibits no distension. There is no tenderness. There is no rebound and no guarding.   Neurological: He is alert and oriented to person, place, and time.   Skin: Skin is warm and dry.   Psychiatric: He has a normal mood and affect. Judgment normal.       Diagnostic Test Results:  Results for orders placed or performed in visit on 09/09/19 (from the past 24 hour(s))   UA with  Microscopic reflex to Culture   Result Value Ref Range    Color Urine Yellow     Appearance Urine Clear     Glucose Urine Negative NEG^Negative mg/dL    Bilirubin Urine Negative NEG^Negative    Ketones Urine Negative NEG^Negative mg/dL    Specific Gravity Urine 1.020 1.003 - 1.035    pH Urine 5.5 5.0 - 7.0 pH    Protein Albumin Urine Negative NEG^Negative mg/dL    Urobilinogen Urine 0.2 0.2 - 1.0 EU/dL    Nitrite Urine Positive (A) NEG^Negative    Blood Urine Negative NEG^Negative    Leukocyte Esterase Urine Trace (A) NEG^Negative    Source Midstream Urine     WBC Urine 0 - 5 OTO5^0 - 5 /HPF    RBC Urine 2-5 (A) OTO2^O - 2 /HPF    Squamous Epithelial /LPF Urine Few FEW^Few /LPF    Bacteria Urine Many (A) NEG^Negative /HPF   CBC with platelets   Result Value Ref Range    WBC 5.4 4.0 - 11.0 10e9/L    RBC Count 3.45 (L) 4.4 - 5.9 10e12/L    Hemoglobin 10.4 (L) 13.3 - 17.7 g/dL    Hematocrit 31.8 (L) 40.0 - 53.0 %    MCV 92 78 - 100 fl    MCH 30.1 26.5 - 33.0 pg    MCHC 32.7 31.5 - 36.5 g/dL    RDW 14.9 10.0 - 15.0 %    Platelet Count 188 150 - 450 10e9/L     *Note: Due to a large number of results and/or encounters for the requested time period, some results have not been displayed. A complete set of results can be found in Results Review.           Assessment & Plan   Problem List Items Addressed This Visit        Nervous and Auditory    Alcohol abuse    Chronic pain       Digestive    Morbid obesity (H)    Relevant Orders    BARIATRIC ADULT REFERRAL      Other Visit Diagnoses     Cellulitis, scrotum    -  Primary    Relevant Medications    amoxicillin-clavulanate (AUGMENTIN) 875-125 MG tablet    Other Relevant Orders    UA with Microscopic reflex to Culture (Completed)    Melena        Relevant Orders    CBC with platelets (Completed)    Fecal colorectal cancer screen (FIT)    GASTROENTEROLOGY ADULT REF CONSULT ONLY    Lateral epicondylitis of right elbow        Abnormal urine findings        Relevant Orders    Urine  "Culture Aerobic Bacterial (Completed)         Augmentin sent to cover for a total of 10 days.  Discussed importance of taking complete course of antibiotics as directed  UA positive for infection.  We will culture  FIT ordered to confirm patient is no longer having GI bleeding.  CBC shows anemia.  Patient referred to GI.   Wrist splint given for the right wrist - for lateral epicondylitis.     He is currently at the max dose of gabapentin I am comfortable with.  We discussed potential for misuse with his history of substance abuse.  Weight loss needs to be a main focus for him - neuropathic pain likely to improve with weight loss.     BMI:   Estimated body mass index is 40.22 kg/m  as calculated from the following:    Height as of 7/15/19: 1.689 m (5' 6.5\").    Weight as of this encounter: 114.8 kg (253 lb).   Weight management plan: Patient referred to endocrine and/or weight management specialty      There are no Patient Instructions on file for this visit.    Return in about 6 weeks (around 10/21/2019) for Weight Check, Medication Recheck.    Mandy Pabon PA-C  [facility]    "

## 2019-09-11 DIAGNOSIS — N39.0 URINARY TRACT INFECTION WITHOUT HEMATURIA, SITE UNSPECIFIED: Primary | ICD-10-CM

## 2019-09-11 DIAGNOSIS — K92.1 MELENA: ICD-10-CM

## 2019-09-11 LAB
BACTERIA SPEC CULT: ABNORMAL
BACTERIA SPEC CULT: ABNORMAL
Lab: ABNORMAL
SPECIMEN SOURCE: ABNORMAL

## 2019-09-11 PROCEDURE — 82274 ASSAY TEST FOR BLOOD FECAL: CPT | Performed by: PHYSICIAN ASSISTANT

## 2019-09-11 RX ORDER — SULFAMETHOXAZOLE/TRIMETHOPRIM 800-160 MG
1 TABLET ORAL 2 TIMES DAILY
Qty: 28 TABLET | Refills: 0 | Status: SHIPPED | OUTPATIENT
Start: 2019-09-11 | End: 2019-01-01

## 2019-09-11 NOTE — RESULT ENCOUNTER NOTE
Martin    Your lab tests are complete and I have reviewed the results.     The urine shows a urinary tract infection - this is different than the scrotum cellulitis.   I would like to switch to a different antibiotic that should cover for BOTH.   Please stop taking the Augmentin and START taking the Bactrim when you get this message.     If you have any questions or concerns, please feel free to call or send a [employer] message.    Sincerely,  Georges Pabon PA-C

## 2019-09-11 NOTE — PROGRESS NOTES
Urine culture positive - we will change antibiotic to Bactrim to cover for UTI and prostatitis.   Contact patient in two weeks to discuss improvement.   Lab only appointment at that time to recheck UA    Georges Pabon PA-C

## 2019-09-13 ENCOUNTER — TELEPHONE (OUTPATIENT)
Dept: FAMILY MEDICINE | Facility: CLINIC | Age: 59
End: 2019-09-13

## 2019-09-13 NOTE — TELEPHONE ENCOUNTER
Mandy Pabon PA-C Whyte, Colleen E, CMA             Please call and make sure he got my message to schedule with [facility].

## 2019-09-17 ENCOUNTER — TRANSFERRED RECORDS (OUTPATIENT)
Dept: HEALTH INFORMATION MANAGEMENT | Facility: CLINIC | Age: 59
End: 2019-09-17

## 2019-09-18 DIAGNOSIS — F41.1 GENERALIZED ANXIETY DISORDER: ICD-10-CM

## 2019-09-18 RX ORDER — GABAPENTIN 300 MG/1
CAPSULE ORAL
Qty: 90 CAPSULE | Refills: 1 | OUTPATIENT
Start: 2019-09-18

## 2019-09-24 NOTE — ED PROVIDER NOTES
History     Chief Complaint:  Alcohol Intoxication    HPI:   The history is provided by the patient.      Aydin Reilly is a 59 year old male with history of alcohol abuse, opoid type drug abuse, and depression who presents with alcohol intoxication and depression. The patient states he has been drinking 1 liter of vodka per day for the past 10 days. He has had withdrawal in the past, but denies history of withdrawal seizures. The patient had been trying to get sober before this drinking binge, and he states he would like to get sober again. The patient states he feels very depressed. The patient's friend reports the patient was expressing passive suicidal thoughts but did not verbalize a plan. He states he had been seeing a psychiatrist but stopped going. He denies other drug use besides alcohol. He denies black/bloody vomit or stool.     Allergies:  No known drug allergies      Medications:    Amlodipine 5 mg  Antabuse 250 mg  Lasix 40 mg  Gabapentin 300 mg  Relafen 500 mg  Naltrexone 50 mg  Viagra PRN    Past Medical History:    Alcohol abuse  Drug abuse, opioid type  Hypertension   Malignant neoplasm of rectum, rectosigmoid junction, and anus   Chondritis   Gastric ulcer   GERD  Hyperlipidemia   Depression   Sleep apnea   Back pain     Past Surgical History:    Abdomen surgery  L4-L5 laminectomy, disk herniation  Squamous cell cancer - anus, 27 xrt/3 rounds, 5-FU/cisplatin  Cystscopy for urethral stricture from radiation therapy  Urethroplasty with buccal graft   Umbilical hernia repair   EGD  Laparotomy exploratory   Tonsillectomy     Family History:    Suicide- father     Social History:  PCP: Mandy Pabon   The patient is accompanied to the ED by a friend.   Marital Status:  Legally   Smoking status: Never  Alcohol use: Positive, consumed alcohol today.  Drug use: history of methamphetamines, marijuana. Last used 5/1/2019      Review of Systems   Constitutional:        Alcohol  intoxication    Gastrointestinal: Negative for abdominal pain, blood in stool, diarrhea and vomiting.   Psychiatric/Behavioral: Positive for suicidal ideas.   All other systems reviewed and are negative.    Physical Exam     Patient Vitals for the past 24 hrs:   BP Temp Temp src Pulse Resp SpO2   09/24/19 1430 (!) 167/89 -- -- 108 -- 95 %   09/24/19 1415 (!) 166/84 -- -- 100 -- 95 %   09/24/19 1400 (!) 174/83 -- -- 107 -- 95 %   09/24/19 1345 (!) 176/85 -- -- 112 -- 95 %   09/24/19 1330 (!) 154/84 -- -- 107 -- 97 %   09/24/19 1315 -- -- -- -- -- 97 %   09/24/19 1245 -- -- -- -- -- 97 %   09/24/19 1230 -- -- -- 102 -- 98 %   09/24/19 1215 -- -- -- -- -- 97 %   09/24/19 1200 (!) 156/88 -- -- 98 -- 97 %   09/24/19 1040 (!) 153/92 98.4  F (36.9  C) Temporal 109 20 98 %        Physical Exam  VS: Reviewed per above  HENT: Mucous membranes moist  EYES: sclera anicteric  CV: Rate as noted, regular rhythm.   RESP: Effort normal. Breath sounds are normal bilaterally.  GI: no tenderness/rebound/guarding, not distended.  NEURO: Alert, moving all extremities. NO tongue fasciculations or tremors.  MSK: No deformity of the extremities  SKIN: Warm and dry        Emergency Department Course     Laboratory:  CBC: HGB 12.2, o/w WNL (WBC 4.3, )   CMP: CO2 17, anion gap 15, calcium 8.2, o/w WNL (Creatinine 1.01)  Alcohol level blood: 0.12    Interventions:  1112: NS 1L IV Bolus  1209: acetaminophen 650 mg, PO   1347: Valium 10 mg, PO  1348: NS 1L IV Bolus      Emergency Department Course:  Past medical records, nursing notes, and vitals reviewed.  1105: I performed an exam of the patient and obtained history, as documented above.  IV started and blood drawn for laboratory testing. Results are as above.          1330: I rechecked the patient. Explained findings to the patient.    244pm: DEC assessed patient and no indication for psych hold/admission. Patient updated, he is amenable to detox.     Patient care signed out to my  colleague Dr. Encinas pending transfer to detox    Impression & Plan      Medical Decision Making:  Patient presents to the ER for evaluation of ongoing alcohol binge, depression.  Vital signs within normal limits.  No exam evidence of alcohol withdrawal or significant alcohol intoxication.  Blood alcohol of 0.12.  Patient has mild anion gap metabolic acidosis which is presumably secondary to underlying alcohol.  Although patient has depression he denies any active suicidal thoughts or ingestions.  DEC assessed patient and did not see indication for psychiatric hold, which I agree with.  Patient was amenable to detox to assist with alcohol cessation.  In the ER he did receive 2 doses of Valium for subjective symptoms, but there was no evidence of active alcohol withdrawal.  Upon signout to my colleague Dr. Encinas, patient was awaiting transfer to detox.    Diagnosis:    ICD-10-CM    1. Alcoholic intoxication without complication (H) F10.920    2. Depression, unspecified depression type F32.9        Disposition:  Transferred to detox    Discharge Medications:  New Prescriptions    OMEPRAZOLE (PRILOSEC) 20 MG DR CAPSULE    Take 1 capsule (20 mg) by mouth daily for 3 days     I, Megan Beh, am serving as a scribe at 11:05 AM on 9/24/2019 to document services personally performed by Lazaro Wilder MD based on my observations and the provider's statements to me.      9/24/2019   [facility] EMERGENCY DEPARTMENT       Lazaro Wilder MD  09/24/19 4122

## 2019-09-24 NOTE — ED TRIAGE NOTES
Patient was 114 days sober, then started drinking again on Demond 9/15 (vodka) drinks about 1 liter daily. Patient brought in by friends concerned about drinking and safety. Patient has been having suicidal thoughts without a plan. ABC's intact.

## 2019-09-24 NOTE — DISCHARGE INSTRUCTIONS

## 2019-09-24 NOTE — ED NOTES
Answered call light and was told by pt that he had bright red blood mixed with stool. Hx of ulcers in the past. MD notified.

## 2019-09-24 NOTE — ED AVS SNAPSHOT
[facility] Emergency Department  [address]  [facility] [address]  Phone:  [phone]  Fax:  [phone]                                    Aydin Reilly   MRN: [id number]    Department:  [facility] Emergency Department   Date of Visit:  9/24/2019           After Visit Summary Signature Page    I have received my discharge instructions, and my questions have been answered. I have discussed any challenges I see with this plan with the nurse or doctor.    ..........................................................................................................................................  Patient/Patient Representative Signature      ..........................................................................................................................................  Patient Representative Print Name and Relationship to Patient    ..................................................               ................................................  Date                                   Time    ..........................................................................................................................................  Reviewed by Signature/Title    ...................................................              ..............................................  Date                                               Time          22EPIC Rev 08/18

## 2019-09-25 NOTE — ED NOTES
Went to check if patient had a ride home, pt is not in room.  Pt did not let staff know what ride he found.

## 2019-09-25 NOTE — ED PROVIDER NOTES
Brief Provider Note     Took over care from Dr. Wilder. Patient is 60yo male with history of polysubstance abuse here with alcohol intoxication and depression. On my exam he does not display any evidence of clinical intoxication nor evidence of significant alcohol withdrawal. When I enter the room the patient is sleeping comfortably. I do not observe any significant tremor nor tongue fasciculations. He admits to me that he is concerned about some bright red blood that he saw in his stool today and that he recently had melena two weeks ago. I performed a SPENCER and thankfully the occult stool test was negative and there was no evidence of melena or BRBPR on my exam. HGB is stable today from previous. He is suppose to follow with a GI doctor for a colonoscopy which I reiterated the importance of. Unfortunately, the patient was also not able to go to detox due to his need for CPAP at night. I discussed options with the patient including admission but at this juncture we both agree that going home would be appropriate for the patient. We discussed reasons to return including signs and symptoms of alcohol withdrawal. The patient is amenable to this plan at this time. All questions were answered and the patient was discharged home in stable condition.     MD Ce Lock, Js Obando MD  09/25/19 5811

## 2019-09-26 NOTE — RESULT ENCOUNTER NOTE
Osman Salazar,    I just wanted to let you know that your lab results have been reviewed and are attached.    -FIT test (screening test for colon cancer) was normal.  Recheck due in 1 year.    Please let me know if you have any questions and have a great week!    Sincerely,    Nay Beck PA-C    [facility]  [address]  [place], [place] [address] [phone] (p)

## 2019-10-05 PROBLEM — R07.9 CHEST PAIN: Status: ACTIVE | Noted: 2019-01-01

## 2019-10-05 NOTE — ED PROVIDER NOTES
History     Chief Complaint:  Mental Health Evaluation     HPI   Aydin Reilly is a 59 year old male, with a history of alcohol abuse and depression, who presents to the ED for mental health evaluation. The patient reports he drinks approximately a liter of alcohol daily. He last had several shots of vodka at 9:00AM today. EMS was called today by his friend/sponsor out of concern of his drinking. He does want help regarding his drinking. He does smoke marijuana but has not over the past 2 weeks. He is currently residing with roommates. The patient notes he has chronic chest and abdominal pain, but nothing currently. He has taken a few Tums today. He has a history of recent bleeding ulcers but no blood in stool recently. The patient denies any fever, vomiting, or other symptoms/complaints. Of note, per nurse's report, the patient has been feeling depressed lately. He is going through a divorce. He is noted to be suicidal with a plan to buy a hose to hook it to his car exhaust over the past 2 days.   Drinks a L of vodka per day.    Allergies:  No known drug allergies    Medications:    Folvite  Lasix  Gabapentin  Multivitamin  Nabumetone  Viagra     Past Medical History:    Alcohol abuse  Anserine bursitis   HTN  Rectum/rectosigmoid junction/anus neoplasm   Gastric ulcer   Chondritis   GERD  SBO  Hypogonadism  Lumbar disc herniation  SCC  Depression   Sleep apnea   Urethral stricture   Chronic pain    Erectile dysfunction    Scrotum cellulitis  Substance abuse, opioid   GI bleed    Past Surgical History:    Abdomen surgery  Back surgery  Biopsy   Laminectomy, L4-L5  Umbilical herniorrhaphy   Urethrotomy   Cystotomy w/ suprapubic tube insertion x2  Urethroplasty w/ buccal graft   ENT surgery  Adhesions lysis   Myringotomy  T&A    Family History:    Suicide: father     Social History:  Smoking status: Never smoker    Substance use: Marijuana, none over past 2 weeks   Alcohol use: Yes, 1L daily, last drink (several  shots vodka) at 9:00AM today   Marital Status:  Legally  [3]     Review of Systems   Constitutional: Negative for fever.   Cardiovascular: Positive for chest pain.   Gastrointestinal: Positive for abdominal pain. Negative for blood in stool and vomiting.   Psychiatric/Behavioral: Positive for dysphoric mood and suicidal ideas.   All other systems reviewed and are negative.  Presents with complaints of alcohol abuse, reported gilda\cidal ideation.  Last drink this AM states was going to buy a hose and hook it to car exhaust.    Physical Exam     Patient Vitals for the past 24 hrs:   BP Temp Temp src Pulse Heart Rate Resp SpO2 Weight   10/05/19 1939 (!) 172/91 -- -- -- 108 -- 92 % --   10/05/19 1840 -- -- -- -- -- -- 94 % --   10/05/19 1814 -- -- -- -- -- -- 97 % --   10/05/19 1743 (!) 174/86 -- -- 108 -- -- 97 % --   10/05/19 1345 -- -- -- -- -- -- 94 % --   10/05/19 1300 134/88 -- -- 99 99 -- -- --   10/05/19 1236 132/83 98.5  F (36.9  C) Oral 103 -- 20 99 % 102.1 kg (225 lb)     Physical Exam  GEN: patient disheveled smells of alcohol.  HEAD: atraumatic, normocephalic  EYES: pupils reactive (3plus to 2plus), extraocular muscles with horizontal nystagmus, conjunctivae normal  ENT: TMs flat and white bilaterally, oropharynx normal with no erythema or exudate, mucus membranes dry, poor dentition  NECK: no cervical LAD, no stridor  RESPIRATORY: no tachypnea, breath sounds clear to auscultation (no rales, wheezes, rhonchi), chest wall nontender, normal phonation  CVS: normal S1/S2, no murmurs/rubs/gallops, borderline tachycardia  ABDOMEN: soft, nontender, no masses or organomegaly, no rebound, decreased bowel sounds, no peritoneal signs  BACK: no costovertebral angle tenderness, no spinal tenderness  EXTREMITIES: intact pulses x 4, full range of motion at joints, no edema  MUSCULOSKELETAL: no deformities  SKIN: warm and dry, no acute rashes or ulceration, no erythema or fluctuance  NEURO: GCS 15, cranial nerves  "intact.  Motor- moves all 4 extremities  5/5.  No asterixs.  DF and PF 5/5.  Sensation- intact .  Reflexes- DTRs 2plus.  Coordination- normal.  Overall symmetrical exam  HEME: no bruising or petechiae/contusions  LYMPH: no lymphadenopathy    Emergency Department Course     ECG (13:03:15):  Rate 100 bpm. WY interval 120. QRS duration 86. QT/QTc 344/443. P-R-T axes 71 -39 22. Normal sinus rhythm. Left axis deviation. Inferior infarct, age undetermined. Anterior infarct, age undetermined. Abnormal ECG. Interpreted at 1303 by Kina Brokos MD.    Laboratory:  Laboratory findings were communicated with the patient who voiced understanding of the findings.    Alcohol ethyl #1 (1310): 0.29(H)  Alcohol ethyl #2 (1520): 0.22(H)    Lactic acid #1 (1329): 4.3(HH)  Lactic acid #2 (1533): 4.1(HH)  Lactic acid #3 : pending    CBC: HGB 12.6(L), o/w WNL (WBC 4.7, )  CMP: o/w WNL (Creatinine 0.95)     UDS: Negative     Interventions:  1348: NS 1L, Infuvite 10mL, Thiamine 100mg, Folic acid 1mg, Infusion IV  1348: Ativan 1mg IV  1502: Valium 2.5mg IV  1604: Lactated ringers 1L Bolus IV + LR 125cc/hr  Ativan 1mg IV  Heplock  Cardiac/Sp02 monitoring    Emergency Department Course:  Patient arrived by EMS.     Past medical records, nursing notes, and vitals reviewed.  1229: I performed an exam of the patient and obtained history, as documented above.    1234: Patient placed on a DEEPA.    1303: EKG obtained.     1310: IV inserted and blood drawn..     BP (!) 172/91   Pulse 108   Temp 98.5  F (36.9  C) (Oral)   Resp 20   Wt 102.1 kg (225 lb)   SpO2 92%   BMI 35.77 kg/m      1617: Recheck.    1630: Patient provided a urine sample.     ROSARIO Cooper knows of the patient    1619: Patient signed out to my ED physician partner, Dr. Encinas      BP (!) 144/73 (BP Location: Right arm)   Pulse 98   Temp 96.6  F (35.9  C) (Oral)   Resp 18   Ht 1.753 m (5' 9\")   Wt 107.2 kg (236 lb 4.8 oz)   SpO2 96%   BMI 34.90 kg/m  "   Patient has a sober friend in the room    Impression & Plan      CMS Diagnoses: The Lactic acid level is elevated due to dehydration, at this time there is no sign of severe sepsis or septic shock.    Medical Decision Making:  Martin is a 59 year old gentleman who lives in a house and does admit to drinking everyday. He made suicidal statements that he would take a hose for carbon monoxide poisoning. He also talked with his sponsor who encourage him to call EMS to get treatment for his alcohol. Upon initial evaluation, he is intoxicated and IV was placed and labs were sent. He was given a banana bag with thiamine and magnesium folvite. Mild tachycardia is noted.  His CBC and chemistry panel are normal. Lactic acid is 14.3, but that's likely because of dehydration and starvation/alcoholic ketoacidosis. EKG did not show any obvious STEMI, and his alcohol was 0.29. Patient is given Ativan for sedation. He was initially seen in room 20 and has since been transferred to .     He is on a DEEPA. The plan is DEC when he is more sober. He will be signed out to the oncoming physician.   Second lactic acid is elevated, so an additional L of LR and maintenance fluids are ordered.  Second lactic acid is only slightly clearing.  I do not suspect infection at this point.  DEC knows of the patient and will also visit with him.  Upon recheck at 4pm, patient is sitting with a sober friend in the room, HR 99, with no complaints.  DEEPA has been placed.     Diagnosis:    ICD-10-CM   1. Alcoholic intoxication with complication (H) F10.929   2. Suicidal ideation R45.851   3. Lactic acidosis E87.2   4. Chest pain, unspecified type R07.9     Disposition: Patient signed out to my ED physician partner, Dr. Ce Ramirez  10/5/2019   [facility] EMERGENCY DEPARTMENT    Scribe Disclosure:  I, Josefina Ramirez, am serving as a scribe at 12:29 PM on 10/5/2019 to document services personally performed by Kina Brooks  MD Mei based on my observations and the provider's statements to me.        Kina Brooks MD  10/06/19 0803

## 2019-10-05 NOTE — ED NOTES
Bed: ED20  Expected date: 10/5/19  Expected time:   Means of arrival: Ambulance  Comments:  [id number] 60yo mental health

## 2019-10-05 NOTE — ED NOTES
Patient given quick meal tray. C/o feeling anxious . CIWA 0. This writer kindly explained to patient concerns will be brought to MD attention.

## 2019-10-05 NOTE — ED PROVIDER NOTES
Brief Provider Note:     Patient was signed out from my colleague Dr. Brooks.    Patient is a 59-year-old male with past medical history of alcohol abuse, depression, hyperlipidemia and hypertension who presents to the emergency department today acutely intoxicated with concerns for possible suicidal ideation.  Patient does admit to having suicidal ideations yesterday and does explain that he is had a plan of using a garden hose hooked up to his car to kill himself via carbon monoxide poisoning.  However he was clinically intoxicated initially so did not have a full psychiatric evaluation initially.  However he was found to have a lactic acidosis that was persistent despite 3 L of IV fluid.  With that being said I do not feel that this patient is septic as he does not have a clear infectious focus and overall is nontoxic-appearing.  This likely represents a alcohol/starvation ketosis.  During his stay in the emergency department he began complaining of some substernal chest pain.  EKG was obtained which did not show any acute signs of ischemia.  We did obtain a troponin but this will need to be trended.  Patient also was having some persistent tachycardia and some signs of early withdrawal as it was given a few doses of Ativan during his stay in the emergency department.  Given the patient's persistent lactic acidosis, chest pain and now early signs of withdrawal I feel the patient meets criteria for admission to the hospital for further evaluation and treatment.  Once he is medically cleared the patient can be evaluated by psychiatry.  I discussed plan with the patient and he is amenable to this plan at this time.  All questions were answered and patient will be admitted in stable condition.      MD Ce Lock Christopher Joseph, MD  10/05/19 2042

## 2019-10-05 NOTE — ED TRIAGE NOTES
Presents with complaints of alcohol abuse, reported glida\cidal ideation.  Last drink this AM states was oing to buy a hose and hook it to car exhaust

## 2019-10-06 NOTE — CONSULTS
"10/6/19 chem dep consult acknowledged.  Emailed unit , Layla Taylor, [facility] application as patient needs Rule 25 funding for chem dep services.  I reviewed Dr. Mitchell recommendation and he is recommending \"dual diagnosis chemical dependency mental health inpatient treatment program\" and he noted he was in touch with [facility] to initiate placing patient on wait list.  [facility] is not dual disorder and it is unclear exactly which program psych is recommending.  I left  a voicemail to discuss this, and confirmed to proceed with getting paperwork to patient and I can complete Rule 25 evaluation and recommend MICD residential program, but it would be outside of [facility] system.  Chem dep will meet with patient tomorrow to complete evaluation.  "
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
## 2019-10-06 NOTE — PHARMACY-ADMISSION MEDICATION HISTORY
Admission medication history interview status for this patient is complete. See [facility] admission navigator for allergy information, prior to admission medications and immunization status.     Medication history interview source(s):Patient  Medication history resources (including written lists, pill bottles, clinic record):None  Primary pharmacy: [facility] in [place], [place] (off [address])    Changes made to PTA medication list:  Added: none  Deleted: none  Changed: none    Actions taken by pharmacist (provider contacted, etc):None     Additional medication history information:None    Medication reconciliation/reorder completed by provider prior to medication history? Yes     Do you take OTC medications (eg tylenol, ibuprofen, fish oil, eye/ear drops, etc)? No       Prior to Admission medications    Medication Sig Last Dose Taking? Auth Provider   acetaminophen (TYLENOL) 325 MG tablet Take 2 tablets (650 mg) by mouth every 8 hours as needed for mild pain 10/4/2019 Yes Juan Antonio Medina DO   gabapentin (NEURONTIN) 300 MG capsule Take 2 capsules (600 mg) by mouth 3 times daily 10/2/2019 Yes Mandy Pabon PA-C   multivitamin, therapeutic with minerals (MULTI-VITAMIN) TABS Take 1 tablet by mouth daily Past Week Yes Reported, Patient   amLODIPine (NORVASC) 5 MG tablet TAKE 1 TABLET BY MOUTH EVERY DAY  Patient not taking: Reported on 9/9/2019 Unknown  Mandy Pabon PA-C   disulfiram (ANTABUSE) 250 MG tablet Take 1 tablet (250 mg) by mouth daily  Patient not taking: Reported on 9/9/2019 Unknown  Mejia Huynh MD   Elastic Bandages & Supports (JOBST ACTIVE 20-30MMHG MEDIUM) MISC 1 Device daily as needed Knee high   Adolfo Simmons MD   folic acid (FOLVITE) 1 MG tablet Take 1 tablet (1 mg) by mouth daily Unknown  Juan Antonio Medina DO   nabumetone (RELAFEN) 500 MG tablet Take 1 tablet (500 mg) by mouth 2 times daily Unknown  Mandy Pabon PA-C   naltrexone (DEPADE/REVIA)  50 MG tablet Take 1 tablet (50 mg) by mouth daily  Patient not taking: Reported on 9/9/2019 Unknown  Mejia Huynh MD   order for DME Equipment being ordered: Splint - left wrist splint   Mandy Pabon PA-C   order for DME Equipment being ordered: grabber/reach extender   Adolfo Simmons MD   sildenafil (VIAGRA) 100 MG tablet 30 min - 4 hr before intercourse take 0.5-1 tablets PO PRN ED Never use with nitroglycerin, terazosin or doxazosin. Unknown  Mandy Pabon PA-C
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
## 2019-10-06 NOTE — PROGRESS NOTES
[facility] Rule 25 (Chemical Health) application packet given to pt for him to review and fill out.

## 2019-10-06 NOTE — ED NOTES
[facility]  ED Nurse Handoff Report    Aydin Reilly is a 59 year old male   ED Chief complaint: Alcohol Problem  . ED Diagnosis:   Final diagnoses:   Alcoholic intoxication with complication (H)   Suicidal ideation   Lactic acidosis   Chest pain, unspecified type     Allergies:   Allergies   Allergen Reactions     No Known Allergies      Seasonal Allergies        Code Status: Full Code  Activity level - Baseline/Home:  Independent. Activity Level - Current:   Assist X 1. Lift room needed: No. Bariatric: No   Needed: No   Isolation: No. Infection: Not Applicable.     Vital Signs:   Vitals:    10/05/19 1743 10/05/19 1814 10/05/19 1840 10/05/19 1939   BP: (!) 174/86   (!) 172/91   Pulse: 108      Resp:       Temp:       TempSrc:       SpO2: 97% 97% 94% 92%   Weight:           Cardiac Rhythm:  ,   Cardiac  Cardiac Rhythm: Normal sinus rhythm  Pain level: 0-10 Pain Scale: 7  Patient confused: No. Patient Falls Risk: Yes.   Elimination Status: Has voided   Patient Report - Initial Complaint: Presents with complaints of alcohol abuse, reported gilda\cidal ideation.  Last drink this AM states was oing to buy a hose and hook it to car exhaust  Focused Assessment: Cardiac - Cardiac WDL: -WDL except; chest pain   Review of Systems (Cardiac) - Cardiovascular Symptoms/Conditions: chest pain   Chest Pain Assessment - Rating (0-10): 8  Chest Pain Location: midsternal  Duration: many days  Precipitating Factors: at rest   Cardiac Monitoring - EKG Monitoring: Yes  Cardiac Regularity: Regular  Cardiac Rhythm: NSR   Gastrointestinal - GI WDL: -WDL except; GI symptoms  GI Signs/Symptoms: abdominal pain  Gastrointestinal Comment: left upper quadrant abdominal pain, hx of GI bleed and alcohol abuse   General Appearance - ADL Assessment (WDL): WDL except (no additional)   Psychiatric Symptoms / Stressors - Mood/Behavior: sad; anxious  Thoughts/Cognition (WDL): WDL except   Suicide/Homicide Risk - Suicidality  "(WDL): WDL except  Suicidal Ideation: Constant with plan   Chemical Abuse/Addictions - Alcohol: Daily  Marijuana: Daily (has not had in 2 weeks)  Security Measures - Security Called:: 1300  Patient Hold Status:: Patient Rights Read / Copy Given; Patient \"Watch\"  Patient Searched?: Yes  Safety Screen: Yes  Personal Belongings Secured?: Yes  Patient Restraints:: Behavioral   Tests Performed: 12 lead EKG, Labs, UA    Abnormal Results:   Abnormal Labs Reviewed   ALCOHOL ETHYL - Abnormal; Notable for the following components:       Result Value    Ethanol g/dL 0.29 (*)     All other components within normal limits   CBC WITH PLATELETS DIFFERENTIAL - Abnormal; Notable for the following components:    RBC Count 4.35 (*)     Hemoglobin 12.6 (*)     Hematocrit 39.4 (*)     All other components within normal limits   LACTIC ACID WHOLE BLOOD - Abnormal; Notable for the following components:    Lactic Acid 4.3 (*)     All other components within normal limits   LACTIC ACID WHOLE BLOOD - Abnormal; Notable for the following components:    Lactic Acid 4.1 (*)     All other components within normal limits   ALCOHOL ETHYL - Abnormal; Notable for the following components:    Ethanol g/dL 0.22 (*)     All other components within normal limits   LACTIC ACID WHOLE BLOOD - Abnormal; Notable for the following components:    Lactic Acid 3.7 (*)     All other components within normal limits       Treatments provided: x2 L LR bolus, Banana Bag, Valium and Ativan  Family Comments: N/A  OBS brochure/video discussed/provided to patient:  N/A  ED Medications:   Medications   lactated ringers infusion (has no administration in time range)   lactated ringers BOLUS 1,000 mL (1,000 mLs Intravenous New Bag 10/5/19 8595)   sodium chloride 0.9 % 1,000 mL with INFUVITE ADULT 10 mL, thiamine 100 mg, folic acid 1 mg infusion ( Intravenous Stopped 10/5/19 1603)   LORazepam (ATIVAN) injection 1 mg (1 mg Intravenous Given 10/5/19 1348)   diazepam (VALIUM) " injection 2.5 mg (2.5 mg Intravenous Given 10/5/19 1502)   lactated ringers BOLUS 1,000 mL (0 mLs Intravenous Stopped 10/5/19 1814)   LORazepam (ATIVAN) injection 1 mg (1 mg Intravenous Given 10/5/19 1814)   LORazepam (ATIVAN) tablet 1 mg (1 mg Oral Given 10/5/19 1938)     Drips infusing:  No  For the majority of the shift, the patient's behavior Green. Interventions performed were N/A.     Severe Sepsis OR Septic Shock Diagnosis Present: No      ED Nurse Name/Phone Number: Tamika Jacob RN,   7:42 PM    RECEIVING UNIT ED HANDOFF REVIEW    Above ED Nurse Handoff Report was reviewed: Yes  Reviewed by: Mervat Hernandez RN on October 5, 2019 at 10:30pm

## 2019-10-06 NOTE — PLAN OF CARE
"Vitals: BP (!) 149/93 (BP Location: Right arm)   Pulse 92   Temp 97.5  F (36.4  C) (Oral)   Resp 22   Ht 1.753 m (5' 9\")   Wt 107.2 kg (236 lb 4.8 oz)   SpO2 97%   BMI 34.90 kg/m    Situation/Status: ETOH/Suicidal  Risk. Pt started scoring higher on CIWA, needing 2 mg IV Ativan q 30-60 min scoring 14-27. Gave pt activities to distract from stressors. Paged  to visit. Hospital and pt [facility]  visited, psych assessed-took of suicide precautions.  Neuro: A/O x 4  Pain: Rated headache pain at 7/10, gave prn Tylenol. CIWA's scoring 14-27, gave po and IV Ativan. Gave Zofran for nausea.  Activity: Assist of 1 w/ gait belt   Diet: Reg diet  Tele/Cardiac: NSR  Lungs: [id number]  GI/: Slight nausea/vomiting, + Flautus, BS x4. X1 Large incontinent/continent soft/loose stool.   Skin: Scabs, bruising.  Lines/drains: IV-SL  Labs: Results for BRENDA THOMPSON (MRN [id number]) as of 10/6/2019 14:43   Ref. Range 10/5/2019 18:43 10/5/2019 22:48 10/6/2019 06:16   Lactic Acid Latest Ref Range: 0.7 - 2.0 mmol/L 3.7 (H) 1.9 0.6 (L)     Plan: Psych recommends: Dual diagnosis chemical dependency mental health inpatient treatment program\" and he noted he was in touch with [facility] to initiate placing patient on wait list. SW consult-SW said they would visit w/ pt this afternoon. Continue to monitor per POC.    "

## 2019-10-06 NOTE — PROGRESS NOTES
"End of Shift Summary.  For vital signs and complete assessments, please see documentation flowsheets.     Pertinent assessments and major Shift Events: Patient admitted to floor from ED after presenting with suicidal thoughts and concern for etoh use. VSS. Total 5 mg PO Ativan given for CIWA scores ranging from 8 to 13. Effective. Patient has remained calm and oriented overnight, anxious and intermittently tearful. Denies any thoughts of suicide, harming himself or others. Poor appetite, NS maint infusing at 100ml/hr. No reports of chest pain or discomfort overnight. Lungs clear & equal bilaterally, sats upper 90's on room air. Attendant remains at bedside for safety.   Plan (Upcoming Events): Continue to monitor for etoh withdrawal and thoughts of suicide. Patient reports he drinks about one liter of vodka a day and his last drink was 9am yesterday. Patient currently reports he feels safe in his living situation with roomates.   Discharge/Transfer Needs: Psych, chem dep and SW consults. Patient has repeatedly verbalized to nurse that he \"needs more help with my mental health\".           "

## 2019-10-06 NOTE — H&P
[facility]  Hospitalist H&P    Name: Aydin Reilly      MRN: [id number]  YOB: 1960    Age: 59 year old  Date of admission: 10/5/2019  Primary care provider: Mandy Pabon            Assessment and Plan:   Aydin Reilly is a 59 year old male with a history of hypertension and alcohol dependence who presents with chest pain.  Patient has a long-standing history of alcohol dependence.  Previously on treatment.  Attends  and has a sponsor.  He has been going through some difficulties recently with a divorce, losing his job, and other social and economic difficulties.  He had 100 days of sobriety but relapsed about 1 week ago.  He is been drinking about 1 L of hard alcohol daily.  He drank all last night with his last drink being 9 AM this morning.  He developed severe depression and thought about suicide so he went to [employer] to get a hose and was going to kill himself with that but evidently did not make the purchase and return back home.  He called his sponsor who called 911 and a welfare check was underway.  Police arrived and called paramedics who brought him to the emergency department as he was complaining of chest pain.  He had a blood alcohol level of about 0.3 on arrival.  He also had a lactic acidosis.  He is hemodynamically stable with no fever nor signs of sepsis.  Lactic acid has trended down with 3 L of IV fluids.  He initially made some further suicidal statements to the ED staff.  Now he is clinically sober and does not endorse any active suicidal thoughts nor intention.  He is showing some mild evidence of alcohol withdrawal.  He continues to complain of constant and dull reproducible substernal chest pain.  He has no other complaints at this time.    1. Chest pain: Suspect musculoskeletal or GI in origin.  Continue to monitor on Telemetry and check serial troponin levels.  Outpatient stress testing could be considered but I would not do this while  inpatient unless more convincing evidence of ACS presents itself.  Will order an oral PPI.  2. Suicidal ideation and gesture: Does not appear actively suicidal.  Was placed on hold in the ED.  This will be continued.  Bedside attendant will be ordered as well as suicidal precautions.  Psych consultation for the morning to determine ultimate disposition.  3. Alcohol dependence: Long-standing history of this.  He is involved with  and has been in treatment.  CD consult will be requested.  Started the CIWA protocol and will monitor for evidence of withdrawal.  Vitamin and electrolyte replacement ordered.  4. Lactic acidosis: Does not appear septic.  Likely reflection of volume depletion and decreased liver clearance in the context of heavy alcohol use.  Continue normal saline overnight and recheck in the morning.    Code status: Full.  Admit to observation status.  Prophylaxis: None.  Disposition: Likely home tomorrow.            Chief Complaint:   Chest pain.         History of Present Illness:   Aydin Reilly is a 59 year old male who presents with chest pain.  History was obtained from my discussion with the patient at the bedside.  I also discussed the case with the ED provider.  The electronic medical record was also reviewed.    59 year old male with a history of hypertension and alcohol dependence who presents with chest pain.  Patient has a long-standing history of alcohol dependence.  Previously on treatment.  Attends  and has a sponsor.  He has been going through some difficulties recently with a divorce, losing his job, and other social and economic difficulties.  He had 100 days of sobriety but relapsed about 1 week ago.  He is been drinking about 1 L of hard alcohol daily.  He drank all last night with his last drink being 9 AM this morning.  He developed severe depression and thought about suicide so he went to [employer] to get a hose and was going to kill himself with that but evidently did not  make the purchase and return back home.  He called his sponsor who called 911 and a welfare check was underway.  Police arrived and called paramedics who brought him to the emergency department as he was complaining of chest pain.  He had a blood alcohol level of about 0.3 on arrival.  He also had a lactic acidosis.  He is hemodynamically stable with no fever nor signs of sepsis.  Lactic acid has trended down with 3 L of IV fluids.  He initially made some further suicidal statements to the ED staff.  Now he is clinically sober and does not endorse any active suicidal thoughts nor intention.  He is showing some mild evidence of alcohol withdrawal.  He continues to complain of constant and dull reproducible substernal chest pain.  He has no other complaints at this time.            Past Medical History:     Past Medical History:   Diagnosis Date     Alcohol abuse     stopped in 2008     Anserine bursitis 4/19/2016     Benign essential hypertension      Cancer (H) 1999     Chondritis 4/19/2016     Elevated blood pressure reading without diagnosis of hypertension 4/19/2016     Gastric ulcer, unspecified as acute or chronic, without mention of hemorrhage, perforation, or obstruction ~1997    treated non-surgically     Gastro-oesophageal reflux disease      H/O malignant neoplasm of rectum, rectosigmoid junction, and anus      Hyperlipidemia LDL goal < 130 4/6/2010     Hypogonadism 5/24/2010     Lumbar disc herniation with radiculopathy     L4, recurrent episodic pain     Moderate episode of recurrent major depressive disorder (H) 10/31/2017     Rotator cuff injury, right, initial encounter 4/19/2016     Sleep apnea      Urethral stricture unspecified 2002    Following radiation; see PSH             Past Surgical History:     Past Surgical History:   Procedure Laterality Date     ABDOMEN SURGERY       BACK SURGERY       BIOPSY       BIOPSY       C NONSPECIFIC PROCEDURE  1991    L4-L5 laminectomy; disk herniation     C  NONSPECIFIC PROCEDURE  1999    squamous cell CA - anus, 27 xrt/3 rounds, 5-FU/cisplatin     C NONSPECIFIC PROCEDURE      umbilical hernia     C NONSPECIFIC PROCEDURE  2002    cystscopy for urethral stricture from radiation therapy     COLONOSCOPY       COLONOSCOPY  4/18/2014    Procedure: Colonoscopy   polyp bx;  Surgeon: Josef Mckeon MD;  Location:  GI     CYSTOSCOPY, CYSTOGRAM, COMBINED N/A 2/26/2015    Procedure: COMBINED CYSTOSCOPY, CYSTOGRAM;  Surgeon: Darell Barrera MD;  Location: [facility] OR     CYSTOSCOPY, INTERNAL URETHROTOMY, COMBINED N/A 12/19/2014    Procedure: COMBINED CYSTOSCOPY, INTERNAL URETHROTOMY;  Surgeon: Buzz Ren MD;  Location:  OR     CYSTOSTOMY, INSERT TUBE SUPRAPUBIC, COMBINED  12/19/2014    Procedure: COMBINED CYSTOSTOMY, INSERT TUBE SUPRAPUBIC;  Surgeon: Buzz Ren MD;  Location:  OR     CYSTOSTOMY, INSERT TUBE SUPRAPUBIC, COMBINED N/A 12/29/2014    Procedure: COMBINED CYSTOSTOMY, INSERT TUBE SUPRAPUBIC;  Surgeon: Buzz Ren MD;  Location:  OR     ENT SURGERY       ESOPHAGOSCOPY, GASTROSCOPY, DUODENOSCOPY (EGD), COMBINED  10/2/2012    Procedure: COMBINED ESOPHAGOSCOPY, GASTROSCOPY, DUODENOSCOPY (EGD);  COMBINED ESOPHAGOSCOPY, GASTROSCOPY, DUODENOSCOPY (EGD) ;  Surgeon: Raj Beltre MD;  Location:  GI     HERNIA REPAIR       LAPAROSCOPIC LYSIS ADHESIONS N/A 12/4/2015    Procedure: LAPAROSCOPIC LYSIS ADHESIONS;  Surgeon: Herbie Sanchez MD;  Location:  OR     LAPAROTOMY EXPLORATORY N/A 12/4/2015    Procedure: LAPAROTOMY EXPLORATORY;  Surgeon: Herbie Sanchez MD;  Location:  OR     MYRINGOTOMY      in childhood     TONSILLECTOMY and adenoidectomy      in childhood     URETHROPLASTY WITH BUCCAL GRAFT N/A 3/27/2015    Procedure: URETHROPLASTY WITH BUCCAL GRAFT;  Surgeon: Darell Barrera MD;  Location:  OR             Social History:     Social History     Tobacco Use     Smoking status: Never Smoker      Smokeless tobacco: Never Used   Substance Use Topics     Alcohol use: Not Currently     Alcohol/week: 0.0 standard drinks     Comment:  quit last consumed 5/30/2019 pt going to AA             Family History:   The family history was fully reviewed and non-contributory in this case.         Allergies:     Allergies   Allergen Reactions     No Known Allergies      Seasonal Allergies              Medications:     Prior to Admission medications    Medication Sig Last Dose Taking? Auth Provider   acetaminophen (TYLENOL) 325 MG tablet Take 2 tablets (650 mg) by mouth every 8 hours as needed for mild pain   Juan Antonio Medina DO   amLODIPine (NORVASC) 5 MG tablet TAKE 1 TABLET BY MOUTH EVERY DAY  Patient not taking: Reported on 9/9/2019   Mandy Pabon PA-C   disulfiram (ANTABUSE) 250 MG tablet Take 1 tablet (250 mg) by mouth daily  Patient not taking: Reported on 9/9/2019   Mejia Huynh MD   Elastic Bandages & Supports (JOBST ACTIVE 20-30MMHG MEDIUM) MISC 1 Device daily as needed Knee high   Adolfo Simmons MD   folic acid (FOLVITE) 1 MG tablet Take 1 tablet (1 mg) by mouth daily   Juan Antonio Meidna DO   furosemide (LASIX) 40 MG tablet TAKE 1 TABLET (40 MG) BY MOUTH DAILY AS NEEDED FOR EDEMA   Adolfo Simmons MD   gabapentin (NEURONTIN) 300 MG capsule Take 2 capsules (600 mg) by mouth 3 times daily   Mandy Pabon PA-C   multivitamin, therapeutic with minerals (MULTI-VITAMIN) TABS Take 1 tablet by mouth daily   Reported, Patient   nabumetone (RELAFEN) 500 MG tablet Take 1 tablet (500 mg) by mouth 2 times daily   Mandy Pabon PA-C   naltrexone (DEPADE/REVIA) 50 MG tablet Take 1 tablet (50 mg) by mouth daily  Patient not taking: Reported on 9/9/2019   Mejia Huynh MD   order for DME Equipment being ordered: Splint - left wrist splint   Mandy Pabon PA-C   order for DME Equipment being ordered: grabber/reach extender   Adolfo Simmons  MD Ronn   sildenafil (VIAGRA) 100 MG tablet 30 min - 4 hr before intercourse take 0.5-1 tablets PO PRN ED Never use with nitroglycerin, terazosin or doxazosin.   Mandy Pabon PA-C             Review of Systems:   A Comprehensive greater than 10 system review of systems was carried out.  Pertinent positives and negatives are noted above.  Otherwise negative for contributory information.           Physical Exam:   Blood pressure (!) 172/91, pulse 108, temperature 98.5  F (36.9  C), temperature source Oral, resp. rate 20, weight 102.1 kg (225 lb), SpO2 92 %.  Wt Readings from Last 1 Encounters:   10/05/19 102.1 kg (225 lb)     Exam:  GENERAL: No apparent distress. Awake, alert, and fully oriented.  HEENT: Normocephalic, atraumatic. Extraocular movements intact.  CARDIOVASCULAR: Regular rate and rhythm without murmurs or rubs. No S3.  PULMONARY: Clear to auscultation bilaterally.  ABDOMINAL: Soft, non-tender, non-distended. Bowel sounds normoactive.   EXTREMITIES: No cyanosis or clubbing. No appreciable edema.  NEUROLOGICAL: CN 2-12 grossly intact, no focal neurological deficits.  DERMATOLOGICAL: No rash, ulcer, bruising, nor jaundice.          Data:   EKG:  Personally reviewed.   Rate 100 bpm. VT interval 120. QRS duration 86. QT/QTc 344/443. P-R-T axes 71 -39 22. Normal sinus rhythm. Left axis deviation. Inferior infarct, age undetermined. Anterior infarct, age undetermined. Abnormal ECG.     Laboratory:  Recent Labs   Lab 10/05/19  1310   WBC 4.7   HGB 12.6*   HCT 39.4*   MCV 91        Recent Labs   Lab 10/05/19  1310      POTASSIUM 3.7   CHLORIDE 104   CO2 23   ANIONGAP 12   GLC 78   BUN 18   CR 0.95   GFRESTIMATED 88   GFRESTBLACK >90   ELSI 9.4     Recent Labs   Lab 10/05/19  1843 10/05/19  1520 10/05/19  1310   LACT 3.7* 4.1* 4.3*     Recent Labs   Lab 10/05/19  1843   TROPI <0.015     No results for input(s): CULT in the last 168 hours.    Imaging:  No results found for this or any  previous visit (from the past 24 hour(s)).    Buzz Dodson DO MPH  Pending sale to [facility] Hospitalist  [address] [place] [address]  Pager: [phone]  10/05/2019

## 2019-10-07 NOTE — PLAN OF CARE
"PRIMARY DIAGNOSIS: \"GENERIC\" NURSING  OUTPATIENT/OBSERVATION GOALS TO BE MET BEFORE DISCHARGE:  1. ADLs back to baseline: No SBA    2. Activity and level of assistance: Up with standby assistance.    3. Pain status: Pain free.    4. Return to near baseline physical activity: No     Discharge Planner Nurse   Safe discharge environment identified: No  Barriers to discharge: Yes       Entered by: Philomena Hernandez 10/06/2019 10:12 PM   CIWAS 8,7 pt given 1mg of Ativan  Tele S-Tach and has been sleeping  Please review provider order for any additional goals.   Nurse to notify provider when observation goals have been met and patient is ready for discharge.  "

## 2019-10-07 NOTE — PLAN OF CARE
"PRIMARY DIAGNOSIS: \"GENERIC\" NURSING  OUTPATIENT/OBSERVATION GOALS TO BE MET BEFORE DISCHARGE:  1. ADLs back to baseline: No SBA    2. Activity and level of assistance: Up with standby assistance.    3. Pain status: Pain free.    4. Return to near baseline physical activity: No     Discharge Planner Nurse   Safe discharge environment identified: No  Barriers to discharge: Yes       Entered by: Philomena Hernandez 10/07/2019 6:56 AM     Please review provider order for any additional goals.   Nurse to notify provider when observation goals have been met and patient is ready for discharge.  "

## 2019-10-07 NOTE — PLAN OF CARE
PRIMARY DIAGNOSIS: GENERALIZED WEAKNESS/Etoh Withdrawal    OUTPATIENT/OBSERVATION GOALS TO BE MET BEFORE DISCHARGE  1. Orthostatic performed: N/A    2. Tolerating PO medications: Yes    3. Return to near baseline physical activity: Yes    4. Cleared for discharge by consultants (if involved): No    Discharge Planner Nurse   Safe discharge environment identified: Yes  Barriers to discharge: Yes       Entered by: Fabrice Rashid 10/07/2019 2:37 PM     Please review provider order for any additional goals.   Nurse to notify provider when observation goals have been met and patient is ready for discharge.    RN - Hypertension - otherwise vss. Tele SR/ST. Pt c/o 1xs episode of chest pain - resolved with PRN ativan and tylenol. Pt up ad earl in room - endorses minor dizziness - appears steady on feet. Zoloft started this morning. Affect remains flat - but appearing in better mood later in the day. Receiving multiple visits from CD and psych - current plan to pursue inpatient treatment of alcoholism pending rule 25 application.

## 2019-10-07 NOTE — PLAN OF CARE
PRIMARY DIAGNOSIS: Chest Pain - Etoh withdrawal  OUTPATIENT/OBSERVATION GOALS TO BE MET BEFORE DISCHARGE:  ADLs back to baseline: No - Pt weak on feet    Activity and level of assistance: Up with standby assistance. - Unsteady    Pain status: Pain free.    Return to near baseline physical activity: No     Discharge Planner Nurse   Safe discharge environment identified: No  Barriers to discharge: Yes       Entered by: Fabrice Rashid 10/07/2019 10:03 AM     Please review provider order for any additional goals.   Nurse to notify provider when observation goals have been met and patient is ready for discharge.    RN - VSS. CIWA protocol discontinued. Pt endorsing ongoing anxiety and depression - but appearing calm. Making comments of uselessness and feeling hopeless. Pt receiving hygiene care/assistance. Ambulated hallway x1 with walker. Denies suicidal thoughts. Awaiting disposition plan - hopefully inpatient chem dep treatment.

## 2019-10-07 NOTE — PROVIDER NOTIFICATION
"RN - Notified provider of pt concerning chest pain - \"Pt c/o throbbing chest pain radiating to left arm. rates 8/10 - describes as pain that prompted him to goto ER. hypertensive, otherwise VSS. Thanks\"  "
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
## 2019-10-07 NOTE — PLAN OF CARE
PRIMARY DIAGNOSIS: GENERALIZED WEAKNESS/ ETOH Withdrawal    OUTPATIENT/OBSERVATION GOALS TO BE MET BEFORE DISCHARGE  1. Orthostatic performed: N/A    2. Tolerating PO medications: Yes    3. Return to near baseline physical activity: Yes    4. Cleared for discharge by consultants (if involved): No    Discharge Planner Nurse   Safe discharge environment identified: Yes  Barriers to discharge: No       Entered by: Suzan Riojas 10/07/2019 6:15 PM     Please review provider order for any additional goals.   Nurse to notify provider when observation goals have been met and patient is ready for discharge.    A&OX4. Vitals stable. Up with assist of 1 for transferring. C/o of anxiety, gave ativan X1. Plan is for inpatient treatment for alcoholic dependency and rule 25 application. Denied any chest pain, but reported of anxiety.  Restarted his Zoloft and trazodone for sleep.

## 2019-10-07 NOTE — PLAN OF CARE
Tele-S-Tach.  Contact iso for ESBL in urine.  Pt is  A&O x4, denies pain.  CiWAs 8,7,7,8.  Pt  had  2mg po of ativan.  Can be incontinent at times.  SBA.  VSS, Q4hrs.  Possible discharge in 1-2 days inpatient MH/CD [place].  Continue to monitor and plan of care.
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
## 2019-10-07 NOTE — CONSULTS
10/7/19 chem dep consult completed.      Met with patient, he had [facility] application but had not completed the paperwork.  I proceeded in completing the Rule 25 evaluation.  Patient is interested in residential treatment and we discussed recommendation to [facility] treatment.  I informed patient that evaluation must be submitted to [facility] first before we can make referrals to any specific programing.  Upon completing the evaluation I left patient with paperwork which he began working on.  I will finish his evaluation documentation and  paperwork from him.    Consulted with Dr. Spicer prior to meeting with patient, he feels a direct transfer to treatment is best but understands we cannot guarantee this will happen.  Patient does state he does not feel safe returning to him home environment.  I will update  on my meeting and any assistance going froward with placement for patient.    UPDATE:  Faxed Rule 25 and application to [facility]. Left voicemail for Josselin NUGENT ().    Bridget Ramirez, [facility]  [phone]

## 2019-10-08 PROBLEM — R45.851 SUICIDE IDEATION: Status: ACTIVE | Noted: 2019-01-01
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
## 2019-10-08 NOTE — PROVIDER NOTIFICATION
MD to MD for Psyc. Please call Dr. Reynoso after 5pm on his cell phone. Cell [phone].  Thanks

## 2019-10-08 NOTE — DISCHARGE SUMMARY
Hospitalist Discharge Summary  [facility]    Aydin Reilly MRN# [id number]   YOB: 1960 Age: 59 year old     Date of Admission:  10/5/2019  Date of Discharge:  10/8/2019  Admitting Physician:  Buzz Dodson DO  Discharge Physician:  Buzz Dodson DO  Discharging Service:  Hospitalist     Primary Provider: Mandy Pabon          Discharge Diagnosis:   Atypical CP, likely MSK and/or anxiety  Anxiety  Depression  Suicidal gesture, not suicidal currently  HTN  Etoh Dep  Lactic acidosis resolved             Discharge Disposition:   Discharged to home           Allergies:   Allergies   Allergen Reactions     No Known Allergies      Seasonal Allergies               Discharge Medications:   Current Discharge Medication List      START taking these medications    Details   sertraline (ZOLOFT) 50 MG tablet Take 1 tablet (50 mg) by mouth daily    Associated Diagnoses: Suicidal ideation      traZODone (DESYREL) 50 MG tablet Take 1 tablet (50 mg) by mouth At Bedtime    Associated Diagnoses: Suicidal ideation      vitamin B1 (THIAMINE) 100 MG tablet Take 1 tablet (100 mg) by mouth daily    Associated Diagnoses: Suicidal ideation         CONTINUE these medications which have CHANGED    Details   omeprazole (PRILOSEC) 20 MG DR capsule Take 1 capsule (20 mg) by mouth daily  Qty: 3 capsule, Refills: 0    Associated Diagnoses: Chest pain, unspecified type         CONTINUE these medications which have NOT CHANGED    Details   acetaminophen (TYLENOL) 325 MG tablet Take 2 tablets (650 mg) by mouth every 8 hours as needed for mild pain  Qty: 100 tablet    Associated Diagnoses: Chronic pain of right knee      multivitamin, therapeutic with minerals (MULTI-VITAMIN) TABS Take 1 tablet by mouth daily      amLODIPine (NORVASC) 5 MG tablet TAKE 1 TABLET BY MOUTH EVERY DAY  Qty: 90 tablet, Refills: 0    Associated Diagnoses: Benign essential hypertension      Elastic Bandages & Supports (JOBST  "ACTIVE 20-30MMHG MEDIUM) MISC 1 Device daily as needed Knee high  Qty: 6 each, Refills: 1    Associated Diagnoses: Pedal edema      folic acid (FOLVITE) 1 MG tablet Take 1 tablet (1 mg) by mouth daily  Qty: 30 tablet, Refills: 0    Associated Diagnoses: Acute renal failure, unspecified acute renal failure type (H)      !! order for DME Equipment being ordered: Splint - left wrist splint  Qty: 1 Device, Refills: 0    Associated Diagnoses: Lateral epicondylitis of left elbow      !! order for DME Equipment being ordered: grabber/reach extender  Qty: 1 Device, Refills: 0    Associated Diagnoses: Acute left-sided low back pain without sciatica       !! - Potential duplicate medications found. Please discuss with provider.      STOP taking these medications       disulfiram (ANTABUSE) 250 MG tablet Comments:   Reason for Stopping:         gabapentin (NEURONTIN) 300 MG capsule Comments:   Reason for Stopping:         nabumetone (RELAFEN) 500 MG tablet Comments:   Reason for Stopping:         naltrexone (DEPADE/REVIA) 50 MG tablet Comments:   Reason for Stopping:         sildenafil (VIAGRA) 100 MG tablet Comments:   Reason for Stopping:                      Condition on Discharge:   Discharge condition: Stable   Discharge vitals: Blood pressure (!) 142/84, pulse 102, temperature 98.3  F (36.8  C), temperature source Oral, resp. rate 16, height 1.753 m (5' 9\"), weight 107.2 kg (236 lb 4.8 oz), SpO2 94 %.   Code status on discharge: Full Code      BASIC PHYSICAL EXAMINATION:  GENERAL: No apparent distress.  CARDIOVASCULAR: Regular rate and rhythm without murmurs.  PULMONARY: Clear to auscultation bilaterally.   GASTROINTESTINAL: Abdomen soft, non-tender.  EXTREMITIES: No edema, pulses intact.  NEUROLOGIC: No focal deficits.            History of Illness:   See detailed admission note for full details.               Procedures excluding imaging which is summarized below:   Please see details in the electronic medical record. "           Consultations:   CHEMICAL DEPENDENCY IP CONSULT  SOCIAL WORK IP CONSULT  PSYCHIATRY IP CONSULT  PSYCHIATRY IP CONSULT  PSYCHIATRY IP CONSULT          Significant Results:   Results for orders placed or performed in visit on 10/17/18   XR Clavicle Right    Narrative    RIGHT CLAVICLE TWO VIEWS   10/17/2018 2:09 PM    HISTORY: Fracture follow-up.    COMPARISON: 9/18/2018.      Impression    IMPRESSION: There has been no change in position or alignment of the  previously seen fracture of the mid to distal right clavicle with  inferior displacement of the distal fragment. There has been further  progression of callus formation. Complete solid bone bridging may not  have yet occurred, although the fracture does appear to be in the  later stages of healing.     DWAIN CRAWFORD MD     *Note: Due to a large number of results and/or encounters for the requested time period, some results have not been displayed. A complete set of results can be found in Results Review.       Transthoracic Echocardiogram Results:  No results found for this or any previous visit (from the past 4320 hour(s)).             Pending Results:   Unresulted Labs Ordered in the Past 30 Days of this Admission     No orders found from 9/5/2019 to 10/6/2019.                      Discharge Instructions and Follow-Up:   Discharge instructions and follow-up:   Discharge Procedure Orders   Follow Up and recommended labs and tests   Order Comments: Transfer to IP psychiatry.  Would recommend stress testing when current psychiatric and CD issues improved.     Activity - Up with nursing assistance     Order Specific Question Answer Comments   Is discharge order? Yes      Full Code     Order Specific Question Answer Comments   Code status determined by: Discussion with patient/legal decision maker      Fall precautions     Advance Diet as Tolerated   Order Comments: Follow this diet upon discharge: Orders Placed This Encounter      Regular Diet Adult      Order Specific Question Answer Comments   Is discharge order? Yes              Hospital Course:   Aydin Reilly is a 59 year old male with a history of hypertension and alcohol dependence who presents with chest pain.  Patient has a long-standing history of alcohol dependence.  Previously on treatment.  Attends  and has a sponsor.  He has been going through some difficulties recently with a divorce, losing his job, and other social and economic difficulties.  He had 100 days of sobriety but relapsed about 1 week ago.  He is been drinking about 1 L of hard alcohol daily.  He drank all last night with his last drink being 9 AM the morning of admission.  He developed severe depression and thought about suicide so he went to [employer] to get a hose and was going to kill himself with that but evidently did not make the purchase and returned back home.  He called his sponsor who called 911 and a welfare check was underway.  Police arrived and called paramedics who brought him to the emergency department as he was complaining of chest pain.  He had a blood alcohol level of about 0.3 on arrival.  He also had a lactic acidosis.  He was hemodynamically stable with no fever nor signs of sepsis.  Lactic acid has trended down with 3 L of IV fluids.  He initially made some further suicidal statements to the ED staff.  Now he is clinically sober and does not endorse any active suicidal thoughts nor intention.  He was showing some mild evidence of alcohol withdrawal but no longer.  He continued to complain of constant and dull reproducible substernal chest pain.  At this point challenging disposition as appears somewhat high risk for reliance on any outpatient services or gap in care.  Evidently IP  bed is available this evening.  Planning on direct transfer without hold as he is not actively suicidal and voluntary.     1. Chest pain: Suspect musculoskeletal or GI in origin.  Continue to monitor on Telemetry and serial  troponin levels undetectable.  Outpatient stress testing could be considered but I would not do this while inpatient unless more convincing evidence of ACS presents itself.  Will order an oral PPI.   Symptoms now resolved.   2. Suicidal ideation and gesture: Does not appear actively suicidal.  Was placed on hold in the ED.  This was discontinued.  Bedside attendant and suicidal precautions also discontinued.  Psych consultation appreciated.  Recommendation for IP MH/CD program but discussion with SW indicates that will not be feasible.  Will reconsult psychiatry to assist with disposition.  Started trazodone for sleep and Zoloft as he was on this previously.  Challenging disposition but IP MH bed available this evening.  3. Alcohol dependence: Long-standing history of this.  He is involved with AA and has been in treatment.  CD consult appreciated - they are discussing options with SW.  Vitamin and electrolyte replacement ordered.  No ongoing withdrawal symptoms so stop symptom triggered benzo dosing.  PRN ativan and hydroxyzine available for pain.  4. Lactic acidosis: Does not appear septic.  Likely reflection of volume depletion and decreased liver clearance in the context of heavy alcohol use.  Continued normal saline overnight and now resolved.  Stop IVF.    The patient was seen, examined, and counseled on this day. The hospitalization and plan of care was reviewed with the patient extensively. All questions were addressed and the patient agreed to follow-up as noted above.      Total time spent in face to face contact with the patient and coordinating discharge was:  35 Minutes    Buzz Dodson DO MPH  Critical access hospital Hospitalist  [address] [place] [address]  Pager: [phone]  10/08/2019

## 2019-10-08 NOTE — PLAN OF CARE
PRIMARY DIAGNOSIS: CHEST PAIN  OUTPATIENT/OBSERVATION GOALS TO BE MET BEFORE DISCHARGE:  1. Ruled out acute coronary syndrome (negative or stable Troponin):  Yes  2. Pain Status: Pain free.  3. Appropriate provocative testing performed: Yes  - Interpretation of cardiac rhythm per telemetry tech: SR    4. Cleared by Consultants (if applicable):No  5. Return to near baseline physical activity: Yes  Discharge Planner Nurse   Safe discharge environment identified: No  Barriers to discharge: No       Entered by: Camilla Jones 10/08/2019 5:16 AM     Please review provider order for any additional goals.   Nurse to notify provider when observation goals have been met and patient is ready for discharge.

## 2019-10-08 NOTE — PROGRESS NOTES
[facility]    Hospitalist Progress Note  Name: Aydin Reilly    MRN: [id number]  Provider:  Buzz Dodson DO MPH  Date of Service: 10/08/2019    Summary of Stay: Aydin Reilly is a 59 year old male with a history of hypertension and alcohol dependence who presents with chest pain.  Patient has a long-standing history of alcohol dependence.  Previously on treatment.  Attends  and has a sponsor.  He has been going through some difficulties recently with a divorce, losing his job, and other social and economic difficulties.  He had 100 days of sobriety but relapsed about 1 week ago.  He is been drinking about 1 L of hard alcohol daily.  He drank all last night with his last drink being 9 AM the morning of admission.  He developed severe depression and thought about suicide so he went to [employer] to get a hose and was going to kill himself with that but evidently did not make the purchase and returned back home.  He called his sponsor who called 911 and a welfare check was underway.  Police arrived and called paramedics who brought him to the emergency department as he was complaining of chest pain.  He had a blood alcohol level of about 0.3 on arrival.  He also had a lactic acidosis.  He was hemodynamically stable with no fever nor signs of sepsis.  Lactic acid has trended down with 3 L of IV fluids.  He initially made some further suicidal statements to the ED staff.  Now he is clinically sober and does not endorse any active suicidal thoughts nor intention.  He was showing some mild evidence of alcohol withdrawal but no longer.  He continued to complain of constant and dull reproducible substernal chest pain.  At this point challenging disposition as appears somewhat high risk for reliance on any outpatient services or gap in care.     1. Chest pain: Suspect musculoskeletal or GI in origin.  Continue to monitor on Telemetry and serial troponin levels undetectable.  Outpatient stress testing could  be considered but I would not do this while inpatient unless more convincing evidence of ACS presents itself.  Will order an oral PPI.   Symptoms now resolved.  2. Suicidal ideation and gesture: Does not appear actively suicidal.  Was placed on hold in the ED.  This was discontinued.  Bedside attendant and suicidal precautions also discontinued.  Psych consultation appreciated.  Recommendation for IP MH/CD program but discussion with SW indicates that will not be feasible.  Will reconsult psychiatry to assist with disposition.  Started trazodone for sleep and Zoloft as he was on this previously.  Challenging disposition.  3. Alcohol dependence: Long-standing history of this.  He is involved with AA and has been in treatment.  CD consult appreciated - they are discussing options with SW.  Vitamin and electrolyte replacement ordered.  No ongoing withdrawal symptoms so stop symptom triggered benzo dosing.  PRN ativan and hydroxyzine available for pain.  4. Lactic acidosis: Does not appear septic.  Likely reflection of volume depletion and decreased liver clearance in the context of heavy alcohol use.  Continued normal saline overnight and now resolved.  Stop IVF.    DVT Prophylaxis: Pneumatic Compression Devices  Code Status: Full Code  Disposition: Expected discharge in 1 days to home vs treatment vs psych. Goals prior to discharge include determine appropriate disposition.  Medically stable for discharge when this is determined.   Incidental Findings: None.  Family updated today: No        Interval History   The patient reports doing well. Still feeling depressed and discouraged but denies suicidal/homicidal thoughts, plans. No chest pain or shortness of breath. No nausea, vomiting, diarrhea, constipation. No fevers. No other specific complaints identified.     -Data reviewed today: I personally reviewed all new labs and imaging results over the last 24 hours.     Physical Exam   Temp: 97.4  F (36.3  C) Temp src:  Oral BP: (!) 146/89 Pulse: 98 Heart Rate: 96 Resp: 16 SpO2: 94 % O2 Device: None (Room air)    Vitals:    10/05/19 1236 10/05/19 2327   Weight: 102.1 kg (225 lb) 107.2 kg (236 lb 4.8 oz)     Vital Signs with Ranges  Temp:  [97.4  F (36.3  C)-98.9  F (37.2  C)] 97.4  F (36.3  C)  Pulse:  [97-98] 98  Heart Rate:  [] 96  Resp:  [16-17] 16  BP: (138-168)/(75-89) 146/89  SpO2:  [94 %-98 %] 94 %  I/O last 3 completed shifts:  In: 120 [P.O.:120]  Out: -     GENERAL: No apparent distress. Awake, alert, and fully oriented.  HEENT: Normocephalic, atraumatic. Extraocular movements intact.  CARDIOVASCULAR: Regular rate and rhythm without murmurs or rubs. No S3.  PULMONARY: Clear bilaterally.  GASTROINTESTINAL: Soft, non-tender, non-distended. Bowel sounds normoactive.   EXTREMITIES: No cyanosis or clubbing. No edema.  NEUROLOGICAL: CN 2-12 grossly intact, no focal neurological deficits.  DERMATOLOGICAL: No rash, ulcer, bruising, nor jaundice.     Medications       amLODIPine  5 mg Oral QPM     folic acid  1 mg Oral Daily     multivitamin w/minerals  1 tablet Oral Daily     pantoprazole  40 mg Oral QAM AC     sertraline  50 mg Oral Daily     sodium chloride (PF)  3 mL Intracatheter Q8H     traZODone  50 mg Oral At Bedtime     vitamin B1  100 mg Oral Daily     Data     Laboratory:  Recent Labs   Lab 10/06/19  0616 10/05/19  1310   WBC 4.7 4.7   HGB 11.1* 12.6*   HCT 34.2* 39.4*   MCV 90 91   * 196     Recent Labs   Lab 10/05/19  1310      POTASSIUM 3.7   CHLORIDE 104   CO2 23   ANIONGAP 12   GLC 78   BUN 18   CR 0.95   GFRESTIMATED 88   GFRESTBLACK >90   ELSI 9.4     Recent Labs   Lab 10/06/19  0616 10/05/19  2248 10/05/19  1843   LACT 0.6* 1.9 3.7*     Recent Labs   Lab 10/05/19  2248 10/05/19  1843   TROPI <0.015 <0.015     No results for input(s): CULT in the last 168 hours.    Imaging:  No results found for this or any previous visit (from the past 24 hour(s)).      Buzz Dodson DO MPH  [facility] Hospitalist  [address] [place] [address]  Pager: [phone]  10/08/2019

## 2019-10-08 NOTE — PLAN OF CARE
PRIMARY DIAGNOSIS: CHEST PAIN  OUTPATIENT/OBSERVATION GOALS TO BE MET BEFORE DISCHARGE:  1. Ruled out acute coronary syndrome (negative or stable Troponin):  Yes  2. Pain Status: Pain free.  3. Appropriate provocative testing performed: Yes  - Interpretation of cardiac rhythm per telemetry tech: SR/ST.    4. Cleared by Consultants (if applicable):No  5. Return to near baseline physical activity: Yes  Discharge Planner Nurse   Safe discharge environment identified: No  Barriers to discharge: Yes; awaiting placement.       Entered by: Camilla Jones 10/08/2019 2:58 AM     Please review provider order for any additional goals.   Nurse to notify provider when observation goals have been met and patient is ready for discharge.

## 2019-10-08 NOTE — PROGRESS NOTES
D:  Per record review, pt's discharge recommendation is to IP psych.    I:  Mohinder spoke with Dr. Spicer, psychologist, and it was determined that the pt's needs will be best met via IP psych treatment.  Dr. Spicer called IP psych and they will be able to accept the pt this evening.    A/P:  Sw will continue to be available as needed until discharge.    ADDENDUM 142  Sw emailed [facility] to update them that the pt will discharge to IP psych.  CD was also following the pt for discharge needs.    Josselin Cordova, JAVAN, LGSW  [phone]  [facility]

## 2019-10-08 NOTE — PLAN OF CARE
A&O X4. VSS on RA. Tele SR. Assist of 1. Contact precautions for ESBL. PIV SL. Denied CP, SOB, nausea. C/o of mild anxiety. C/o of mild pain. PRN Tylenol given X1. C/o of nausea; PRN SL Zofran given X1. Chemical dependency and psych following. Plan to discharge possible today pending placement to inpatient chemical dependency facility.

## 2019-10-09 NOTE — PROGRESS NOTES
Clinic Care Coordination Contact    Reason for Referral: Mental Wellness (Health) (Mental Illness/Chemical Depedency): Education, Resources of Behavioral Health Services and Treatment Options    Additional pertinent details:  Patient with recent admission for alcohol intoxication and suicidal ideation.  Met with psychiatry while inpatient.  I would like to reach out and see what other resources may be helpful for him.    Data: Per chart review, patient was in [facility] from 10/05/19--10/08/19 with discharge diagnoses of:     Atypical CP, likely MSK and/or anxiety  Anxiety  Depression  Suicidal gesture, not suicidal currently  HTN  Etoh Dep  Lactic acidosis resolved    Per chart review, patient was discharged from [facility] to [facility] inpatient psych yesterday, 10/08/19, and patient remains there to date.    Plan: Patient is currently in an inpatient psych unit. -[facility] will follow for possible community resource needs post hospitalization.      [facility]    WINDY Flores, Social Work Care Coordinator  [facility] [employer]  [address]  [email or web address]   Office: [phone]  Gender Pronouns: i.e,. she/her  Employed by [employer]

## 2019-10-09 NOTE — PROGRESS NOTES
"SPIRITUAL HEALTH SERVICES  SPIRITUAL ASSESSMENT Progress Note  [facility] ([facility] 3B     REFERRAL SOURCE: Hospital  Request    The pt voiced that, \"the doctors have helped me see depression in a different way and I want to accept support for treatment.\" Pt mentioned using alcohol before as coping strategy.  Pt expressed the difference between living out there and in the hospital saying, \"No alcohol and I am feeling better.  I know I am in a good place to get the help although my family do not know that I am here\"  I listened to the pt talk about his struggle of not wanting to tell his family about his hospitalization. Pt said \"I fear how they would respond and I fear how it might  affect my 86 year old mother \" The pt told me he is Yarsanism and needed prayer for courage to call his sister, and prayer to get better. I encouraged the pt to use his spirituality and belief systems as encouragement for his healing, for communicating with his family, and for engaging with the unit programming.     PLAN: I will follow-up once a week as pt remain on Unit 3B    Torrie Napoles  Chaplain Resident  Pager [phone]  "

## 2019-10-09 NOTE — PLAN OF CARE
Problem: General Plan of Care (Inpatient Behavioral)  Goal: Individualization/Patient Specific Goal (IP Behavioral)  Description  The patient and/or their representative will achieve their patient-specific goals related to the plan of care.    The patient-specific goals include:  Outcome: No Change  Flowsheets (Taken 10/9/2019 1133)  Areas of Vulnerability: Pending divorce, isolated, substance abuse, financial stress  Patient Strengths: Steady employment; Awareness of substance use issues  The patient completed the reasons for admit and goals for discharge in the personal plan of care.   Reasons for admit:  1)  Overwhelmed with life  2)  Suicidal thoughts  3)  Financial stress      Goals for discharge:  1)  Coping skills  2)  Medication management  3)  MICD treatment
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
## 2019-10-09 NOTE — PROGRESS NOTES
Pt has been pleasant and cooperative this AM. Pt reports that he continues to feel better. Pt denies alcohol withdrawal symptoms. Pt was out for breakfast and ate well. Pt was medication compliant and returned to bed after breakfast. Pt's affect and eye contact is appropriate.     Pt is rating his anxiety at 8/10 and depression at 5/10 (when 10 is the worst). Pt denies SI/SIB.     Pt has been pleasant and cooperative. Pt has been visible on unit. Pt attended programming this PM.

## 2019-10-09 NOTE — CONSULTS
Brief Medicine Note:     Pt transferred from medical floor at [facility]. Please refer to admission H&P while on medicine dated 10/5/2019, of which this writer reviewed and is up to date. Please review discharge summary from 10/8/19, of which this writer reviewed and is up to date.     In short, Aydin Reilly is a 59 year old male with a history of HTN, anxiety, depression and alcohol dependence who presented to [facility] for chest pain and alcohol withdrawal. He also endorsed suicidal ideation at that time.     1. Chest pain: Suspected to be musculoskeletal or GI in origin. Was on telemetry without acute events and troponin levels negative. Per discharging hospitalist, outpatient stress testing could be considered. Was started on oral PPI. Symptoms had since resolved on discharge.  - Continue PPI  - Follow-up with PCP for consideration of outpatient stress test. Will place Cardiology referral in discharge navigator   - Please notify medicine STAT if any recurrence of chest pain, SOB, hypoxia, diaphoresis or acute change in vital signs    2. Suicidal ideation, anxiety and depression:  - Management per psychiatry team    3. Alcohol dependence: Has a long standing hx of alcohol abuse. Involved in AA and has been in treatments. Was on MSSA protocol. No e/o withdrawal symptoms therefore BZDs dosing was discontinued at outside hospital. LFTs on 10/5 WNL.   - Management per psychiatry    4. Lactic acidosis: Was elevated at outside hospital to 4, downtrended and resolved with IVF. There was no s/sx of sepsis. Likely reflection of volume depletion and decreased liver clearance.     5. Anemia, thrombocytopenia:   Patient with Hgb 11.1 on 10/6, improved to 11.9 today. Hemoglobin appears at baseline (11-12). Plt count downtrending from 144 to 127 today. BL platelets ~180-190. Likely thrombocytopenia is 2/2 BM suppression in setting of alcohol abuse.   - Trend CBC daily  - Please notify medicine if any signs or symptoms of  bleeding     6. Elevated blood pressure: In setting of psychiatric decompensation w/o hx of HTN or on antihypertensives. BP currently -150. Will continue to monitor.   - Monitor BP  - If continues to be persistently SBP >140/90 will consider initiation of BP medication    Medicine will continue to follow peripherally for follow-up of CBC and BP monitoring. Please call the on-call PAFletcherC for any f/u medical issues if they arise.       Please page medicine with any concerns.      Raisa Peng PA-C  Hospitalist Service  Pager [phone]

## 2019-10-09 NOTE — PROVIDER NOTIFICATION
"   10/08/19 2000   Patient Belongings   Did you bring any home meds/supplements to the hospital?  No     Patient with no belongings sent to security.   Mazariegos short sleeved shirt, black pants, papers and a deck of cards with patient.   Per patient his cell phone was lost at [facility]. He did not have a wallet with him \"I left it at home\".   Tennis shoes with laces placed in locker.     ADMISSION:  I am responsible for any personal items that are not sent to the safe or pharmacy. [facility] is not responsible for loss, theft or damage of any property in my possession.    Patient Signature _____________________ Date/Time _____________________    Staff Signature _______________________ Date/Time _____________________    2nd Staff person, if patient is unable/unwilling to sign  ___________________________________ Date/Time _____________________  DISCHARGE:  All personal items have been returned to me.    Patient Signature _____________________ Date/Time _____________________    Staff Signature _______________________ Date/Time _____________________    "

## 2019-10-09 NOTE — PROGRESS NOTES
Writer took over cares at 1900. A/O x 4. Denied pain. Planned p/u and transport via HE between 2000 and 2030. Will continue to monitor.

## 2019-10-09 NOTE — DISCHARGE SUMMARY
Pt p/u by HE transport via stretcher. PIV removed by previous RN, no complications. No tele monitor present. All belongings returned except cell phone. Pt asked writer for phone from charging station, no phone recovered. Writer checked entire room, closet, clothes, bags, drawers, locked charging station, etc with two fellow staff members and no phone found. Pt made aware, gave contact card for [facility]. Writer told pt if phone found we will contact [facility], , where pt is going. Escorted by two  staff by stretcher.

## 2019-10-09 NOTE — PROGRESS NOTES
Pt attended the OT discussion group which involved reading a selected quote, discussing it's meaning, and relating it to personal life experience or situation.  Pt chose a quote about fear, and made insightful comments about his own life, and appears to have significant stressors he is dealing with (pt spoke in general terms - alluded to sobriety/chemical use, marital/relationship stress, financial stress, housing issues).  Pt shared how fear has immobilized him and led to increasing chemical use, depression, and his sponsor having a health and welfare check done on him.
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
## 2019-10-09 NOTE — PHARMACY-ADMISSION MEDICATION HISTORY
Admission medication history for the October 9, 2019 admission is complete.     Interview Sources:  Epic chart - discharged from [facility] on 10/8/19    Reliability of Source: Good    Medication Adherence: not applicable    Current Outpatient Pharmacy:    [facility] [id number] [place] [facility], [place] - [address]  [facility] [id number] [place] [facility], [place] - [address]  [facility] [id number] [place] [facility], [place] - [address]    Changes made to PTA medication list (reason)  Added: n/a  Deleted: n/a  Changed: n/a    Additional medication history information:   Medication information from discharge summary from 10/8/19    Gabapentin 300 mg last filled 09/05/19 for quantity 90 for 15 day supply    Prior to Admission Medication List:  Prior to Admission medications    Medication Sig Last Dose Taking? Auth Provider   acetaminophen (TYLENOL) 325 MG tablet Take 2 tablets (650 mg) by mouth every 8 hours as needed for mild pain 10/8/2019 at 0947 Yes Juan Antonio Medina DO   folic acid (FOLVITE) 1 MG tablet Take 1 tablet (1 mg) by mouth daily 10/8/2019 at 0947 Yes Juan Antonio Medina DO   gabapentin (NEURONTIN) 300 MG capsule Take 600 mg by mouth 3 times daily  10/2/2019 at Unknown time Yes Reported, Patient   multivitamin, therapeutic with minerals (MULTI-VITAMIN) TABS Take 1 tablet by mouth daily 10/8/2019 at 0947 Yes Reported, Patient   omeprazole (PRILOSEC) 20 MG DR capsule Take 1 capsule (20 mg) by mouth daily Given pantoprazole on 10/8/19 at Unknown time Yes Buzz Dodson DO   sertraline (ZOLOFT) 50 MG tablet Take 1 tablet (50 mg) by mouth daily 10/8/2019 at 0947 Yes Buzz Dodson DO   traZODone (DESYREL) 50 MG tablet Take 1 tablet (50 mg) by mouth At Bedtime 10/7/2019 at 2211 Yes Buzz Dodson DO   vitamin B1 (THIAMINE) 100 MG tablet Take 1 tablet (100 mg) by mouth daily 10/8/2019 at 0947 Yes Buzz Dodson DO       Time spent: 15 minutes    Medication history completed by:   Riya  Va, PharmD, BCPP  Behavioral Health Pharmacist  [facility] ([facility])  [facility] Ascom: *[phone] or [phone]  [facility] Phone: [phone]

## 2019-10-09 NOTE — PLAN OF CARE
Problem: Adult Behavioral Health Plan of Care  Goal: Team Discussion  Description  Team Plan:  Outcome: No Change  Note:   BEHAVIORAL TEAM DISCUSSION    Participants: Dr. Daniela Reynoso MD, Martha Sutherland RN, Joyce SANTORO  Progress: New admit  Anticipated length of stay: 2-3 days  Continued Stay Criteria/Rationale: Depression and SI  Medical/Physical: benign essential hypertension, urethroplasty 2015, neuropathy to BLE, lumbar disc herniation with L4-L5 laminectomy, GERD, sleep apnea  Precautions:   Behavioral Orders   Procedures    Code 1 - Restrict to Unit    Routine Programming     As clinically indicated    Status 15     Every 15 minutes.    Suicide precautions     Patients on Suicide Precautions should have a Combination Diet ordered that includes a Diet selection(s) AND a Behavioral Tray selection for Safe Tray - with utensils, or Safe Tray - NO utensils       Plan: Psychiatric Assessment. Medication Management. Therapeutic Mileu. Individual and group support.   Rationale for change in precautions or plan: Initial plan

## 2019-10-09 NOTE — PLAN OF CARE
"  Problem: Mood Impairment (Depressive Signs/Symptoms)  Goal: Improved Mood Symptoms (Depressive Signs/Symptoms)  Outcome: No Change     Problem: Decreased Participation/Engagement (Depressive Signs/Symptoms)  Goal: Increased Participation and Engagement (Depressive Signs/Symptoms)  Outcome: Improving; pt active and social in milieu; attended groups.     Behavioral   Pt ate 100% of meal; attended groups; pt showered;   denied SI, SIB, HI, and hallucinations. Affect flat and blunted;  Pt endorsed depression and anxiety.  Pt indicated that his employer \"fired\" him today for no-call, no show;  Pt indicated he is struggling with financial stress and feels overwhelmed, which prompted his hospitalization.     Medical   Pt remains on monitoring for withdrawal; MSSA 5 and 9;   Pt experiencing diaphoresis, tremor, elevated heart rate and temp. MSSA 9; provider notified; MSSA protocol with valium initiated.     BP, Temp and HR elevated: 1700 Temp 99.4, Pulse 93, /77;  2100 temp 99.7, Pulse 98, /77.     Blood Glucose BID and at HS.  Blood glucose 123 and 120  Pt needs room due to contact precautions (ESBL)     Plan  Will continue to monitor.   "

## 2019-10-09 NOTE — PROGRESS NOTES
10/09/19 1457   General Information   Date Initially Attended OT 10/09/19     Pt actively participated in occupational therapy clinic. Pt was able to ask for assistance as needed, and independently initiate a familiar, single-step, creative expression task with minimal assistance for task setup. Pt demonstrated good focus, planning, and attention to detail during task completion. Pt appeared comfortable interacting with peers and writer, and was polite and pleasant on approach. Pt was engaged with a congruent affect.    Plan: More information needed to complete OT Initial Assessment; OT staff will meet with pt to review the role of occupational therapy and explain the value of having them involved in their treatment plan including options to meet current needs/self-identified goals. As group attendance is established, Pt will be given self-assessment to inform OT initial assessment.

## 2019-10-09 NOTE — PROGRESS NOTES
Facilitated group on stress management. Provided definition of stress. Discussion ensured regarding pt's largest stressor, current stressors, social support, emotional management and life balance.Pt was able to identify his addiction as a source of stress and was able to identify walking as a stress reducer.

## 2019-10-09 NOTE — PROGRESS NOTES
Phone call with [facility] ([phone]) to follow-up on Rule 25. Writer was informed that Karl called Bridget Yang, the [facility] who completed the assessment, and recommeded following up with Bridget. Writer called the phone number for Bridget listed in chart, but it was a wrong number.    Update: Called [facility] requesting call back from Karl regarding status of Rule 25.

## 2019-10-10 NOTE — PROGRESS NOTES
"Pt was pleasant and cooperative. Pt rated depression at a 3 and anxiety at 5-6. Pt denies SI/SIB. Pt has been visible and active in the milieu. Pt has been attending programming.   Pt has not shown any signs of withdrawal this shift as he did last evening. Pt scored a 5 earlier this shift. Pt has been encouraged to let staff know if he begins to experience symptoms as he had last evening -tremors and diaphoresis.     Pt requested and received prn hydroxyzine 25 mg at 1122 for increased anxiety. Pt reports thinking about discharge plans and discharging is one of the triggers for his increased anxiety.    1430- Pt approached staff and reported that he was having increased anxiety again and requested prn.  Dr. Reynoso was notified as it was to early for prn hydroxyzine. Order received for prn gabapentin 100 mg tid prn for anxiety.    1510- Writer approached patient with prn gabapentin. Pt reported to writer that he had begun having chest \"heaviness\" about 1 hour ago. Pt denies history of cardiac issues. Pt reports that he had one previous episode on Sunday when he was at [facility] and it was felt it was anxiety related at that time. VS taken 98.8, 16, O2 96%, 133/75 (p=83) and standing 146/87 (p=98).    Raisa MANZO notified, order for ECG received and she will see patient. Pt aware of this  Plan.   "

## 2019-10-10 NOTE — PROVIDER NOTIFICATION
Pt experiencing diaphoresis, tremor, elevated heart rate and temp. MSSA 9; provider notified; MSSA protocol with valium initiated.

## 2019-10-10 NOTE — PROGRESS NOTES
Paolo message from Medardo Black of [facility] reporting that they are missing a signature for the application for  and requested the signature page be faxed to [phone].    Writer faxed the signature page to [facility].    Update: Phone call with Medardo who reported they received the signature page and it was sent on to the [facility] team who will contact the writer.    Writer met with patient  to share update on Rule 25. Patient reported that he called his employer and found out that he lost his job for missing three days without calling in. He had a good attitude about this news. He also reported that he called his family and told them he was in the hospital for depression and they were very supportive and glad that he was finally admitting he was depressed and getting the help he needs. He appeared pleased with their response.

## 2019-10-10 NOTE — PROGRESS NOTES
"Internal Medicine Progress Note      Assessment and plan:  In short, Aydin Reilly is a 59 year old male with a history of HTN, anxiety, depression and alcohol dependence who presented to [facility] for chest pain and alcohol withdrawal. He also endorsed suicidal ideation at that time.      Medicine was notified by psychiatry RN that patient was having chest pain episode.     Upon assessment, patient was lying in bed comfortably reading a Patrick Potter book. He reports that he developed chest pain 1.5 hours ago while in group coloring a picture. He describes it as a pressure in his left side of the chest. He denies any sharp pain or pleuritic pain. No SOB, cough, hemoptysis, diaphoresis, nausea, vomiting or abdominal pain. He does note some lightheadedness. He reports that he had eaten lunch without difficulties and had danced with group session without pain. Denies any exertional pain PTA. He states the pain is more dull and less intense then the pain that he experienced at the OSH prior to arrival. No cough or URI symptoms. Denies any worsening pain with movement.     He denies any tobacco use, HLD, and does note borderline HTN. He is adopted therefore does not know his family history. He is not a diabetic.       Objective:  /68   Pulse 86   Temp 98.8  F (37.1  C) (Tympanic)   Resp 16   Ht 1.727 m (5' 8\")   Wt 108.7 kg (239 lb 9.6 oz)   SpO2 96%   BMI 36.43 kg/m      Vitals signs reviewed and noted    GENERAL: Alert and oriented x 3. NAD. Lying in bed resting comfortably. Pleasant and reading a book.   HEENT: Anicteric sclera. Mucous membranes moist.   CV: RRR. S1, S2. No murmurs appreciated.   RESPIRATORY: Effort normal on room air. Lungs CTAB with no wheezing, rales, rhonchi.   GI: Abdomen soft and non distended with normoactive bowel sounds present in all quadrants. There is mild epigastric tenderness, no rebound or guarding.   NEUROLOGICAL: No focal deficits. Moves all extremities.  "   EXTREMITIES: No peripheral edema. Intact bilateral pedal pulses.   SKIN: No jaundice. No rashes.     Pertinent labs and procedures were reviewed.     Subjective:     Chest pain:   Had episode of chest pain while admitted at OSH prior to arrival. At that time was suspected to be musculoskeletal or GI in origin. Was on telemetry w/o acute event. Serial troponins were negative. ECG PTA with sinus rhythm, with possible inferior infarct age undetermined. At OSH, recommended patient f/u for consideration of OP stress test. Today, with recurrent episode of left sided chest pain. On exam, VSS without tachycardia or hypoxia. He is well appearing reading a book. There is no reproducible tenderness. There is tenderness to the epigastric region. Was given GI cocktail with improvement, but continues with mild pain. Symptoms felt to be atypical in nature. Chest pain is not pleuritic, and patient without risk factors for PE/DVT and w/o tachycardia or hypoxia therefore do not suspect PE.     Work-up today included Troponin negative. CXR unremarkable other than chronic basilar scarring/atelectasis. ECG reviewed showing poor R wave progression in ventral leads, however no s/sx of ischemic changes. There was low voltage QRS. This was discussed with Cardiology Fellow, Dr. Haro, on call and patient has a history of similar appearing ECGs dating back to 2015 without acute change. Cardiology did not know etiology of morphology but did not have concerns for ACS. Discussed cannot rule out coronary artery disease. Cardiology Fellow recommended patient follow-up for outpatient stress test once psychiatrically stable. Did not feel it was necessary for patient to have this done during this psychiatric hospitalization unless any acute change.     Patient was seen and discussed with Dr. Jean-Baptiste. Will repeat troponin level at 2200 to trend and moonlighter will follow.     2. Anemia/Thrombocytopenia:   Patient with Hgb 11.1 on 10/6, improved  to 12.2 today. Hemoglobin appears at baseline (11-12). Plt count improving from 127->133 today. BL platelets ~180-190. Likely thrombocytopenia is 2/2 BM suppression in setting of alcohol abuse.   - Follow-up with PCP for follow-up CBC within 7 days of discharge for repeat CBC  - Please notify medicine if any signs or symptoms of bleeding; would obtain CBC      3. Elevated blood pressure: In setting of psychiatric decompensation w/o hx of HTN or on antihypertensives. BP currently -150. Will continue to monitor.   - Monitor BP  - If continues to be persistently SBP >140/90 will consider initiation of BP medication    Please notify medicine provider on call for any worsening chest pain, SOB or acute change in vital signs.     Medicine will continue to monitor. Please call with any questions or concerns.     Raisa Peng PA-C  Internal Medicine  [phone]

## 2019-10-10 NOTE — PROGRESS NOTES
"INITIAL OT ASSESSMENT       10/09/19 1385   General Information   Date Initially Attended OT 10/09/19   Clinical Impression   Affect Appropriate to situation   Orientation Oriented to person, place and time   Appearance and ADLs General cleanliness observed in most areas   Attention to Internal Stimuli No observed signs   Interaction Skills Initiates appropriately with staff;Initiates appropriately with peers   Ability to Communicate Needs Independent   Verbal Content Articulate;Clear;Appropriate to topic   Ability to Maintain Boundaries Maintains appropriate physical boundaries;Maintains appropriate verbal boundaries   Participation Initiates participation   Concentration Concentrates 50 minutes   Ability to Concentrate Without difficulty   Follows and Comprehends Directions Independently follows multi-step directions   Memory Delayed and immediate recall intact   Organization Independently organizes all tasks   Decision Making Independent   Planning and Problem Solving Independently plans ahead   Ability to Apply and Learn Concepts Applies within group structure   Frustrations / Stress Tolerance Independently identifies skills    Level of Insight Insightful into needs, issues, goals   Self Esteem Can identify positives;Takes risks with support and encouragement;Accepts positive feedback   Social Supports Identifies utilizing supports     Pt attended 2 out of 2 OT groups offered. Pt actively participated in occupational therapy clinic. Pt was able to ask for assistance as needed, and independently returned to a structured creative expression task. Pt demonstrated good focus, planning, and attention to detail. Social with peers and staff throughout. Pt actively participated in a structured occupational therapy group with a focus on connecting song titles to life events and personal feelings. Using a list of song titles, pt identified A Hard Day's Night, What's My Age Again (\"I tend to act younger\"), Sound of " "Silence, [place] of Broken Dreams (\"reflecting on my past\"), and Walking on Sunshine (future-oriented) as song titles that relate to his life, as well as Time For Me To Fly as a song title that indicates something about his future. Pt also independently identified \"Freewill by Rush\" as a song that he personally relates to, and cited a song george about making decisions. Pt then completed a visual scanning task while listening to identified songs to facilitate focus and organization. He patiently and politely offered assistance to a peer who was having difficulty with the task. Calm, pleasant, cooperative, and engaged throughout groups. Affect appeared to brighten with social interaction.     Pt was given and completed a written self-assessment form. OT staff reviewed with pt and explained the value of having them involved in their treatment plan, and provided options to meet current needs/self-identified goals. Pt stated reason for admission was \"depression.\" Selected goals including set and accomplish goals, accept minor imperfections, and ask others for help when needed.     PLAN: Will provide structure, support, and encouragement. Offer education on coping strategies and life management skills. Assist pt to increase self awareness regarding mental health issues and expand ideas. Will assess further in the areas of organization, problem solving, and concentration.           "
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
## 2019-10-10 NOTE — PROGRESS NOTES
Pt participated in vitalizing, organizing and bridging movements that facilitated energized connection to self and others.  He appeared surprised with himself but also truly enjoying the community dance.  He led expressive movements, but would stop when too much attention came his direction.         10/10/19 1130   Dance Movement Therapy   Type of Intervention structured groups   Response participates with encouragement   Hours 1

## 2019-10-11 NOTE — PROGRESS NOTES
Patient attended a 60 minute psychoeducation group on the Cognitive Model. The relationship between thoughts, emotions and behaviors was discussed. Participants discussed several scenarios and ways to change irrational thoughts into positive and rational thoughts. They also discussed the new emotion and behavior that came from positive, rational thoughts. Patient was an active participant. Patient was honest about his feelings and struggles. His affect was bright. He was affirming of other participants.

## 2019-10-11 NOTE — PROGRESS NOTES
"Pt active in milieu, social with staff and peers, attending and participating appropriately in group. /82 P 85, no complaints of chest pain this shift. Pt reports he uses a CPAP at home and has been waking up with headaches, pt also requesting a decongestent for a \"runny nose\", on-call notified, CPAP and benadryl ordered.    Pt denies SI/SIB and depression. Pt endorses anxiety. Pt verbalized his goal is to think positive.     PRNs:  Tylenol for headache   Hydroxyzine for anxiety  benadryl for rhinitis       "

## 2019-10-11 NOTE — PROGRESS NOTES
10/10/19 2100   General Information   Art Directive other (see comments)   AT directive was to create an image of a positive early memory and when finished with drawing, to identify the feeling(s) associated with that memory or image. Goals of directive: emotional expression, early recollections directive, identifying personal strengths. Pt was a positive participant, focused on task for the full duration of group.  Pt lambert an image of a TV with three screens and images of the Batman tv show, the Sweeten and another TV show. Pt shared memories of his birthday party which was on the lunar landing and favorite TV show which pt shared bring a feeling of happiness.  Pts mood was calm.

## 2019-10-11 NOTE — PLAN OF CARE
"Problem: OT General Care Plan  Goal: OT Goal 1  Will consistently attend OT groups and improve coping strategies with increasing repertoire of ideas and understanding of symptoms of when to use the strategies.     Pt attended 2 out of 2 OT groups offered. Pt actively participated in occupational therapy clinic. Pt was able to ask for assistance as needed, and independently initiated a structured creative expression task. Pt demonstrated good focus, planning, and attention to detail. Social with peers and writer throughout. He was very patient with a peer who was having a difficult time, and offered polite and appropriate words of encouragement. This peer thanked him for being nice to her. Pt actively participated in a mental health management group with a focus on coping through movement to facilitate relaxation and stress management via chair yoga. Pt followed and engaged in 100% of the yoga poses, and verbalized feeling \"good\" at the end of group. Pleasant, cooperative, and engaged throughout groups. Future-oriented in discussing his plan to go straight to treatment after this hospitalization, and expressed satisfaction with this plan. Affect appeared to brighten with social interaction.     "

## 2019-10-11 NOTE — PLAN OF CARE
"  Problem: Mood Impairment (Depressive Signs/Symptoms)  Goal: Improved Mood Symptoms (Depressive Signs/Symptoms)  Outcome: Improving     At start of shift, pt c/o dull chest pain over heart. Vitals stable, Internal Medicine assessed (see note). Labs, ECG and chest xray done (see results).  IM recommended monitoring pt and contacting them if any further chest pain or shortness of breath. Last set of VS stable. Upon standing, BP raised to 156/99, but no symptoms. Chest pain improved by \"80%\" per pt.    /83   Pulse 93   Temp 99.4  F (37.4  C) (Tympanic)   Resp 16   Ht 1.727 m (5' 8\")   Wt 108.7 kg (239 lb 9.6 oz)   SpO2 94%   BMI 36.43 kg/m      MSSA score 5. Slight anxiety, took PRN vistaril; improved.  Martin states his mood is better today. He is looking forward to CD treatments and living in sober housing afterwards. He feels hopeful about recovery.  "

## 2019-10-11 NOTE — PROGRESS NOTES
"Brief Medicine Note    Internal Medicine following for follow-up of chest pain as well as BP monitoring.     Today, patient states that he is chest pain free. He states it went away when he was sleeping last night. He feels well this morning without acute complaints. Denies chest pain, SOB, dizziness, lightheadedness, arm pain, nauesa, vomiting or abdominal pain.     Today's vital signs, medications, and nursing notes were reviewed.     BP (!) 163/90   Pulse 94   Temp 97.9  F (36.6  C) (Oral)   Resp 17   Ht 1.727 m (5' 8\")   Wt 108.7 kg (239 lb 9.6 oz)   SpO2 96%   BMI 36.43 kg/m    GENERAL: Alert and oriented x 3. Appears comfortable. Answering questions appropriately.   HEENT: Anicteric sclera. Mucous membranes moist.   CV: RRR. S1, S2. No murmurs appreciated. No peripheral edema.   RESPIRATORY: Effort normal on room air. Lungs CTAB with no wheezing, rales, rhonchi.   GI: Abdomen soft and non distended, bowel sounds present. No tenderness, rebound, guarding.       A/P:  Chest pain, resolved:   Had episode of chest pain while admitted at OSH prior to arrival. At that time was suspected to be musculoskeletal or GI in origin. Was on telemetry w/o acute event. Serial troponins were negative. ECG PTA with sinus rhythm, with possible inferior infarct age undetermined. At OSH, recommended patient f/u for consideration of OP stress test. On 10/10, with recurrent episode of left sided chest pain. On exam, VSS without tachycardia or hypoxia. He is well appearing reading a book. There is no reproducible tenderness. There is tenderness to the epigastric region. Was given GI cocktail with improvement, but continues with mild pain. Symptoms felt to be atypical in nature. Chest pain was not pleuritic, and patient without risk factors for PE/DVT and w/o tachycardia or hypoxia therefore do not suspect PE.      Work-up included Troponin negative. CXR unremarkable other than chronic basilar scarring/atelectasis. ECG reviewed " showing poor R wave progression in ventral leads, however no s/sx of ischemic changes. There was low voltage QRS. This was discussed with Cardiology Fellow, Dr. Haro, on call and patient has a history of similar appearing ECGs dating back to 2015 without acute change. Cardiology did not know etiology of morphology but did not have concerns for ACS. Discussed cannot rule out coronary artery disease. Cardiology Fellow recommended patient follow-up for outpatient stress test once psychiatrically stable. Did not feel it was necessary for patient to have this done during this psychiatric hospitalization unless any acute change.      Patient was seen and discussed with Dr. Jean-Baptiste. Repeat Troponin level at 2200 unremarkable. Patient is now chest pain free. VSS stable other than elevated BP.     Will obtain ECHO to assess for any acute wall motion abnormalities. Will send message to PCP pending ECHO results to assist with follow-up for OP stress test. Cardiology referral has already been placed. Please notify medicine with any new or worsening chest pain.    2. Anemia/Thrombocytopenia:   Patient with Hgb 11.1 on 10/6, improved to 12.2 today. Hemoglobin appears at baseline (11-12). Plt count improving from 127->133 today. BL platelets ~180-190. Likely thrombocytopenia is 2/2 BM suppression in setting of alcohol abuse.   - Follow-up with PCP for follow-up CBC within 7 days of discharge for repeat CBC  - Please notify medicine if any signs or symptoms of bleeding; would obtain CBC      3. Elevated blood pressure: In setting of psychiatric decompensation w/o hx of HTN or on antihypertensives. BP currently -160. Continuing to score 4 on MSSA. Has not required BZDs.   - Start amlodipine 5mg daily today  - Will continue to monitor BP     Medicine will follow-up on ECHO results and BP monitoring. Please call medicine if any additional questions or concerns or recurrent chest pain.     Raisa Peng,  ANNA  Hospitalist Service  Pager [phone]

## 2019-10-11 NOTE — PROGRESS NOTES
Pt has been up and about and involved.Pt attending groups and eating well at meals. Pt with elevated BP. Has been trending up. Pt started on BP med.Pt mood is bright. Denies feeling suicidal. Pt MSSA score low at 4. Pt has no complaints of chest pain,shortness of breath ect.

## 2019-10-11 NOTE — PROGRESS NOTES
[facility], [place]   Psychiatric Progress Note        Interim History:   The patient's care was discussed with the treatment team during the daily team meeting and/or staff's chart notes were reviewed.  Staff report patient is social, bright and fully engaged. Reported moderated dep and anx but affect is incongruent. He denied SI and JOSE. Visible and attending groups. Eating and sleeping well. Somatic, CP addressed by IM team and medical workup was benign. Independent with ADL. No overt psychosis, ranjeet or confusion.     The patient was bright and fully engaged. Noted that depression resolving and denied anx and SI. He is concerned about relapsing and hopes to discharge to [facility] directly. He denied hallucinations and rumination resolving. Sleep and appetite are intact. CP resolved, no other physical complaints and tolerating medications well.     Discussed medications and care plan.        Medications:       amLODIPine  5 mg Oral Daily     folic acid  1 mg Oral Daily     gabapentin  300 mg Oral TID     multivitamin w/minerals  1 tablet Oral Daily     omeprazole  20 mg Oral Daily     sertraline  50 mg Oral Daily     traZODone  50 mg Oral At Bedtime     vitamin B1  100 mg Oral Daily          Allergies:     Allergies   Allergen Reactions     No Known Allergies      Seasonal Allergies           Labs:     Recent Results (from the past 24 hour(s))   EKG 12-lead, complete    Collection Time: 10/10/19  3:44 PM   Result Value Ref Range    Interpretation ECG Click View Image link to view waveform and result    Troponin I    Collection Time: 10/10/19  3:51 PM   Result Value Ref Range    Troponin I ES <0.015 0.000 - 0.045 ug/L   Glucose by meter    Collection Time: 10/10/19  5:02 PM   Result Value Ref Range    Glucose 94 70 - 99 mg/dL   EKG 12-lead, complete    Collection Time: 10/10/19  5:52 PM   Result Value Ref Range    Interpretation ECG Click View Image link to view waveform and result     Glucose by meter    Collection Time: 10/10/19  9:23 PM   Result Value Ref Range    Glucose 238 (H) 70 - 99 mg/dL   Troponin I    Collection Time: 10/10/19 10:05 PM   Result Value Ref Range    Troponin I ES <0.015 0.000 - 0.045 ug/L   Hemoglobin A1c    Collection Time: 10/10/19 10:05 PM   Result Value Ref Range    Hemoglobin A1C 4.6 0 - 5.6 %   Glucose by meter    Collection Time: 10/11/19  7:54 AM   Result Value Ref Range    Glucose 97 70 - 99 mg/dL          Psychiatric Examination:     Vitals:    10/10/19 1614 10/10/19 2005 10/11/19 0750 10/11/19 1232   BP: (!) 147/87 127/83 (!) 163/90 137/83   Pulse: 84 93 94 93   Resp:  16 17    Temp: 99.4  F (37.4  C) 99.4  F (37.4  C) 97.9  F (36.6  C)    TempSrc: Tympanic Tympanic Oral    SpO2: 94% 94% 96%    Weight:       Height:                         Sitting Orthostatic BP: 163/90      Sitting Orthostatic Pulse: 94 bpm      Standing Orthostatic BP: 148/90      Standing Orthostatic Pulse: 101 bpm       Weight is 239 lbs 9.6 oz  Body mass index is 36.43 kg/m .    Appearance: awake, alert, appeared as age stated, well groomed and no apparent distress  Attitude:  cooperative  Eye Contact:  good  Mood:  depressed and better  Affect:  appropriate and in normal range, full range and reactive  Speech:  clear, coherent and normal prosody  Psychomotor Behavior:  no evidence of tardive dyskinesia, dystonia, or tics and intact station, gait and muscle tone  Throught Process:  linear and goal oriented  Associations:  no loose associations  Thought Content:  no evidence of suicidal ideation or homicidal ideation and no evidence of psychotic thought  Insight:  fair  Judgement:  intact  Oriented to:  time, person, and place  Attention Span and Concentration:  intact  Recent and Remote Memory:  intact         Precautions:     Behavioral Orders   Procedures     Code 2     For xray only     Routine Programming     As clinically indicated     Status 15     Every 15 minutes.     Suicide  precautions     Patients on Suicide Precautions should have a Combination Diet ordered that includes a Diet selection(s) AND a Behavioral Tray selection for Safe Tray - with utensils, or Safe Tray - NO utensils            Diagnoses:     1.  Major depressive disorder, recurrent, severe without psychotic features.   2.  Alcohol use disorder, severe.   3.  Methamphetamine use disorder.   4.  Cannabis use disorder, severe.   5.  Rule out substance-induced mood disorder.          Plan:     Medications:  1.  Zoloft was continued at 50 mg daily with a goal to gradually titrate as tolerated pending clinical response.   2.  Gabapentin continued at 300 mg t.i.d.   3.  The patient was placed on alcohol withdrawal protocol including MSSA, Valium p.r.n., multivitamin, folic acid and thiamine.  The patient has maintained low withdrawal scores and completed detox uneventfully.   4.  Trazodone continued at 50 mg at bedtime.   5.  PRN hydroxyzine for anxiety will be offered.     Legal Status and Disposition:  -- volunt.   -- The patient has completed Rule 25 assessment.  He is awaiting placement at residential [facility].  He is at very high risk for relapsing if discharged to the community and will likely need [id number] referral to treatment.  Discharge will be granted once the patient establishes mood stabilization, remission of suicidal ideation, and establishes safety in the community.

## 2019-10-12 NOTE — PROGRESS NOTES
Pt has been up and about. Pt present at breakfast, ate well. Pt is social. Pt attends groups. During free time Pt has been noted drawing. Pt is pleasant.

## 2019-10-12 NOTE — PROGRESS NOTES
Brief Medicine Note    Internal Medicine following for follow-up of Echocardiogram and BP monitoring.     ECHO reviewed from 10/11  Interpretation Summary:   Normal biventricular size and systolic function. LVEF 60-65%. No diagnostic  wall motion abnormality.  Mild left ventricular hypertrophy.  No significant valve dysfunction.    Started on Amlodipine 5mg daily yesterday morning for elevated BP. BP have improved to 123/82 today. Patient reports feeling well w/o chest pain or pressure.     Today's vital signs, medications, and nursing notes were reviewed.    A/P:  - Patient to follow-up with PCP for outpatient stress test. Staff message was sent to patient's PCP Mandy Pabon PA-C to assist with scheduling  - Patient to follow-up with Cardiology as outpatient. Referral placed in discharge navigator  - Continue Amlodipine 5mg daily for blood pressure. PCP to consider changing to ACE/ARB given mild LVH. Will hold for now as will not be able to follow for follow-up labs including K/Cr and to assess for side effects      Medicine will continue to monitor BP peripherally. Please page with any additional questions or concerns.     Raisa Peng PA-C  Hospitalist Service  Pager [phone]

## 2019-10-13 NOTE — PROGRESS NOTES
10/12/19 3073   General Information   Art Directive other (see comments)   AT directive is to identify a minimum of three current recent feelings or needs using a feelings and needs inventory handout. Then pts were asked to assign a color to each feeling or need and create artwork using lines, shapes and colors. Goals of directive: emotional expression   Pt was a positive participant, focused on task for the full duration of group.

## 2019-10-13 NOTE — PROGRESS NOTES
Pt active in milieu, social with staff and peers. Full range affect, mood is calm. Pt denies SI/SIB. [facility]

## 2019-10-13 NOTE — PROGRESS NOTES
Brief Medicine Note    Medicine following BP peripherally. BP improved with initiation of amlodipine 5mg daily to -130s.     A/P:  - Patient to follow-up with PCP within 7 days of discharge for BP monitoring and for outpatient stress test. Staff message was sent to patient's PCP Mandy Pabon PA-C to assist with scheduling  - Patient to follow-up with Cardiology as outpatient. Referral placed in discharge navigator  - Continue Amlodipine 5mg daily for blood pressure. PCP to consider changing to ACE/ARB given mild LVH. Will hold for now as will not be able to follow for follow-up labs including K/Cr and to assess for side effects     Please call medicine if patient's SBP is <110 or >150 or if chest pain recurs     No further medical intervention is required at this time. Medicine signing off. Please feel free to call with any questions.     Raisa Peng PA-C  Hospitalist Service  Pager [phone]

## 2019-10-13 NOTE — PROGRESS NOTES
Pt up this AM. Showered. Pt presently eating breakfast, social with peers. Pt is med compliant. Pt. Denies feeling suicidal. Pt is requesting a AA [employer], for to continue his AA recovery program.

## 2019-10-13 NOTE — PLAN OF CARE
"48 Hour Nursing Observation     Mental Health  Suicide ideation    Admit Date: 10/8/2019    Length of Stay: 4    Patient evaluation continues. Assessed mood,anxiety,thoughts and behavior. Patient progressing towards goals. Patient is encouraged to participate in groups and assisted to develop healthy coping skills.   /79   Pulse 91   Temp 99.1  F (37.3  C) (Tympanic)   Resp 16   Ht 1.727 m (5' 8\")   Wt 108.7 kg (239 lb 9.6 oz)   SpO2 94%   BMI 36.43 kg/m      Mood: calm    Patient denies depression  and reports minimal anxiety     Affect: full range    Sleep: good, better with CPAP    Appetite: Good    SI: denies    Group participation: meaningful participation, social with staff and peers    ADL's: indempendent    Discharge planning: to [facility], door to door    Refer to daily team meeting notes for individualized plan of care. Nursing will continue to assess.    *Scale is 1-10 and 10 is the worst.          "

## 2019-10-14 NOTE — PROGRESS NOTES
[facility], [place]   Psychiatric Progress Note        Interim History:   The patient's care was discussed with the treatment team during the daily team meeting and/or staff's chart notes were reviewed.  Staff report patient is social, bright and fully engaged. Rated dep and anx low. Future oriented and denied SI and JOSE. fearful of relapsing if discharge to the community and hopes to be released to directly to CD program. Eating and sleeping well. No overt psychosis, ranjeet or confusion. Active int he milieu and attending groups.  Independent with ADL.     The patient was bright and engaged. Noted that dep and anx resolved. Concerned about relapsing and seeks door to door referral to CD treatment. Sponsor visited yesterday. Have some difficulty falling sleep due to neuropathy. Noted that he was on 1200 mg of Gabapentin TID at one point. No SI or JOSE. No hallucinations or racing thoughts. Eating well. Tolerating medications well.     Discussed medications and care plan.        Medications:       amLODIPine  5 mg Oral Daily     folic acid  1 mg Oral Daily     gabapentin  300 mg Oral TID     multivitamin w/minerals  1 tablet Oral Daily     omeprazole  20 mg Oral Daily     sertraline  50 mg Oral Daily     traZODone  50 mg Oral At Bedtime     vitamin B1  100 mg Oral Daily          Allergies:     Allergies   Allergen Reactions     No Known Allergies      Seasonal Allergies           Labs:     Recent Results (from the past 24 hour(s))   Glucose by meter    Collection Time: 10/13/19  5:17 PM   Result Value Ref Range    Glucose 108 (H) 70 - 99 mg/dL   Glucose by meter    Collection Time: 10/13/19 10:34 PM   Result Value Ref Range    Glucose 123 (H) 70 - 99 mg/dL   Glucose by meter    Collection Time: 10/14/19  7:37 AM   Result Value Ref Range    Glucose 109 (H) 70 - 99 mg/dL          Psychiatric Examination:     Vitals:    10/12/19 1601 10/13/19 0726 10/13/19 1629 10/14/19 0732   BP: 129/79  137/79 138/79 138/86   Pulse: 91 80 85 97   Resp:  16 16 16   Temp: 99.1  F (37.3  C) 97.6  F (36.4  C) 99.2  F (37.3  C) 97.7  F (36.5  C)   TempSrc: Tympanic Oral Tympanic Oral   SpO2:  95% 96% 97%   Weight:  108.2 kg (238 lb 8 oz)     Height:                         Sitting Orthostatic BP: 163/90      Sitting Orthostatic Pulse: 94 bpm      Standing Orthostatic BP: 148/90      Standing Orthostatic Pulse: 101 bpm       Weight is 238 lbs 8 oz  Body mass index is 36.26 kg/m .    Appearance: awake, alert, appeared as age stated, well groomed and no apparent distress  Attitude:  cooperative  Eye Contact:  good  Mood:  better  Affect:  appropriate and in normal range, full range and reactive  Speech:  clear, coherent and normal prosody  Psychomotor Behavior:  no evidence of tardive dyskinesia, dystonia, or tics and intact station, gait and muscle tone  Throught Process:  linear and goal oriented  Associations:  no loose associations  Thought Content:  no evidence of suicidal ideation or homicidal ideation and no evidence of psychotic thought  Insight:  fair  Judgement:  intact  Oriented to:  time, person, and place  Attention Span and Concentration:  intact  Recent and Remote Memory:  intact         Precautions:     Behavioral Orders   Procedures     Code 2     For xray only     Routine Programming     As clinically indicated     Status 15     Every 15 minutes.     Suicide precautions     Patients on Suicide Precautions should have a Combination Diet ordered that includes a Diet selection(s) AND a Behavioral Tray selection for Safe Tray - with utensils, or Safe Tray - NO utensils            Diagnoses:     1.  Major depressive disorder, recurrent, severe without psychotic features.   2.  Alcohol use disorder, severe.   3.  Methamphetamine use disorder.   4.  Cannabis use disorder, severe.   5.  Rule out substance-induced mood disorder.          Plan:     Medications:  1.  Zoloft was continued at 50 mg daily with a goal  to gradually titrate as tolerated pending clinical response.   2.  Gabapentin continued and titrated to 500 mg t.i.d.   3.  The patient was placed on alcohol withdrawal protocol including MSSA, Valium p.r.n., multivitamin, folic acid and thiamine.  The patient has maintained low withdrawal scores and completed detox uneventfully.   4.  Trazodone continued at 50 mg at bedtime.   5.  PRN hydroxyzine for anxiety will be offered.     Legal Status and Disposition:  -- volunt.   -- The patient has completed Rule 25 assessment.  He is awaiting placement at residential [facility].  He is at very high risk for relapsing if discharged to the community and will likely need [id number] referral to treatment.  Discharge will be granted once the patient establishes mood stabilization, remission of suicidal ideation, and establishes safety in the community.

## 2019-10-14 NOTE — PROGRESS NOTES
GORDON with [facility] requesting update on status of Rule 25.    Update: Phone call with Pedro Cruz who is processing the Rule 25. She indicated that patient was employed and making $17.00 per hour. Writer reported that patient found out he lost his job while here in the hospital. Pedro requested a letter from employer stating his employment has been terminated.She will send another ASPEN for patient to sign that is open to any facilty. Patient was notified and he reported he will call his employer to request letter be faxed directly to Pedro. [employer]'s contact info: [phone], Fax: [phone].      received ASPEN from [employer] and had patient sign and faxed back to [employer].    Patient called his employer and found out he has [employer] insurance until the end of the month. He reported that he needs to get into a program using his insurance and when it lapses, then apply for Rule 25 funding. Writer received voice mail from [employer] with the same message.    Rener GORDON with [employer], manager of [employer] requesting call back about possible admission for patient.    Writer faxed referrals to the following treatment facilities:  [facility], [place], [facility], [place], [facility].

## 2019-10-14 NOTE — PLAN OF CARE
Patient was calm, social and attending group activities. He denies any anxiety, depression and SI/SIB. Patient is hoping to discharge to treatment facility.

## 2019-10-14 NOTE — PLAN OF CARE
Problem: OT General Care Plan  Goal: OT Goal 1  Description  Will consistently attend OT groups and improve coping strategies with increasing repertoire of ideas and understanding of symptoms of when to use the strategies.     Note:   Attended 2 of 2 OT groups. He is quick to be involved, pleasant and insightful to ideas that are helpful for him. He talked about the value of using DBT and his AA skills which he feels he has renewed the techniques of both here. He stated feeling better since admission. Affect was bright with interactions. He lambert peers into conversations and offered subtle support. He problem solved ideas for details on task that adapted his technique.  He participated in a group focused on the topic of identifying progress noted since admission, coping strategies that were helpful in the past, what is helping currently and setting a goal for the day.  He was actively involved, used humor and also volunteered to speak up first.

## 2019-10-14 NOTE — PROGRESS NOTES
10/13/19 2200   Therapeutic Recreation   Type of Intervention structured groups   Activity game   Response Participates, initiates socially appropriate   Hours 1     Pt participated in Therapeutic Recreation group with focus on stress reduction, creative expression, and leisure participation. Engaged and cooperative in a  recreational activity via a group drawing game. Pt participated throughout entire duration of the group. Pt added to group discussion, sociable, and was appropriate with interactions. Showed progress in session goals. Pt mood was calm. Appropriately shared sense of humor with peers during group and appeared to brighten with social interaction.
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
## 2019-10-15 NOTE — PROGRESS NOTES
"   10/15/19 1600   General Information   Art Directive other (see comments)   AT directive was to create an image of a safe place and to identify five items within safe place that represent each of the five senses. Goals of directive: trauma containment, emotional expression, mindfulness.  Pt was a positive participant, focused on task for the full duration of group.  Pt lambert an image of a nest and shared with group.  Pt described the nest as 3B and the eggs as the patients on the unit. Pt lambert the eggs with cracks and described himself and other patients as recovering and \"coming out of our shells\"   Pt talked about creating art again post discharge as a form of self expression.  Pts mood was calm.  "

## 2019-10-15 NOTE — PROGRESS NOTES
"Pt temp this shift:  100.0 tympanic, later 99.4 oral.  Pt endorses generalized body aches and feeling somewhat \"blaah.\"  Pt was given influenza vaccine last evening and was asymptomatic at that time.  PRN Tylenol 650 mg for aches, PRN Benadryl 25 mg at HS.  "

## 2019-10-15 NOTE — PROGRESS NOTES
Behavioral Health  Note    Behavioral Health  Spirituality Group Note    UNIT 3B    Name: Aydin Reilly YOB: 1960   MRN: [id number] Age: 59 year old      Patient attended -led group, which included discussion of spirituality, coping with illness and building resilience.    Patient attended group for 1.0 hrs.    The patient actively participated in group discussion and patient demonstrated an appreciation of topic's application for their personal circumstances.    Torrie Napoles  Chaplain Resident  Pager [phone]

## 2019-10-15 NOTE — PROGRESS NOTES
Phone call from Nehemias with [facility] ([phone]) who requested additional information regarding funding for treatment. She reported that the director of the program will review the assessment and have an answer on acceptance by tomorrow morning.    Phone call with Ed from [facility] to inquire about making a referral to the program. He reported that [facility] does not pay for the lodging portion of the program which costs $105 per day. The program is not dual diagnosis which the Rule 25 was recommending. Patient was notified. He is unable to pay for the lodging out of pocket. There is a two week wait for the program at which time the funding from the [facility] would kick in at 100%.    Follow-up phone calls were made to the following facilities:  [facility]: [place] at [phone]  [facility]: [place] at [phone]  [facility]: [phone]; they did not receive referral. Sent to wrong number. Referral was faxed to [phone]  [facility]: [place] at [phone]    Writer faxed new referral to [facility] at Fax: # [phone].

## 2019-10-15 NOTE — PROGRESS NOTES
Pt had a good evening. Pt denies all MH symptoms and states that he feels great. Pt was social with other pts and appeared to enjoy playing games with his peers. No SI. No further concerns.

## 2019-10-15 NOTE — PLAN OF CARE
Problem: OT General Care Plan  Goal: OT Goal 1  Description  Will consistently attend OT groups and improve coping strategies with increasing repertoire of ideas and understanding of symptoms of when to use the strategies.     Note:   Attended 2 of 2 OT groups. He worked independently and also assisted a peer with supplies. Work was done at a constant pace and with planning and organized ideas. He was social while working and supportive of others in a kind manner. He participated in the afternoon OT group focused on the Process of Recovery, identifying healthy perspectives and ways in managing one's positive growth. He added comments he stated gains from AA. He stated plans to explore  school programs to be a counselor in the area of CD. He appeared engaged, comfortable and calm.

## 2019-10-15 NOTE — PLAN OF CARE
Patient was calm, social and attending group activities. He denies symptoms of anxiety, depression and SI/SIB.

## 2019-10-16 NOTE — PLAN OF CARE
Problem: OT General Care Plan  Goal: OT Goal 1  Description  Will consistently attend OT groups and improve coping strategies with increasing repertoire of ideas and understanding of symptoms of when to use the strategies.     Note:   Attended 2 of 2 OT groups. Work continues to be organized with pt taking time to plan out details when encouraged. He used humor in a pleasant manner. He participated in an activity  focused on stress management, identifying areas on one's life that are balanced and areas chosen to focus on by setting helpful goals. He identified positive assets of self in the context of the activity and offered positive comments to peers. He stated being pleased he is here getting tx for his depression and is finding it so helpful. He stated appreciation of the help he is receiving here.

## 2019-10-16 NOTE — PROGRESS NOTES
Pleasant,eating well, med compliant, social, involved in groups, denying suicidality. Awaiting CD placement.

## 2019-10-16 NOTE — PROGRESS NOTES
Patient attended a 60 minute psychoeducation group on the Cognitive Model. The relationship between thoughts, emotions and behaviors was discussed. Participants discussed several scenarios and ways to change irrational thoughts into positive and rational thoughts. They also discussed the new emotion and behavior that came from positive, rational thoughts. Patient was an active participant who shared good examples.
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
## 2019-10-16 NOTE — PROGRESS NOTES
"Pt active in milieu, social with staff and peers. Full range affect, mood is calm. Pt feels ready for discharge to CD when his funding \"goes through\".   "

## 2019-10-17 NOTE — PROGRESS NOTES
10/16/19 2107   Therapeutic Recreation   Type of Intervention structured groups   Activity game   Response Participates, initiates socially appropriate   Hours 1     Pt participated in Therapeutic Recreation group with focus on stress reduction, socialization, cognitive processes, and leisure participation. Engaged and cooperative in group recreational intervention; word puzzles. Pt participated throughout entire duration of the group. Showed progress in session goals. Pt added to the group discussion during the activity and seemed to brighten with socialization.

## 2019-10-17 NOTE — DISCHARGE INSTRUCTIONS
Behavioral Discharge Planning and Instructions      Summary:  You were admitted on 10/8/2019  due to Depression and Suicidal Ideations.  You were treated by Dr. Daniela Reynoso MD and discharged on 10/1/2019 from [facility] to [facility] Treatment Program [facility]    [facility]  [address] [place]  [address]  [phone]    Principal Diagnosis:   1.  Major depressive disorder, recurrent, severe without psychotic features.   2.  Alcohol use disorder, severe.   3.  Methamphetamine use disorder.   4.  Cannabis use disorder, severe.   5.  Rule out substance-induced mood disorder.    Health Care Follow-up Appointments:   PCP: Mandy Pabon  [facility]  [address] [place]  [address]  Phone: [phone]  Appointment: Wednesday, October 23 at 1:10 pm  (Follow up for BP monitoring and outpatient stress test)    Pt will schedule follow up appointments (mental health) while he is in treatment.  He is not certain where he will be living after he completes treatment.  Attend all scheduled appointments with your outpatient providers. Call at least 24 hours in advance if you need to reschedule an appointment to ensure continued access to your outpatient providers.   Major Treatments, Procedures and Findings:  You were provided with: a psychiatric assessment, assessed for medical stability, medication evaluation and/or management, group therapy, milieu management and medical interventions    Symptoms to Report: feeling more aggressive, increased confusion, losing more sleep, mood getting worse or thoughts of suicide    Early warning signs can include: increased depression or anxiety sleep disturbances increased thoughts or behaviors of suicide or self-harm  increased unusual thinking, such as paranoia or hearing voices    Safety and Wellness:  Take all medicines as directed.  Make no changes unless your doctor suggests them.  Follow treatment recommendations.   "Refrain from alcohol and non-prescribed drugs.  If there is a concern for safety, call 911.    Resources:   Crisis Intervention: 663.961.9192 or 541-674-4255 (TTY: 585.895.7725).  Call anytime for help.  National [place] on Mental Illness (www.[place].bridger.org): 836.252.3120 or 586-512-9618.  Alcoholics Anonymous (www.alcoholics-anonymous.org): Check your phone book for your local chapter.  Suicide Awareness Voices of Education (SAVE) (www.save.org): 274-495-IGNH (8582)  National Suicide Prevention Line (www.mentalhealthmn.org): 187-298-OKXA (8947)  Mental Health Consumer/Survivor Network of [place] (www.mhcsn.net): 806.340.1563 or 523-274-4475  Mental Health Association of [place] (www.mentalhealth.org): 510.696.7633 or 136-307-7767  Self- Management and Recovery Training., [employer]-- Toll free: 769.602.9214  www.FiFully.PocketSuite  [facility] Crisis Response 527-657-0620  Text 4 Life: txt \"LIFE\" to 98509 for immediate support and crisis intervention      The treatment team has appreciated the opportunity to work with you.     If you have any questions or concerns our unit number is [phone].    "
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
## 2019-10-17 NOTE — PROGRESS NOTES
Attended 2 of 2 OT groups. He was social, pleasant, supportive of others and quick to be involved. He stated appreciation of what he learned and gained being here in the hospital and feels he has made positive strides in a healthy perspective. Work was organized and well planned out.  In the afternoon OT session he participated in an activity focused on identifying helpful ideas for calming using the 5 senses, and practicing a grounding technique with this focus. He offered resourceful ideas that are helpful for him. He was not charged for the 2nd group as he left early to prepare for discharge.

## 2019-10-17 NOTE — PROGRESS NOTES
PRN Vistaril 25 mg x 1 for anxiety - pt reports as effective  PRN Tylenol 650 mg x 1 for headache - pt reports as effective  Temp 99.2 tympanic at start of shift.  Follow up later in the shift - 97.7 oral.  BP 92 (before dinner) and 121 (after eating evening snacks)

## 2019-10-17 NOTE — PROGRESS NOTES
"   10/16/19 2107   Behavioral Health   Hallucinations denies / not responding to hallucinations   Thinking intact   Orientation person: oriented;place: oriented;date: oriented;time: oriented   Memory baseline memory   Insight admits / accepts   Judgement intact   Eye Contact at examiner   Affect full range affect   Mood mood is calm   Physical Appearance/Attire attire appropriate to age and situation   Hygiene well groomed   Suicidality other (see comments)  (denies)   1. Wish to be Dead (Past Month) No   2. Non-Specific Active Suicidal Thoughts (Past Month) No   Self Injury other (see comment)  (none)   Elopement   (none)   Activity other (see comment)  (social in the milieu)   Speech clear;coherent   Medication Sensitivity no stated side effects;no observed side effects   Psychomotor / Gait balanced;steady   Therapeutic Recreation   Type of Intervention structured groups   Activity game   Response Participates, initiates socially appropriate   Hours 1   Psycho Education   Type of Intervention 1:1 intervention   Response participates, initiates socially appropriate   Hours 0.5   Treatment Detail check in   Activities of Daily Living   Hygiene/Grooming independent   Oral Hygiene independent   Dress independent   Laundry   (NA)   Room Organization independent     Pt said that he feels happy and great. Pt said that he feels he is ready to go and that he has developed his skills here. Pt said that he got a little \"sandi eyed\" when a pt complimented him and his personality today. Pt said that he feels he is coming back to who he once was. Pt denied any MH symptoms and any SI. Pt is social with other pts. Pt attended community meeting. No further concerns.   "

## 2019-10-18 NOTE — LETTER
[place] CARE COORDINATION  [facility]  [address], [address]  [place]  [address]    October 29, 2019    Aydin Reilly  [address] [place] [id number]      Dear Aydin,    I am a Social Work Clinic Care Coordinator who works with Mandy Pabon PA-C at [facility] I have been trying to reach you recently to introduce Clinic Care Coordination and to see if there was anything I might assist you with.  I wanted to provide you with  a description of clinic care coordination (below).    The clinic care coordinator is a registered nurse and/or  who understand the health care system. The goal of clinic care coordination is to help you manage your health and improve access to the [facility] system in the most efficient manner. The registered nurse can assist you in meeting your health care goals by providing education, coordinating services, and strengthening the communication among your providers. The  can assist you with financial, behavioral, psychosocial, chemical dependency, counseling, and/or psychiatric resources.    I will be transitioning out of the clinic at the end of this week. If you would like to talk to a Care Coordinator, please contact RN Care Coordinator Khris Brewer at [phone]. Thank you.    We at [facility] are focused on providing you with the highest-quality healthcare experience possible and that all starts with you.     Sincerely,       [facility]  WINDY Flores, Social Work Care Coordinator  [facility]   [address]  [address]  [email or web address]   Office: [phone]  Gender Pronouns: she/her  Employed by [employer]

## 2019-10-18 NOTE — PROGRESS NOTES
"Clinic Care Coordination Contact  [facility]/[employer]    Clinical Data: Patient was hospitalized at [facility] (inpatient psych) from 10/08/19 to 10/17/19 with diagnoses of:    1.  Major depressive disorder, recurrent, severe without psychotic features.   2.  Alcohol use disorder, severe.   3.  Methamphetamine use disorder.   4.  Cannabis use disorder, severe.   5.  Rule out substance-induced mood disorder.    Per chart review, patient has a follow up visit scheduled at the [facility] on 10/23/19.     Per patient's  After Visit Summary, patient was discharged to the \"[facility]\".    Outreach attempted x 1.  Writer left a message on patient's voicemail with call back information and requested return call.    Plan: Await patient's return call. If no response, Social Work Care Coordinator will try to reach patient again in 2-4 business days.      [facility]  WINDY Flores, Social Work Care Coordinator  [facility] and [address]  [address]  [email or web address]   Office: [phone]  Gender Pronouns: she/her  Employed by [employer]              Addendum  10/28/19  Clinic Care Coordination Contact  [facility]/Vo[employer]    Data: Patient has not called writer since writer left a voice message on 10/18/19. Per chart review, patient's 10/23/19 clinic office visit was cancelled. No new clinic office visit has been rescheduled at this time.     Outreach attempted x 2.  Writer left a message on patient's voicemail with call back information and requested return call.    Plan: Care Coordinator will send care coordination introduction letter with care coordinator contact information and explanation of care coordination services via mail.       [facility]  WINDY Flores, Social Work Care Coordinator  [facility]  " [facility] and [address]  [address]  [email or web address]   Office: [phone]  Gender Pronouns: she/her  Employed by [employer] [facility]        Addendum  10/29/19  Clinic Care Coordination Contact  [facility]/Kaela  Clinical Data: Care Coordinator Outreach    Outreach attempted x 3.  Social Work Care Coordinator sent a  care coordination introduction letter with care coordinator contact information and explanation of care coordination services via mail to patient today.     Plan: Social Work Care Coordinator will plan no further outreaches at this time but remains available if patient's status changes or if a Care Coordination referral is received.    [facility]  WINDY Flores, Social Work Care Coordinator  [facility] and [address]  [address]  [email or web address]   Office: [phone]  Gender Pronouns: she/her  Employed by [facility]                         .

## 2019-12-27 NOTE — TELEPHONE ENCOUNTER
Routing to provider- Please see prior message from pt.    Wants generic Viagra.     Please let Nurse Triage know if we should do anything for follow up. Thank you!

## 2020-01-01 ENCOUNTER — ANESTHESIA (OUTPATIENT)
Dept: MEDSURG UNIT | Facility: CLINIC | Age: 60
End: 2020-01-01

## 2020-01-01 ENCOUNTER — MYC MEDICAL ADVICE (OUTPATIENT)
Dept: FAMILY MEDICINE | Facility: CLINIC | Age: 60
End: 2020-01-01

## 2020-01-01 ENCOUNTER — TELEPHONE (OUTPATIENT)
Dept: FAMILY MEDICINE | Facility: CLINIC | Age: 60
End: 2020-01-01

## 2020-01-01 ENCOUNTER — VIRTUAL VISIT (OUTPATIENT)
Dept: FAMILY MEDICINE | Facility: CLINIC | Age: 60
End: 2020-01-01
Payer: COMMERCIAL

## 2020-01-01 ENCOUNTER — OFFICE VISIT (OUTPATIENT)
Dept: FAMILY MEDICINE | Facility: CLINIC | Age: 60
End: 2020-01-01
Payer: COMMERCIAL

## 2020-01-01 ENCOUNTER — MYC REFILL (OUTPATIENT)
Dept: FAMILY MEDICINE | Facility: CLINIC | Age: 60
End: 2020-01-01

## 2020-01-01 ENCOUNTER — VIRTUAL VISIT (OUTPATIENT)
Dept: SURGERY | Facility: CLINIC | Age: 60
End: 2020-01-01
Attending: PHYSICIAN ASSISTANT
Payer: COMMERCIAL

## 2020-01-01 ENCOUNTER — VIRTUAL VISIT (OUTPATIENT)
Dept: PHYSICAL THERAPY | Facility: CLINIC | Age: 60
End: 2020-01-01
Attending: PHYSICIAN ASSISTANT
Payer: COMMERCIAL

## 2020-01-01 ENCOUNTER — ANESTHESIA EVENT (OUTPATIENT)
Dept: MEDSURG UNIT | Facility: CLINIC | Age: 60
End: 2020-01-01

## 2020-01-01 ENCOUNTER — TELEPHONE (OUTPATIENT)
Dept: SURGERY | Facility: CLINIC | Age: 60
End: 2020-01-01

## 2020-01-01 ENCOUNTER — APPOINTMENT (OUTPATIENT)
Dept: CARDIOLOGY | Facility: CLINIC | Age: 60
End: 2020-01-01
Attending: INTERNAL MEDICINE
Payer: COMMERCIAL

## 2020-01-01 ENCOUNTER — HOSPITAL ENCOUNTER (INPATIENT)
Facility: CLINIC | Age: 60
LOS: 3 days | Discharge: HOME | End: 2020-04-03
Attending: EMERGENCY MEDICINE | Admitting: INTERNAL MEDICINE
Payer: COMMERCIAL

## 2020-01-01 ENCOUNTER — OFFICE VISIT (OUTPATIENT)
Dept: FAMILY MEDICINE | Facility: CLINIC | Age: 60
End: 2020-01-01
Payer: MEDICAID

## 2020-01-01 ENCOUNTER — APPOINTMENT (OUTPATIENT)
Dept: GENERAL RADIOLOGY | Facility: CLINIC | Age: 60
End: 2020-01-01
Attending: EMERGENCY MEDICINE
Payer: COMMERCIAL

## 2020-01-01 ENCOUNTER — VIRTUAL VISIT (OUTPATIENT)
Dept: SURGERY | Facility: CLINIC | Age: 60
End: 2020-01-01
Payer: COMMERCIAL

## 2020-01-01 VITALS
WEIGHT: 279 LBS | SYSTOLIC BLOOD PRESSURE: 120 MMHG | RESPIRATION RATE: 14 BRPM | DIASTOLIC BLOOD PRESSURE: 74 MMHG | HEART RATE: 76 BPM | HEIGHT: 68 IN | BODY MASS INDEX: 42.28 KG/M2 | TEMPERATURE: 98 F

## 2020-01-01 VITALS
HEART RATE: 98 BPM | SYSTOLIC BLOOD PRESSURE: 133 MMHG | WEIGHT: 264.2 LBS | OXYGEN SATURATION: 94 % | RESPIRATION RATE: 20 BRPM | BODY MASS INDEX: 40.17 KG/M2 | DIASTOLIC BLOOD PRESSURE: 84 MMHG | TEMPERATURE: 98.5 F

## 2020-01-01 VITALS
WEIGHT: 270 LBS | SYSTOLIC BLOOD PRESSURE: 136 MMHG | HEART RATE: 88 BPM | BODY MASS INDEX: 40.92 KG/M2 | HEIGHT: 68 IN | RESPIRATION RATE: 14 BRPM | TEMPERATURE: 98.9 F | DIASTOLIC BLOOD PRESSURE: 74 MMHG

## 2020-01-01 VITALS — BODY MASS INDEX: 38.95 KG/M2 | HEIGHT: 68 IN | WEIGHT: 257 LBS

## 2020-01-01 DIAGNOSIS — R45.851 SUICIDAL IDEATION: ICD-10-CM

## 2020-01-01 DIAGNOSIS — M79.89 LEG SWELLING: ICD-10-CM

## 2020-01-01 DIAGNOSIS — F41.1 GENERALIZED ANXIETY DISORDER: ICD-10-CM

## 2020-01-01 DIAGNOSIS — G60.9 IDIOPATHIC PERIPHERAL NEUROPATHY: ICD-10-CM

## 2020-01-01 DIAGNOSIS — M54.9 BACK PAIN WITH RADIATION: Primary | ICD-10-CM

## 2020-01-01 DIAGNOSIS — F10.10 ALCOHOL ABUSE: ICD-10-CM

## 2020-01-01 DIAGNOSIS — M54.41 CHRONIC BILATERAL LOW BACK PAIN WITH BILATERAL SCIATICA: ICD-10-CM

## 2020-01-01 DIAGNOSIS — N52.8 OTHER MALE ERECTILE DYSFUNCTION: ICD-10-CM

## 2020-01-01 DIAGNOSIS — G89.29 CHRONIC LEFT-SIDED LOW BACK PAIN WITH LEFT-SIDED SCIATICA: Primary | ICD-10-CM

## 2020-01-01 DIAGNOSIS — R10.9 CHRONIC ABDOMINAL PAIN: ICD-10-CM

## 2020-01-01 DIAGNOSIS — M54.42 LOW BACK PAIN DUE TO BILATERAL SCIATICA: ICD-10-CM

## 2020-01-01 DIAGNOSIS — G89.29 CHRONIC LEFT-SIDED LOW BACK PAIN WITHOUT SCIATICA: Primary | ICD-10-CM

## 2020-01-01 DIAGNOSIS — M54.16 LUMBAR RADICULOPATHY: ICD-10-CM

## 2020-01-01 DIAGNOSIS — F10.930 ALCOHOL WITHDRAWAL SYNDROME WITHOUT COMPLICATION (H): ICD-10-CM

## 2020-01-01 DIAGNOSIS — M54.9 BACK PAIN WITH RADIATION: ICD-10-CM

## 2020-01-01 DIAGNOSIS — G89.29 CHRONIC ABDOMINAL PAIN: ICD-10-CM

## 2020-01-01 DIAGNOSIS — E66.01 MORBID OBESITY (H): ICD-10-CM

## 2020-01-01 DIAGNOSIS — G47.33 OSA (OBSTRUCTIVE SLEEP APNEA): ICD-10-CM

## 2020-01-01 DIAGNOSIS — M79.644 BILATERAL THUMB PAIN: Primary | ICD-10-CM

## 2020-01-01 DIAGNOSIS — E66.812 CLASS 2 SEVERE OBESITY WITH BODY MASS INDEX (BMI) OF 35 TO 39.9 WITH SERIOUS COMORBIDITY (H): Primary | ICD-10-CM

## 2020-01-01 DIAGNOSIS — M54.42 CHRONIC LEFT-SIDED LOW BACK PAIN WITH LEFT-SIDED SCIATICA: Primary | ICD-10-CM

## 2020-01-01 DIAGNOSIS — K64.4 EXTERNAL HEMORRHOIDS: ICD-10-CM

## 2020-01-01 DIAGNOSIS — M54.42 CHRONIC BILATERAL LOW BACK PAIN WITH BILATERAL SCIATICA: ICD-10-CM

## 2020-01-01 DIAGNOSIS — I21.4 NSTEMI (NON-ST ELEVATED MYOCARDIAL INFARCTION) (H): ICD-10-CM

## 2020-01-01 DIAGNOSIS — I10 BENIGN ESSENTIAL HYPERTENSION: ICD-10-CM

## 2020-01-01 DIAGNOSIS — G56.23 CUBITAL TUNNEL SYNDROME, BILATERAL: ICD-10-CM

## 2020-01-01 DIAGNOSIS — G89.29 CHRONIC BILATERAL LOW BACK PAIN WITH BILATERAL SCIATICA: ICD-10-CM

## 2020-01-01 DIAGNOSIS — M54.41 LOW BACK PAIN DUE TO BILATERAL SCIATICA: ICD-10-CM

## 2020-01-01 DIAGNOSIS — F41.9 ANXIETY: Primary | ICD-10-CM

## 2020-01-01 DIAGNOSIS — F10.10 ALCOHOL ABUSE: Primary | ICD-10-CM

## 2020-01-01 DIAGNOSIS — E66.01 CLASS 2 SEVERE OBESITY WITH BODY MASS INDEX (BMI) OF 35 TO 39.9 WITH SERIOUS COMORBIDITY (H): Primary | ICD-10-CM

## 2020-01-01 DIAGNOSIS — N49.2 CELLULITIS, SCROTUM: Primary | ICD-10-CM

## 2020-01-01 DIAGNOSIS — M54.50 CHRONIC LEFT-SIDED LOW BACK PAIN WITHOUT SCIATICA: Primary | ICD-10-CM

## 2020-01-01 DIAGNOSIS — G47.00 INSOMNIA, UNSPECIFIED TYPE: ICD-10-CM

## 2020-01-01 DIAGNOSIS — M79.645 BILATERAL THUMB PAIN: Primary | ICD-10-CM

## 2020-01-01 LAB
ALBUMIN SERPL-MCNC: 3.2 G/DL (ref 3.4–5)
ALBUMIN SERPL-MCNC: 3.9 G/DL (ref 3.4–5)
ALP SERPL-CCNC: 69 U/L (ref 40–150)
ALP SERPL-CCNC: 89 U/L (ref 40–150)
ALT SERPL W P-5'-P-CCNC: 39 U/L (ref 0–70)
ALT SERPL W P-5'-P-CCNC: 45 U/L (ref 0–70)
ANION GAP SERPL CALCULATED.3IONS-SCNC: 12 MMOL/L (ref 3–14)
ANION GAP SERPL CALCULATED.3IONS-SCNC: 6 MMOL/L (ref 3–14)
AST SERPL W P-5'-P-CCNC: 39 U/L (ref 0–45)
AST SERPL W P-5'-P-CCNC: 58 U/L (ref 0–45)
BASOPHILS # BLD AUTO: 0 10E9/L (ref 0–0.2)
BASOPHILS NFR BLD AUTO: 0.2 %
BILIRUB SERPL-MCNC: 0.5 MG/DL (ref 0.2–1.3)
BILIRUB SERPL-MCNC: 0.8 MG/DL (ref 0.2–1.3)
BUN SERPL-MCNC: 11 MG/DL (ref 7–30)
BUN SERPL-MCNC: 12 MG/DL (ref 7–30)
CALCIUM SERPL-MCNC: 10 MG/DL (ref 8.5–10.1)
CALCIUM SERPL-MCNC: 8.1 MG/DL (ref 8.5–10.1)
CHLORIDE SERPL-SCNC: 101 MMOL/L (ref 94–109)
CHLORIDE SERPL-SCNC: 110 MMOL/L (ref 94–109)
CO2 SERPL-SCNC: 21 MMOL/L (ref 20–32)
CO2 SERPL-SCNC: 23 MMOL/L (ref 20–32)
CREAT SERPL-MCNC: 0.88 MG/DL (ref 0.66–1.25)
CREAT SERPL-MCNC: 0.94 MG/DL (ref 0.66–1.25)
DIFFERENTIAL METHOD BLD: ABNORMAL
EOSINOPHIL # BLD AUTO: 0 10E9/L (ref 0–0.7)
EOSINOPHIL NFR BLD AUTO: 0.3 %
ERYTHROCYTE [DISTWIDTH] IN BLOOD BY AUTOMATED COUNT: 15.9 % (ref 10–15)
ERYTHROCYTE [DISTWIDTH] IN BLOOD BY AUTOMATED COUNT: 15.9 % (ref 10–15)
ETHANOL SERPL-MCNC: <0.01 G/DL
GFR SERPL CREATININE-BSD FRML MDRD: 89 ML/MIN/{1.73_M2}
GFR SERPL CREATININE-BSD FRML MDRD: >90 ML/MIN/{1.73_M2}
GLUCOSE SERPL-MCNC: 102 MG/DL (ref 70–99)
GLUCOSE SERPL-MCNC: 119 MG/DL (ref 70–99)
HCT VFR BLD AUTO: 36.1 % (ref 40–53)
HCT VFR BLD AUTO: 40.8 % (ref 40–53)
HGB BLD-MCNC: 11.7 G/DL (ref 13.3–17.7)
HGB BLD-MCNC: 13.4 G/DL (ref 13.3–17.7)
IMM GRANULOCYTES # BLD: 0 10E9/L (ref 0–0.4)
IMM GRANULOCYTES NFR BLD: 0.3 %
INTERPRETATION ECG - MUSE: NORMAL
INTERPRETATION ECG - MUSE: NORMAL
LIPASE SERPL-CCNC: 151 U/L (ref 73–393)
LYMPHOCYTES # BLD AUTO: 0.7 10E9/L (ref 0.8–5.3)
LYMPHOCYTES NFR BLD AUTO: 11.4 %
MAGNESIUM SERPL-MCNC: 1.6 MG/DL (ref 1.6–2.3)
MAGNESIUM SERPL-MCNC: 1.8 MG/DL (ref 1.6–2.3)
MCH RBC QN AUTO: 27.2 PG (ref 26.5–33)
MCH RBC QN AUTO: 27.3 PG (ref 26.5–33)
MCHC RBC AUTO-ENTMCNC: 32.4 G/DL (ref 31.5–36.5)
MCHC RBC AUTO-ENTMCNC: 32.8 G/DL (ref 31.5–36.5)
MCV RBC AUTO: 83 FL (ref 78–100)
MCV RBC AUTO: 84 FL (ref 78–100)
MONOCYTES # BLD AUTO: 0.5 10E9/L (ref 0–1.3)
MONOCYTES NFR BLD AUTO: 8 %
NEUTROPHILS # BLD AUTO: 4.8 10E9/L (ref 1.6–8.3)
NEUTROPHILS NFR BLD AUTO: 79.8 %
NRBC # BLD AUTO: 0 10*3/UL
NRBC BLD AUTO-RTO: 0 /100
PHOSPHATE SERPL-MCNC: 1.5 MG/DL (ref 2.5–4.5)
PHOSPHATE SERPL-MCNC: 1.7 MG/DL (ref 2.5–4.5)
PHOSPHATE SERPL-MCNC: 2.9 MG/DL (ref 2.5–4.5)
PHOSPHATE SERPL-MCNC: 3 MG/DL (ref 2.5–4.5)
PLATELET # BLD AUTO: 108 10E9/L (ref 150–450)
PLATELET # BLD AUTO: 86 10E9/L (ref 150–450)
POTASSIUM SERPL-SCNC: 3.1 MMOL/L (ref 3.4–5.3)
POTASSIUM SERPL-SCNC: 3.4 MMOL/L (ref 3.4–5.3)
POTASSIUM SERPL-SCNC: 3.5 MMOL/L (ref 3.4–5.3)
POTASSIUM SERPL-SCNC: 3.6 MMOL/L (ref 3.4–5.3)
POTASSIUM SERPL-SCNC: 3.8 MMOL/L (ref 3.4–5.3)
PROT SERPL-MCNC: 6.2 G/DL (ref 6.8–8.8)
PROT SERPL-MCNC: 7.6 G/DL (ref 6.8–8.8)
RBC # BLD AUTO: 4.28 10E12/L (ref 4.4–5.9)
RBC # BLD AUTO: 4.93 10E12/L (ref 4.4–5.9)
SODIUM SERPL-SCNC: 134 MMOL/L (ref 133–144)
SODIUM SERPL-SCNC: 139 MMOL/L (ref 133–144)
TROPONIN I SERPL-MCNC: 0.08 UG/L (ref 0–0.04)
TROPONIN I SERPL-MCNC: 0.09 UG/L (ref 0–0.04)
TROPONIN I SERPL-MCNC: 0.09 UG/L (ref 0–0.04)
TROPONIN I SERPL-MCNC: 0.11 UG/L (ref 0–0.04)
WBC # BLD AUTO: 4.2 10E9/L (ref 4–11)
WBC # BLD AUTO: 6 10E9/L (ref 4–11)

## 2020-01-01 PROCEDURE — 83735 ASSAY OF MAGNESIUM: CPT | Performed by: HOSPITALIST

## 2020-01-01 PROCEDURE — 99214 OFFICE O/P EST MOD 30 MIN: CPT | Performed by: PHYSICIAN ASSISTANT

## 2020-01-01 PROCEDURE — 36415 COLL VENOUS BLD VENIPUNCTURE: CPT | Performed by: INTERNAL MEDICINE

## 2020-01-01 PROCEDURE — 25000132 ZZH RX MED GY IP 250 OP 250 PS 637: Performed by: INTERNAL MEDICINE

## 2020-01-01 PROCEDURE — 84100 ASSAY OF PHOSPHORUS: CPT | Performed by: HOSPITALIST

## 2020-01-01 PROCEDURE — 99214 OFFICE O/P EST MOD 30 MIN: CPT | Mod: 95 | Performed by: PHYSICIAN ASSISTANT

## 2020-01-01 PROCEDURE — 84132 ASSAY OF SERUM POTASSIUM: CPT | Performed by: HOSPITALIST

## 2020-01-01 PROCEDURE — 36415 COLL VENOUS BLD VENIPUNCTURE: CPT | Performed by: HOSPITALIST

## 2020-01-01 PROCEDURE — 96374 THER/PROPH/DIAG INJ IV PUSH: CPT

## 2020-01-01 PROCEDURE — 25800030 ZZH RX IP 258 OP 636: Performed by: INTERNAL MEDICINE

## 2020-01-01 PROCEDURE — 99207 ZZC CDG-CODE CATEGORY CHANGED: CPT | Performed by: HOSPITALIST

## 2020-01-01 PROCEDURE — 40000671 ZZH STATISTIC ANESTHESIA CASE

## 2020-01-01 PROCEDURE — 25000128 H RX IP 250 OP 636: Performed by: INTERNAL MEDICINE

## 2020-01-01 PROCEDURE — 97110 THERAPEUTIC EXERCISES: CPT | Mod: GP | Performed by: PHYSICAL THERAPIST

## 2020-01-01 PROCEDURE — 25000128 H RX IP 250 OP 636: Performed by: EMERGENCY MEDICINE

## 2020-01-01 PROCEDURE — 97161 PT EVAL LOW COMPLEX 20 MIN: CPT | Mod: GP | Performed by: PHYSICAL THERAPIST

## 2020-01-01 PROCEDURE — 25000125 ZZHC RX 250: Performed by: INTERNAL MEDICINE

## 2020-01-01 PROCEDURE — 99223 1ST HOSP IP/OBS HIGH 75: CPT | Mod: AI | Performed by: INTERNAL MEDICINE

## 2020-01-01 PROCEDURE — 12000000 ZZH R&B MED SURG/OB

## 2020-01-01 PROCEDURE — 99233 SBSQ HOSP IP/OBS HIGH 50: CPT | Performed by: HOSPITALIST

## 2020-01-01 PROCEDURE — C9113 INJ PANTOPRAZOLE SODIUM, VIA: HCPCS | Performed by: INTERNAL MEDICINE

## 2020-01-01 PROCEDURE — 40000275 ZZH STATISTIC RCP TIME EA 10 MIN

## 2020-01-01 PROCEDURE — 99442 ZZC PHYSICIAN TELEPHONE EVALUATION 11-20 MIN: CPT | Performed by: PHYSICIAN ASSISTANT

## 2020-01-01 PROCEDURE — 36415 COLL VENOUS BLD VENIPUNCTURE: CPT | Performed by: EMERGENCY MEDICINE

## 2020-01-01 PROCEDURE — 99205 OFFICE O/P NEW HI 60 MIN: CPT | Mod: 95 | Performed by: PHYSICIAN ASSISTANT

## 2020-01-01 PROCEDURE — 85025 COMPLETE CBC W/AUTO DIFF WBC: CPT | Performed by: EMERGENCY MEDICINE

## 2020-01-01 PROCEDURE — 84484 ASSAY OF TROPONIN QUANT: CPT | Performed by: INTERNAL MEDICINE

## 2020-01-01 PROCEDURE — 93005 ELECTROCARDIOGRAM TRACING: CPT

## 2020-01-01 PROCEDURE — 96361 HYDRATE IV INFUSION ADD-ON: CPT

## 2020-01-01 PROCEDURE — 83690 ASSAY OF LIPASE: CPT | Performed by: EMERGENCY MEDICINE

## 2020-01-01 PROCEDURE — 99285 EMERGENCY DEPT VISIT HI MDM: CPT | Mod: 25

## 2020-01-01 PROCEDURE — 80320 DRUG SCREEN QUANTALCOHOLS: CPT | Performed by: EMERGENCY MEDICINE

## 2020-01-01 PROCEDURE — 25500064 ZZH RX 255 OP 636: Performed by: INTERNAL MEDICINE

## 2020-01-01 PROCEDURE — 99232 SBSQ HOSP IP/OBS MODERATE 35: CPT | Performed by: HOSPITALIST

## 2020-01-01 PROCEDURE — 25800030 ZZH RX IP 258 OP 636: Performed by: EMERGENCY MEDICINE

## 2020-01-01 PROCEDURE — 40000264 ECHOCARDIOGRAM COMPLETE

## 2020-01-01 PROCEDURE — 93010 ELECTROCARDIOGRAM REPORT: CPT | Performed by: INTERNAL MEDICINE

## 2020-01-01 PROCEDURE — 97110 THERAPEUTIC EXERCISES: CPT | Mod: 95 | Performed by: PHYSICAL THERAPIST

## 2020-01-01 PROCEDURE — 93306 TTE W/DOPPLER COMPLETE: CPT | Mod: 26 | Performed by: INTERNAL MEDICINE

## 2020-01-01 PROCEDURE — HZ2ZZZZ DETOXIFICATION SERVICES FOR SUBSTANCE ABUSE TREATMENT: ICD-10-PCS | Performed by: INTERNAL MEDICINE

## 2020-01-01 PROCEDURE — 25000132 ZZH RX MED GY IP 250 OP 250 PS 637: Performed by: HOSPITALIST

## 2020-01-01 PROCEDURE — 97162 PT EVAL MOD COMPLEX 30 MIN: CPT | Mod: 95 | Performed by: PHYSICAL THERAPIST

## 2020-01-01 PROCEDURE — 85027 COMPLETE CBC AUTOMATED: CPT | Performed by: INTERNAL MEDICINE

## 2020-01-01 PROCEDURE — 84484 ASSAY OF TROPONIN QUANT: CPT | Performed by: EMERGENCY MEDICINE

## 2020-01-01 PROCEDURE — 25000132 ZZH RX MED GY IP 250 OP 250 PS 637: Performed by: EMERGENCY MEDICINE

## 2020-01-01 PROCEDURE — 96376 TX/PRO/DX INJ SAME DRUG ADON: CPT

## 2020-01-01 PROCEDURE — 80053 COMPREHEN METABOLIC PANEL: CPT | Performed by: EMERGENCY MEDICINE

## 2020-01-01 PROCEDURE — 99239 HOSP IP/OBS DSCHRG MGMT >30: CPT | Performed by: HOSPITALIST

## 2020-01-01 PROCEDURE — 80053 COMPREHEN METABOLIC PANEL: CPT | Performed by: INTERNAL MEDICINE

## 2020-01-01 PROCEDURE — 71045 X-RAY EXAM CHEST 1 VIEW: CPT

## 2020-01-01 PROCEDURE — 37000011 ZZH ANESTHESIA WARD SERVICE

## 2020-01-01 PROCEDURE — 84100 ASSAY OF PHOSPHORUS: CPT | Performed by: INTERNAL MEDICINE

## 2020-01-01 RX ORDER — TRAZODONE HYDROCHLORIDE 50 MG/1
50 TABLET, FILM COATED ORAL AT BEDTIME
Qty: 90 TABLET | Refills: 1 | Status: SHIPPED | OUTPATIENT
Start: 2020-01-01 | End: 2020-01-01

## 2020-01-01 RX ORDER — MAGNESIUM SULFATE HEPTAHYDRATE 40 MG/ML
2 INJECTION, SOLUTION INTRAVENOUS DAILY PRN
Status: DISCONTINUED | OUTPATIENT
Start: 2020-01-01 | End: 2020-01-01 | Stop reason: HOSPADM

## 2020-01-01 RX ORDER — PROCHLORPERAZINE 25 MG
25 SUPPOSITORY, RECTAL RECTAL EVERY 12 HOURS PRN
Status: DISCONTINUED | OUTPATIENT
Start: 2020-01-01 | End: 2020-01-01 | Stop reason: HOSPADM

## 2020-01-01 RX ORDER — DIAZEPAM 10 MG/2ML
10 INJECTION, SOLUTION INTRAMUSCULAR; INTRAVENOUS
Status: DISCONTINUED | OUTPATIENT
Start: 2020-01-01 | End: 2020-01-01 | Stop reason: HOSPADM

## 2020-01-01 RX ORDER — ACETAMINOPHEN 650 MG/1
650 SUPPOSITORY RECTAL EVERY 4 HOURS PRN
Status: DISCONTINUED | OUTPATIENT
Start: 2020-01-01 | End: 2020-01-01 | Stop reason: HOSPADM

## 2020-01-01 RX ORDER — DIAZEPAM 10 MG/2ML
10 INJECTION, SOLUTION INTRAMUSCULAR; INTRAVENOUS ONCE
Status: COMPLETED | OUTPATIENT
Start: 2020-01-01 | End: 2020-01-01

## 2020-01-01 RX ORDER — LANOLIN ALCOHOL/MO/W.PET/CERES
100 CREAM (GRAM) TOPICAL ONCE
Status: COMPLETED | OUTPATIENT
Start: 2020-01-01 | End: 2020-01-01

## 2020-01-01 RX ORDER — METHYLPREDNISOLONE 4 MG
TABLET, DOSE PACK ORAL
Qty: 21 TABLET | Refills: 0 | Status: SHIPPED | OUTPATIENT
Start: 2020-01-01 | End: 2020-01-01

## 2020-01-01 RX ORDER — POTASSIUM CHLORIDE 1500 MG/1
20-40 TABLET, EXTENDED RELEASE ORAL
Status: DISCONTINUED | OUTPATIENT
Start: 2020-01-01 | End: 2020-01-01 | Stop reason: HOSPADM

## 2020-01-01 RX ORDER — GABAPENTIN 300 MG/1
600 CAPSULE ORAL 3 TIMES DAILY
Qty: 180 CAPSULE | Refills: 1 | Status: SHIPPED | OUTPATIENT
Start: 2020-01-01 | End: 2020-01-01

## 2020-01-01 RX ORDER — SODIUM CHLORIDE 9 MG/ML
INJECTION, SOLUTION INTRAVENOUS CONTINUOUS
Status: DISCONTINUED | OUTPATIENT
Start: 2020-01-01 | End: 2020-01-01

## 2020-01-01 RX ORDER — ASPIRIN 81 MG/1
81 TABLET ORAL DAILY
Status: DISCONTINUED | OUTPATIENT
Start: 2020-01-01 | End: 2020-01-01 | Stop reason: HOSPADM

## 2020-01-01 RX ORDER — SERTRALINE HYDROCHLORIDE 100 MG/1
100 TABLET, FILM COATED ORAL DAILY
Qty: 90 TABLET | Refills: 1 | Status: SHIPPED | OUTPATIENT
Start: 2020-01-01

## 2020-01-01 RX ORDER — FUROSEMIDE 20 MG
20 TABLET ORAL DAILY
Qty: 30 TABLET | Refills: 0 | Status: SHIPPED | OUTPATIENT
Start: 2020-01-01 | End: 2020-01-01

## 2020-01-01 RX ORDER — LIDOCAINE 40 MG/G
CREAM TOPICAL
Status: DISCONTINUED | OUTPATIENT
Start: 2020-01-01 | End: 2020-01-01 | Stop reason: HOSPADM

## 2020-01-01 RX ORDER — MULTIPLE VITAMINS W/ MINERALS TAB 9MG-400MCG
1 TAB ORAL DAILY
Qty: 90 TABLET | Refills: 1 | Status: SHIPPED | OUTPATIENT
Start: 2020-01-01

## 2020-01-01 RX ORDER — BUSPIRONE HYDROCHLORIDE 5 MG/1
5 TABLET ORAL 3 TIMES DAILY
Qty: 30 TABLET | Refills: 0 | Status: SHIPPED | OUTPATIENT
Start: 2020-01-01 | End: 2020-01-01

## 2020-01-01 RX ORDER — AMOXICILLIN 250 MG
1 CAPSULE ORAL 2 TIMES DAILY PRN
Status: DISCONTINUED | OUTPATIENT
Start: 2020-01-01 | End: 2020-01-01 | Stop reason: HOSPADM

## 2020-01-01 RX ORDER — LANOLIN ALCOHOL/MO/W.PET/CERES
100 CREAM (GRAM) TOPICAL DAILY
Status: DISCONTINUED | OUTPATIENT
Start: 2020-01-01 | End: 2020-01-01 | Stop reason: HOSPADM

## 2020-01-01 RX ORDER — AMLODIPINE BESYLATE 5 MG/1
5 TABLET ORAL DAILY
Qty: 90 TABLET | Refills: 1 | Status: SHIPPED | OUTPATIENT
Start: 2020-01-01

## 2020-01-01 RX ORDER — CARVEDILOL 6.25 MG/1
6.25 TABLET ORAL 2 TIMES DAILY WITH MEALS
Status: DISCONTINUED | OUTPATIENT
Start: 2020-01-01 | End: 2020-01-01 | Stop reason: HOSPADM

## 2020-01-01 RX ORDER — LORAZEPAM 1 MG/1
1-2 TABLET ORAL EVERY 30 MIN PRN
Status: DISCONTINUED | OUTPATIENT
Start: 2020-01-01 | End: 2020-01-01 | Stop reason: HOSPADM

## 2020-01-01 RX ORDER — MULTIPLE VITAMINS W/ MINERALS TAB 9MG-400MCG
1 TAB ORAL DAILY
Status: DISCONTINUED | OUTPATIENT
Start: 2020-01-01 | End: 2020-01-01 | Stop reason: HOSPADM

## 2020-01-01 RX ORDER — SILDENAFIL 100 MG/1
TABLET, FILM COATED ORAL
Qty: 12 TABLET | Refills: 11 | Status: SHIPPED | OUTPATIENT
Start: 2020-01-01

## 2020-01-01 RX ORDER — AMOXICILLIN 250 MG
2 CAPSULE ORAL 2 TIMES DAILY PRN
Status: DISCONTINUED | OUTPATIENT
Start: 2020-01-01 | End: 2020-01-01 | Stop reason: HOSPADM

## 2020-01-01 RX ORDER — TRAZODONE HYDROCHLORIDE 50 MG/1
50 TABLET, FILM COATED ORAL
Qty: 30 TABLET | Refills: 1 | Status: SHIPPED | OUTPATIENT
Start: 2020-01-01

## 2020-01-01 RX ORDER — METFORMIN HCL 500 MG
TABLET, EXTENDED RELEASE 24 HR ORAL
Qty: 60 TABLET | Refills: 1 | Status: SHIPPED | OUTPATIENT
Start: 2020-01-01

## 2020-01-01 RX ORDER — PREGABALIN 75 MG/1
CAPSULE ORAL
Qty: 150 CAPSULE | Refills: 0 | Status: SHIPPED | OUTPATIENT
Start: 2020-01-01 | End: 2020-01-01

## 2020-01-01 RX ORDER — POTASSIUM CHLORIDE 29.8 MG/ML
20 INJECTION INTRAVENOUS
Status: DISCONTINUED | OUTPATIENT
Start: 2020-01-01 | End: 2020-01-01 | Stop reason: HOSPADM

## 2020-01-01 RX ORDER — BENZOCAINE/MENTHOL 6 MG-10 MG
LOZENGE MUCOUS MEMBRANE 2 TIMES DAILY
Qty: 30 G | Refills: 4 | Status: SHIPPED | OUTPATIENT
Start: 2020-01-01

## 2020-01-01 RX ORDER — NALOXONE HYDROCHLORIDE 0.4 MG/ML
.1-.4 INJECTION, SOLUTION INTRAMUSCULAR; INTRAVENOUS; SUBCUTANEOUS
Status: DISCONTINUED | OUTPATIENT
Start: 2020-01-01 | End: 2020-01-01 | Stop reason: HOSPADM

## 2020-01-01 RX ORDER — MAGNESIUM SULFATE HEPTAHYDRATE 40 MG/ML
4 INJECTION, SOLUTION INTRAVENOUS EVERY 4 HOURS PRN
Status: DISCONTINUED | OUTPATIENT
Start: 2020-01-01 | End: 2020-01-01 | Stop reason: HOSPADM

## 2020-01-01 RX ORDER — ASPIRIN 325 MG
325 TABLET ORAL ONCE
Status: COMPLETED | OUTPATIENT
Start: 2020-01-01 | End: 2020-01-01

## 2020-01-01 RX ORDER — FUROSEMIDE 20 MG
20 TABLET ORAL DAILY
Qty: 90 TABLET | Refills: 2 | Status: SHIPPED | OUTPATIENT
Start: 2020-01-01

## 2020-01-01 RX ORDER — ONDANSETRON 4 MG/1
4 TABLET, ORALLY DISINTEGRATING ORAL EVERY 6 HOURS PRN
Status: DISCONTINUED | OUTPATIENT
Start: 2020-01-01 | End: 2020-01-01 | Stop reason: HOSPADM

## 2020-01-01 RX ORDER — NAPROXEN SODIUM 220 MG
440 TABLET ORAL 2 TIMES DAILY WITH MEALS
Qty: 90 TABLET | Refills: 3 | Status: SHIPPED | OUTPATIENT
Start: 2020-01-01

## 2020-01-01 RX ORDER — POTASSIUM CHLORIDE 7.45 MG/ML
10 INJECTION INTRAVENOUS
Status: DISCONTINUED | OUTPATIENT
Start: 2020-01-01 | End: 2020-01-01 | Stop reason: HOSPADM

## 2020-01-01 RX ORDER — TRAZODONE HYDROCHLORIDE 50 MG/1
50 TABLET, FILM COATED ORAL
COMMUNITY
End: 2020-01-01

## 2020-01-01 RX ORDER — PROCHLORPERAZINE MALEATE 10 MG
10 TABLET ORAL EVERY 6 HOURS PRN
Status: DISCONTINUED | OUTPATIENT
Start: 2020-01-01 | End: 2020-01-01 | Stop reason: HOSPADM

## 2020-01-01 RX ORDER — POTASSIUM CL/LIDO/0.9 % NACL 10MEQ/0.1L
10 INTRAVENOUS SOLUTION, PIGGYBACK (ML) INTRAVENOUS
Status: DISCONTINUED | OUTPATIENT
Start: 2020-01-01 | End: 2020-01-01 | Stop reason: HOSPADM

## 2020-01-01 RX ORDER — ONDANSETRON 2 MG/ML
4 INJECTION INTRAMUSCULAR; INTRAVENOUS EVERY 6 HOURS PRN
Status: DISCONTINUED | OUTPATIENT
Start: 2020-01-01 | End: 2020-01-01 | Stop reason: HOSPADM

## 2020-01-01 RX ORDER — ACETAMINOPHEN 325 MG/1
650 TABLET ORAL EVERY 4 HOURS PRN
Status: DISCONTINUED | OUTPATIENT
Start: 2020-01-01 | End: 2020-01-01 | Stop reason: HOSPADM

## 2020-01-01 RX ORDER — GABAPENTIN 300 MG/1
900 CAPSULE ORAL 3 TIMES DAILY
Qty: 270 CAPSULE | Refills: 5 | Status: SHIPPED | OUTPATIENT
Start: 2020-01-01 | End: 2020-01-01

## 2020-01-01 RX ORDER — POTASSIUM CHLORIDE 1.5 G/1.58G
20-40 POWDER, FOR SOLUTION ORAL
Status: DISCONTINUED | OUTPATIENT
Start: 2020-01-01 | End: 2020-01-01 | Stop reason: HOSPADM

## 2020-01-01 RX ORDER — FOLIC ACID 1 MG/1
1 TABLET ORAL DAILY
Status: DISCONTINUED | OUTPATIENT
Start: 2020-01-01 | End: 2020-01-01 | Stop reason: HOSPADM

## 2020-01-01 RX ORDER — GABAPENTIN 300 MG/1
900 CAPSULE ORAL 3 TIMES DAILY
Qty: 270 CAPSULE | Refills: 5 | Status: SHIPPED | OUTPATIENT
Start: 2020-01-01

## 2020-01-01 RX ORDER — LORAZEPAM 2 MG/ML
1-2 INJECTION INTRAMUSCULAR EVERY 30 MIN PRN
Status: DISCONTINUED | OUTPATIENT
Start: 2020-01-01 | End: 2020-01-01 | Stop reason: HOSPADM

## 2020-01-01 RX ADMIN — ASPIRIN 325 MG ORAL TABLET 325 MG: 325 PILL ORAL at 13:37

## 2020-01-01 RX ADMIN — MAGNESIUM SULFATE 2 G: 2 INJECTION INTRAVENOUS at 08:53

## 2020-01-01 RX ADMIN — PANTOPRAZOLE SODIUM 40 MG: 40 INJECTION, POWDER, FOR SOLUTION INTRAVENOUS at 08:53

## 2020-01-01 RX ADMIN — SODIUM CHLORIDE: 9 INJECTION, SOLUTION INTRAVENOUS at 20:02

## 2020-01-01 RX ADMIN — CARVEDILOL 6.25 MG: 6.25 TABLET, FILM COATED ORAL at 18:35

## 2020-01-01 RX ADMIN — CARVEDILOL 6.25 MG: 6.25 TABLET, FILM COATED ORAL at 18:14

## 2020-01-01 RX ADMIN — LORAZEPAM 1 MG: 1 TABLET ORAL at 22:53

## 2020-01-01 RX ADMIN — ACETAMINOPHEN 650 MG: 325 TABLET, FILM COATED ORAL at 21:53

## 2020-01-01 RX ADMIN — LORAZEPAM 1 MG: 1 TABLET ORAL at 18:32

## 2020-01-01 RX ADMIN — CARVEDILOL 6.25 MG: 6.25 TABLET, FILM COATED ORAL at 18:32

## 2020-01-01 RX ADMIN — POTASSIUM PHOSPHATE, MONOBASIC AND POTASSIUM PHOSPHATE, DIBASIC 20 MMOL: 224; 236 INJECTION, SOLUTION, CONCENTRATE INTRAVENOUS at 17:56

## 2020-01-01 RX ADMIN — DIAZEPAM 10 MG: 10 INJECTION, SOLUTION INTRAMUSCULAR; INTRAVENOUS at 12:47

## 2020-01-01 RX ADMIN — ASPIRIN 81 MG: 81 TABLET, COATED ORAL at 08:44

## 2020-01-01 RX ADMIN — DIAZEPAM 10 MG: 10 INJECTION, SOLUTION INTRAMUSCULAR; INTRAVENOUS at 14:37

## 2020-01-01 RX ADMIN — ASPIRIN 81 MG: 81 TABLET, COATED ORAL at 08:14

## 2020-01-01 RX ADMIN — FOLIC ACID 1 MG: 1 TABLET ORAL at 08:44

## 2020-01-01 RX ADMIN — Medication 100 MG: at 13:37

## 2020-01-01 RX ADMIN — ACETAMINOPHEN 650 MG: 325 TABLET, FILM COATED ORAL at 04:37

## 2020-01-01 RX ADMIN — CARVEDILOL 6.25 MG: 6.25 TABLET, FILM COATED ORAL at 08:53

## 2020-01-01 RX ADMIN — Medication 1 MG: at 21:53

## 2020-01-01 RX ADMIN — SODIUM CHLORIDE 1000 ML: 9 INJECTION, SOLUTION INTRAVENOUS at 12:46

## 2020-01-01 RX ADMIN — FOLIC ACID 1 MG: 1 TABLET ORAL at 08:53

## 2020-01-01 RX ADMIN — MULTIPLE VITAMINS W/ MINERALS TAB 1 TABLET: TAB at 08:54

## 2020-01-01 RX ADMIN — SODIUM CHLORIDE: 9 INJECTION, SOLUTION INTRAVENOUS at 01:11

## 2020-01-01 RX ADMIN — CARVEDILOL 6.25 MG: 6.25 TABLET, FILM COATED ORAL at 08:14

## 2020-01-01 RX ADMIN — PANTOPRAZOLE SODIUM 40 MG: 40 INJECTION, POWDER, FOR SOLUTION INTRAVENOUS at 08:14

## 2020-01-01 RX ADMIN — POTASSIUM CHLORIDE 20 MEQ: 1500 TABLET, EXTENDED RELEASE ORAL at 11:21

## 2020-01-01 RX ADMIN — FOLIC ACID 1 MG: 1 TABLET ORAL at 08:14

## 2020-01-01 RX ADMIN — THIAMINE HCL TAB 100 MG 100 MG: 100 TAB at 08:44

## 2020-01-01 RX ADMIN — THIAMINE HCL TAB 100 MG 100 MG: 100 TAB at 08:53

## 2020-01-01 RX ADMIN — LORAZEPAM 1 MG: 1 TABLET ORAL at 05:35

## 2020-01-01 RX ADMIN — LORAZEPAM 2 MG: 1 TABLET ORAL at 17:12

## 2020-01-01 RX ADMIN — DIAZEPAM 10 MG: 10 INJECTION, SOLUTION INTRAMUSCULAR; INTRAVENOUS at 13:37

## 2020-01-01 RX ADMIN — LORAZEPAM 2 MG: 1 TABLET ORAL at 19:50

## 2020-01-01 RX ADMIN — LORAZEPAM 1 MG: 1 TABLET ORAL at 05:30

## 2020-01-01 RX ADMIN — PANTOPRAZOLE SODIUM 40 MG: 40 INJECTION, POWDER, FOR SOLUTION INTRAVENOUS at 08:50

## 2020-01-01 RX ADMIN — LORAZEPAM 1 MG: 1 TABLET ORAL at 21:52

## 2020-01-01 RX ADMIN — LORAZEPAM 1 MG: 1 TABLET ORAL at 08:21

## 2020-01-01 RX ADMIN — FOLIC ACID: 5 INJECTION, SOLUTION INTRAMUSCULAR; INTRAVENOUS; SUBCUTANEOUS at 18:33

## 2020-01-01 RX ADMIN — LORAZEPAM 1 MG: 1 TABLET ORAL at 16:36

## 2020-01-01 RX ADMIN — POTASSIUM CHLORIDE 20 MEQ: 1500 TABLET, EXTENDED RELEASE ORAL at 08:14

## 2020-01-01 RX ADMIN — LORAZEPAM 1 MG: 1 TABLET ORAL at 07:01

## 2020-01-01 RX ADMIN — POTASSIUM CHLORIDE 40 MEQ: 1500 TABLET, EXTENDED RELEASE ORAL at 09:18

## 2020-01-01 RX ADMIN — MULTIPLE VITAMINS W/ MINERALS TAB 1 TABLET: TAB at 08:14

## 2020-01-01 RX ADMIN — POTASSIUM PHOSPHATE, MONOBASIC AND POTASSIUM PHOSPHATE, DIBASIC 20 MMOL: 224; 236 INJECTION, SOLUTION, CONCENTRATE INTRAVENOUS at 09:18

## 2020-01-01 RX ADMIN — POTASSIUM CHLORIDE 20 MEQ: 1500 TABLET, EXTENDED RELEASE ORAL at 08:54

## 2020-01-01 RX ADMIN — HUMAN ALBUMIN MICROSPHERES AND PERFLUTREN 3 ML: 10; .22 INJECTION, SOLUTION INTRAVENOUS at 08:30

## 2020-01-01 RX ADMIN — SODIUM CHLORIDE: 9 INJECTION, SOLUTION INTRAVENOUS at 23:56

## 2020-01-01 RX ADMIN — MULTIPLE VITAMINS W/ MINERALS TAB 1 TABLET: TAB at 08:44

## 2020-01-01 RX ADMIN — PANTOPRAZOLE SODIUM 40 MG: 40 INJECTION, POWDER, FOR SOLUTION INTRAVENOUS at 17:17

## 2020-01-01 RX ADMIN — LORAZEPAM 1 MG: 1 TABLET ORAL at 18:14

## 2020-01-01 RX ADMIN — THIAMINE HCL TAB 100 MG 100 MG: 100 TAB at 08:14

## 2020-01-01 RX ADMIN — ASPIRIN 81 MG: 81 TABLET, COATED ORAL at 08:54

## 2020-01-01 RX ADMIN — MAGNESIUM SULFATE 2 G: 2 INJECTION INTRAVENOUS at 12:42

## 2020-01-01 RX ADMIN — LORAZEPAM 1 MG: 1 TABLET ORAL at 04:37

## 2020-01-01 RX ADMIN — LORAZEPAM 2 MG: 1 TABLET ORAL at 10:15

## 2020-01-01 RX ADMIN — CARVEDILOL 6.25 MG: 6.25 TABLET, FILM COATED ORAL at 08:44

## 2020-01-01 ASSESSMENT — ENCOUNTER SYMPTOMS
EYES NEGATIVE: 1
GASTROINTESTINAL NEGATIVE: 1
MUSCULOSKELETAL NEGATIVE: 1
RESPIRATORY NEGATIVE: 1
CONSTITUTIONAL NEGATIVE: 1
CARDIOVASCULAR NEGATIVE: 1
NEUROLOGICAL NEGATIVE: 1
NEUROLOGICAL NEGATIVE: 1
RESPIRATORY NEGATIVE: 1
EYES NEGATIVE: 1
NEUROLOGICAL NEGATIVE: 1
GASTROINTESTINAL NEGATIVE: 1
EYES NEGATIVE: 1
GASTROINTESTINAL NEGATIVE: 1
CARDIOVASCULAR NEGATIVE: 1
CONSTITUTIONAL NEGATIVE: 1
GASTROINTESTINAL NEGATIVE: 1
PSYCHIATRIC NEGATIVE: 1
CONSTITUTIONAL NEGATIVE: 1
EYES NEGATIVE: 1
PSYCHIATRIC NEGATIVE: 1
PSYCHIATRIC NEGATIVE: 1
VOMITING: 1
NEUROLOGICAL NEGATIVE: 1
PSYCHIATRIC NEGATIVE: 1
EYES NEGATIVE: 1
ABDOMINAL PAIN: 1
RESPIRATORY NEGATIVE: 1
RESPIRATORY NEGATIVE: 1
CARDIOVASCULAR NEGATIVE: 1
CONSTITUTIONAL NEGATIVE: 1
RESPIRATORY NEGATIVE: 1
ROS GI COMMENTS: AS IN HPI
CONSTITUTIONAL NEGATIVE: 1

## 2020-01-01 ASSESSMENT — ACTIVITIES OF DAILY LIVING (ADL)
SWALLOWING: 0-->SWALLOWS FOODS/LIQUIDS WITHOUT DIFFICULTY
RETIRED_COMMUNICATION: 0-->UNDERSTANDS/COMMUNICATES WITHOUT DIFFICULTY
DRESS: 0-->INDEPENDENT
FALL_HISTORY_WITHIN_LAST_SIX_MONTHS: NO
ADLS_ACUITY_SCORE: 15
TOILETING: 0-->INDEPENDENT
ADLS_ACUITY_SCORE: 17
ADLS_ACUITY_SCORE: 15
ADLS_ACUITY_SCORE: 15
ADLS_ACUITY_SCORE: 17
ADLS_ACUITY_SCORE: 15
ADLS_ACUITY_SCORE: 15
RETIRED_EATING: 0-->INDEPENDENT
ADLS_ACUITY_SCORE: 17
AMBULATION: 0-->INDEPENDENT
ADLS_ACUITY_SCORE: 17
ADLS_ACUITY_SCORE: 15
ADLS_ACUITY_SCORE: 15
TRANSFERRING: 0-->INDEPENDENT
BATHING: 0-->INDEPENDENT
COGNITION: 0 - NO COGNITION ISSUES REPORTED
ADLS_ACUITY_SCORE: 15

## 2020-01-01 ASSESSMENT — MIFFLIN-ST. JEOR
SCORE: 1965.24
SCORE: 2065.04
SCORE: 2024.21

## 2020-01-01 ASSESSMENT — PATIENT HEALTH QUESTIONNAIRE - PHQ9
SUM OF ALL RESPONSES TO PHQ QUESTIONS 1-9: 0
SUM OF ALL RESPONSES TO PHQ QUESTIONS 1-9: 0
10. IF YOU CHECKED OFF ANY PROBLEMS, HOW DIFFICULT HAVE THESE PROBLEMS MADE IT FOR YOU TO DO YOUR WORK, TAKE CARE OF THINGS AT HOME, OR GET ALONG WITH OTHER PEOPLE: NOT DIFFICULT AT ALL
SUM OF ALL RESPONSES TO PHQ QUESTIONS 1-9: 0

## 2020-01-03 NOTE — PROGRESS NOTES
Subjective  Answers for HPI/ROS submitted by the patient on 1/3/2020   If you checked off any problems, how difficult have these problems made it for you to do your work, take care of things at home, or get along with other people?: Not difficult at all  PHQ9 TOTAL SCORE: 0      Aydin Reilly is a 59 year old male who presents to clinic today for the following health issues:    HPI   Depression and Anxiety Follow-Up    How are you doing with your depression since your last visit? Improved     How are you doing with your anxiety since your last visit?  No change    Are you having other symptoms that might be associated with depression or anxiety? No    Have you had a significant life event? No     Do you have any concerns with your use of alcohol or other drugs? No 90 days sober today    Social History     Tobacco Use     Smoking status: Never Smoker     Smokeless tobacco: Never Used   Substance Use Topics     Alcohol use: Not Currently     Alcohol/week: 0.0 standard drinks     Comment:  quit last consumed 5/30/2019 pt going to AA     Drug use: Not Currently     Types: Methamphetamines, Marijuana     Comment: last used 5/1/2019     PHQ 4/2/2019 6/19/2019 1/3/2020   PHQ-9 Total Score 0 2 0   Q9: Thoughts of better off dead/self-harm past 2 weeks Not at all Not at all Not at all     CONNER-7 SCORE 1/22/2019 4/2/2019 6/19/2019   Total Score - - -   Total Score - - 2 (minimal anxiety)   Total Score 0 0 2     Last PHQ-9 1/3/2020   1.  Little interest or pleasure in doing things 0   2.  Feeling down, depressed, or hopeless 0   3.  Trouble falling or staying asleep, or sleeping too much 0   4.  Feeling tired or having little energy 0   5.  Poor appetite or overeating 0   6.  Feeling bad about yourself 0   7.  Trouble concentrating 0   8.  Moving slowly or restless 0   Q9: Thoughts of better off dead/self-harm past 2 weeks 0   PHQ-9 Total Score 0   Difficulty at work, home, or with people -     CONNER-7  6/19/2019   1. Feeling  nervous, anxious, or on edge 1   2. Not being able to stop or control worrying 0   3. Worrying too much about different things 1   4. Trouble relaxing 0   5. Being so restless that it is hard to sit still 0   6. Becoming easily annoyed or irritable 0   7. Feeling afraid, as if something awful might happen 0   CONNER-7 Total Score 2   If you checked any problems, how difficult have they made it for you to do your work, take care of things at home, or get along with other people? -     Living a [facility] sober living step 2 right now  In 2 months hoping to move to step 3  Now sober for 90 days    Requesting medication refills today        Suicide Assessment Five-step Evaluation and Treatment (SAFE-T)      How many servings of fruits and vegetables do you eat daily?  0-1    On average, how many sweetened beverages do you drink each day (Examples: soda, juice, sweet tea, etc.  Do NOT count diet or artificially sweetened beverages)?   0    How many days per week do you miss taking your medication? 0        Patient Active Problem List   Diagnosis     H/O malignant neoplasm of rectum, rectosigmoid junction, and anus     Cellulitis of scrotum     Lumbago     Lumbar Radiculopathy, SEE FYI     Back Pain w/ Radiation, SEE FYI     Chronic abdominal pain     Hyperlipidemia with target LDL less than 130     Hypogonadism     Scrotal pain     Erectile dysfunction     Drug abuse, opioid type (H)     GIB (gastrointestinal bleeding)     MAGALY (obstructive sleep apnea)     Generalized anxiety disorder     Urethral stricture     Alcohol abuse     History of urethral stricture     Chronic pain     Edema     Anemia of chronic disease     Other mononeuropathy     Hx SBO     Health Care Home     Benign essential hypertension     Rotator cuff injury, right, initial encounter     Chondritis     Anserine bursitis     Substance abuse (H)     Chronic pain of right knee     Intertriginous candidiasis     Gastroesophageal reflux disease, esophagitis  presence not specified     Morbid obesity (H)     Moderate episode of recurrent major depressive disorder (H)     Cellulitis of scrotum     Chest pain     Suicide ideation     Past Surgical History:   Procedure Laterality Date     ABDOMEN SURGERY       BACK SURGERY       BIOPSY       BIOPSY       C NONSPECIFIC PROCEDURE  1991    L4-L5 laminectomy; disk herniation     C NONSPECIFIC PROCEDURE  1999    squamous cell CA - anus, 27 xrt/3 rounds, 5-FU/cisplatin     C NONSPECIFIC PROCEDURE      umbilical hernia     C NONSPECIFIC PROCEDURE  2002    cystscopy for urethral stricture from radiation therapy     COLONOSCOPY       COLONOSCOPY  4/18/2014    Procedure: Colonoscopy   polyp bx;  Surgeon: Josef Mckeon MD;  Location:  GI     CYSTOSCOPY, CYSTOGRAM, COMBINED N/A 2/26/2015    Procedure: COMBINED CYSTOSCOPY, CYSTOGRAM;  Surgeon: Darell Barrera MD;  [facility]: [facility]     CYSTOSCOPY, INTERNAL URETHROTOMY, COMBINED N/A 12/19/2014    Procedure: COMBINED CYSTOSCOPY, INTERNAL URETHROTOMY;  Surgeon: Buzz Ren MD;  Location:  OR     CYSTOSTOMY, INSERT TUBE SUPRAPUBIC, COMBINED  12/19/2014    Procedure: COMBINED CYSTOSTOMY, INSERT TUBE SUPRAPUBIC;  Surgeon: Buzz Ren MD;  Location:  OR     CYSTOSTOMY, INSERT TUBE SUPRAPUBIC, COMBINED N/A 12/29/2014    Procedure: COMBINED CYSTOSTOMY, INSERT TUBE SUPRAPUBIC;  Surgeon: Buzz Ren MD;  Location:  OR     ENT SURGERY       ESOPHAGOSCOPY, GASTROSCOPY, DUODENOSCOPY (EGD), COMBINED  10/2/2012    Procedure: COMBINED ESOPHAGOSCOPY, GASTROSCOPY, DUODENOSCOPY (EGD);  COMBINED ESOPHAGOSCOPY, GASTROSCOPY, DUODENOSCOPY (EGD) ;  Surgeon: Raj Beltre MD;  Location:  GI     HERNIA REPAIR       LAPAROSCOPIC LYSIS ADHESIONS N/A 12/4/2015    Procedure: LAPAROSCOPIC LYSIS ADHESIONS;  Surgeon: Herbie Sanchez MD;  Location:  OR     LAPAROTOMY EXPLORATORY N/A 12/4/2015    Procedure: LAPAROTOMY EXPLORATORY;  Surgeon:  Herbie Sanchez MD;  Location: [facility] OR     MYRINGOTOMY      in childhood     TONSILLECTOMY and adenoidectomy      in childhood     URETHROPLASTY WITH BUCCAL GRAFT N/A 3/27/2015    Procedure: URETHROPLASTY WITH BUCCAL GRAFT;  Surgeon: Darell Barrera MD;  Location: [facility]       Social History     Tobacco Use     Smoking status: Never Smoker     Smokeless tobacco: Never Used   Substance Use Topics     Alcohol use: Not Currently     Alcohol/week: 0.0 standard drinks     Comment:  quit last consumed 5/30/2019 pt going to AA     Family History   Adopted: Yes   Problem Relation Age of Onset     Unknown/Adopted Mother      Suicide Father      Schizophrenia No family hx of      Bipolar Disorder No family hx of      Dementia No family hx of      [place] Disease No family hx of      Parkinsonism No family hx of      Autism Spectrum Disorder No family hx of      Intellectual Disability (Mental Retardation) No family hx of          Current Outpatient Medications   Medication Sig Dispense Refill     acetaminophen (TYLENOL) 325 MG tablet Take 2 tablets (650 mg) by mouth every 8 hours as needed for mild pain 100 tablet 0     amLODIPine (NORVASC) 5 MG tablet Take 1 tablet (5 mg) by mouth daily 90 tablet 1     gabapentin (NEURONTIN) 300 MG capsule Take 2 capsules (600 mg) by mouth 3 times daily 180 capsule 1     multivitamin w/minerals (MULTI-VITAMIN) tablet Take 1 tablet by mouth daily 90 tablet 1     sertraline (ZOLOFT) 50 MG tablet Take 1 tablet (50 mg) by mouth daily 90 tablet 1     sildenafil (VIAGRA) 100 MG tablet 30 min - 4 hr before intercourse take 0.5-1 tablets PO PRN ED Never use with nitroglycerin, terazosin or doxazosin. 12 tablet 11     traZODone (DESYREL) 50 MG tablet Take 1 tablet (50 mg) by mouth At Bedtime 90 tablet 1     Allergies   Allergen Reactions     No Known Allergies      Seasonal Allergies        Reviewed and updated as needed this visit by Provider         Review of Systems   Constitutional:  "Negative.    HENT: Negative.    Eyes: Negative.    Respiratory: Negative.    Cardiovascular: Negative.    Gastrointestinal: Negative.    Genitourinary: Negative.    Musculoskeletal: Negative.    Skin: Negative.    Neurological: Negative.    Psychiatric/Behavioral:        As in HPI         Objective    /74 (Cuff Size: Adult Large)   Pulse 88   Temp 98.9  F (37.2  C) (Tympanic)   Resp 14   Ht 1.727 m (5' 8\")   Wt 122.5 kg (270 lb)   BMI 41.05 kg/m    Physical Exam  Constitutional:       General: He is not in acute distress.     Appearance: He is well-developed. He is not diaphoretic.   HENT:      Head: Normocephalic.      Right Ear: External ear normal.      Left Ear: External ear normal.      Nose: Nose normal.   Eyes:      Conjunctiva/sclera: Conjunctivae normal.   Neck:      Musculoskeletal: Normal range of motion.   Pulmonary:      Effort: Pulmonary effort is normal.   Neurological:      Mental Status: He is alert and oriented to person, place, and time.   Psychiatric:         Judgment: Judgment normal.         Diagnostic Test Results:  No results found. However, due to the size of the patient record, not all encounters were searched. Please check Results Review for a complete set of results.        Assessment & Plan   Problem List Items Addressed This Visit        Nervous and Auditory    Alcohol abuse    Relevant Medications    gabapentin (NEURONTIN) 300 MG capsule    multivitamin w/minerals (MULTI-VITAMIN) tablet    Back Pain w/ Radiation, SEE FYI    Relevant Medications    gabapentin (NEURONTIN) 300 MG capsule    Chronic abdominal pain    Relevant Medications    gabapentin (NEURONTIN) 300 MG capsule    Lumbar Radiculopathy, SEE FYI    Relevant Medications    sertraline (ZOLOFT) 50 MG tablet    gabapentin (NEURONTIN) 300 MG capsule    traZODone (DESYREL) 50 MG tablet       Circulatory    Benign essential hypertension    Relevant Medications    amLODIPine (NORVASC) 5 MG tablet       Behavioral    " Generalized anxiety disorder    Relevant Medications    sertraline (ZOLOFT) 50 MG tablet    gabapentin (NEURONTIN) 300 MG capsule    traZODone (DESYREL) 50 MG tablet      Other Visit Diagnoses     Suicidal ideation        Relevant Medications    sertraline (ZOLOFT) 50 MG tablet    traZODone (DESYREL) 50 MG tablet         Medications refilled as above  Patient has a good plan for sobriety and resources available  Overall he is doing very well  Weight gain - fairly significant since becoming sober.  We discussed healthy eating and increasing activity.       Patient Instructions   Astonish Results - check this out for free workout videos      Return in about 6 weeks (around 2/14/2020) for Weight Check.    Mandy Pabon PA-C  [facility]

## 2020-02-07 NOTE — PATIENT INSTRUCTIONS
We discussed the diagnosis of acute low back pain.    I encouraged physical activity as tolerated.  Being sedentary will likely make symptoms worse.  They should return to work and activities as tolerated.  For pain control I recommended NSAIDs, acetaminophen, ice and/or heat.  Narcotics should be avoided.    In most patients, symptoms should improve in 4-6 weeks.     The patient should go to an emergency room if they experience any of the following symptoms:  - Numbness in the genitals, anus, butt and inner thigh areas  - Change in bowel or bladder function  - Worsening weakness or change in coordination  - Fever or chills    I recommend follow up with their primary care provider or an orthopedic provider in 6 weeks if symptoms do not improve, or sooner if symptoms are getting worse.                 Patient Education     Hip Rotation (Flexibility)    These instructions are for the right hip. Switch sides for your left hip.  1. Lie on your back on the floor, with your knees bent and feet flat on the floor. Don t press your neck or lower back to the floor.  2. Rest your right ankle on your left knee.  3. Place a towel around the back of your left thigh. Pull on the ends of the towel to pull your left knee toward your chest. Feel the stretch in your buttocks.  4. Hold for 30 to 60 seconds. Lower your leg back down.  5. Repeat 2 times, or as instructed.  6. Switch legs and repeat.   7. Do this 3 times a day, or as instructed.  Date Last Reviewed: 3/10/2016    5916-1416 [employer]. [address] [place] [address], [place] [address]. All rights reserved. This information is not intended as a substitute for professional medical care. Always follow your healthcare professional's instructions.

## 2020-02-07 NOTE — PROGRESS NOTES
Subjective     Aydin Reilly is a 59 year old male who presents to clinic today for the following health issues:    HPI   Back Pain       Duration: 4 days        Specific cause: none    Description:   Location of pain: low back bilateral  Character of pain: burning and pain shooting down LT leg  Pain radiation:radiates into the left leg  New numbness or weakness in legs, not attributed to pain:  no     Intensity: moderate    History:   Pain interferes with job: No  History of back problems: previous spinal surgery - 1991  Any previous MRI or X-rays: None  Sees a specialist for back pain:  No  Therapies tried without relief: sitting and standing    Alleviating factors:   Improved by: acetaminophen (Tylenol)  and gabapentin    Precipitating factors:  Worsened by: Lifting, Bending and Standing    Moving boxes last week  Using a different vehicle  Sciatic nerve pain on left  Using tennis ball deep tissue massage  Sitting ok, worse with standing  Today he is 50% than its worst    History of L4-L5 laminectomy in 1991  This is what that felt like, but much less severe      Red flag symptoms:  Denies saddle anesthesia, bowel and bladder dysfunction.    Denies fever and chills.    Denies recent IV drug use.    Denies recent weight loss or history of cancer.   Denies history of osteoporosis or prolonged steroid use.      Musculoskeletal problem/pain      Duration: several weeks    Description  Location: bilateral ring and small fingers    Intensity:  moderate    Accompanying signs and symptoms: numbness and tingling    History  Previous similar problem: YES  Previous evaluation:  none    Precipitating or alleviating factors:  Trauma or overuse: no   Aggravating factors include: holding a clipboard with elbows bent    Therapies tried and outcome: nothing      Patient Active Problem List   Diagnosis     H/O malignant neoplasm of rectum, rectosigmoid junction, and anus     Cellulitis of scrotum     Lumbago     Lumbar  Radiculopathy, SEE FYI     Back Pain w/ Radiation, SEE FYI     Chronic abdominal pain     Hyperlipidemia with target LDL less than 130     Hypogonadism     Scrotal pain     Erectile dysfunction     Drug abuse, opioid type (H)     GIB (gastrointestinal bleeding)     MAGALY (obstructive sleep apnea)     Generalized anxiety disorder     Urethral stricture     Alcohol abuse     History of urethral stricture     Chronic pain     Edema     Anemia of chronic disease     Other mononeuropathy     Hx SBO     Health Care Home     Benign essential hypertension     Rotator cuff injury, right, initial encounter     Chondritis     Anserine bursitis     Substance abuse (H)     Chronic pain of right knee     Intertriginous candidiasis     Gastroesophageal reflux disease, esophagitis presence not specified     Morbid obesity (H)     Moderate episode of recurrent major depressive disorder (H)     Cellulitis of scrotum     Chest pain     Suicide ideation     Past Surgical History:   Procedure Laterality Date     ABDOMEN SURGERY       BACK SURGERY       BIOPSY       BIOPSY       C NONSPECIFIC PROCEDURE  1991    L4-L5 laminectomy; disk herniation     C NONSPECIFIC PROCEDURE  1999    squamous cell CA - anus, 27 xrt/3 rounds, 5-FU/cisplatin     C NONSPECIFIC PROCEDURE      umbilical hernia     C NONSPECIFIC PROCEDURE  2002    cystscopy for urethral stricture from radiation therapy     COLONOSCOPY       COLONOSCOPY  4/18/2014    Procedure: Colonoscopy   polyp bx;  Surgeon: Josef Mckeon MD;  Location: [facility]     CYSTOSCOPY, CYSTOGRAM, COMBINED N/A 2/26/2015    Procedure: COMBINED CYSTOSCOPY, CYSTOGRAM;  Surgeon: Darell Barrera MD;  Location: [facility]     CYSTOSCOPY, INTERNAL URETHROTOMY, COMBINED N/A 12/19/2014    Procedure: COMBINED CYSTOSCOPY, INTERNAL URETHROTOMY;  Surgeon: Buzz Ren MD;  Location: [facility]     CYSTOSTOMY, INSERT TUBE SUPRAPUBIC, COMBINED  12/19/2014    Procedure: COMBINED CYSTOSTOMY, INSERT TUBE  SUPRAPUBIC;  Surgeon: Buzz Ren MD;  Location[facility]     CYSTOSTOMY, INSERT TUBE SUPRAPUBIC, COMBINED N/A 12/29/2014    Procedure: COMBINED CYSTOSTOMY, INSERT TUBE SUPRAPUBIC;  Surgeon: Buzz Ren MD;  Location: [facility] OR     ENT SURGERY       ESOPHAGOSCOPY, GASTROSCOPY, DUODENOSCOPY (EGD), COMBINED  10/2/2012    Procedure: COMBINED ESOPHAGOSCOPY, GASTROSCOPY, DUODENOSCOPY (EGD);  COMBINED ESOPHAGOSCOPY, GASTROSCOPY, DUODENOSCOPY (EGD) ;  Surgeon: Raj Beltre MD;  Location: [facility]     HERNIA REPAIR       LAPAROSCOPIC LYSIS ADHESIONS N/A 12/4/2015    Procedure: LAPAROSCOPIC LYSIS ADHESIONS;  Surgeon: Herbie Sanchez MD;  Location: [facility]     LAPAROTOMY EXPLORATORY N/A 12/4/2015    Procedure: LAPAROTOMY EXPLORATORY;  Surgeon: Herbie Sanchez MD;  Location: [facility]     MYRINGOTOMY      in childhood     TONSILLECTOMY and adenoidectomy      in childhood     URETHROPLASTY WITH BUCCAL GRAFT N/A 3/27/2015    Procedure: URETHROPLASTY WITH BUCCAL GRAFT;  Surgeon: Darell Barrera MD;  Location: [facility]       Social History     Tobacco Use     Smoking status: Never Smoker     Smokeless tobacco: Never Used   Substance Use Topics     Alcohol use: Not Currently     Alcohol/week: 0.0 standard drinks     Comment:  quit last consumed 5/30/2019 pt going to AA     Family History   Adopted: Yes   Problem Relation Age of Onset     Unknown/Adopted Mother      Suicide Father      Schizophrenia No family hx of      Bipolar Disorder No family hx of      Dementia No family hx of      [place] Disease No family hx of      Parkinsonism No family hx of      Autism Spectrum Disorder No family hx of      Intellectual Disability (Mental Retardation) No family hx of          Current Outpatient Medications   Medication Sig Dispense Refill     acetaminophen (TYLENOL) 325 MG tablet Take 2 tablets (650 mg) by mouth every 8 hours as needed for mild pain 100 tablet 0     amLODIPine (NORVASC) 5 MG tablet Take  "1 tablet (5 mg) by mouth daily 90 tablet 1     gabapentin (NEURONTIN) 300 MG capsule Take 2 capsules (600 mg) by mouth 3 times daily 180 capsule 1     methylPREDNISolone (MEDROL DOSEPAK) 4 MG tablet therapy pack Follow Package Directions 21 tablet 0     multivitamin w/minerals (MULTI-VITAMIN) tablet Take 1 tablet by mouth daily 90 tablet 1     sertraline (ZOLOFT) 50 MG tablet Take 1 tablet (50 mg) by mouth daily 90 tablet 1     sildenafil (VIAGRA) 100 MG tablet 30 min - 4 hr before intercourse take 0.5-1 tablets PO PRN ED Never use with nitroglycerin, terazosin or doxazosin. 12 tablet 11     traZODone (DESYREL) 50 MG tablet Take 1 tablet (50 mg) by mouth At Bedtime 90 tablet 1     Allergies   Allergen Reactions     No Known Allergies      Seasonal Allergies        Reviewed and updated as needed this visit by Provider       Review of Systems   Constitutional: Negative.    HENT: Negative.    Eyes: Negative.    Respiratory: Negative.    Cardiovascular: Negative.    Gastrointestinal: Negative.    Genitourinary: Negative.    Musculoskeletal:        As in HPI   Skin: Negative.    Neurological: Negative.    Psychiatric/Behavioral: Negative.          Objective    /74   Pulse 76   Temp 98  F (36.7  C) (Tympanic)   Resp 14   Ht 1.727 m (5' 8\")   Wt 126.6 kg (279 lb)   BMI 42.42 kg/m    Physical Exam  Constitutional:       General: He is not in acute distress.     Appearance: He is well-developed.   HENT:      Head: Normocephalic and atraumatic.      Nose: Nose normal.   Eyes:      Conjunctiva/sclera: Conjunctivae normal.   Neck:      Musculoskeletal: Normal range of motion.   Pulmonary:      Effort: Pulmonary effort is normal.   Musculoskeletal:      Lumbar back: He exhibits tenderness, pain and spasm. He exhibits no bony tenderness.   Skin:     General: Skin is warm and dry.   Neurological:      Mental Status: He is alert and oriented to person, place, and time.      Sensory: Sensation is intact.      Motor: " Motor function is intact.      Coordination: Coordination is intact.      Gait: Gait is intact.      Comments: Positive SLR on left   Psychiatric:         Judgment: Judgment normal.         Diagnostic Test Results:  No results found. However, due to the size of the patient record, not all encounters were searched. Please check Results Review for a complete set of results.        Assessment & Plan   Problem List Items Addressed This Visit        Nervous and Auditory    Lumbago - Primary    Relevant Medications    methylPREDNISolone (MEDROL DOSEPAK) 4 MG tablet therapy pack       Respiratory    MAGALY (obstructive sleep apnea)    Relevant Orders    Sleep DME (Completed)      Other Visit Diagnoses     Cubital tunnel syndrome, bilateral             LBP discussed below  Orders placed for CPAP mask supplies  Cubital tunnel - likely from sitting with his elbow bent for long periods of time.  Stretches encouraged, avoid activities with arms bent for extended periods of time.  Recheck if no improvement.  Discussed towel wrapping - however he is not typically bothered at night.       Patient Instructions   We discussed the diagnosis of acute low back pain.    I encouraged physical activity as tolerated.  Being sedentary will likely make symptoms worse.  They should return to work and activities as tolerated.  For pain control I recommended NSAIDs, acetaminophen, ice and/or heat.  Narcotics should be avoided.    In most patients, symptoms should improve in 4-6 weeks.     The patient should go to an emergency room if they experience any of the following symptoms:  - Numbness in the genitals, anus, butt and inner thigh areas  - Change in bowel or bladder function  - Worsening weakness or change in coordination  - Fever or chills    I recommend follow up with their primary care provider or an orthopedic provider in 6 weeks if symptoms do not improve, or sooner if symptoms are getting worse.                 Patient Education     Hip  Rotation (Flexibility)    These instructions are for the right hip. Switch sides for your left hip.  1. Lie on your back on the floor, with your knees bent and feet flat on the floor. Don t press your neck or lower back to the floor.  2. Rest your right ankle on your left knee.  3. Place a towel around the back of your left thigh. Pull on the ends of the towel to pull your left knee toward your chest. Feel the stretch in your buttocks.  4. Hold for 30 to 60 seconds. Lower your leg back down.  5. Repeat 2 times, or as instructed.  6. Switch legs and repeat.   7. Do this 3 times a day, or as instructed.  Date Last Reviewed: 3/10/2016    1453-5120 [employer]. [address] [place] [address]. All rights reserved. This information is not intended as a substitute for professional medical care. Always follow your healthcare professional's instructions.               Return in about 6 weeks (around 3/20/2020) for a recheck if you are not improved.    Mandy Pabon PA-C  [facility]

## 2020-03-02 NOTE — TELEPHONE ENCOUNTER
Please see patient mychart message.     Current dose says 600 mg TID, but would like this increased to 900 mg TID. Says it was a mistake. He should be getting 900 mg TID

## 2020-03-06 NOTE — TELEPHONE ENCOUNTER
Ok for one additional medrol dose pack.     Next step is PT and/or MRI    Need to confirm he does not have any red flag symptom:    (Red flag symptoms:  Denies saddle anesthesia, bowel and bladder dysfunction.  Denies fever and chills.  Denies recent IV drug use.  Denies recent weight loss or history of cancer.   Denies history of osteoporosis or prolonged steroid use)    Georges Pabon PA-C

## 2020-03-06 NOTE — TELEPHONE ENCOUNTER
Call back from patient.     Provider message was shared with patient.     States he does not have any red flag symptoms. (Cancer-many years ago, should be in his chart).     Medication sent to pharmacy    PT was pended for PCP review.     Flimmert message sent to patient with contact information to be able to schedule appointment for PT.

## 2020-03-06 NOTE — TELEPHONE ENCOUNTER
Patient Contact    Attempt # 1    Was call answered?  No.  Left message on voicemail with information to call me back.    On call back:    -Pt denied any of the sx below on initial call, but please review to confirm.  -After, please send pended referral to PCP

## 2020-03-06 NOTE — TELEPHONE ENCOUNTER
Patient Contact    Attempt # 1    Was call answered?  No.  Left message on voicemail with information to call me back.    On call back:    -triage as needed  -Did pain ever go away from 02/07  -Otherwise, note provider message below and see what pt would like to do

## 2020-03-06 NOTE — TELEPHONE ENCOUNTER
Pt calling back to triage.    States that his pain did go away last time after prednisone. States the pain is enough to call in about, but does not interfere with sleep or walking. States the pain is exact same as last time.    Has been doing stretches, mentions placing a patch on his lower back.    RN went over pt instructions from last visit. Pt states understanding of all of these. At this time, he is wondering if he could get another dose of steroids, and also a referral for orthopedics.     [place] is where he lives, would ideally like something around that area.

## 2020-03-06 NOTE — TELEPHONE ENCOUNTER
MC message:    That sciatica pain has returned down my left leg.  I haven't done any lifting or sudden twists.    What do you suggest?

## 2020-03-20 NOTE — PROGRESS NOTES
[facility] Initial Evaluation  Subjective:    Therapist Generated HPI Evaluation  Problem details: Multiple year h/o left low back and left leg pain.  H/o lumbar HNP in 1991..         Type of problem:  Lumbar (left leg).    This is a recurrent condition.  Occurance: lifting.  Where condition occurred: for unknown reasons.  Patient reports pain:  Lumbar spine left.  Pain is described as aching and is intermittent.  Radiates to: hamstring, calf. Pain is worse in the P.M..  Progression since onset: better since the steroids.  Associated with: none. Symptoms are exacerbated by lifting and twisting    Imaging testing: no recent imaging.  Past treatment: steroid packs, hamstring stretching, glute stretching. Improved with treatment: 2 bouts of steroids, most recent last week.                          Objective:  System         Lumbar/SI Evaluation  ROM:    AROM Lumbar:   Flexion:            75%  Ext:                    50%   Side Bend:        Left:  WNL    Right:  WNL  Rotation:           Left:  WNL    Right:  WNL  Side Glide:        Left:     Right:         Strength: Core = 3/5  Lumbar Myotomes:  normal            Lumbar DTR's:  not assessed          Neural Tension/Mobility:      Left side:SLR; SLR w/DF; Femoral Nerve or Slump  negative.     Right side:   SLR w/DF; Femoral Nerve; Slump or SLR  negative.       Lumbar Provocation:    Left positive with:  Stork w/ext  Left negative with:  PROM hip    Right negative with:  Stork w/ext and PROM hip                                                 General     ROS    Assessment/Plan:    Patient is a 59 year old male with lumbar complaints.    Patient has the following significant findings with corresponding treatment plan.                Diagnosis 1:  Left Low Back Pain and Left Sided Sciatica  Pain -  self management, education and home program  Decreased ROM/flexibility - therapeutic exercise, therapeutic activity and home program  Decreased strength -  therapeutic exercise, therapeutic activities and home program  Decreased function - therapeutic activities    Therapy Evaluation Codes:   1) History comprised of:   Personal factors that impact the plan of care:      Time since onset of symptoms.    Comorbidity factors that impact the plan of care are:      Overweight.     Medications impacting care: None.  2) Examination of Body Systems comprised of:   Body structures and functions that impact the plan of care:      Lumbar spine.   Activity limitations that impact the plan of care are:      Bending and Lifting.  3) Clinical presentation characteristics are:   Stable/Uncomplicated.  4) Decision-Making    Low complexity using standardized patient assessment instrument and/or measureable assessment of functional outcome.  Cumulative Therapy Evaluation is: Low complexity.    Previous and current functional limitations:  (See Goal Flow Sheet for this information)    Short term and Long term goals: (See Goal Flow Sheet for this information)     Communication ability:  Patient appears to be able to clearly communicate and understand verbal and written communication and follow directions correctly.  Treatment Explanation - The following has been discussed with the patient:   RX ordered/plan of care  Anticipated outcomes  Possible risks and side effects  This patient would benefit from PT intervention to resume normal activities.   Rehab potential is good.    Frequency:  1 X week, once daily  Duration:  for 1 visit  Discharge Plan:  Achieve all LTG.  Independent in home treatment program.  Reach maximal therapeutic benefit.    Please refer to the daily flowsheet for treatment today, total treatment time and time spent performing 1:1 timed codes.

## 2020-03-20 NOTE — PROGRESS NOTES
[facility] Initial Evaluation  Subjective:    Patient Health History                     Surgeries include:  Orthopedic surgery (L4L5, Lamanectomy).        Occupation: [employer].   Primary job tasks include:  Prolonged sitting and prolonged standing.                                    Objective:  System    Physical Exam    General     ROS    Assessment/Plan:

## 2020-03-31 PROBLEM — F10.939 ALCOHOL WITHDRAWAL (H): Status: ACTIVE | Noted: 2020-01-01

## 2020-03-31 NOTE — ED PROVIDER NOTES
History     Chief Complaint:  Altered Mental Status    The history is provided by the patient.      Aydin Reilly is a 59 year old male with a history of alcohol abuse who presents with altered mental status after binge drinking for the last two weeks. He relapsed after achieving 6 months of sobriety and his last alcoholic beverage was about an hour prior to arrival. He states he has not eaten in days and found about 7 bottles of liquor around his house this morning. He notes some emesis and abdominal pain. Additionally, he was having thoughts of suicide but denies any intent or plan to harm himself now. He denies chest pain, hematemesis, or a history of withdrawal seizures. He denies the use of other drugs/substances.     Allergies:  No Known Drug Allergies    Medications:    Norvasc  Neurontin   Medrol dosepak  Prilosec  Zoloft  Viagra  Desyrel      Past Medical History:    Alcohol abuse  Opioid abuse  Suicidal ideation  Anserine bursitis  Hypertension  Cancer; malignant neoplasm of the rectum  Chondritis  GERD  Hyperlipidemia  Hypogonadism  Lumbar disc herniation with radiculopathy  Depression   Sleep apnea   Urethral stricture    Past Surgical History:    Abdomen surgery  L4-L5 laminectomy, disk herniation  Squamous cell cancer - anus, 27 xrt/3 rounds, 5-FU/cisplatin  Cystscopy for urethral stricture from radiation therapy  Urethroplasty with buccal graft   Umbilical hernia repair   EGD  Laparotomy exploratory   Tonsillectomy      Family History:    Suicide- father      Social History:  PCP: Mandy Pabon   The patient is accompanied to the ED by a friend.   Marital Status:  Legally   Smoking status: Never  Alcohol use: Positive, consumed alcohol today.  Drug use: history of methamphetamines, marijuana. Last used 5/1/2019      Review of Systems   Constitutional:        Altered Mental Status   Gastrointestinal: Positive for abdominal pain and vomiting.   All other systems reviewed and are  negative.      Physical Exam     Patient Vitals for the past 24 hrs:   BP Temp Temp src Pulse Heart Rate Resp SpO2   03/31/20 1315 (!) 166/147 -- -- 104 103 -- 97 %   03/31/20 1300 (!) 151/98 -- -- 104 105 -- 96 %   03/31/20 1253 (!) 147/92 -- -- 114 -- -- 97 %   03/31/20 1125 (!) 177/103 98.1  F (36.7  C) Temporal -- 110 18 97 %       Physical Exam  Gen: alert  HEENT: PERRL, oropharynx clear  Neck: normal ROM  CV: RRR, no murmurs. Tachycardic   Pulm: breath sounds equal, lungs clear  Abd: Soft, nontender  Back: no evidence of injury, no cva tenderness  MSK: no deformity, moves all extremities  Skin: no rash  Neuro: alert, appropriate conversation and interaction. hand tremors and tongue fasciculation   Psych: suicidal ideation without plan or intent, low mood, remorseful about recent drinking, denies suicide attempt or ingestion    Emergency Department Course     EKG:   Time: 1211  Vent. Rate 102 bpm. WA interval 116. QRS duration 76. QT/QTc 344/448. P-R-T axis 63 -21 21.  Sinus tachycardia. Inferior infarct, age undetermined. Cannot rule out anterior infarct, age undetermined. Abnormal EKG. No significant change compared to EKG dated 10/8/2019. Read time: 1212    Imaging:  Radiology findings were communicated with the patient who voiced understanding of the findings.    XR Chest Port 1 View:  Negative exam.     Readings as per radiology.     Laboratory:  Laboratory findings were communicated with the patient who voiced understanding of the findings.    CBC: WBC: 6.0, HGB: 13.4, PLT: 108 (L)  CMP: Glucose 102 (H) o/w WNL (Creatinine 0.88)  Lipase: 151   Alcohol level blood: <0.01   Troponin (1243): 0.110     Interventions:  1246 0.9% NaCl Bolus 1000mLs IV   1247 Valium 10 mg IV   1337 Valium 10 mg IV  1337 Aspirin 325 mg Oral   1337 Thiamine 100 mg Oral     Emergency Department Course:  Past medical records, nursing notes, and vitals reviewed.    EKG obtained in the ED, see results above.   IV was inserted and  blood was drawn for laboratory testing, results above.  The patient was sent for xray imaging while in the emergency department, results above.     1131: I performed an exam of the patient as documented above.   1252: Patient rechecked and updated. IV access was gained.   1326: Patient rechecked and updated.    1426: I consulted with Dr. Limon of the hospitalist services who is in agreement to accept the patient for admission.    Findings and plan explained to the Patient who consents to admission. Discussed the patient with Dr. Limon, who will admit the patient to a telemetry bed for further monitoring, evaluation, and treatment.    I personally reviewed the laboratory results with the patient and answered all related questions prior to admission.      Impression & Plan     Medical Decision Making:  The patient presents for concern for alcohol withdrawal. Here, the patient does clinically have alcohol withdrawal symptoms with tachycardia, hypertension, and tremulousness. Laboratory studies show normal electrolytes, normal renal function, and normal liver function. Patient denies any ingestion or overdose. Patient with Q waves on EKG with markedly elevated blood pressure and epigastric pain. Therefore, cardiac complication was considered. Troponin returned elevated. Differential diagnosis does include ACS versus demand from hypertension versus demand from recent dehydration due to alcohol binge. Discussed with the admitting hospitalist regarding heparin - no active CP and ddx does include demand ischemia vs alcohol cardiomyopathy.  Given alcohol abuse there is risk of bleeding therefore will hold heparin at this time, plan to trend trop inpatient. Patient will be admitted to telemetry for ongoing management of the above.     Discharge Diagnosis:    ICD-10-CM    1. Alcohol abuse  F10.10 CBC with platelets differential   2. Alcohol withdrawal syndrome without complication (H)  F10.230    3. NSTEMI  (non-ST elevated myocardial infarction) (H)  I21.4      Disposition:  Admission to telemetry.     Scribe Disclosure:  I, Deb Ordonez, am serving as a scribe at 11:37 AM on 3/31/2020 to document services personally performed by Annabella Hernandez MD based on my observations and the provider's statements to me.     3/31/2020   [facility] EMERGENCY DEPARTMENT       Annabella Hernandez MD  03/31/20 [id number]

## 2020-03-31 NOTE — LETTER
[place] [address] [facility]  [address]  [facility] [address]  [phone]    April 3, 2020    Re: Aydin Reilly  [address] [place] [address]  [phone] (home)     : 1960      To Whom It May Concern:      Aydin Reilly was hospitalized from 3/31/2020 until 4/3/2020 due to medical illness.  He may return to work on 2020 with full duty.        Sincerely,        Kee Monk MD   IM/Hospitalist Service

## 2020-03-31 NOTE — H&P
[facility]  Hospitalist Admission Note  Name: Aydin Reilly    MRN: [id number]  YOB: 1960    Age: 59 year old  Date of admission: 3/31/2020  Primary care provider: Mandy Pabon    Chief Complaint:  withdrawal    Aydin Reilly is a 59 year old male with PMH including alcohol abuse, hypertension, gastric ulcer, GERD, back pain who presents with alcohol withdrawal as well as nausea and vomiting.    He notes he has been in a binge for the past couple weeks after previously being sober for 6 months.  He has been drinking up to 1 L of liquor per day.  He generally has not been eating and has had developed some epigastric abdominal pain and has had nausea and multiple episodes of nonbloody emesis.    Here in the ER he was hypertensive and tachycardic.  Lab work-up was remarkable for negative alcohol level but troponin of 0.110.  EKG was then obtained and showed sinus tachycardia with what appeared to be Q waves in lead III and aVF.  He denies any chest pain at this point but has had some discomfort in this area with vomiting.    He was noted to be in alcohol withdrawal and quite tremulous.  He received multiple doses of IV Valium with some improvement.  He was given 325 mg aspirin but after discussion between myself and the ER provider we have elected to hold off on heparin for now given bleeding risk and atypical presentation with relatively lower clinical suspicion for ACS pending further work-up.    I am now asked to admit him for further care involving treatment of his alcohol withdrawal and cardiac work-up.    Assessment and Plan:   1. Alcohol withdrawal: In the setting of a 2-week binge after previously being sober for about 6 months.  --Admit to inpatient status  --Ativan as per CIWA protocol  --Folate, thiamine, multivitamin  --Given hypertension, tachycardia and cardiac considerations will start Coreg 6.25 mg twice daily  --Consider CD consult when clinically  appropriate  --Currently mental status normal, feels improved after Valium, no immediate indication for admission to the ICU but monitor for worsening.    2.   Elevated troponin, Q waves on EKG: Denies chest pain.  In the setting of acute alcohol withdrawal I do think demand ischemia somewhat likely.  Also would consider stress cardiomyopathy, alcohol induced cardiomyopathy.  Agree with continuing aspirin for now but will hold off on heparin in the short-term pending further clinical data.  --Serial troponins  --Check echocardiogram  --Please notify MD if chest pain develops and obtain stat EKG in that case.  --Continue aspirin enteric-coated 81 mg daily  --If he has a characteristic rise and fall would certainly consider heparin but for now hold off given bleeding risk in the setting of alcohol binge    3.   Sinus tachycardia: Likely related to alcohol withdrawal.  Given hypertension and some concern for demand ischemia we will start Coreg 6.25 mg twice daily.  Continue IV hydration for now and monitor.    4.   Anxiety, depression: Resume home sertraline and trazodone once doses are verified.    5.  MAGALY: uses cpap, has his home machine.    DVT Prophylaxis: Pneumatic Compression Devices  Code Status: Full Code  Discharge Dispo: admit to inpatient status      History of Present Illness:  Aydin Reilly is a 59 year old male with PMH including alcohol abuse, hypertension, gastric ulcer, GERD, back pain who presents with alcohol withdrawal as well as nausea and vomiting.    He notes he has been in a binge for the past couple weeks after previously being sober for 6 months.  He has been drinking up to 1 L of liquor per day.  He generally has not been eating and has had developed some epigastric abdominal pain and has had nausea and multiple episodes of nonbloody emesis.    He reports that he did remove all of alcohol from his home prior to coming into the ER today and wants to be sober.  He did have one inpatient  psychiatry stay last year.  He currently denies any chest pain, pressure or shortness of breath.  He did have some episodes of sweating while cleaning up his home earlier today.  He reports he typically attends outpatient group/classes but these have been cut back/stopped for now related to the current pandemic.  He does think that they are about to start up virtual/zoo meeting type groups.       Past Medical History:  Past Medical History:   Diagnosis Date     Alcohol abuse     stopped in 2008     Anserine bursitis 4/19/2016     Benign essential hypertension      Cancer (H) 1999     Chondritis 4/19/2016     Elevated blood pressure reading without diagnosis of hypertension 4/19/2016     Gastric ulcer, unspecified as acute or chronic, without mention of hemorrhage, perforation, or obstruction ~1997    treated non-surgically     Gastro-oesophageal reflux disease      H/O malignant neoplasm of rectum, rectosigmoid junction, and anus      Hyperlipidemia LDL goal < 130 4/6/2010     Hypogonadism 5/24/2010     Lumbar disc herniation with radiculopathy     L4, recurrent episodic pain     Moderate episode of recurrent major depressive disorder (H) 10/31/2017     Rotator cuff injury, right, initial encounter 4/19/2016     Sleep apnea      Urethral stricture unspecified 2002    Following radiation; see PSH     Past Surgical History:  Past Surgical History:   Procedure Laterality Date     ABDOMEN SURGERY       BACK SURGERY       BIOPSY       BIOPSY       C NONSPECIFIC PROCEDURE  1991    L4-L5 laminectomy; disk herniation     C NONSPECIFIC PROCEDURE  1999    squamous cell CA - anus, 27 xrt/3 rounds, 5-FU/cisplatin     C NONSPECIFIC PROCEDURE      umbilical hernia     C NONSPECIFIC PROCEDURE  2002    cystscopy for urethral stricture from radiation therapy     COLONOSCOPY       COLONOSCOPY  4/18/2014    Procedure: Colonoscopy   polyp bx;  Surgeon: Josef Mckeon MD;  Location:  GI     CYSTOSCOPY, CYSTOGRAM, COMBINED N/A  2/26/2015    Procedure: COMBINED CYSTOSCOPY, CYSTOGRAM;  Surgeon: Darell Barrera MD;  Location: [facility] OR     CYSTOSCOPY, INTERNAL URETHROTOMY, COMBINED N/A 12/19/2014    Procedure: COMBINED CYSTOSCOPY, INTERNAL URETHROTOMY;  Surgeon: Buzz Ren MD;  Location: [facility] OR     CYSTOSTOMY, INSERT TUBE SUPRAPUBIC, COMBINED  12/19/2014    Procedure: COMBINED CYSTOSTOMY, INSERT TUBE SUPRAPUBIC;  Surgeon: Buzz Ren MD;  Location: [facility] OR     CYSTOSTOMY, INSERT TUBE SUPRAPUBIC, COMBINED N/A 12/29/2014    Procedure: COMBINED CYSTOSTOMY, INSERT TUBE SUPRAPUBIC;  Surgeon: Buzz Ren MD;  Location: [facility] OR     ENT SURGERY       ESOPHAGOSCOPY, GASTROSCOPY, DUODENOSCOPY (EGD), COMBINED  10/2/2012    Procedure: COMBINED ESOPHAGOSCOPY, GASTROSCOPY, DUODENOSCOPY (EGD);  COMBINED ESOPHAGOSCOPY, GASTROSCOPY, DUODENOSCOPY (EGD) ;  Surgeon: Raj Beltre MD;  Location: [facility] GI     HERNIA REPAIR       LAPAROSCOPIC LYSIS ADHESIONS N/A 12/4/2015    Procedure: LAPAROSCOPIC LYSIS ADHESIONS;  Surgeon: Herbie Sanchez MD;  Location: [facility] OR     LAPAROTOMY EXPLORATORY N/A 12/4/2015    Procedure: LAPAROTOMY EXPLORATORY;  Surgeon: Herbie Sanchez MD;  Location: [facility] OR     MYRINGOTOMY      in childhood     TONSILLECTOMY and adenoidectomy      in childhood     URETHROPLASTY WITH BUCCAL GRAFT N/A 3/27/2015    Procedure: URETHROPLASTY WITH BUCCAL GRAFT;  Surgeon: Darell Barrera MD;  Location: [facility] OR     Social History:  Social History     Tobacco Use     Smoking status: Never Smoker     Smokeless tobacco: Never Used   Substance Use Topics     Alcohol use: Not Currently     Alcohol/week: 0.0 standard drinks     Comment:  quit last consumed 5/30/2019 pt going to AA     Social History     Social History Narrative     Not on file     Family History:  Family History   Adopted: Yes   Problem Relation Age of Onset     Unknown/Adopted Mother      Suicide Father      Schizophrenia No family hx of       Bipolar Disorder No family hx of      Dementia No family hx of      Frontier Disease No family hx of      Parkinsonism No family hx of      Autism Spectrum Disorder No family hx of      Intellectual Disability (Mental Retardation) No family hx of      Allergies:  Allergies   Allergen Reactions     No Known Allergies      Seasonal Allergies      Medications:    No current facility-administered medications on file prior to encounter.   acetaminophen (TYLENOL) 325 MG tablet, Take 2 tablets (650 mg) by mouth every 8 hours as needed for mild pain  amLODIPine (NORVASC) 5 MG tablet, Take 1 tablet (5 mg) by mouth daily  gabapentin (NEURONTIN) 300 MG capsule, Take 3 capsules (900 mg) by mouth 3 times daily  multivitamin w/minerals (MULTI-VITAMIN) tablet, Take 1 tablet by mouth daily  omeprazole (PRILOSEC) 20 MG DR capsule, Take 1 capsule (20 mg) by mouth daily  sertraline (ZOLOFT) 50 MG tablet, Take 1 tablet (50 mg) by mouth daily  traZODone (DESYREL) 50 MG tablet, Take 50 mg by mouth nightly as needed for sleep  sildenafil (VIAGRA) 100 MG tablet, 30 min - 4 hr before intercourse take 0.5-1 tablets PO PRN ED Never use with nitroglycerin, terazosin or doxazosin.        Review of Systems:  A Comprehensive greater than 10 system review of systems was carried out.  Pertinent positives and negatives are noted above.  Otherwise negative for contributory information.     Physical Exam:  Blood pressure (!) 141/94, pulse 115, temperature 98.1  F (36.7  C), temperature source Temporal, resp. rate 18, SpO2 98 %.  Wt Readings from Last 1 Encounters:   02/07/20 126.6 kg (279 lb)     Exam:  General: Alert, awake, no acute distress.  HEENT: NC/AT, eyes anicteric, external occular movements intact, face symmetric.  Dentition WNL, MM moist.  Cardiac: tachycardic, RR, S1, S2.   Pulmonary: Normal chest rise, normal work of breathing.  Lungs CTA BL  Abdomen: obese, soft, non-tender, non-distended.  Bowel Sounds Present.  No  guarding.  Extremities: no deformities.  Warm, well perfused.  Skin: no rashes or lesions noted.  Warm and Dry.  Neuro: mild tremor.  No focal deficits noted.  Speech clear.  Psych: Appropriate affect.    Data:  EKG:  Sinus tachycardia, q-wave in leads III, AVF    Imaging:  Recent Results (from the past 48 hour(s))   XR Chest Port 1 View    Narrative    XR CHEST PORT 1 VW   3/31/2020 1:56 PM     HISTORY:  vomiting      Impression    IMPRESSION:  Negative exam.    KAREN HANLEY MD     Labs:  Recent Labs   Lab 03/31/20  1319   WBC 6.0   HGB 13.4   HCT 40.8   MCV 83   *          Lab Results   Component Value Date     03/31/2020     10/09/2019     10/05/2019    Lab Results   Component Value Date    CHLORIDE 101 03/31/2020    CHLORIDE 106 10/09/2019    CHLORIDE 104 10/05/2019    Lab Results   Component Value Date    BUN 11 03/31/2020    BUN 19 10/09/2019    BUN 18 10/05/2019      Lab Results   Component Value Date    POTASSIUM 3.8 03/31/2020    POTASSIUM 3.4 10/09/2019    POTASSIUM 3.7 10/05/2019    Lab Results   Component Value Date    CO2 21 03/31/2020    CO2 26 10/09/2019    CO2 23 10/05/2019    Lab Results   Component Value Date    CR 0.88 03/31/2020    CR 0.84 10/09/2019    CR 0.95 10/05/2019            Jason Limon MD  Hospitalist  [facility]

## 2020-03-31 NOTE — ED NOTES
"[facility]  ED Nurse Handoff Report    Aydin Reilly is a 59 year old male   ED Chief complaint: Alcohol Problem  . ED Diagnosis:   Final diagnoses:   Alcohol abuse   Alcohol withdrawal syndrome without complication (H)   NSTEMI (non-ST elevated myocardial infarction) (H)     Allergies:   Allergies   Allergen Reactions     No Known Allergies      Seasonal Allergies        Code Status: Full Code  Activity level - Baseline/Home:  Independent. Activity Level - Current:   Stand by Assist. Lift room needed: No. Bariatric: No   Needed: No   Isolation: No. Infection: Not Applicable.     Vital Signs:   Vitals:    03/31/20 1330 03/31/20 1400 03/31/20 1415 03/31/20 1430   BP: 128/55 (!) 163/85 (!) 141/109 (!) 141/94   Pulse: 113 116 115 115   Resp:       Temp:       TempSrc:       SpO2: 98% 98% 98% 98%       Cardiac Rhythm:  ,      Pain level: 0-10 Pain Scale: 6  Patient confused: No. Patient Falls Risk: Yes.   Elimination Status: Has voided   Patient Report - Initial Complaint:    A&O x4, ABCs intact. Pt presents for alcohol problem. Pt states that hes been drinking for the past week and he wants to get help. Pt states \"I want help before I hurt myself.\" Pt has \"fleeting\" thoughts of killing himself, but \"I'm too chicken to do anything.\" Pt states 6 months ago he was here because he \"bought a garden hose and gas, drove into the woods and was going to do the gas routine.\"        . Focused Assessment:   Cardiac Cardiac - Cardiac WDL: WDL  Capillary Refill, General: less than/equal to 3 secs  [place]       12:00 MHCD General Appearance - ADL Assessment (WDL): WDL  Speech (WDL): WDL   Psychiatric Symptoms / Stressors - Mood/Behavior: cooperative; calm; sad   Suicide/Homicide Risk - Suicidality (WDL): WDL except  Suicidal Ideation: Thoughts only  [place]     12:00 Respiratory Respiratory - Respiratory WDL: WDL          Tests Performed: labs, CXR. Abnormal Results:   Labs Ordered and Resulted from Time of ED " Arrival Up to the Time of Departure from the ED   COMPREHENSIVE METABOLIC PANEL - Abnormal; Notable for the following components:       Result Value    Glucose 102 (*)     AST 58 (*)     All other components within normal limits   TROPONIN I - Abnormal; Notable for the following components:    Troponin I ES 0.110 (*)     All other components within normal limits   CBC WITH PLATELETS DIFFERENTIAL - Abnormal; Notable for the following components:    RDW 15.9 (*)     Platelet Count 108 (*)     Absolute Lymphocytes 0.7 (*)     All other components within normal limits   ALCOHOL ETHYL   LIPASE   TELEMETRY MONITORING CARDIOLOGY ADULT   TELEMETRY MONITORING CARDIOLOGY ADULT     XR Chest Port 1 View   Final Result   IMPRESSION:  Negative exam.      ROLANDO HANLEY MD        .   Treatments provided: see MAR  Family Comments: none at bedside per current restrictions  OBS brochure/video discussed/provided to patient:  N/A  ED Medications:   Medications   diazepam (VALIUM) injection 10 mg (has no administration in time range)   dextrose 5% and 0.9% NaCl infusion (has no administration in time range)   diazepam (VALIUM) injection 10 mg (10 mg Intravenous Given 3/31/20 1247)   0.9% sodium chloride BOLUS (1,000 mLs Intravenous New Bag 3/31/20 1246)   diazepam (VALIUM) injection 10 mg (10 mg Intravenous Given 3/31/20 1337)   aspirin (ASA) tablet 325 mg (325 mg Oral Given 3/31/20 1337)   vitamin B1 (THIAMINE) tablet 100 mg (100 mg Oral Given 3/31/20 1337)   diazepam (VALIUM) injection 10 mg (10 mg Intravenous Given 3/31/20 1437)     Drips infusing:  No  For the majority of the shift, the patient's behavior Green. Interventions performed were N/A.    Sepsis treatment initiated: No       ED Nurse Name/Phone Number: Susan George RN,   2:43 PM    RECEIVING UNIT ED HANDOFF REVIEW    Above ED Nurse Handoff Report was reviewed: Yes  Reviewed by: Rolando Chauhan RN on March 31, 2020 at 4:06 PM

## 2020-03-31 NOTE — ED TRIAGE NOTES
"A&O x4, ABCs intact. Pt presents for alcohol problem. Pt states that hes been drinking for the past week and he wants to get help. Pt states \"I want help before I hurt myself.\" Pt has \"fleeting\" thoughts of killing himself, but \"I'm too chicken to do anything.\" Pt states 6 months ago he was here because he \"bought a garden hose and gas, drove into the woods and was going to do the gas routine.\"    "

## 2020-03-31 NOTE — PHARMACY-ADMISSION MEDICATION HISTORY
Admission medication history interview status for this patient is complete. See [facility] admission navigator for allergy information, prior to admission medications and immunization status.     Medication history interview source(s):Patient  Medication history resources (including written lists, pill bottles, clinic record):phone interview  Primary pharmacy:[facility]    Changes made to PTA medication list:  Added: none  Deleted: benadryl, folic acid, solumedrol  Changed: trazodone    Actions taken by pharmacist (provider contacted, etc):None     Additional medication history information:None    Medication reconciliation/reorder completed by provider prior to medication history?  N   (Y/N)     For patients on insulin therapy: N  (Y/N)    Prior to Admission medications    Medication Sig Last Dose Taking? Auth Provider   acetaminophen (TYLENOL) 325 MG tablet Take 2 tablets (650 mg) by mouth every 8 hours as needed for mild pain  Yes Dinh Rizzo APRN CNP   amLODIPine (NORVASC) 5 MG tablet Take 1 tablet (5 mg) by mouth daily 3/31/2020 at Unknown time Yes Mandy Pabon PA-C   gabapentin (NEURONTIN) 300 MG capsule Take 3 capsules (900 mg) by mouth 3 times daily Past Week at am Yes Mandy Pabon PA-C   multivitamin w/minerals (MULTI-VITAMIN) tablet Take 1 tablet by mouth daily Past Week at Unknown time Yes Mandy Pabon PA-C   omeprazole (PRILOSEC) 20 MG DR capsule Take 1 capsule (20 mg) by mouth daily 3/31/2020 at Unknown time Yes Yoshi Dempsey MD   sertraline (ZOLOFT) 50 MG tablet Take 1 tablet (50 mg) by mouth daily Past Week at Unknown time Yes Mandy Pabon PA-C   traZODone (DESYREL) 50 MG tablet Take 50 mg by mouth nightly as needed for sleep  Yes Unknown, Entered By History   sildenafil (VIAGRA) 100 MG tablet 30 min - 4 hr before intercourse take 0.5-1 tablets PO PRN ED Never use with nitroglycerin, terazosin or doxazosin.   Mandy Pabon  ANNA Mitchell

## 2020-03-31 NOTE — PLAN OF CARE
BP (!) 164/77   Pulse 110   Temp 98.1  F (36.7  C) (Oral)   Resp 18   Wt 119.8 kg (264 lb 3.2 oz)   SpO2 98%   BMI 40.17 kg/m      -VSS ex HTN  -Had chest pain, MD paged  --ekg obtained  -LAYNE's 19,9,4,9

## 2020-04-01 NOTE — PROGRESS NOTES
[facility]    Hospitalist Progress Note    Date of Service (when I saw the patient): 04/01/2020  Provider:  Kee Monk MD   Text Page  7am - 6PM       Assessment & Plan   Aydin Reilly is a 59 year old male who 6 has a history of alcoholism, essential hypertension, GERD, PUD of the stomach and chronic back pain.  He admitted on 3/31/2020 with nausea, vomiting, shakiness and other clinical evidence of alcohol withdrawal after a recent binge.  He has been using 1 L of hard liquor per day.  He arrived with hypertensive, tachycardic, with nonbloody emesis and complaining of epigastric pain.  No chest pain.  Initial suspicion for ACS, serial troponin plateaued overnight.    Today he denies chest pain, shortness of breath, epigastric pain, no nausea or vomiting.  Overall feels better.  Still tremulous.    1.  Alcohol withdrawal syndrome.  Relapsing after several months sobriety per patient report.  Continue patient, CIWA protocol treatment.  Vitamin supplementation, electrolyte replacement per protocol.  Request chemical dependency consult.    2.  Elevated troponin, presence of Q waves in lead III aVF.  Troponin plateau.  Suspect demand supply ischemia in the setting of withdrawal.  No clear evidence of ACS.  Close follow-up.  Continue Coreg stress started in the emergency department.    3.  Sinus tachycardia as part of the symptomatic spectrums of VASQUEZ and likely alcohol induced dehydration.  Improving.    4.  History of MAGALY.  On treatment NIPPV, will continue here.    5.  Anxiety/depression.  On sertraline and trazodone, Ativan as needed as part of CIWA.     DVT Prophylaxis: Pneumatic Compression Devices  Code Status: Full Code    Disposition: Expected discharge in 2 -3  days once not longer withdrawing .    Interval History   Denies major issues. No CP/SOB/N/V.  Hands tremor, shakiness.  Feeling tired.  Sleeping.    -Data reviewed today: I reviewed all new labs and imaging results over the last 24  hours. I personally reviewed the EKG tracing showing Sinus rhythm in the monitor, heart rate 97..    Physical Exam   Temp: 97  F (36.1  C) Temp src: Oral BP: 117/66 Pulse: 97 Heart Rate: 97 Resp: 20 SpO2: 94 % O2 Device: None (Room air)    Vitals:    03/31/20 1704   Weight: 119.8 kg (264 lb 3.2 oz)     Vital Signs with Ranges  Temp:  [97  F (36.1  C)-98.3  F (36.8  C)] 97  F (36.1  C)  Pulse:  [] 97  Heart Rate:  [] 97  Resp:  [16-20] 20  BP: (108-164)/() 117/66  SpO2:  [94 %-99 %] 94 %  I/O last 3 completed shifts:  In: 1343 [P.O.:100; I.V.:1243]  Out: -     GEN:  Alert, oriented x 3, appears comfortable, NAD.  HEENT:  Normocephalic/atraumatic, no scleral icterus, no nasal discharge, mouth moist.  CV:  Regular rate and rhythm, no murmur or JVD.  S1 + S2 noted, no S3 or S4.  LUNGS:  Clear to auscultation bilaterally without rales/rhonchi/wheezing/retractions.  Symmetric chest rise on inhalation noted.  ABD:  Active bowel sounds, soft, non-tender/non-distended.  No rebound/guarding/rigidity.  EXT:  No edema or cyanosis.  No joint synovitis noted.  SKIN:  Dry to touch, no exanthems noted in the visualized areas.   NEURO; gross hands' tremor    Medications     sodium chloride Stopped (04/01/20 0923)       aspirin  81 mg Oral Daily     carvedilol  6.25 mg Oral BID w/meals     folic acid  1 mg Oral Daily     multivitamin w/minerals  1 tablet Oral Daily     pantoprazole (PROTONIX) IV  40 mg Intravenous Daily with breakfast     sodium chloride (PF)  3 mL Intracatheter Q8H     vitamin B1  100 mg Oral Daily       Data   Recent Labs   Lab 04/01/20  0739 04/01/20  0143 03/31/20  2251 03/31/20  1815 03/31/20  1319 03/31/20  1243   WBC 4.2  --   --   --  6.0  --    HGB 11.7*  --   --   --  13.4  --    MCV 84  --   --   --  83  --    PLT 86*  --   --   --  108*  --      --   --   --   --  134   POTASSIUM 3.1*  --   --   --   --  3.8   CHLORIDE 110*  --   --   --   --  101   CO2 23  --   --   --   --  21    BUN 12  --   --   --   --  11   CR 0.94  --   --   --   --  0.88   ANIONGAP 6  --   --   --   --  12   ELSI 8.1*  --   --   --   --  10.0   *  --   --   --   --  102*   ALBUMIN 3.2*  --   --   --   --  3.9   PROTTOTAL 6.2*  --   --   --   --  7.6   BILITOTAL 0.5  --   --   --   --  0.8   ALKPHOS 69  --   --   --   --  89   ALT 39  --   --   --   --  45   AST 39  --   --   --   --  58*   LIPASE  --   --   --   --   --  151   TROPI  --  0.076* 0.085* 0.089*  --  0.110*       Recent Results (from the past 24 hour(s))   XR Chest Port 1 View    Narrative    XR CHEST PORT 1 VW   3/31/2020 1:56 PM     HISTORY:  vomiting      Impression    IMPRESSION:  Negative exam.    KAREN HANLEY MD   Echocardiogram Complete    Narrative    [id number]  [id number]  [id number]  [id number]^JULIANNA^DEMARCUS^[facility]  Echocardiography Laboratory  [address] [place] [address]        Name: PAYAL THOMPSON  MRN: [id number]  : 1960  Study Date: 2020 08:18 AM  Age: 59 yrs  Gender: Male  Patient Location: [facility]  Reason For Study: Chest Pain  Ordering Physician: DEMARCUS LEVINE  Referring Physician: Mandy Pabon  Performed By: Pedro Mcwilliams RDCS     BSA: 2.3 m2  Height: 68 in  Weight: 264 lb  BP: 121/75 mmHg  _____________________________________________________________________________  __        Procedure  Complete Portable Echo Adult. Optison (NDC #[id number]) given intravenously.  _____________________________________________________________________________  __        Interpretation Summary     Technically challenging study due to patient habitus, even with the use of  contrast imaging.     Left ventricular size, global systolic function, and wall motion are normal,  estimated LVEF 60-65%.  Right ventricular global function is normal.  No significant valvular abnormalities.     This study was compared to a TTE from 10/2019. There has been no  significant  change.  _____________________________________________________________________________  __        Left Ventricle  The left ventricle is normal in size. There is concentric remodeling present.  Left ventricular systolic function is normal. The visual ejection fraction is  estimated at 60-65%. Left ventricular diastolic function is normal. No  regional wall motion abnormalities noted.     Right Ventricle  The right ventricle is normal in structure, function and size.     Atria  Normal left atrial size. Right atrial size is normal.     Mitral Valve  The mitral valve is normal in structure and function.        Tricuspid Valve  The tricuspid valve is not well visualized, but is grossly normal. There is  trace tricuspid regurgitation. Right ventricular systolic pressure could not  be approximated due to inadequate tricuspid regurgitation.     Aortic Valve  The aortic valve is normal in structure and function. The aortic valve is  trileaflet.     Pulmonic Valve  The pulmonic valve is not well seen, but is grossly normal.     Vessels  The aortic Sinus(es) of Valsalva are borderline dilated. The ascending aorta  is Borderline dilated. Inferior vena cava not well visualized for estimation  of right atrial pressure.     Pericardium  There is no pericardial effusion.     _____________________________________________________________________________  __  MMode/2D Measurements & Calculations  IVSd: 1.1 cm  LVIDd: 5.4 cm  LVIDs: 2.7 cm  LVPWd: 1.2 cm  FS: 51.0 %     LV mass(C)d: 250.5 grams  LV mass(C)dI: 108.9 grams/m2  Ao root diam: 3.8 cm  LA dimension: 3.3 cm  asc Aorta Diam: 3.8 cm  LA/Ao: 0.87  LVOT diam: 2.1 cm  LVOT area: 3.5 cm2  LA Volume (BP): 55.0 ml  LA Volume Index (BP): 23.9 ml/m2  RWT: 0.44        Doppler Measurements & Calculations  MV E max daniel: 88.4 cm/sec  MV A max daniel: 96.3 cm/sec  MV E/A: 0.92     MV dec time: 0.24 sec  PA acc time: 0.08 sec  E/E' av.7  Lateral E/e': 8.5  Medial E/e': 7.0            _____________________________________________________________________________  __           Report approved by: Fabian Olson 04/01/2020 08:47 AM                Disclaimer: This note consists of symbols derived from keyboarding, dictation and/or voice recognition software. As a result, there may be errors in the script that have gone undetected. Please consider this when interpreting information found in this chart.

## 2020-04-01 NOTE — PROGRESS NOTES
"SPIRITUAL HEALTH SERVICES Progress Note  FR Med Surg 3     Spiritual Health Services visit per routine admission hospital  request.    Provided reflective conversation while assessing emotional/spiritual needs and resources; offered validation of feelings and affirmation.  Pt is Anabaptist. Prayer requested and provided.       Illness Narrative - Martin narrated that he was admitted after a \"binge\" precipitated by concerns about work and Covid-19 outbreak. He reported that he is improving and hopes that he will be able to regain and sustain sobriety again (he states he had 6 months of sobriety previously).       Distress -     Martin shared that he is \"beating himself up\" about relapse.  Martin became tearful when describing his feelings of \"guilt and disappointment\".     Expressed concerns about possible heart damage and is awaiting more information from clinical team regarding plan of care.       Coping -     Named his sponsor as being available and supportive by phone.    Wffiliated with [facility] but reported this is his \"childhood [employer] orth[employer]\" and that they \"don't really know me there\".    Shared a number of coping strategies that have helped him in his recovery.  He has coloring materials and 12 Step books with him.    Martin acknowledges he has \"all the tools.  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    Pt is focused on \"helping my body rest and heal\".     Spirituality is central to his coping.  Prayer welcomed.  Encouraged Martin to reach out to tessa community to bridge spiritual support after discharge.      Plan - Will continue to support while on unit. [facility] remains available for any further needs or requests.         Todd Monteiro MA  Staff   [phone] - phone    "

## 2020-04-01 NOTE — PLAN OF CARE
Patient alert and oriented  Peripheral IV infusing continuos fluids  VSS, on room air  CIWA score of 4/6 scoring for anxiety  Lugns clear, denies SOB  Denies nausea, tolerating regular diet  Up assist standby  Positive BS, last BM today- incont  Voiding adequately- dribbling  Potassium and phosphorus replaced this shift, rechecks at 1500 (K) and 1600 (Phos)  Tele: SR   Denies chest pain, no pain at all  Continue with plan  of care

## 2020-04-01 NOTE — PLAN OF CARE
"/64 (BP Location: Left arm)   Pulse 98   Temp 97.9  F (36.6  C) (Oral)   Resp 18   Wt 119.8 kg (264 lb 3.2 oz)   SpO2 94%   BMI 40.17 kg/m      -VSS  -Gave 3mg ativan total  --at one point reported head was \"swimming\"  -Phos replaced    "

## 2020-04-02 NOTE — PLAN OF CARE
Presentation/Diagnosis:ETOH CIWA   History:Cancer, alcohol use, HLD, Sleep apnea, Depression   Labs/Protocols:K 3.4, replaced and reorder placed. Mag 1.6- replaced and reorder placed. CIWA 7,12,2,15( this is when he found out his mother had a bad stroke) 5 Phos 3.0  Vitals:VSS, complaining of a headache   Telemetry:SR with elevated T waves   Respiratory:RA sating 96%  Neuro:A&O  GI/:Uses urinal at bedside   Skin:Intact   LDAs:PIV infusing NS@100   Diet:Regular diet   Activity:SBA   Teaching:pt educated on plan of care   Plan:Monitor electrolytes and alcohol with drawl  Pt did receive a phone call today that his mother had a bad stroke. This made patient anxious and tearful. Spiritual health saw patient today and had a lengthy conversation with him.

## 2020-04-02 NOTE — PLAN OF CARE
VSS afebrile. A&O x4. SBA. CIWA ranged from 0-8, 1mg Ativan given with relief. Tele: SR with peaked T waves. Phos recheck was 2.9, no replacement needed. NS restarted at 100ml/hr in PIV. Denies CP. Will cont POC

## 2020-04-02 NOTE — PROGRESS NOTES
[facility]    Hospitalist Progress Note    Date of Service (when I saw the patient): 04/02/2020  Provider:  Kee Monk MD   Text Page  7am - 6PM       Assessment & Plan   Aydin Reilly is a 59 year old male who 6 has a history of alcoholism, essential hypertension, GERD, PUD of the stomach and chronic back pain.  He admitted on 3/31/2020 with nausea, vomiting, shakiness and other clinical evidence of alcohol withdrawal after a recent binge.  He has been using 1 L of hard liquor per day.  He arrived with hypertensive, tachycardic, with nonbloody emesis and complaining of epigastric pain.  No chest pain.  Initial suspicion for ACS, serial troponin plateaued overnight.    Today he denies chest pain, shortness of breath, epigastric pain, no nausea or vomiting.  Overall feels better.  Still tremulous.    1.  Alcohol withdrawal syndrome.  Relapsing after several months of sobriety per patient report.  Continue patient, CIWA protocol treatment.  Vitamin supplementation, electrolyte replacement per protocol.  Request chemical dependency consult.    2.  Elevated troponin, presence of Q waves in lead III aVF.  Troponin plateau.  Suspect demand supply ischemia in the setting of withdrawal.  No clear evidence of ACS.  Close follow-up.  Continue Coreg started in the emergency department.  - Normal echocardiogram    3.  Sinus tachycardia as part of the symptomatic spectrums of VASQUEZ and likely alcohol induced dehydration.  Resolved.    4.  History of MAGALY.  On treatment NIPPV, will continue here.    5.  Anxiety/depression.  On sertraline and trazodone, Ativan as needed as part of CIWA.     DVT Prophylaxis: Pneumatic Compression Devices  Code Status: Full Code    Disposition: Expected discharge in 2 -3  days once not longer withdrawing .    Interval History   Affected by family news of his mother having surgery d/t stroke in [place]. O/W denies major issues. No CP/SOB/N/V.  Hands tremor, shakiness much improved.   Fall asleep easily but wakes up same way.    -Data reviewed today: I reviewed all new labs and imaging results over the last 24 hours. I personally reviewed the EKG tracing showing Sinus rhythm in the monitor, heart rate 97..    Physical Exam   Temp: 98.2  F (36.8  C) Temp src: Oral BP: 131/71 Pulse: 98 Heart Rate: 98 Resp: 24 SpO2: 92 % O2 Device: None (Room air)    Vitals:    03/31/20 1704   Weight: 119.8 kg (264 lb 3.2 oz)     Vital Signs with Ranges  Temp:  [97.8  F (36.6  C)-98.2  F (36.8  C)] 98.2  F (36.8  C)  Pulse:  [98] 98  Heart Rate:  [98-99] 98  Resp:  [18-24] 24  BP: (110-173)/(60-89) 131/71  SpO2:  [92 %-95 %] 92 %  I/O last 3 completed shifts:  In: 1705 [P.O.:780; I.V.:925]  Out: -     GEN:  Alert, oriented x 3, appears comfortable, NAD.  HEENT:  Normocephalic/atraumatic, no scleral icterus, no nasal discharge, mouth moist.  CV:  Regular rate and rhythm, no murmur or JVD.  S1 + S2 noted, no S3 or S4.  LUNGS:  Clear to auscultation bilaterally without rales/rhonchi/wheezing/retractions.  Symmetric chest rise on inhalation noted.  ABD:  Active bowel sounds, soft, non-tender/non-distended.  No rebound/guarding/rigidity.  EXT:  No edema or cyanosis.  No joint synovitis noted.  SKIN:  Dry to touch, no exanthems noted in the visualized areas.   NEURO; minimal hands' tremor    Medications     sodium chloride 100 mL/hr at 04/01/20 4566       aspirin  81 mg Oral Daily     carvedilol  6.25 mg Oral BID w/meals     folic acid  1 mg Oral Daily     multivitamin w/minerals  1 tablet Oral Daily     pantoprazole (PROTONIX) IV  40 mg Intravenous Daily with breakfast     sodium chloride (PF)  3 mL Intracatheter Q8H     vitamin B1  100 mg Oral Daily       Data   Recent Labs   Lab 04/02/20  0638 04/01/20  1522 04/01/20  0739 04/01/20  0143 03/31/20  2251 03/31/20  1815 03/31/20  1319 03/31/20  1243   WBC  --   --  4.2  --   --   --  6.0  --    HGB  --   --  11.7*  --   --   --  13.4  --    MCV  --   --  84  --   --   --   83  --    PLT  --   --  86*  --   --   --  108*  --    NA  --   --  139  --   --   --   --  134   POTASSIUM 3.4 3.5 3.1*  --   --   --   --  3.8   CHLORIDE  --   --  110*  --   --   --   --  101   CO2  --   --  23  --   --   --   --  21   BUN  --   --  12  --   --   --   --  11   CR  --   --  0.94  --   --   --   --  0.88   ANIONGAP  --   --  6  --   --   --   --  12   ELSI  --   --  8.1*  --   --   --   --  10.0   GLC  --   --  119*  --   --   --   --  102*   ALBUMIN  --   --  3.2*  --   --   --   --  3.9   PROTTOTAL  --   --  6.2*  --   --   --   --  7.6   BILITOTAL  --   --  0.5  --   --   --   --  0.8   ALKPHOS  --   --  69  --   --   --   --  89   ALT  --   --  39  --   --   --   --  45   AST  --   --  39  --   --   --   --  58*   LIPASE  --   --   --   --   --   --   --  151   TROPI  --   --   --  0.076* 0.085* 0.089*  --  0.110*       No results found for this or any previous visit (from the past 24 hour(s)).          Disclaimer: This note consists of symbols derived from keyboarding, dictation and/or voice recognition software. As a result, there may be errors in the script that have gone undetected. Please consider this when interpreting information found in this chart.

## 2020-04-02 NOTE — PROGRESS NOTES
"SPIRITUAL HEALTH SERVICES Progress Note  Critical access hospital Med Surg 3     Spiritual Health Services visit per consult order for emotional/spiritual support.   Aydin tearfully shared about his concerns for his mother in [place]. He is awaiting news after she suffered a stroke and is in surgery at this time.   Martin engaged in life review and shared about his love for his mother.  They have been \"very close. She has been my rock\".   Martin expressed his sadness and frustration at being so far away at this time. \"Even if I could be there, I wouldn't be able to see her because of Covid visit restrictions\".  Martin noted she didn't know about his hospital admission.    Offered validation of feelings, affirmation of continued use of positive coping skills and prayer .  Spoke about being close in Spirit even though they are apart.     Will continue to provided support during hospital admission.    Todd Monteiro MA  Staff   [phone] - phone    "

## 2020-04-03 NOTE — PLAN OF CARE
Patient put himself in shower and Tele tech call to inform nurse. Patient stated that he is no longer dizzy and wanted to jump in the shower. Plan to continue with discharge as planned. Staff to ambulate patient prior to discharge to verify he is feeling better and not having further issues.     Addendum.   No further issues with ambulation. Patient to discharge to home. Verbalized understanding of discharge instructions. Valuables returned.

## 2020-04-03 NOTE — PLAN OF CARE
A&OX4. LS coarse and expiratory wheezing. Up with assist of 1 with walker and gait belt. BP was elevated due to anxiety because he was worried for his mom's recent diagnosis of stroke. CIWA score 9,5 and 11. Ativan administered as ordered. Regular diet. Continue POC.

## 2020-04-03 NOTE — DISCHARGE SUMMARY
[facility]    Discharge Summary  Hospitalist    Date of Admission:  3/31/2020  Date of Discharge:  4/3/2020  Provider:  Kee Monk MD  Date of Service (when I last saw the patient): 04/03/20    Discharge Diagnoses   1.  Alcohol withdrawal syndrome.  2.  Elevated troponin from supply demand ischemia in the setting of alcohol withdrawal.  3.  Sinus tachycardia in the setting of alcohol withdrawal syndrome.  4.  History of MAGALY on CPAP.  5.  History of anxiety/depression.  6.  Obesity with BMI equal 40    Other medical issues:  Past Medical History:   Diagnosis Date     Alcohol abuse     stopped in 2008     Anserine bursitis 4/19/2016     Benign essential hypertension      Cancer (H) 1999     Chondritis 4/19/2016     Elevated blood pressure reading without diagnosis of hypertension 4/19/2016     Gastric ulcer, unspecified as acute or chronic, without mention of hemorrhage, perforation, or obstruction ~1997    treated non-surgically     Gastro-oesophageal reflux disease      H/O malignant neoplasm of rectum, rectosigmoid junction, and anus      Hyperlipidemia LDL goal < 130 4/6/2010     Hypogonadism 5/24/2010     Lumbar disc herniation with radiculopathy     L4, recurrent episodic pain     Moderate episode of recurrent major depressive disorder (H) 10/31/2017     Rotator cuff injury, right, initial encounter 4/19/2016     Sleep apnea      Urethral stricture unspecified 2002    Following radiation; see [facility]       History of Present Illness   Aydin Reilly is an 59 year old male who presented with recent alcohol binge, tremulousness.  Please see the admission history and physical for full details.    Hospital Course   Aydin Reilly is a 59 year old male who has a history of alcoholism, essential hypertension, GERD, PUD of the stomach and chronic back pain.  He admitted on 3/31/2020 with nausea, vomiting, shakiness and other clinical evidence of alcohol withdrawal after a recent binge.  He has been  using 1 L of hard liquor per day.  He arrived with hypertensive, tachycardic, with nonbloody emesis and complaining of epigastric pain.  No chest pain.  Initial suspicion for ACS, serial troponin plateaued overnight.  He continued to have a good and stable hospital course, denies chest pain, shortness of breath, epigastric pain, nausea or vomiting.  Overall feels better.    He has been scoring low in the CIWA protocol.  He had worsening of anxiety not related to alcohol just family because his mother has suffered a stroke and she underwent invasive treatment with surgical intervention.  Apparently she has not recovered consciousness after the intervention.  Patient has requested to discharge to home to get involved with his family.  He has requested some medication for anxiety control, I offered a small amount of buspirone and advised him to continue his treatment with his primary care physician.  He will also plan to go back to his community resources for chemical dependence treatment as he did before this relapse.    1.  Alcohol withdrawal syndrome.  Relapsing after several months of sobriety per patient report. CIWA protocol treatment.  Vitamin supplementation, electrolyte replacement per protocol.  Request chemical dependency consult.     2.  Elevated troponin, presence of Q waves in lead III aVF.  Troponin plateau.  Suspect demand supply ischemia in the setting of withdrawal.  No clear evidence of ACS.  Close follow-up.  Continue Coreg started in the emergency department here.  - Normal echocardiogram     3.  Sinus tachycardia as part of the symptomatic spectrums of VASQUEZ and likely alcohol induced dehydration.  Resolved.     4.  History of MAGALY.  On treatment NIPPV, continue here.     5.  Anxiety/depression.  On sertraline and trazodone, Ativan as needed as part of CIWA.    # Discharge Pain Plan:    - Patient currently has NO PAIN and is not being prescribed pain medications on discharge.    Significant Results  and Procedures   See below     Pending Results      Unresulted Labs Ordered in the Past 30 Days of this Admission     No orders found from 3/1/2020 to 4/1/2020.          Code Status   Full Code       Primary Care Physician   Mandy Pabon    GEN:  Alert, oriented x 3, appears comfortable, NAD.  HEENT:  Normocephalic/atraumatic, no scleral icterus, no nasal discharge, mouth moist.  CV:  Regular rate and rhythm, no murmur or JVD.  S1 + S2 noted, no S3 or S4.  LUNGS:  Clear to auscultation bilaterally without rales/rhonchi/wheezing/retractions.  Symmetric chest rise on inhalation noted.  ABD:  Active bowel sounds, soft, non-tender/non-distended.  No rebound/guarding/rigidity.  EXT:  No edema or cyanosis.  No joint synovitis noted.  SKIN:  Dry to touch, no exanthems noted in the visualized areas.     Discharge Disposition   Discharged to home    Consultations This Hospital Stay   SPIRITUAL HEALTH SERVICES IP CONSULT  SOCIAL WORK IP CONSULT    Time Spent on this Encounter   I, Kee Monk MD, personally saw the patient today and spent greater than 30 minutes discharging this patient.     Discharge Orders      Reason for your hospital stay    Alcohol withdrawal syndrome.     Follow-up and recommended labs and tests     Follow up with primary care provider, Mandy Pabon, within 7 days for hospital follow- up.  No follow up labs or test are needed.  Recommend to resume outpatient CD treatment, AA meetings and refrain from drinking alcohol     Activity    Your activity upon discharge: activity as tolerated     Full Code     Diet    Follow this diet upon discharge:   Regular Diet Adult     Discharge Medications   Current Discharge Medication List      START taking these medications    Details   busPIRone (BUSPAR) 5 MG tablet Take 1 tablet (5 mg) by mouth 3 times daily for 10 days  Qty: 30 tablet, Refills: 0    Comments: Future refills by PCP Dr. Mandy Pabon with phone number  [phone].  Associated Diagnoses: Anxiety         CONTINUE these medications which have NOT CHANGED    Details   acetaminophen (TYLENOL) 325 MG tablet Take 2 tablets (650 mg) by mouth every 8 hours as needed for mild pain  Qty: 100 tablet, Refills: 0    Associated Diagnoses: Chronic pain of right knee      amLODIPine (NORVASC) 5 MG tablet Take 1 tablet (5 mg) by mouth daily  Qty: 90 tablet, Refills: 1    Associated Diagnoses: Benign essential hypertension      gabapentin (NEURONTIN) 300 MG capsule Take 3 capsules (900 mg) by mouth 3 times daily  Qty: 270 capsule, Refills: 5    Associated Diagnoses: Back pain with radiation; Chronic abdominal pain; Alcohol abuse; Lumbar radiculopathy; Generalized anxiety disorder      multivitamin w/minerals (MULTI-VITAMIN) tablet Take 1 tablet by mouth daily  Qty: 90 tablet, Refills: 1    Associated Diagnoses: Alcohol abuse      omeprazole (PRILOSEC) 20 MG DR capsule Take 1 capsule (20 mg) by mouth daily  Qty: 90 capsule, Refills: 0    Associated Diagnoses: Chest pain, unspecified type      sertraline (ZOLOFT) 50 MG tablet Take 1 tablet (50 mg) by mouth daily  Qty: 90 tablet, Refills: 1    Associated Diagnoses: Suicidal ideation      traZODone (DESYREL) 50 MG tablet Take 50 mg by mouth nightly as needed for sleep      sildenafil (VIAGRA) 100 MG tablet 30 min - 4 hr before intercourse take 0.5-1 tablets PO PRN ED Never use with nitroglycerin, terazosin or doxazosin.  Qty: 12 tablet, Refills: 11    Associated Diagnoses: Other male erectile dysfunction           Allergies   Allergies   Allergen Reactions     No Known Allergies      Seasonal Allergies      Data   Most Recent 3 CBC's:  Recent Labs   Lab Test 04/01/20  0739 03/31/20  1319 10/10/19  0814   WBC 4.2 6.0 5.7   HGB 11.7* 13.4 12.2*   MCV 84 83 91   PLT 86* 108* 133*      Most Recent 3 BMP's:  Recent Labs   Lab Test 04/03/20  0714 04/02/20  0638 04/01/20  1522 04/01/20  0739 03/31/20  1243 10/09/19  0901   NA  --   --   --   139 134 140   POTASSIUM 3.6 3.4 3.5 3.1* 3.8 3.4   CHLORIDE  --   --   --  110* 101 106   CO2  --   --   --   26   BUN  --   --   --  12  19   CR  --   --   --  0.94 0.88 0.84   ANIONGAP  --   --   --  6 12 8   ELSI  --   --   --  8.1* 10.0 8.8   GLC  --   --   --  119* 102* 160*     Most Recent 2 LFT's:  Recent Labs   Lab Test 20  0739 20  1243   AST 39 58*   ALT 39 45   ALKPHOS 69 89   BILITOTAL 0.5 0.8     Most Recent INR's and Anticoagulation Dosing History:  Anticoagulation Dose History     Recent Dosing and Labs Latest Ref Rng & Units 10/1/2012 2015 2016    INR 0.86 - 1.14 1.01 0.94 1.13        Most Recent 3 Troponin's:  Recent Labs   Lab Test 20  0143 20  2251 20  1815   TROPI 0.076* 0.085* 0.089*     Most Recent Cholesterol Panel:  Recent Labs   Lab Test 16  0746   CHOL 173   LDL 80   HDL 37*   TRIG 279*     Most Recent 6 Bacteria Isolates From Any Culture (See EPIC Reports for Culture Details):  Recent Labs   Lab Test 19  0956 18  0058 18  0051 18  0511 18  0323 18  0304   CULT >100,000 colonies/mL  Escherichia coli  *  <10,000 colonies/mL  urogenital suki   No growth No growth 50,000 to 100,000 colonies/mL  Aerococcus urinae  * No growth No growth     Most Recent TSH, T4 and A1c Labs:  Recent Labs   Lab Test 10/10/19  2205 18  1711   TSH  --  1.58   A1C 4.6  --      Results for orders placed or performed during the hospital encounter of 20   XR Chest Port 1 View    Narrative    XR CHEST PORT 1 VW   3/31/2020 1:56 PM     HISTORY:  vomiting      Impression    IMPRESSION:  Negative exam.    KAREN HANLEY MD   Echocardiogram Complete    Narrative    [id number]  [id number]  [id number]  [id number]^JULIANNA^DEMARCUS^[facility]  Echocardiography Laboratory  [address] [place] [address]        Name: FRANCYHORACIOPAYAL  MRN: [id number]  : 1960  Study Date:  04/01/2020 08:18 AM  Age: 59 yrs  Gender: Male  Patient Location: [facility]  Reason For Study: Chest Pain  Ordering Physician: DEMARCUS LEVINE  Referring Physician: Mandy Pabon  Performed By: Pedro Mcwilliams RDCS     BSA: 2.3 m2  Height: 68 in  Weight: 264 lb  BP: 121/75 mmHg  _____________________________________________________________________________  __        Procedure  Complete Portable Echo Adult. Optison (NDC #[id number]) given intravenously.  _____________________________________________________________________________  __        Interpretation Summary     Technically challenging study due to patient habitus, even with the use of  contrast imaging.     Left ventricular size, global systolic function, and wall motion are normal,  estimated LVEF 60-65%.  Right ventricular global function is normal.  No significant valvular abnormalities.     This study was compared to a TTE from 10/2019. There has been no significant  change.  _____________________________________________________________________________  __        Left Ventricle  The left ventricle is normal in size. There is concentric remodeling present.  Left ventricular systolic function is normal. The visual ejection fraction is  estimated at 60-65%. Left ventricular diastolic function is normal. No  regional wall motion abnormalities noted.     Right Ventricle  The right ventricle is normal in structure, function and size.     Atria  Normal left atrial size. Right atrial size is normal.     Mitral Valve  The mitral valve is normal in structure and function.        Tricuspid Valve  The tricuspid valve is not well visualized, but is grossly normal. There is  trace tricuspid regurgitation. Right ventricular systolic pressure could not  be approximated due to inadequate tricuspid regurgitation.     Aortic Valve  The aortic valve is normal in structure and function. The aortic valve is  trileaflet.     Pulmonic Valve  The pulmonic valve is not  well seen, but is grossly normal.     Vessels  The aortic Sinus(es) of Valsalva are borderline dilated. The ascending aorta  is Borderline dilated. Inferior vena cava not well visualized for estimation  of right atrial pressure.     Pericardium  There is no pericardial effusion.     _____________________________________________________________________________  __  MMode/2D Measurements & Calculations  IVSd: 1.1 cm  LVIDd: 5.4 cm  LVIDs: 2.7 cm  LVPWd: 1.2 cm  FS: 51.0 %     LV mass(C)d: 250.5 grams  LV mass(C)dI: 108.9 grams/m2  Ao root diam: 3.8 cm  LA dimension: 3.3 cm  asc Aorta Diam: 3.8 cm  LA/Ao: 0.87  LVOT diam: 2.1 cm  LVOT area: 3.5 cm2  LA Volume (BP): 55.0 ml  LA Volume Index (BP): 23.9 ml/m2  RWT: 0.44        Doppler Measurements & Calculations  MV E max daniel: 88.4 cm/sec  MV A max daniel: 96.3 cm/sec  MV E/A: 0.92     MV dec time: 0.24 sec  PA acc time: 0.08 sec  E/E' av.7  Lateral E/e': 8.5  Medial E/e': 7.0           _____________________________________________________________________________  __           Report approved by: Fabian Olson 2020 08:47 AM        *Note: Due to a large number of results and/or encounters for the requested time period, some results have not been displayed. A complete set of results can be found in Results Review.     Disclaimer: This note consists of symbols derived from keyboarding, dictation and/or voice recognition software. As a result, there may be errors in the script that have gone undetected. Please consider this when interpreting information found in this chart.

## 2020-04-03 NOTE — CONSULTS
"Discharge Planner   Discharge Plans in progress: Sw met with the pt to provide and discuss CD resources.  The pt was sitting in bed when sw arrived.  The pt was alert and oriented and open to talking with sw.  The pt was in a pleasant mood and contributed appropriately to the conversation.    The pt said that he attends AA and has a sponsor.  He said that he has been to treatment in the past.  He told sw that he has a good support system in his family and friends.  When sw asked if he knew what caused the relapse, he said that he just moved into a place on his own and thought he could handle \"it\".  Sw asked him to clarify what \"it\" is and he said have a drink without over indulging.  The pt stated that he now knows that he cannot do it.  He reported that there is no alcohol in his place at this time.  The pt said that he plans to go home and be sober.  He said that he has received resources in the past, he knows what he needs to do, and he does not need additional resources at this time.  Barriers to discharge plan: None.  Follow up plan: Sw will continue to be available as needed.  Please reconsult sw if needs arise.       Entered by: Josselin Cordova 04/03/2020 12:12 PM     JAVAN Hollis, [facility]  Inpatient Care Coordination  [facility]  [phone]  "

## 2020-04-03 NOTE — PLAN OF CARE
VSS afebrile. A&O x4. SBA. CIWA ranged from 0-8, 1mg Ativan given with relief. Tele: SR. NS 100ml/hr in PIV. Denies CP. Will cont POC

## 2020-04-03 NOTE — PLAN OF CARE
Patient Ciwa score 4 or less. Scoring for baseline anxiety. IV fluids SL. Mg+ and K+ replaced. VSS Lungs clear. Ambulated in the halls. Continent of Band B. BM this AM.     Addendum  Patient has been taken for 3 walks with staff. He C\O dizziness after first and third walk. He has asked for ativan several times. He is not scoring on CIWA to receive ativan. He was also sched to discharge home later today and is going to be his own . Will update Dr Estrada on Dizziness and ativan request.

## 2020-04-06 NOTE — TELEPHONE ENCOUNTER
ED / Discharge Outreach Protocol    Patient Contact    Attempt # 1    Was call answered?  No.  Left message on voicemail with information to call triage back. On call back, please complete hospital outreach.

## 2020-04-07 NOTE — TELEPHONE ENCOUNTER
Reason for your hospital stay     Alcohol withdrawal syndrome.          Follow-up and recommended labs and tests      Follow up with primary care provider, Mandy Pabon, within 7 days for hospital follow- up.  No follow up labs or test are needed.  Recommend to resume outpatient CD treatment, AA meetings and refrain from drinking alcohol          Activity     Your activity upon discharge: activity as tolerated      Full Code          Diet     Follow this diet upon discharge:   Regular Diet Adult     START taking these medications     Details   busPIRone (BUSPAR) 5 MG tablet Take 1 tablet (5 mg) by mouth 3 times daily for 10 days  Qty: 30 tablet, Refills: 0     Comments: Future refills by PCP Dr. Mandy Pabon with phone number [phone].  Associated Diagnoses: Anxiety

## 2020-04-07 NOTE — TELEPHONE ENCOUNTER
Patient Contact     Attempt # 2     Was call answered?  No.  Left message on voicemail with information to call triage back. On call back, please complete hospital outreach.

## 2020-04-08 NOTE — TELEPHONE ENCOUNTER
ED / Discharge Outreach Protocol    Patient Contact    Attempt # 3    Was call answered?  No.  Left message on voicemail with information to call triage back.

## 2020-04-09 NOTE — TELEPHONE ENCOUNTER
"Hospital/TCU/ED for chronic condition Discharge Protocol    \"Hi, my name is Thalia Becker RN, a registered nurse, and I am calling from [facility].  I am calling to follow up and see how things are going for you after your recent emergency visit/hospital/TCU stay.\"    Tell me how you are doing now that you are home?\"   Doing well, better every day.  Online support for sobriety.       Discharge Instructions    \"Let's review your discharge instructions.  What is/are the follow-up recommendations?  Pt. Response: Come see Georges, stay sober    \"Has an appointment with your primary care provider been scheduled?\"   Yes. (confirm)    \"When you see the provider, I would recommend that you bring your medications with you.\"    Medications    \"Tell me what changed about your medicines when you discharged?\"    Changes to chronic meds?    0-1    \"What questions do you have about your medications?\"    None     New diagnoses of heart failure, COPD, diabetes, or MI?    No              Post Discharge Medication Reconciliation Status: discharge medications reconciled, continue medications without change.    Was MTM referral placed (*Make sure to put transitions as reason for referral)?   No    Call Summary    \"What questions or concerns do you have about your recent visit and your follow-up care?\"     none    \"If you have questions or things don't continue to improve, we encourage you contact us through the main clinic number (give number).  Even if the clinic is not open, triage nurses are available 24/7 to help you.     We would like you to know that our clinic has extended hours (provide information).  We also have urgent care (provide details on closest location and hours/contact info)\"      \"Thank you for your time and take care!\"             "

## 2020-04-09 NOTE — TELEPHONE ENCOUNTER
Georges- pt would like to speak about upping sertraline dosage or adding another anxiety med at telephone visit on 04/13.    Pt states he does not like buspar, and will not want to continue on this medication.

## 2020-04-13 NOTE — PROGRESS NOTES
"Aydin Reilly is a 59 year old male who is being evaluated via a billable telephone visit.      The patient has been notified of following:     \"This telephone visit will be conducted via a call between you and your physician/provider. We have found that certain health care needs can be provided without the need for a physical exam.  This service lets us provide the care you need with a short phone conversation.  If a prescription is necessary we can send it directly to your pharmacy.  If lab work is needed we can place an order for that and you can then stop by our lab to have the test done at a later time.    Telephone visits are billed at different rates depending on your insurance coverage. During this emergency period, for some insurers they may be billed the same as an in-person visit.  Please reach out to your insurance provider with any questions.    If during the course of the call the physician/provider feels a telephone visit is not appropriate, you will not be charged for this service.\"    Patient has given verbal consent for Telephone visit?  Yes    How would you like to obtain your AVS? Gregorio Ahn     Aydin Reilly is a 59 year old male who presents to clinic today for the following health issues:        Hospital Follow-up Visit:    Hospital/Nursing Home/IP Rehab Facility: [facility]  Date of Admission: 3/31  Date of Discharge: 4/3  Reason(s) for Admission: anxiety, alcohol            Problems taking medications regularly:  None       Medication changes since discharge: yes, stopped buspar       Problems adhering to non-medication therapy:  None    Back to being sober, doing well  Relapse a few weeks ago - he moved to his own place, isolated, and started drinking for about a week then went into the hospital  Admitted due to positive troponin  Took about a week to feel better  Normal echocardiogram - no specific cardiology follow up needed    Denies chest pain and " breathing problems today  He is walking more, dyspnea is improving    He is staying in [place] - rented a room in a house  He is not in a sober house or rehab program  He calls friends every day - all recovering alcoholics - in order to check in on sobriety  No alcohol in the house - landlord and roommates know he is sober and are super helpful   Friend from his sober house - moving in with him soon  He attends AA meetings online via Zoom daily  Talks to his sponsor every day - feels he is getting more attention now via phone contact than before with the in-person meetings  He was laid off from work at [employer]    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     He states the Buspar makes him feel more uneasy - he stopped using it 3 days post discharge  Now he is doing okay, still some anxiety   Mood is positive.  He would like to increase the sertraline dose.     His blood pressure was elevated while inpatient.  He is able to check it at the group home and keep a log.       Summary of hospitalization:  [facility] discharge summary reviewed  Diagnostic Tests/Treatments reviewed.  Follow up needed: PCP only   Other Healthcare Providers Involved in Patient s Care:         None  Update since discharge: improved.   Post Discharge Medication Reconciliation: discharge medications reconciled and changed, per note/orders (see AVS).  Plan of care communicated with patient     Coding guidelines for this visit:  Type of Medical   Decision Making Face-to-Face Visit       within 7 Days of discharge Face-to-Face Visit        within 14 days of discharge   Moderate Complexity [id number]   High Complexity [id number]          Monitor blood pressure - at group home.       Mother is in a rehab facility in [facility] - due to recent bilateral stroke            Patient Active Problem List    Diagnosis     H/O malignant neoplasm of rectum, rectosigmoid junction, and anus     Cellulitis of scrotum     Lumbago     Lumbar Radiculopathy, SEE FYI     Back Pain w/ Radiation, SEE FYI     Chronic abdominal pain     Hyperlipidemia with target LDL less than 130     Hypogonadism     Scrotal pain     Erectile dysfunction     Drug abuse, opioid type (H)     GIB (gastrointestinal bleeding)     MAGALY (obstructive sleep apnea)     Generalized anxiety disorder     Urethral stricture     Alcohol abuse     History of urethral stricture     Chronic pain     Edema     Anemia of chronic disease     Other mononeuropathy     Hx SBO     Health Care Home     Benign essential hypertension     Rotator cuff injury, right, initial encounter     Chondritis     Anserine bursitis     Substance abuse (H)     Chronic pain of right knee     Intertriginous candidiasis     Gastroesophageal reflux disease, esophagitis presence not specified     Morbid obesity (H)     Moderate episode of recurrent major depressive disorder (H)     Cellulitis of scrotum     Chest pain     Suicide ideation     Alcohol withdrawal (H)     Past Surgical History:   Procedure Laterality Date     ABDOMEN SURGERY       BACK SURGERY       BIOPSY       BIOPSY       C NONSPECIFIC PROCEDURE  1991    L4-L5 laminectomy; disk herniation     C NONSPECIFIC PROCEDURE  1999    squamous cell CA - anus, 27 xrt/3 rounds, 5-FU/cisplatin     C NONSPECIFIC PROCEDURE      umbilical hernia     C NONSPECIFIC PROCEDURE  2002    cystscopy for urethral stricture from radiation therapy     COLONOSCOPY       COLONOSCOPY  4/18/2014    Procedure: Colonoscopy   polyp bx;  Surgeon: Josef Mckeon MD;  Location: [facility]     CYSTOSCOPY, CYSTOGRAM, COMBINED N/A 2/26/2015    Procedure: COMBINED CYSTOSCOPY, CYSTOGRAM;  Surgeon: Darell Barrera MD;  [facility]: [facility]     CYSTOSCOPY, INTERNAL URETHROTOMY, COMBINED N/A 12/19/2014    Procedure: COMBINED CYSTOSCOPY, INTERNAL URETHROTOMY;  Surgeon:  Buzz Ren MD;  Location: [facility]     CYSTOSTOMY, INSERT TUBE SUPRAPUBIC, COMBINED  12/19/2014    Procedure: COMBINED CYSTOSTOMY, INSERT TUBE SUPRAPUBIC;  Surgeon: Buzz Ren MD;  Location[facility]     CYSTOSTOMY, INSERT TUBE SUPRAPUBIC, COMBINED N/A 12/29/2014    Procedure: COMBINED CYSTOSTOMY, INSERT TUBE SUPRAPUBIC;  Surgeon: Buzz Ren MD;  Location: [facility]     ENT SURGERY       ESOPHAGOSCOPY, GASTROSCOPY, DUODENOSCOPY (EGD), COMBINED  10/2/2012    Procedure: COMBINED ESOPHAGOSCOPY, GASTROSCOPY, DUODENOSCOPY (EGD);  COMBINED ESOPHAGOSCOPY, GASTROSCOPY, DUODENOSCOPY (EGD) ;  Surgeon: Raj Beltre MD;  Location: [facility]     HERNIA REPAIR       LAPAROSCOPIC LYSIS ADHESIONS N/A 12/4/2015    Procedure: LAPAROSCOPIC LYSIS ADHESIONS;  Surgeon: Herbie Sanchez MD;  Location: [facility]     LAPAROTOMY EXPLORATORY N/A 12/4/2015    Procedure: LAPAROTOMY EXPLORATORY;  Surgeon: Herbie Sanchez MD;  Location: [facility]     MYRINGOTOMY      in childhood     TONSILLECTOMY and adenoidectomy      in childhood     URETHROPLASTY WITH BUCCAL GRAFT N/A 3/27/2015    Procedure: URETHROPLASTY WITH BUCCAL GRAFT;  Surgeon: Darell Barrera MD;  [facility]       Social History     Tobacco Use     Smoking status: Never Smoker     Smokeless tobacco: Never Used   Substance Use Topics     Alcohol use: Yes     Alcohol/week: 0.0 standard drinks     Comment: recent relapse     Family History   Adopted: Yes   Problem Relation Age of Onset     Unknown/Adopted Mother      Suicide Father      Schizophrenia No family hx of      Bipolar Disorder No family hx of      Dementia No family hx of      [place] Disease No family hx of      Parkinsonism No family hx of      Autism Spectrum Disorder No family hx of      Intellectual Disability (Mental Retardation) No family hx of          Current Outpatient Medications   Medication Sig Dispense Refill     acetaminophen (TYLENOL) 325 MG tablet Take 2  tablets (650 mg) by mouth every 8 hours as needed for mild pain 100 tablet 0     amLODIPine (NORVASC) 5 MG tablet Take 1 tablet (5 mg) by mouth daily 90 tablet 1     gabapentin (NEURONTIN) 300 MG capsule Take 3 capsules (900 mg) by mouth 3 times daily 270 capsule 5     multivitamin w/minerals (MULTI-VITAMIN) tablet Take 1 tablet by mouth daily 90 tablet 1     omeprazole (PRILOSEC) 20 MG DR capsule Take 1 capsule (20 mg) by mouth daily 90 capsule 0     sertraline (ZOLOFT) 100 MG tablet Take 1 tablet (100 mg) by mouth daily 90 tablet 1     sildenafil (VIAGRA) 100 MG tablet 30 min - 4 hr before intercourse take 0.5-1 tablets PO PRN ED Never use with nitroglycerin, terazosin or doxazosin. 12 tablet 11     traZODone (DESYREL) 50 MG tablet Take 1 tablet (50 mg) by mouth nightly as needed for sleep 30 tablet 1     Allergies   Allergen Reactions     No Known Allergies      Seasonal Allergies        Reviewed and updated as needed this visit by Provider         Review of Systems          Objective   Reported vitals:  There were no vitals taken for this visit.   alert and no distress  PSYCH: Alert and oriented times 3; coherent speech, normal   rate and volume, able to articulate logical thoughts, able   to abstract reason, no tangential thoughts, no hallucinations   or delusions  His affect is normal  RESP: No cough, no audible wheezing, able to talk in full sentences  Remainder of exam unable to be completed due to telephone visits    Diagnostic Test Results:  none         Assessment/Plan:    ICD-10-CM    1. Alcohol abuse  F10.10    2. Insomnia, unspecified type  G47.00 traZODone (DESYREL) 50 MG tablet   3. Generalized anxiety disorder  F41.1 sertraline (ZOLOFT) 100 MG tablet        1. Today we increased the dose of sertraline to 100 mg daily.  We will recheck in 2 weeks to see how anxiety is and check in on sobriety.   2. Trazodone refilled - patient encouraged to take as needed  3. Patient has a plan in place for sobriety  with appropriate COVID-19 precautions    Return in about 2 weeks (around 4/27/2020) for Anxiety Recheck, Depression Recheck, Medication Recheck.      Phone call duration:  14 minutes    Georges Pabon PA-C

## 2020-04-17 NOTE — TELEPHONE ENCOUNTER
Patient was recently seen. Concerns were not addressed during OV. Would you like patient to do a new video visit?

## 2020-04-17 NOTE — TELEPHONE ENCOUNTER
Rx sent for medrol dose pack - this has worked well for him in the past.  Continue PT exercises and follow up in 2 weeks if not improved.      For OTC pain control - I recommend acetaminophen 1000 mg 2 times a day and naproxen 440 2 times a day.     Georges Pabon PA-C

## 2020-04-20 NOTE — TELEPHONE ENCOUNTER
"Requested Prescriptions   Pending Prescriptions Disp Refills     sildenafil (VIAGRA) 100 MG tablet 12 tablet 11     Si min - 4 hr before intercourse take 0.5-1 tablets PO PRN ED Never use with nitroglycerin, terazosin or doxazosin.       Erectile Dysfuction Protocol Passed - 2020  9:36 PM        Passed - Absence of nitrates on medication list        Passed - Absence of Alpha Blockers on Med list        Passed - Recent (12 mo) or future (30 days) visit within the authorizing provider's specialty     Patient has had an office visit with the authorizing provider or a provider within the authorizing providers department within the previous 12 mos or has a future within next 30 days. See \"Patient Info\" tab in inbasket, or \"Choose Columns\" in Meds & Orders section of the refill encounter.              Passed - Medication is active on med list        Passed - Patient is age 18 or older             "

## 2020-04-28 NOTE — PROGRESS NOTES
"Aydin Reilly is a 59 year old male who is being evaluated via a billable telephone visit.      The patient has been notified of following:     \"This telephone visit will be conducted via a call between you and your physician/provider. We have found that certain health care needs can be provided without the need for a physical exam.  This service lets us provide the care you need with a short phone conversation.  If a prescription is necessary we can send it directly to your pharmacy.  If lab work is needed we can place an order for that and you can then stop by our lab to have the test done at a later time.    Telephone visits are billed at different rates depending on your insurance coverage. During this emergency period, for some insurers they may be billed the same as an in-person visit.  Please reach out to your insurance provider with any questions.    If during the course of the call the physician/provider feels a telephone visit is not appropriate, you will not be charged for this service.\"    Patient has given verbal consent for Telephone visit?  Yes    How would you like to obtain your AVS? Allhart    Subjective     Aydin Reilly is a 59 year old male who presents to clinic today for the following health issues:    HPI  Follow up      Duration:     Description (location/character/radiation): pt is following up on last virtual visit.  Pt not getting a good signal on phone and phone keeps hanging up so could not see how he is doing.    Intensity:  moderate    Accompanying signs and symptoms: none    History (similar episodes/previous evaluation): None    Precipitating or alleviating factors: None    Therapies tried and outcome: None     Reception is terrible  He is doing well - exercising  Back is doing ok     He has no concerns about his back at this time.  He will continue with stretches at home for now, and use caution with lifting.     He would like another order for his CPAP supplies    He has " some tissue that is wet and irritated at his anus - which started about he had a malignant tumor removed.  He would like to try some remedies for the discomfort.     He plans to see his urologist to discuss ongoing erectile dysfunction.         Patient Active Problem List   Diagnosis     H/O malignant neoplasm of rectum, rectosigmoid junction, and anus     Cellulitis of scrotum     Lumbago     Lumbar Radiculopathy, SEE FYI     Back Pain w/ Radiation, SEE FYI     Chronic abdominal pain     Hyperlipidemia with target LDL less than 130     Hypogonadism     Scrotal pain     Erectile dysfunction     Drug abuse, opioid type (H)     GIB (gastrointestinal bleeding)     MAGALY (obstructive sleep apnea)     Generalized anxiety disorder     Urethral stricture     Alcohol abuse     History of urethral stricture     Chronic pain     Edema     Anemia of chronic disease     Other mononeuropathy     Hx SBO     Health Care Home     Benign essential hypertension     Rotator cuff injury, right, initial encounter     Chondritis     Anserine bursitis     Substance abuse (H)     Chronic pain of right knee     Intertriginous candidiasis     Gastroesophageal reflux disease, esophagitis presence not specified     Morbid obesity (H)     Moderate episode of recurrent major depressive disorder (H)     Cellulitis of scrotum     Chest pain     Suicide ideation     Alcohol withdrawal (H)     Past Surgical History:   Procedure Laterality Date     ABDOMEN SURGERY       BACK SURGERY       BIOPSY       BIOPSY       C NONSPECIFIC PROCEDURE  1991    L4-L5 laminectomy; disk herniation     C NONSPECIFIC PROCEDURE  1999    squamous cell CA - anus, 27 xrt/3 rounds, 5-FU/cisplatin     C NONSPECIFIC PROCEDURE      umbilical hernia     C NONSPECIFIC PROCEDURE  2002    cystscopy for urethral stricture from radiation therapy     COLONOSCOPY       COLONOSCOPY  4/18/2014    Procedure: Colonoscopy   polyp bx;  Surgeon: Josef Mckeon MD;  Location:  GI      CYSTOSCOPY, CYSTOGRAM, COMBINED N/A 2/26/2015    Procedure: COMBINED CYSTOSCOPY, CYSTOGRAM;  Surgeon: Darell Barrera MD;  [facility]: [facility]     CYSTOSCOPY, INTERNAL URETHROTOMY, COMBINED N/A 12/19/2014    Procedure: COMBINED CYSTOSCOPY, INTERNAL URETHROTOMY;  Surgeon: Buzz Ren MD;  [facility]     CYSTOSTOMY, INSERT TUBE SUPRAPUBIC, COMBINED  12/19/2014    Procedure: COMBINED CYSTOSTOMY, INSERT TUBE SUPRAPUBIC;  Surgeon: Buzz Ren MD;  Location: [facility]     CYSTOSTOMY, INSERT TUBE SUPRAPUBIC, COMBINED N/A 12/29/2014    Procedure: COMBINED CYSTOSTOMY, INSERT TUBE SUPRAPUBIC;  Surgeon: Buzz Ren MD;  Location:  OR     ENT SURGERY       ESOPHAGOSCOPY, GASTROSCOPY, DUODENOSCOPY (EGD), COMBINED  10/2/2012    Procedure: COMBINED ESOPHAGOSCOPY, GASTROSCOPY, DUODENOSCOPY (EGD);  COMBINED ESOPHAGOSCOPY, GASTROSCOPY, DUODENOSCOPY (EGD) ;  Surgeon: Raj Beltre MD;  Location: [facility]     HERNIA REPAIR       LAPAROSCOPIC LYSIS ADHESIONS N/A 12/4/2015    Procedure: LAPAROSCOPIC LYSIS ADHESIONS;  Surgeon: Herbie Sanchez MD;  Location: [facility]     LAPAROTOMY EXPLORATORY N/A 12/4/2015    Procedure: LAPAROTOMY EXPLORATORY;  Surgeon: Herbie Sanchez MD;  Location: [facility]     MYRINGOTOMY      in childhood     TONSILLECTOMY and adenoidectomy      in childhood     URETHROPLASTY WITH BUCCAL GRAFT N/A 3/27/2015    Procedure: URETHROPLASTY WITH BUCCAL GRAFT;  Surgeon: Darell Barrera MD;  Location: [facility]       Social History     Tobacco Use     Smoking status: Never Smoker     Smokeless tobacco: Never Used   Substance Use Topics     Alcohol use: Yes     Alcohol/week: 0.0 standard drinks     Comment: recent relapse     Family History   Adopted: Yes   Problem Relation Age of Onset     Unknown/Adopted Mother      Suicide Father      Schizophrenia No family hx of      Bipolar Disorder No family hx of      Dementia No family hx of      [place] Disease No family hx of       Parkinsonism No family hx of      Autism Spectrum Disorder No family hx of      Intellectual Disability (Mental Retardation) No family hx of          Current Outpatient Medications   Medication Sig Dispense Refill     acetaminophen (TYLENOL) 325 MG tablet Take 2 tablets (650 mg) by mouth every 8 hours as needed for mild pain 100 tablet 3     amLODIPine (NORVASC) 5 MG tablet Take 1 tablet (5 mg) by mouth daily 90 tablet 1     gabapentin (NEURONTIN) 300 MG capsule Take 3 capsules (900 mg) by mouth 3 times daily 270 capsule 5     hemorroidal (TUCKS) 50 % pad Use up to 3 times a day 40 each 4     hydrocortisone (CORTAID) 1 % external cream Apply topically 2 times daily 30 g 4     methylPREDNISolone (MEDROL DOSEPAK) 4 MG tablet therapy pack Follow Package Directions 21 tablet 0     multivitamin w/minerals (MULTI-VITAMIN) tablet Take 1 tablet by mouth daily 90 tablet 1     naproxen (NAPROSYN) 500 MG tablet Take 1 tablet (500 mg) by mouth 2 times daily (with meals) 180 tablet 3     omeprazole (PRILOSEC) 20 MG DR capsule Take 1 capsule (20 mg) by mouth daily 90 capsule 0     sertraline (ZOLOFT) 100 MG tablet Take 1 tablet (100 mg) by mouth daily 90 tablet 1     sildenafil (VIAGRA) 100 MG tablet 30 min - 4 hr before intercourse take 0.5-1 tablets PO PRN ED Never use with nitroglycerin, terazosin or doxazosin. 12 tablet 11     traZODone (DESYREL) 50 MG tablet Take 1 tablet (50 mg) by mouth nightly as needed for sleep 30 tablet 1     Allergies   Allergen Reactions     No Known Allergies      Seasonal Allergies        Reviewed and updated as needed this visit by Provider         Review of Systems   Constitutional: Negative.    HENT: Negative.    Eyes: Negative.    Respiratory: Negative.    Cardiovascular: Negative.    Gastrointestinal:        As in HPI   Genitourinary:        As in HPI   Musculoskeletal:        As in HPI   Neurological: Negative.    Psychiatric/Behavioral: Negative.              Objective   Reported  vitals:  There were no vitals taken for this visit.   alert and no distress  PSYCH: Alert and oriented times 3; coherent speech, normal   rate and volume, able to articulate logical thoughts, able   to abstract reason, no tangential thoughts, no hallucinations   or delusions  His affect is normal  RESP: No cough, no audible wheezing, able to talk in full sentences  Remainder of exam unable to be completed due to telephone visits    Diagnostic Test Results:  none         Assessment/Plan:    ICD-10-CM    1. Chronic left-sided low back pain with left-sided sciatica  M54.42     G89.29    2. External hemorrhoids  K64.4 hemorroidal (TUCKS) 50 % pad     hydrocortisone (CORTAID) 1 % external cream   3. MAGALY (obstructive sleep apnea)  G47.33 Sleep DME      For the back pain - if this continues, we will discuss PT versus MRI.  May need referral to a spine specialist due to previous spine surgery.     Return in about 6 weeks (around 6/9/2020) for a recheck if you are not improved.      Phone call duration:  14 minutes    Georges Pabon PA-C

## 2020-05-15 NOTE — ADDENDUM NOTE
Addendum  created 05/15/20 0824 by Khris Hunter APRN CRNA    Intraprocedure Event edited, Intraprocedure LDAs edited, Intraprocedure Staff edited, LDA properties accepted

## 2020-05-18 NOTE — PROGRESS NOTES
"Aydin Reilly is a 59 year old male who is being evaluated via a billable video visit.      The patient has been notified of following:     \"This video visit will be conducted via a call between you and your physician/provider. We have found that certain health care needs can be provided without the need for an in-person physical exam.  This service lets us provide the care you need with a video conversation.  If a prescription is necessary we can send it directly to your pharmacy.  If lab work is needed we can place an order for that and you can then stop by our lab to have the test done at a later time.    Video visits are billed at different rates depending on your insurance coverage.  Please reach out to your insurance provider with any questions.    If during the course of the call the physician/provider feels a video visit is not appropriate, you will not be charged for this service.\"    Patient has given verbal consent for Video visit? Yes    How would you like to obtain your AVS? [facility]    Patient would like the video invitation sent by: Text to cell phone: [phone]    Will anyone else be joining your video visit? No      Subjective     Aydin Reilly is a 59 year old male who presents today via video visit for the following health issues:    HPI  Back Pain       Duration: 5/5/2020        Specific cause: none    Description:   Location of pain: low back both  Character of pain: sharp and intermittent  Pain radiation:radiates into the right buttocks, radiates into the right leg, radiates into the left buttocks and radiates into the left leg  New numbness or weakness in legs, not attributed to pain:  no     Intensity: moderate, severe    History:   Pain interferes with job: No  History of back problems: similar issues on and off   Any previous MRI or X-rays: waqas, 5 years or so  Sees a specialist for back pain:  No  Therapies tried without relief:     Alleviating factors:   Improved by: " tylenol/ibuprofen, naproxen, lying down      Precipitating factors:  Worsened by: Lifting, Bending and twisting too quickly    5/5/2020 - worse back pain he has ever had  Down the left side - NEW pain in the right side  Still doing PT ecercises  He continues to stay active  Sitting on an exercise ball helps  After resting yesterday he feels better today  Last medrol dose pack was before 5/5/20        Red flag symptoms:  Denies saddle anesthesia, bowel and bladder dysfunction.  Denies fever and chills.  Denies recent IV drug use.  Denies recent weight loss.   Denies history of osteoporosis or prolonged steroid use.      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  Sobriety still going well    Lasix - for leg swelling in the past    Needs a new referral for a sleep study - patient has gained wt since study and needs new orders for supplies        Video Start Time: 9:47 AM        Patient Active Problem List   Diagnosis     H/O malignant neoplasm of rectum, rectosigmoid junction, and anus     Cellulitis of scrotum     Lumbago     Lumbar Radiculopathy, SEE FYI     Back Pain w/ Radiation, SEE FYI     Chronic abdominal pain     Hyperlipidemia with target LDL less than 130     Hypogonadism     Scrotal pain     Erectile dysfunction     Drug abuse, opioid type (H)     GIB (gastrointestinal bleeding)     MAGALY (obstructive sleep apnea)     Generalized anxiety disorder     Urethral stricture     Alcohol abuse     History of urethral stricture     Chronic pain     Edema     Anemia of chronic disease     Other mononeuropathy     Hx SBO     Health Care Home     Benign essential hypertension     Rotator cuff injury, right, initial encounter     Chondritis     Anserine bursitis     Substance abuse (H)     Chronic pain of right knee     Intertriginous candidiasis     Gastroesophageal reflux disease, esophagitis presence not specified     Morbid obesity (H)     Moderate episode of recurrent major  depressive disorder (H)     Cellulitis of scrotum     Chest pain     Suicide ideation     Alcohol withdrawal (H)     Past Surgical History:   Procedure Laterality Date     ABDOMEN SURGERY       BACK SURGERY       BIOPSY       BIOPSY       C NONSPECIFIC PROCEDURE  1991    L4-L5 laminectomy; disk herniation     C NONSPECIFIC PROCEDURE  1999    squamous cell CA - anus, 27 xrt/3 rounds, 5-FU/cisplatin     C NONSPECIFIC PROCEDURE      umbilical hernia     C NONSPECIFIC PROCEDURE  2002    cystscopy for urethral stricture from radiation therapy     COLONOSCOPY       COLONOSCOPY  4/18/2014    Procedure: Colonoscopy   polyp bx;  Surgeon: Josef Mckeon MD;  Location:  GI     CYSTOSCOPY, CYSTOGRAM, COMBINED N/A 2/26/2015    Procedure: COMBINED CYSTOSCOPY, CYSTOGRAM;  Surgeon: Darell Barrera MD;  Location: [facility] OR     CYSTOSCOPY, INTERNAL URETHROTOMY, COMBINED N/A 12/19/2014    Procedure: COMBINED CYSTOSCOPY, INTERNAL URETHROTOMY;  Surgeon: Buzz Ren MD;  Location:  OR     CYSTOSTOMY, INSERT TUBE SUPRAPUBIC, COMBINED  12/19/2014    Procedure: COMBINED CYSTOSTOMY, INSERT TUBE SUPRAPUBIC;  Surgeon: Buzz Ren MD;  Location:  OR     CYSTOSTOMY, INSERT TUBE SUPRAPUBIC, COMBINED N/A 12/29/2014    Procedure: COMBINED CYSTOSTOMY, INSERT TUBE SUPRAPUBIC;  Surgeon: Buzz Ren MD;  Location:  OR     ENT SURGERY       ESOPHAGOSCOPY, GASTROSCOPY, DUODENOSCOPY (EGD), COMBINED  10/2/2012    Procedure: COMBINED ESOPHAGOSCOPY, GASTROSCOPY, DUODENOSCOPY (EGD);  COMBINED ESOPHAGOSCOPY, GASTROSCOPY, DUODENOSCOPY (EGD) ;  Surgeon: Raj Beltre MD;  Location: [facility]     HERNIA REPAIR       LAPAROSCOPIC LYSIS ADHESIONS N/A 12/4/2015    Procedure: LAPAROSCOPIC LYSIS ADHESIONS;  Surgeon: Herbie Sanchez MD;  Location: [facility] OR     LAPAROTOMY EXPLORATORY N/A 12/4/2015    Procedure: LAPAROTOMY EXPLORATORY;  Surgeon: Herbie Sanchez MD;  Location: [facility] OR     MYRINGOTOMY      in  childhood     TONSILLECTOMY and adenoidectomy      in childhood     URETHROPLASTY WITH BUCCAL GRAFT N/A 3/27/2015    Procedure: URETHROPLASTY WITH BUCCAL GRAFT;  Surgeon: Darell Barrera MD;  Location:  OR       Social History     Tobacco Use     Smoking status: Never Smoker     Smokeless tobacco: Never Used   Substance Use Topics     Alcohol use: Yes     Alcohol/week: 0.0 standard drinks     Comment: recent relapse     Family History   Adopted: Yes   Problem Relation Age of Onset     Unknown/Adopted Mother      Suicide Father      Schizophrenia No family hx of      Bipolar Disorder No family hx of      Dementia No family hx of      Nora Disease No family hx of      Parkinsonism No family hx of      Autism Spectrum Disorder No family hx of      Intellectual Disability (Mental Retardation) No family hx of          Current Outpatient Medications   Medication Sig Dispense Refill     acetaminophen (TYLENOL) 325 MG tablet Take 2 tablets (650 mg) by mouth every 8 hours as needed for mild pain 100 tablet 3     amLODIPine (NORVASC) 5 MG tablet Take 1 tablet (5 mg) by mouth daily 90 tablet 1     furosemide (LASIX) 20 MG tablet Take 1 tablet (20 mg) by mouth daily 30 tablet 0     gabapentin (NEURONTIN) 300 MG capsule Take 3 capsules (900 mg) by mouth 3 times daily 270 capsule 5     hemorroidal (TUCKS) 50 % pad Use up to 3 times a day 40 each 4     hydrocortisone (CORTAID) 1 % external cream Apply topically 2 times daily 30 g 4     multivitamin w/minerals (MULTI-VITAMIN) tablet Take 1 tablet by mouth daily 90 tablet 1     naproxen (NAPROSYN) 500 MG tablet Take 1 tablet (500 mg) by mouth 2 times daily (with meals) 180 tablet 3     omeprazole (PRILOSEC) 20 MG DR capsule Take 1 capsule (20 mg) by mouth daily 90 capsule 0     sertraline (ZOLOFT) 100 MG tablet Take 1 tablet (100 mg) by mouth daily 90 tablet 1     sildenafil (VIAGRA) 100 MG tablet 30 min - 4 hr before intercourse take 0.5-1 tablets PO PRN ED Never use  "with nitroglycerin, terazosin or doxazosin. 12 tablet 11     traZODone (DESYREL) 50 MG tablet Take 1 tablet (50 mg) by mouth nightly as needed for sleep 30 tablet 1     Allergies   Allergen Reactions     No Known Allergies      Seasonal Allergies        Reviewed and updated as needed this visit by Provider         Review of Systems   Constitutional: Negative.    HENT: Negative.    Eyes: Negative.    Respiratory: Negative.    Cardiovascular: Negative.    Gastrointestinal: Negative.    Genitourinary: Negative.    Musculoskeletal:        As in HPI   Neurological: Negative.    Psychiatric/Behavioral: Negative.             Objective    There were no vitals taken for this visit.  Estimated body mass index is 40.17 kg/m  as calculated from the following:    Height as of 2/7/20: 1.727 m (5' 8\").    Weight as of 3/31/20: 119.8 kg (264 lb 3.2 oz).  Physical Exam     GENERAL: Healthy, alert and no distress  EYES: Eyes grossly normal to inspection.  No discharge or erythema, or obvious scleral/conjunctival abnormalities.  RESP: No audible wheeze, cough, or visible cyanosis.  No visible retractions or increased work of breathing.    SKIN: Visible skin clear. No significant rash, abnormal pigmentation or lesions.  NEURO: Cranial nerves grossly intact.  Mentation and speech appropriate for age.  PSYCH: Mentation appears normal, affect normal/bright, judgement and insight intact, normal speech and appearance well-groomed.      Diagnostic Test Results:  none         Assessment & Plan       ICD-10-CM    1. Chronic left-sided low back pain with left-sided sciatica  M54.42 Orthopedic & Spine  Referral    G89.29    2. Leg swelling  M79.89 furosemide (LASIX) 20 MG tablet   3. MAGALY (obstructive sleep apnea)  G47.33 SLEEP EVALUATION & MANAGEMENT REFERRAL - [facility] [phone]  (Age 18 and up)   4. Morbid obesity (H)  E66.01 COMPREHENSIVE WEIGHT MANAGEMENT      LBP - patient has exhausted PT, " "medrol dosepack x2  Hx of lumbar surgery -  Pt referred to spine surgery to discuss next steps    BMI:   Estimated body mass index is 40.17 kg/m  as calculated from the following:    Height as of 2/7/20: 1.727 m (5' 8\").    Weight as of 3/31/20: 119.8 kg (264 lb 3.2 oz).   Weight management plan: Discussed healthy diet and exercise guidelines  Referral to comprehensive weight loss clinic        Return in about 2 weeks (around 6/1/2020) for Specialist Appointment.    Mandy Pabon PA-C  [facility]      Video-Visit Details    Type of service:  Video Visit    Video End Time:10:05 AM    Originating Location (pt. Location): Other work    Distant Location (provider location):  [facility]     Platform used for Video Visit: [employer]    Return in about 2 weeks (around 6/1/2020) for Specialist Appointment.       Georges Pabon PA-C        "

## 2020-05-21 NOTE — TELEPHONE ENCOUNTER
Spoke to pt reminding him to fill out his new pt questionnaire some time over the next few days before his appointment on the 26th    Lalita Gale MA

## 2020-05-25 NOTE — PROGRESS NOTES
"    New Medical Weight Management Consult    PATIENT:  Aydin Reilly  MRN:         [id number]  :         1960  JEROME:         2020    Dear Pepper, Mandy Mitchell PA-C,    I had the pleasure of seeing your patient, Aydin Reilly. Full intake/assessment was done to determine barriers to weight loss success and develop a treatment plan. Aydin Reilly is a 59 year old male interested in treatment of medical problems associated with excess weight. He has a height of 5' 8\"[patient reported[, a weight of 257 lbs 0 oz, and the calculated Body mass index is 39.08 kg/m .    ASSESSMENT/PLAN:  PROGRAM OVERVIEW  Reviewed options at [facility].  All of patients questions about the weight loss program were answered fully.     Pt not currently a  SURGICAL candidate due to recent his or alcohol abuse. Could consider this if he maintains 2 yr sobriety.  However, candidate would still be high risk for relapse following surgery.     MEDICATIONS:  We discussed healthy habits to assist with weight loss. We reviewed medications associated with weight gain. We discussed the role of pharmacological agents in the treatment of obesity and the \"off-label\" use of medications in this practice. We discussed medication that may assist with weight loss. Indications, contraindications, risks/benefits, and potential side effects were discussed.   Metformin XR was prescribed. Discussed that medications must always be used together with lifestyle changes such as improvements in diet choices, portion control and establishing and maintaining a regular exercise program.     Pt not candidate for phentermine due to AODA status. Topiramate can cause numbness so I will avoid. Saxanda- option if covered by insurance  With pts hx a do not feel Contrave would help him. He is not always hunger looking for his next meal.  Has been on Wellbutrin in the past but did not see a change in his appetite on this.     - metFORMIN " (GLUCOPHAGE-XR) 500 MG 24 hr tab,1 tablet by mouth daily with meal for 1 week, then 2 tablets daily with meal.  Dispense: 60 tablet; Refill: 1       NUTRITION: referral ordered today     - NUTRITION REFERRAL    Potential BARRIERS to weight loss identified thus far:   Weight bearing joint pain  Limited medication options due to recent AODA.     STRENGTHS that may help weight loss:   Positive attitude  Sees therapist weekly    CURRENT GOALS:   1. Eat breakfast daily (yogurt, eggs, oatmeal)   Avoid Quik Trip runs in am   2. Eat sitting down for meals (especially your night meal) This will help you take longer to eat and allow your body to recognize when it is satisfied..  3. Change liquids to all non calorie options except Skim milk at meals.  4. Decrease amt of milk consumed at mealtime (small glass 8 oz of milk at eat meal)     COUNSELING:   We discussed Bariatric Basics including:  -eating 3 meals daily  -eating protein first  -eating slowly, chewing food well  -avoiding/limiting calorie containing beverages  -limiting carbohydrates and changing to whole grains  -limiting restaurant or cafeteria eating to twice a week or less    We discussed the importance of restorative sleep and stress management in maintaining a healthy weight.  We discussed insulin resistance and glycemic index as it relates to appetite and weight control.   We discussed the importance of physical activity including cardiovascular and strength training in maintaining a healthier weight and explored viable options.  We discussed the National Weight Control Registry healthy weight maintenance strategies and ways to optimize metabolism.      2. Low back pain due to bilateral sciatica  Ordered Get Moving Referral  - [facility] REFERRAL; Future       Follow up: Return to clinic in 6 wks to see Chante Kearns PA-C      He has the following co-morbidities:       5/26/2020   I have the following health issues associated with  "obesity: High Blood Pressure, High Cholesterol, Sleep Apnea   I have the following symptoms associated with obesity: Knee Pain, Depression, Back Pain, Fatigue   using CPAP  Referral to get retested and get new a new machine.      Patient Goals 5/26/2020   I am interested in having a healthier weight to diminish current health problems: Yes   I am interested in having a healthier weight in order to prevent future health problems: Yes   I am interested in having a healthier weight in order to have a future surgery: No       Referring Provider 5/26/2020   Please name the provider who referred you to Medical Weight Management.  If you do not know, please answer: \"I Don't Know\". Mandy Pabon       Weight History 5/26/2020   How concerned are you about your weight? Very Concerned   Would you describe your weight gain as gradual? Yes   I became overweight: As an Adult   The following factors have contributed to my weight gain:  Eating Wrong Types of Food, Eating Too Much, Lack of Exercise, Stress   I have tried the following methods to lose weight: Watching Portions or Calories, Atkins-type Diet (Low Carb/High Protein)   My lowest weight since age 20 was: 165   My highest weight since age 20 was: 271   The most weight I have ever lost was: (lbs) 30   I have the following family history of obesity/being overweight:  Unknown (adopted)   Has anyone in your family had weight loss surgery? No   How has your weight changed over the last year?  Gained   How many pounds? 15     Wake up 7:15am  Leaves for work at 8:15 am and a cup coffee with fench vanilla cream.    Noon-Corn dog, cantaloupe.   Snacks cookie, bagel, kit jose e (not a grazer is usually hungry)  Finished work at 4pm.  Home by 5 pm   Supper: Microwave a Burrito and eat in room in front of TV  Snack: Slice of pizza, bowl of cereal, (anything in fridge)  Bedtime: non consistent (IS a  at his apt for cheaper rent)    Diet Recall Review with Patient 5/26/2020 "   Do you typically eat breakfast? Yes, yogurt, donut at kwik trip.   Do you typically eat lunch? Yes   If you do eat lunch, what types of food do you typically eat?  [place], pizza slice   Do you typically eat supper? No   Do you typically eat snacks? Yes   If you do snack, what types of food do you typically eat? Kit jose e's,   Do you like vegetables?  Yes   Do you drink water? Yes   If you do drink milk, what type? Skim With meals (Drinks 1/2 gallon daily)   How many 8oz glasses of sugar containing drinks such as Eduardo-Aid/sweet tea do you drink in a day? 2 Country time lemonade, occasional sugar free version.   How many cans/bottles of sugar pop/soda/tea/sports drinks do you drink in a day? 2   How many cans/bottles of diet pop/soda/tea or sports drink do you drink in a day? 2 Monster drink   How often do you have a drink of alcohol? Never       Eating Habits 5/26/2020   Generally, my meals include foods like these: bread, pasta, rice, potatoes, corn, crackers, sweet dessert, pop, or juice. Almost Everyday,    Generally, my meals include foods like these: fried meats, brats, burgers, french fries, pizza, cheese, chips, or ice cream. A Few Times a Week   Eat fast food (like McDonalds, BurLATTO, Taco Bell). Never   Eat at a buffet or sit-down restaurant. Never   Eat most of my meals in front of the TV or computer. Almost Everyday   Often skip meals, eat at random times, have no regular eating times. Almost Everyday   Rarely sit down for a meal but snack or graze throughout.  Almost Everyday   Eat extra snacks between meals. Almost Everyday   Eat most of my food at the end of the day. A Few Times a Week   Eat in the middle of the night or wake up at night to eat. A Few Times a Week, Wakes up and is hungry in middle of the night.    Eat extra snacks to prevent or correct low blood sugar. Never   Eat to prevent acid reflux or stomach pain. Never   Worry about not having enough food to eat. Never   Have you been to  the food shelf at least a few times this year? No   I eat when I am depressed. Less Than Weekly   I eat when I am stressed. Less Than Weekly   I eat when I am bored. Once a Week   I eat when I am anxious. Never   I eat when I am happy or as a reward. Never   I feel hungry all the time even if I just have eaten. Never   Feeling full is important to me. Less Than Weekly   I finish all the food on my plate even if I am already full. Less Than Weekly   I can't resist eating delicious food or walk past the good food/smell. Never   I eat/snack without noticing that I am eating. Less Than Weekly   I eat when I am preparing the meal. Less Than Weekly   I eat more than usual when I see others eating. Never   I have trouble not eating sweets, ice cream, cookies, or chips if they are around the house. Never   I think about food all day. Never   What foods, if any, do you crave? Sweets/Candy/Chocolate-Dayday [place] is open and he loves their donuts.  (Donuts used to be his comfort food.  Can scarf down a whole 6 pack of donuts- did last week.       Amount of Food 5/26/2020   I make myself vomit what I have eaten or use laxatives to get rid of food. Never   I eat a large amount of food, like a loaf of bread, a box of cookies, a pint/quart of ice cream, all at once. Never   I eat a large amount of food even when I am not hungry. Never   I eat rapidly. Almost Everyday   I eat alone because I feel embarrassed and do not want others to see how much I have eaten. Never   I eat until I am uncomfortably full. Never   I feel bad, disgusted, or guilty after I overeat. Never   I make myself vomit what I have eaten or use laxatives to get rid of food. Never       Activity/Exercise History 5/26/2020   How much of a typical 12 hour day do you spend sitting? Half the Day   How much of a typical 12 hour day do you spend lying down? Less Than Half the Day   How much of a typical day do you spend walking/standing? Half the Day   How many hours (not  including work) do you spend on the TV/Video Games/Computer/Tablet/Phone? 1 Hour or Less   How many times a week are you active for the purpose of exercise? Once a Week   What keeps you from being more active? Pain   How many total minutes do you spend doing some activity for the purpose of exercising when you exercise? Less Than 15 Minutes   Not currently exercising, he avoids the walking because it is uncomfortable.  Left size of groin hurting and having bilateral sciatica. Pt and PCP do not think this is due to a hernia.  PCP ordered referral with Spine Surgeon.    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  He is currently on gabapentin.      PAST MEDICAL HISTORY:  Past Medical History:   Diagnosis Date     Alcohol abuse     stopped in 2008, Relapse 4/2020.     Anserine bursitis 4/19/2016     Benign essential hypertension      Cancer (H) 1999     Chondritis 4/19/2016     Elevated blood pressure reading without diagnosis of hypertension 4/19/2016     Gastric ulcer, unspecified as acute or chronic, without mention of hemorrhage, perforation, or obstruction ~1997    treated non-surgically     Gastro-oesophageal reflux disease      H/O malignant neoplasm of rectum, rectosigmoid junction, and anus      Hyperlipidemia LDL goal < 130 4/6/2010     Hypogonadism 5/24/2010     Lumbar disc herniation with radiculopathy     L4, recurrent episodic pain     Moderate episode of recurrent major depressive disorder (H) 10/31/2017     Rotator cuff injury, right, initial encounter 4/19/2016     Sleep apnea      Urethral stricture unspecified 2002    Following radiation; see [facility]       Work/Social History Reviewed With Patient 5/26/2020   My employment status is: Full-Time   My job is: Works at group home for [employer]   How much of your job is spent on the computer or phone? Less Than 50%   How many hours do you spend commuting to work daily?  10   What is your marital status? Single   If in a relationship, is your  significant other overweight? N/A   Do you have children? No   If you have children, are they overweight? N/A   Who do you live with?  Housemates   Are they supportive of your health goals? Yes   Who does the food shopping?  Me   He is staying in [place] - rented a room in a house. No alcohol in the house - landlord and roommates know he is sober. Friend from his sober house - moving in with him soon    Social History     Tobacco Use     Smoking status: Never Smoker     Smokeless tobacco: Never Used   Substance Use Topics     Alcohol use: Yes     Alcohol/week: 0.0 standard drinks     Comment: recent relapse     Drug use: Not Currently     Types: Methamphetamines, Marijuana     Comment: last used 5/1/2019 4/13/20 Reviewed Dr. Valente note together with pt.  Back to being sober, doing well  Relapse a few weeks ago - he moved to his own place, isolated, and started drinking for about a week then went into the hospital  He calls friends every day - all recovering alcoholics - in order to check in on sobriety  No alcohol in the house - landlord and roommates know he is sober and are super helpful   He attends AA meetings online via Zoom daily  Talks to his sponsor every day - feels he is getting more attention now via phone contact than before with the in-person meetings    Mental Health History Reviewed With Patient 5/26/2020   Have you ever been physically or sexually abused? No   If yes, do you feel that the abuse is affecting your weight? N/A   If yes, would you like to talk to a counselor about the abuse? N/A   How often in the past 2 weeks have you felt little interest or pleasure in doing things? Not at all   Over the past 2 weeks how often have you felt down, depressed, or hopeless? Not at all       Sleep History Reviewed With Patient 5/26/2020   How many hours do you sleep at night? 6   Do you think that you snore loudly or has anybody ever heard you snore loudly (louder than talking or so loud it can  be heard behind a shut door)? Yes   Has anyone seen or heard you stop breathing during your sleep? Yes   Do you often feel tired, fatigued, or sleepy during the day? Yes   Do you have a TV/Computer in your bedroom? Yes   Uses CPAP nightly. Has PCP referral to sleep clinic to get new supplies.    MEDICATIONS:   Current Outpatient Medications   Medication Sig Dispense Refill     acetaminophen (TYLENOL) 325 MG tablet Take 2 tablets (650 mg) by mouth every 8 hours as needed for mild pain 100 tablet 3     amLODIPine (NORVASC) 5 MG tablet Take 1 tablet (5 mg) by mouth daily 90 tablet 1     furosemide (LASIX) 20 MG tablet Take 1 tablet (20 mg) by mouth daily 30 tablet 0     gabapentin (NEURONTIN) 300 MG capsule Take 3 capsules (900 mg) by mouth 3 times daily 270 capsule 5     hemorroidal (TUCKS) 50 % pad Use up to 3 times a day 40 each 4     hydrocortisone (CORTAID) 1 % external cream Apply topically 2 times daily 30 g 4     multivitamin w/minerals (MULTI-VITAMIN) tablet Take 1 tablet by mouth daily 90 tablet 1     naproxen (NAPROSYN) 500 MG tablet Take 1 tablet (500 mg) by mouth 2 times daily (with meals) 180 tablet 3     omeprazole (PRILOSEC) 20 MG DR capsule Take 1 capsule (20 mg) by mouth daily 90 capsule 0     sertraline (ZOLOFT) 100 MG tablet Take 1 tablet (100 mg) by mouth daily 90 tablet 1     sildenafil (VIAGRA) 100 MG tablet 30 min - 4 hr before intercourse take 0.5-1 tablets PO PRN ED Never use with nitroglycerin, terazosin or doxazosin. 12 tablet 11     traZODone (DESYREL) 50 MG tablet Take 1 tablet (50 mg) by mouth nightly as needed for sleep 30 tablet 1     Component      Latest Ref Rng & Units 4/1/2020 4/1/2020           7:39 AM  3:22 PM   Sodium      133 - 144 mmol/L 139    Potassium      3.4 - 5.3 mmol/L 3.1 (L) 3.5   Chloride      94 - 109 mmol/L 110 (H)    Carbon Dioxide      20 - 32 mmol/L 23    Anion Gap      3 - 14 mmol/L 6    Glucose      70 - 99 mg/dL 119 (H)    Urea Nitrogen      7 - 30 mg/dL 12   "  Creatinine      0.66 - 1.25 mg/dL 0.94    GFR Estimate      >60 mL/min/1.73:m2 89    GFR Estimate If Black      >60 mL/min/1.73:m2 >90    Calcium      8.5 - 10.1 mg/dL 8.1 (L)    Bilirubin Total      0.2 - 1.3 mg/dL 0.5    Albumin      3.4 - 5.0 g/dL 3.2 (L)    Protein Total      6.8 - 8.8 g/dL 6.2 (L)    Alkaline Phosphatase      40 - 150 U/L 69    ALT      0 - 70 U/L 39    AST      0 - 45 U/L 39    WBC      4.0 - 11.0 10e9/L 4.2    RBC Count      4.4 - 5.9 10e12/L 4.28 (L)    Hemoglobin      13.3 - 17.7 g/dL 11.7 (L)    Hematocrit      40.0 - 53.0 % 36.1 (L)    MCV      78 - 100 fl 84    MCH      26.5 - 33.0 pg 27.3    MCHC      31.5 - 36.5 g/dL 32.4    RDW      10.0 - 15.0 % 15.9 (H)    Platelet Count      150 - 450 10e9/L 86 (L)      BP Readings from Last 6 Encounters:   04/03/20 133/84   02/07/20 120/74   01/03/20 136/74   10/17/19 (!) 141/82   10/08/19 (!) 144/80   09/24/19 (!) 182/92     ROS:  HEENT  Hx of glaucoma: none  Vision changes: none  Cardiovascular  Chest Pain with Exertion: none  Palpitations: none  Hx of heart disease: None  HTN:   Pulmonary  Shortness of breath at rest: none  Shortness of breath with exertion: Yes  Snoring: Yes, uses CPAP  Gastrointestinal  Heartburn: none, on PPI  Abdominal pain: none  Constipation: none  Psychiatric  Moods Stable: Great.  Sees a therapist with [facility].- is in AODA counseling. Involved in AA.    Anxiety:none  Depression:none  History of alcohol/drug abuse: yes  Hx of eating disorder: none  Endocrine  Polydipsia:   Thyroid disease:  Neurologic:  Hx of seizures: none  Hx of paresthesias:yes, on Gabapentin  Headaches:none    History of kidney stones: none  History of kidney disease: none      ALLERGIES:   Allergies   Allergen Reactions     No Known Allergies      Seasonal Allergies      Component      Latest Ref Rng & Units 10/10/2019   Hemoglobin A1C      0 - 5.6 % 4.6       PHYSICAL EXAM:  Ht 5' 8\" (1.727 m)   Wt 257 lb (116.6 kg)   BMI " 39.08 kg/m    GENERAL: Healthy, alert and no distress  EYES: Eyes grossly normal to inspection.  No discharge or erythema, or obvious scleral/conjunctival abnormalities.  RESP: No audible wheeze, cough, or visible cyanosis.  No visible retractions or increased work of breathing.    SKIN: Visible skin clear. No significant rash, abnormal pigmentation or lesions.  NEURO: Cranial nerves grossly intact.  Mentation and speech appropriate for age.  PSYCH: Mentation appears normal, affect normal/bright, judgement and insight intact, normal speech and appearance well-groomed.        Sincerely,    Chante Kearns PA-C        Video-Visit Details     Type of service:  Video Visit     Video Start Time: 11:15 PM  Video End Time: 12:15    Originating Location (pt. Location): Work     Distant Location (provider location):  [facility] [employer] [facility]      Platform used for Video Visit: Mayelin

## 2020-05-26 NOTE — PATIENT INSTRUCTIONS
CURRENT GOALS:     1. Eat breakfast daily (yogurt, eggs, oatmeal)   Avoid Quik Trip runs in am   2. Eat sitting down for meals (especially your night meal) This will help you take longer to eat and allow your body to recognize when it is satisfied..  3. Change liquids to all non calorie options except Skim milk at meals.  4. Decrease amt of milk consumed at mealtime (small glass 8 oz of milk at eat meal)     REFERRAL:  I have ordered a referral to the [facility]. Please call to schedule at any of the locations: [phone].     MEDICATION STARTED AT THIS APPOINTMENT    We are starting metformin.  Starting instructions:      Extended release tablet: Take 1 tablet by mouth daily with a meal for 1 week, then increase to 2 tablets daily with a meal or 1 tablet twice daily with meals  .    Call the nurse at [phone] if you have any questions or concerns.     Metformin is a medication that is used to assist with lowering blood sugars in patients that have Pre-Diabetes or Diabetes. It has also been found to help with weight loss.    Metformin helps to lower blood sugars by lowering the amount of sugar (glucose) your liver produces that would otherwise cause your blood sugar to increase. If you are insulin resistant, your body has hard time absorbing that sugar and utilizing it as fuel, so instead get pushed and stored as fat. And as you continue to eat when you insulin resistant, that fuel continually gets stored as fat. Metformin helps your body respond better to insulin (the product that lowers your blood sugar).    Side-effects. Metformin is generally well tolerated. The main side-effects we see are diarrhea, nausea and stomach upset - this tends to subside over time as your body gets used to the medication. Taking this medications with food will lower these side effects.    Low blood sugar (hypoglycemia) is rare to occur with metformin, but can occur if you do not eat enough or are taking other  medications that assist to lower blood sugar. The signs of low blood sugar are:  o Weakness  o Shaky   o Hungry  o Sweating  o Confusion      See below for ways to treat low blood sugar without adding in lots of extra calories.    Treating Low Blood Sugar    If you have symptoms of low blood sugar (sweating, shaking, dizzy, confused) eat 15 grams of carbs and wait 15 minutes:      Glucose Tabs are best for sugars under 70 -  Dex4 or BD Glucose tablets are good, you will need to take 3-4 of these to equal 15 grams.       One small box of raisins    4 oz fruit juice box or   cup fruit juice    1 small apple    1 small banana      cup canned fruit in water      English muffin or a slice of bread with jelly     1 low fat frozen waffle with sugar-free syrup      cup cottage cheese with   cup frozen or fresh blueberries    1 cup skim or low-fat milk      cup whole grain cereal    4-6 crackers such as Triscuits    Please refer to the pharmacy insert for more information on side-effects. Please call the clinic should you have more questions regarding this medication.      .Healthy Habits  Eat Better ? Move More ? Live Well    Eat 3 nutrient-rich meals each day     Don t skip meals--it will cause you to overeat later in the day!     Eating fiber (vegetables/fruits/whole grains) and protein with meals helps you stay full longer     Choose foods with less than 10 grams of sugar and 5 grams of fat per serving to prevent excess calories and weight re-gain   Eat around the same times each day to develop a routine eating schedule    Avoid snacking unless physically hungry.   Planned snacks: 1-2 times per day and no more than 150 calories      Eat protein first    Protein helps with healing, maintaining adequate muscle mass, reducing hunger and optimizing nutritional status    Aim for 60-80 grams of protein per day     Fill up on Fiber    Fiber comes from plants--fruits, veggies, whole grains, nuts/seeds and beans    Fiber is low  in calories, high in phytonutrients and helps you stay full longer    Aim for 25-35 grams per day by eating fiber with meals and snacks    Eat S-[id number]    Take 20-30 minutes to eat each meal by taking small bites, chewing foods to applesauce consistency or 20-30 times before you swallow    Eating foods too fast can delay satiety/fullness signals and increase overeating   ? Slow down your eating by using toddler utensils, putting your fork/spoon down between bites and not watching TV or emailing during meals!     Keep a Journal          Writing down what you eat, how you feel and when you are active helps you identify new changes to work on from week to week          Look for ways to cut 100 calories from your current diet 2-3 times per day    Drink 64 ounces of Non Calorie drinks between meals    Water    Zero calorie Propel  or Vitamin Water      So[employer] Lif[employer]ater  Zero Calories    Crystal Light , Sugar-Free Eduardo-Aid , and other sugar-free lemonade or flavored beauchamp    Keep Caffeine to less than 300mg per day ie: 3-6oz cups coffee     Work up to 45-60 minutes of physical activity most days of the week    Helps with losing weight and prevent regaining those extra pounds!     Do a combo of cardio (walking/water exercises) and strength training (lifting weights/Vinyasa yoga)    Avoid Mindless Eating    Be present when you eat--take note of the smell, taste and quality of your food    Make a list of alternative activities you could do to prevent eating out of boredom/stress  ? Go for a walk, call a friend, chew gum, paint your nails, re-organize the garage, etc

## 2020-05-26 NOTE — PROGRESS NOTES
"Aydin Reilly is a 59 year old male who is being evaluated via a billable video visit.      The patient has been notified of following:     \"This video visit will be conducted via a call between you and your physician/provider. We have found that certain health care needs can be provided without the need for an in-person physical exam.  This service lets us provide the care you need with a video conversation.  If a prescription is necessary we can send it directly to your pharmacy.  If lab work is needed we can place an order for that and you can then stop by our lab to have the test done at a later time.    Video visits are billed at different rates depending on your insurance coverage.  Please reach out to your insurance provider with any questions.    If during the course of the call the physician/provider feels a video visit is not appropriate, you will not be charged for this service.\"    Patient has given verbal consent for Video visit? Yes    How would you like to obtain your AVS? Gregorio    Patient would like the video invitation sent by: Text to cell phone: [phone]    Will anyone else be joining your video visit? No        Niki Reece CMA  5/26/2020      10:02 AM          "

## 2020-05-29 PROBLEM — M54.42 CHRONIC BILATERAL LOW BACK PAIN WITH BILATERAL SCIATICA: Status: ACTIVE | Noted: 2020-01-01

## 2020-05-29 PROBLEM — M54.41 CHRONIC BILATERAL LOW BACK PAIN WITH BILATERAL SCIATICA: Status: ACTIVE | Noted: 2020-01-01

## 2020-05-29 PROBLEM — G89.29 CHRONIC BILATERAL LOW BACK PAIN WITH BILATERAL SCIATICA: Status: ACTIVE | Noted: 2020-01-01

## 2020-05-29 NOTE — PROGRESS NOTES
"Physical Therapy Virtual Initial Visit      The patient has been notified of following:     \"This virtual visit will be conducted between you and your provider. We have found that certain health care needs can be provided without the need for physical presence.  This service lets us provide the care you need with a virtual visit.\"    Due to external, as well as internal [facility] management of the COVID-19 Virus, Aydin Reilly was not seen in our clinic.  As a substitution, we implemented a virtual visit to manage this patient's condition utilizing the [employer] virtual visit platform via the patient s existing code.  The provider, Matias Martínez, reviewed the patient's chart, PTRx prescription, and spoke with the patient to determine the following telemedicine visit is appropriate and effective for the patient's care.    The following type of visit was completed:   Video Visit:  The [employer] platform uses a synchronous HIPAA compliant video stream for this patient encounter.         Subjective:  The history is provided by the patient. No  was used.   Therapist Generated HPI Evaluation  Problem details: Pt describes h/o LBP for a number of years including 1999 L4/5 laminectomy.  Sciatic pain down both legs over the past couple months.  Multiple brief courses of PT and recent spine consult referral does not appear scheduled.  Referred to PT 05/26/2020.         Type of problem:  Lumbar.    This is a chronic condition.  Condition occurred with:  Other reason.  Where condition occurred: for unknown reasons.  Patient reports pain:  Lumbar spine left.  Pain is described as sharp and is intermittent.  Radiates to: L buttock, groin, thigh, calf; R buttock and thigh. Pain is worse in the P.M..  Since onset symptoms are gradually worsening.  Exacerbated by: sitting, lying down, getting in and out of car, bending forward.  Relieved by: standing.  Imaging testing: N/A.    Restrictions due to condition " "include:  Working in normal job without restrictions.  Barriers include:  None as reported by patient.    Patient Health History  Aydin Reilly being seen for back, activity for weight control.          Pain score: not rated numerically.  General health as reported by patient is fair.  Pertinent medical history includes: diabetes and cancer.   Red flags:  None as reported by patient.   Other medical allergies details: see chart.    Other surgery history details: L4/5 decompression, rectal cancer, urethral reconstruction.    Current medications:  Pain medication. Other medications details: diabetic medications.    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   Primary job tasks include:  Computer work and prolonged sitting.                Pt states his goals are to have \"no more of this back pain, loss of at least 30 pounds.\"              Objective:    Standing Alignment:        Lumbar deviations alignment: incisional scar.                         Lumbar/SI Evaluation    Lumbar Myotomes:  Lumbar myotomes: no gross asymmetry noted on self MMT of B LE.                Lumbar Dermtomes:  Lumbar dermatomes: no gross asymmetry on self sensation to light touch throughout B LE.                                                                       Nehemias Lumbar Evaluation    Posture:  Sitting: fair  Standing: fair  Lordosis: Reduced  Lateral Shift: no  Correction of Posture: no effect    Movement Loss:  Flexion (Flex): mod and pain  Extension (EXT): mod  Side Glide R (SG R): mod  Side Glide L (SG L): mod  Test Movements:  FIS: During: peripheralizing  After: peripheralizing  Pretest Movements: back, B legs  Repeat FIS: During: peripheralizing  After: peripheralizing    EIS: During: no effect  After: no effect    Repeat EIS: During: no effect  After: no effect    MIKEY: During: peripheralizing  After: peripheralizing    Repeat MIKEY: During: peripheralizing  After: peripheralizing    EIL: During: centralizing  " After: centralizing    Repeat EIL: During: centralizing  After: centralizing          Conclusion: derangement  Principle of Treatment:      Extension: REIL                                         ROS    PTRx Content from today's visit:  Exercise Name: Prone Press Ups - Reps: 10 - Sessions: 4-5  Pt to emphasize consistency.  Discussed centralization vs peripheralization and pt to not proceed if is peripheralizing.    Assessment/Plan:     Patient is a 59 year old male with lumbar complaints.    Patient has the following significant findings with corresponding treatment plan.                Diagnosis 1:  Chronic LBP with leg symptoms  Pain -  hot/cold therapy and directional preference exercise  Decreased ROM/flexibility - manual therapy and therapeutic exercise  Decreased function - therapeutic activities  Impaired posture - neuro re-education    Therapy Evaluation Codes:   1) History comprised of:   Personal factors that impact the plan of care:      Past/current experiences and Time since onset of symptoms.    Comorbidity factors that impact the plan of care are:      Cancer and Diabetes.     Medications impacting care: Pain.  2) Examination of Body Systems comprised of:   Body structures and functions that impact the plan of care:      Lumbar spine.   Activity limitations that impact the plan of care are:      Bending and Sitting.  3) Clinical presentation characteristics are:   Evolving/Changing.  4) Decision-Making    Moderate complexity using standardized patient assessment instrument and/or measureable assessment of functional outcome.  Cumulative Therapy Evaluation is: Moderate complexity.    Previous and current functional limitations:  (See Goal Flow Sheet for this information)    Short term and Long term goals: (See Goal Flow Sheet for this information)     Communication ability:  Patient appears to be able to clearly communicate and understand verbal and written communication and follow directions  correctly.  Treatment Explanation - The following has been discussed with the patient:   RX ordered/plan of care  Anticipated outcomes  Possible risks and side effects  This patient would benefit from PT intervention to resume normal activities.   Rehab potential is fair to good.    Frequency:  1 X week, once daily  Duration:  for 6 weeks  Discharge Plan:  Achieve all LTG.  Independent in home treatment program.  Reach maximal therapeutic benefit.    Please refer to the daily flowsheet for treatment today, total treatment time and time spent performing 1:1 timed codes.       Virtual visit contact time    Time of service began: 10:00 AM  Time of service ended: 10:35 AM  Total Time for set up, visit, and documentation: 40 minutes    Payor: [employer] / Plan: [employer] MA / Product Type: HMO /     Procedure Code/s   Therapeutic Exercise (95211): 10 minutes  Evaluation: 25 minutes    I have reviewed the note as documented above.  This accurately captures the substance of my conversation with the patient.  Provider location: [facility] ([facility]State)  Patient location: home    ___________________________________________________

## 2020-06-19 NOTE — PROGRESS NOTES
"Aydin Reilly is a 59 year old male who is being evaluated via a billable video visit.      The patient has been notified of following:     \"This video visit will be conducted via a call between you and your physician/provider. We have found that certain health care needs can be provided without the need for an in-person physical exam.  This service lets us provide the care you need with a video conversation.  If a prescription is necessary we can send it directly to your pharmacy.  If lab work is needed we can place an order for that and you can then stop by our lab to have the test done at a later time.    Video visits are billed at different rates depending on your insurance coverage.  Please reach out to your insurance provider with any questions.    If during the course of the call the physician/provider feels a video visit is not appropriate, you will not be charged for this service.\"    Patient has given verbal consent for Video visit? Yes    Will anyone else be joining your video visit? No      Subjective     Aydin Reilly is a 59 year old male who presents today via video visit for the following health issues:    HPI  Pain in thumb      Duration: 2 weeks    Description (location/character/radiation): both thumbs    Intensity:  moderate    Accompanying signs and symptoms: aching stiffness,     History (similar episodes/previous evaluation): None    Precipitating or alleviating factors: he drives using his thumbs    Therapies tried and outcome: None     About on week ago sore  Worse with holding phone  Drives 40 min each day   No clicking   Sore to move it  Worse at the end of day  Worse after driving  No swelling   No N/T  No injury   Right slightly worse RHD  He is taking 500 mg acetaminophen  Switching with ibuprofen  gabapentin          Video Start Time: 10:05    neuropathy      Duration: ongoing    Description (location/character/radiation): both feet    Intensity:  moderate    Accompanying signs " and symptoms: feels burning    History (similar episodes/previous evaluation): yes    Precipitating or alleviating factors: None    Therapies tried and outcome: gabapentin helps     Much worse - even when taking the gabapentin consistently  900 mg TID       Patient Active Problem List   Diagnosis     H/O malignant neoplasm of rectum, rectosigmoid junction, and anus     Cellulitis of scrotum     Lumbago     Lumbar Radiculopathy, SEE FYI     Back Pain w/ Radiation, SEE FYI     Chronic abdominal pain     Hyperlipidemia with target LDL less than 130     Hypogonadism     Scrotal pain     Erectile dysfunction     Drug abuse, opioid type (H)     GIB (gastrointestinal bleeding)     MAGALY (obstructive sleep apnea)     Generalized anxiety disorder     Urethral stricture     Alcohol abuse     History of urethral stricture     Chronic pain     Edema     Anemia of chronic disease     Other mononeuropathy     Hx SBO     Health Care Home     Benign essential hypertension     Rotator cuff injury, right, initial encounter     Chondritis     Anserine bursitis     Substance abuse (H)     Chronic pain of right knee     Intertriginous candidiasis     Gastroesophageal reflux disease, esophagitis presence not specified     Morbid obesity (H)     Moderate episode of recurrent major depressive disorder (H)     Cellulitis of scrotum     Chest pain     Suicide ideation     Alcohol withdrawal (H)     Chronic bilateral low back pain with bilateral sciatica     Past Surgical History:   Procedure Laterality Date     ABDOMEN SURGERY       BACK SURGERY       BIOPSY       BIOPSY       C NONSPECIFIC PROCEDURE  1991    L4-L5 laminectomy; disk herniation     C NONSPECIFIC PROCEDURE  1999    squamous cell CA - anus, 27 xrt/3 rounds, 5-FU/cisplatin     C NONSPECIFIC PROCEDURE      umbilical hernia     C NONSPECIFIC PROCEDURE  2002    cystscopy for urethral stricture from radiation therapy     COLONOSCOPY       COLONOSCOPY  4/18/2014    Procedure:  Colonoscopy   polyp bx;  Surgeon: Josef Mckeon MD;  Location: [facility]     CYSTOSCOPY, CYSTOGRAM, COMBINED N/A 2/26/2015    Procedure: COMBINED CYSTOSCOPY, CYSTOGRAM;  Surgeon: Darell Barrera MD;  Location: [facility]     CYSTOSCOPY, INTERNAL URETHROTOMY, COMBINED N/A 12/19/2014    Procedure: COMBINED CYSTOSCOPY, INTERNAL URETHROTOMY;  Surgeon: Buzz Ren MD;  Location:  OR     CYSTOSTOMY, INSERT TUBE SUPRAPUBIC, COMBINED  12/19/2014    Procedure: COMBINED CYSTOSTOMY, INSERT TUBE SUPRAPUBIC;  Surgeon: Buzz Ren MD;  Location:  OR     CYSTOSTOMY, INSERT TUBE SUPRAPUBIC, COMBINED N/A 12/29/2014    Procedure: COMBINED CYSTOSTOMY, INSERT TUBE SUPRAPUBIC;  Surgeon: Buzz Ren MD;  Location:  OR     ENT SURGERY       ESOPHAGOSCOPY, GASTROSCOPY, DUODENOSCOPY (EGD), COMBINED  10/2/2012    Procedure: COMBINED ESOPHAGOSCOPY, GASTROSCOPY, DUODENOSCOPY (EGD);  COMBINED ESOPHAGOSCOPY, GASTROSCOPY, DUODENOSCOPY (EGD) ;  Surgeon: Raj Beltre MD;  Location: [facility]     HERNIA REPAIR       LAPAROSCOPIC LYSIS ADHESIONS N/A 12/4/2015    Procedure: LAPAROSCOPIC LYSIS ADHESIONS;  Surgeon: Herbie Sanchez MD;  Location: [facility]     LAPAROTOMY EXPLORATORY N/A 12/4/2015    Procedure: LAPAROTOMY EXPLORATORY;  Surgeon: Herbie Sanchez MD;  Location: [facility]     MYRINGOTOMY      in childhood     TONSILLECTOMY and adenoidectomy      in childhood     URETHROPLASTY WITH BUCCAL GRAFT N/A 3/27/2015    Procedure: URETHROPLASTY WITH BUCCAL GRAFT;  Surgeon: Darell Barrera MD;  Location: [facility]       Social History     Tobacco Use     Smoking status: Never Smoker     Smokeless tobacco: Never Used   Substance Use Topics     Alcohol use: Yes     Alcohol/week: 0.0 standard drinks     Comment: recent relapse     Family History   Adopted: Yes   Problem Relation Age of Onset     Unknown/Adopted Mother      Suicide Father      Schizophrenia No family hx of      Bipolar Disorder No  family hx of      Dementia No family hx of      Nora Disease No family hx of      Parkinsonism No family hx of      Autism Spectrum Disorder No family hx of      Intellectual Disability (Mental Retardation) No family hx of          Current Outpatient Medications   Medication Sig Dispense Refill     acetaminophen (TYLENOL) 325 MG tablet Take 2 tablets (650 mg) by mouth every 8 hours as needed for mild pain 100 tablet 3     amLODIPine (NORVASC) 5 MG tablet Take 1 tablet (5 mg) by mouth daily 90 tablet 1     gabapentin (NEURONTIN) 300 MG capsule Take 3 capsules (900 mg) by mouth 3 times daily 270 capsule 5     multivitamin w/minerals (MULTI-VITAMIN) tablet Take 1 tablet by mouth daily 90 tablet 1     naproxen (NAPROSYN) 500 MG tablet Take 1 tablet (500 mg) by mouth 2 times daily (with meals) 180 tablet 3     naproxen sodium (ANAPROX) 220 MG tablet Take 2 tablets (440 mg) by mouth 2 times daily (with meals) 90 tablet 3     omeprazole (PRILOSEC) 20 MG DR capsule Take 1 capsule (20 mg) by mouth daily 90 capsule 0     pregabalin (LYRICA) 75 MG capsule Take 1 capsule (75 mg) by mouth 3 times daily for 7 days, THEN 2 capsules (150 mg) 3 times daily for 21 days. 150 capsule 0     sertraline (ZOLOFT) 100 MG tablet Take 1 tablet (100 mg) by mouth daily 90 tablet 1     sildenafil (VIAGRA) 100 MG tablet 30 min - 4 hr before intercourse take 0.5-1 tablets PO PRN ED Never use with nitroglycerin, terazosin or doxazosin. 12 tablet 11     traZODone (DESYREL) 50 MG tablet Take 1 tablet (50 mg) by mouth nightly as needed for sleep 30 tablet 1     furosemide (LASIX) 20 MG tablet Take 1 tablet (20 mg) by mouth daily (Patient not taking: Reported on 6/19/2020) 30 tablet 0     hemorroidal (TUCKS) 50 % pad Use up to 3 times a day (Patient not taking: Reported on 6/19/2020) 40 each 4     hydrocortisone (CORTAID) 1 % external cream Apply topically 2 times daily (Patient not taking: Reported on 6/19/2020) 30 g 4     metFORMIN  "(GLUCOPHAGE-XR) 500 MG 24 hr tablet 1 tablet by mouth daily with meal for 1 week, then 2 tablets daily with meal. (Patient not taking: Reported on 6/19/2020) 60 tablet 1     Allergies   Allergen Reactions     No Known Allergies      Seasonal Allergies        Reviewed and updated as needed this visit by Provider         Review of Systems   Constitutional: Negative.    HENT: Negative.    Eyes: Negative.    Respiratory: Negative.    Cardiovascular: Negative.    Gastrointestinal: Negative.    Genitourinary: Negative.    Musculoskeletal:        As in HPI   Skin: Negative.    Neurological:        As in HPI   Psychiatric/Behavioral: Negative.             Objective    There were no vitals taken for this visit.  Estimated body mass index is 39.08 kg/m  as calculated from the following:    Height as of 5/26/20: 1.727 m (5' 8\").    Weight as of 5/26/20: 116.6 kg (257 lb).  Physical Exam     GENERAL: Healthy, alert and no distress  EYES: Eyes grossly normal to inspection.  No discharge or erythema, or obvious scleral/conjunctival abnormalities.  RESP: No audible wheeze, cough, or visible cyanosis.  No visible retractions or increased work of breathing.    SKIN: Visible skin clear. No significant rash, abnormal pigmentation or lesions.  NEURO: Cranial nerves grossly intact.  Mentation and speech appropriate for age.  PSYCH: Mentation appears normal, affect normal/bright, judgement and insight intact, normal speech and appearance well-groomed.      Diagnostic Test Results:  None        Assessment & Plan   Problem List Items Addressed This Visit     None      Visit Diagnoses     Bilateral thumb pain    -  Primary    Relevant Medications    naproxen sodium (ANAPROX) 220 MG tablet    Idiopathic peripheral neuropathy        Relevant Medications    pregabalin (LYRICA) 75 MG capsule           For the thumb pain - this sounds most consistent with arthritis or overuse.  Treatment is NSAIDs, ice, rest.      Transition from gabapentin to " Lyrica - decrease gabapentin dose over a week, then stop ans start Lyrica        Return in about 2 weeks (around 7/3/2020) for Medication Recheck - Lyrica.    Mandy Pabon PA-C  [facility]      Video-Visit Details    Type of service:  Video Visit    Video End Time:10:28    Originating Location (pt. Location): Home    Distant Location (provider location):  [facility]     Platform used for Video Visit: Doximity    Return in about 2 weeks (around 7/3/2020) for Medication Recheck - Lyrica.       Georges Pabon PA-C

## 2020-06-21 NOTE — PROGRESS NOTES
Attempted to connect via [employer] for scheduled new MWM RD visit. Waited 10 minutes in virtual platform for patient who did not arrive/connect. Pt may contact call center to reschedule.     Liz Syed RD, LD  Clinical Dietitian

## 2020-06-29 NOTE — TELEPHONE ENCOUNTER
Please advice if virtual or e-visit visit needed prior to Rx's approval.    1) Patient is requesting gabapentin refill. Should he continue taking gabapentin 300 mg capsule?    2) He is also requesting abx for flare up of cellulitis. Not discussed at 06/19/2020 appt.     Controlled Substance Refill Request for Gabapentin  Problem List Complete:  No     gabapentin (NEURONTIN) 300 MG capsule  270 capsule  5  3/3/2020  8/30/2020  No         Last virtual visit 06/19/2020    Pt filled Rx 06/09/20 #270 tablets   checked in past 3 months?  Yes 06/29/2020

## 2020-07-01 NOTE — LETTER
July 1, 2020      Aydin Reilly  [address] [address]        To Whom It May Concern:    Aydin Reilly was evaluated on 6/29/2020. Please excuse him  until 7/1/2020 due to illness. Ok to return to work without restrictions on 7/2/2020.        Sincerely,        Mandy Pabon PA-C

## 2020-08-03 PROBLEM — G89.29 CHRONIC BILATERAL LOW BACK PAIN WITH BILATERAL SCIATICA: Status: RESOLVED | Noted: 2020-01-01 | Resolved: 2020-01-01

## 2020-08-03 PROBLEM — M54.41 CHRONIC BILATERAL LOW BACK PAIN WITH BILATERAL SCIATICA: Status: RESOLVED | Noted: 2020-01-01 | Resolved: 2020-01-01

## 2020-08-03 PROBLEM — M54.42 CHRONIC BILATERAL LOW BACK PAIN WITH BILATERAL SCIATICA: Status: RESOLVED | Noted: 2020-01-01 | Resolved: 2020-01-01

## 2020-09-21 ENCOUNTER — DOCUMENTATION ONLY (OUTPATIENT)
Dept: FAMILY MEDICINE | Facility: CLINIC | Age: 60
End: 2020-09-21

## 2020-09-21 NOTE — PROGRESS NOTES
The following message was received from P[place] [email or web address]    Dr. Pabon,    The death of your patient was reported to the [facility] Medical Examiner s Office. The name of your patient and a brief summary of the circumstances surrounding this death are provided below. Please respond to this email with the date you or your care team last evaluated the patient, their current problem list and a list of current medications. If a fracture, recent or remote trauma is documented in their history, please comment on the role of that injury in the death and whether the injury should be included on the death certificate.   If the date last seen is within 120 days and any trauma is non-contributory, the death record will be directed to you to complete. If it is more than 120 days since last seen, the Medical Examiner will evaluate who will complete the death record, which will show up in your queue if assigned to you.    To discuss this patient with an investigator, call [phone]. Phones are answered 24 hours.    : Aydin THOMPSON Ramsey  : 1960  DOD: 2020  SUMMARY: Patient was found dead at his residence with drug paraphernalia nearby. HCME will be doing an autopsy to determine the cause and manner of death.      We need to complete the following:  - Epic paperwork to report patient as .     Georges Pabon PA-C

## 2020-10-14 NOTE — CONSULTS
"CLINICAL NUTRITION SERVICES  -  ASSESSMENT NOTE    Recommendations Ordered by Registered Dietitian (RD):     Oral nutrition supplements prn    Phos add on   Malnutrition: Unable to determine due to incomplete nutrition focused physical assessment per direction of department guidelines in the setting of COVID19      REASON FOR ASSESSMENT  Aydin Reilly is a 59 year old male seen by Registered Dietitian for positive nutrition risk screen - unintentional weight loss of 10# or more in past 2 months    History of alcohol abuse, hypertension, gastric ulcer, GERD, back pain who presents with alcohol withdrawal as well as nausea and vomiting    NUTRITION HISTORY    Information obtained from chart review (unable to reach patient by phone)    Food allergies/intolerances: NKFA     Resumed drinking ~2 weeks ago, up to 1 L of ETOH per day    Suspect nutrient dense foods were displaced by consumption of ETOH    CURRENT NUTRITION ORDERS    Diet: Regular    Supplement: none  Current Intake/Tolerance:    Per flow sheet review, 100% intake for documented meals     Factors affecting nutrition intake include: ETOH withdrawal    NUTRITION FOCUSED PHYSICAL ASSESSMENT FOR DIAGNOSING MALNUTRITION + PHYSICAL FINDINGS  Completed and Observed:  No, Deferred per direction of department guidelines in the setting of COVID19   Obtained from Chart/Interdisciplinary Team    Kev nutrition score: 1; total score: 17    Last BM: 3/31    Skin: no pressure injury noted    Generalized weakness    Edema: none    CIWA trending    ANTHROPOMETRICS  Height: 5'8\"  Weight: 119 kg   Body mass index is 40.17 kg/m .  Weight Status:  Obesity Grade III BMI >40  Weight History:  5% weight loss in 1 month, although relatively stable from ~6 months, as noted below  Wt Readings from Last 10 Encounters:   03/31/20 119.8 kg (264 lb 3.2 oz)   02/07/20 126.6 kg (279 lb)   01/03/20 122.5 kg (270 lb)   10/17/19 109.3 kg (241 lb)   10/05/19 107.2 kg (236 lb 4.8 oz) "   09/09/19 114.8 kg (253 lb)   08/20/19 106.6 kg (235 lb)   07/15/19 105.9 kg (233 lb 8 oz)   07/09/19 105.9 kg (233 lb 8 oz)   06/19/19 103.4 kg (228 lb)       ASSESSED NUTRITION NEEDS (PER APPROVED PRACTICE GUIDELINES, Dosing weight: 120 kg):  Estimated Energy Needs: 8811-4905 kcals (20-25 Kcal/Kg)  Justification: maintenance and obese   Estimated Protein Needs: 120+ grams protein (1+ g pro/Kg)  Justification: preservation of lean body mass  Estimated Fluid Needs: >1 mL/Kcal  Justification: maintenance    LABS  Labs reviewed    Electrolytes  Potassium (mmol/L)   Date Value   03/31/2020 3.8   10/09/2019 3.4   10/05/2019 3.7    Blood Glucose  Glucose (mg/dL)   Date Value   03/31/2020 102 (H)   10/09/2019 160 (H)   10/05/2019 78   09/24/2019 81   07/15/2019 82     Hemoglobin A1C (%)   Date Value   10/10/2019 4.6    Inflammatory Markers  CRP Inflammation (mg/L)   Date Value   08/02/2018 157.0 (H)   11/14/2016 130.0 (H)   08/24/2015 168.0 (H)     WBC (10e9/L)   Date Value   03/31/2020 6.0   10/10/2019 5.7   10/09/2019 5.8     Albumin (g/dL)   Date Value   03/31/2020 3.9   10/09/2019 3.5   10/05/2019 3.9      Magnesium (mg/dL)   Date Value   11/16/2016 2.1   11/15/2016 1.7   08/28/2015 1.8     Sodium (mmol/L)   Date Value   03/31/2020 134   10/09/2019 140   10/05/2019 139    Renal  Urea Nitrogen (mg/dL)   Date Value   03/31/2020 11   10/09/2019 19   10/05/2019 18     Creatinine (mg/dL)   Date Value   03/31/2020 0.88   10/09/2019 0.84   10/05/2019 0.95     Additional  Triglycerides (mg/dL)   Date Value   08/01/2016 279 (H)   07/22/2011 237 (H)   03/28/2011 274 (H)     Ketones Urine (mg/dL)   Date Value   09/09/2019 Negative          MEDICATIONS    Medications reviewed    aspirin  81 mg Oral Daily     carvedilol  6.25 mg Oral BID w/meals     folic acid  1 mg Oral Daily     multivitamin w/minerals  1 tablet Oral Daily     pantoprazole (PROTONIX) IV  40 mg Intravenous Daily with breakfast     sodium chloride (PF)  3 mL  Intracatheter Q8H     vitamin B1  100 mg Oral Daily          dextrose 5% and 0.9% NaCl       sodium chloride 100 mL/hr at 04/01/20 0535      Electrolyte replacement protocols     NUTRITION DIAGNOSIS:  Predicted inadequate nutrient intake (protein energy) related to ETOH abuse as evidenced by hospitalization for withdrawal, recent resumption of ETOH consumption previously sober    INTERVENTIONS  Recommendations / Nutrition Prescription  Continue regular diet as ordered + oral nutrition supplements prn + micronutrients as ordered  Phos check    Implementation  Nutrition education: deferred  Biochemical data: P04 add on   Medical food supplement: Boost prn  Collaboration and Referral of care: Discussed patient during interdisciplinary care rounds this morning    Goals  Patient to consume >/= 75% of meals TID       MONITORING AND EVALUATION:  Progress towards goals will be monitored and evaluated per protocol and Practice Guidelines      Leslie Osei, MS, RDN, LD, CNSC  Pager - 3rd floor/ICU: [phone]  Pager - All other floors: [phone]  Pager - Weekend/holiday: [phone]  Office: [phone]     no

## 2021-05-03 NOTE — PLAN OF CARE
Telemedicine Visit: The client's condition can be safely assessed and treated via synchronous audio and visual telemedicine encounter.    Reason for Telemedicine Visit: COVID-19 restrictions.  Originating Site (Client Location): Client's home  Distant Site (Provider Location): Provider Remote Setting  Consent: The client/guardian has verbally consented to: the potential risks and benefits of telemedicine (video visit) versus in person care; bill my insurance or make self-payment for services provided; and responsibility for payment of non-covered services.   Mode of Communication: Video Conference via Zoom.     As the provider I attest to compliance with applicable laws and regulations related to telemedicine.  Video start time: 8:00pm  Video end time: 8:53pm      [facility] -  Case Progress Note     Date of Service: 5/03/21  Client Name: Candida Francois (pronouns: she/her/her)  YOB: 1977  Age: 43 year old  MRN: [id number]  Gender/Gender Identity: Female  Treating Provider: Kenia Webb, PhD, Postdoctoral Fellow  Type of Session: Individual  Type of Visit: Telemedicine  Present in Session: Client only  Number of Minutes: 53 min     Current Symptoms/Status:     Gender Dysphoria: Gender and body dysphoria associated with fear and anxiety. Incongruence and distress between her biological sex and her experienced/expressed gender. Recurrent insecurities regarding her gender needs. Body and facial hair have been trigger her gender dysphoria recurrent since she has been presenting herself as woman at work. Her gender dysphoria regarding her voice has been a recurrent issue. Minority stress has been reported. In the process of healing the connection to her body. Connecting her identity with today's life has been a process.      Progress Toward Treatment Goals:   Session # 23 - Client continues to show interest and commitment towards receiving services at [facility].  Treatment plan established  VSS afebrile. A&O x4. SBA. CIWA range from 1-8, 1mg Ativan given with relief. Tele: SR. NS restarted at 100ml/hr in PIV. Trops are trending down, denies CP. Will cont POC  Sunni Rivera RN on 4/1/2020 at 7:05 AM      in 7/24/2020.  Continued working on her assignments.  Consistent progress in affirming her gender at work.  Client is committed to taking care of herself and she has maintained a solid transition plan.  Client legally changed the name and gender marker (12/03/2020).  Started hair removal and voice therapy sessions.    Intervention: Modality and Description:   Individual, interpersonal, CBT. The focus of today's session was on supporting her.     Response to Intervention:   Client processed her physical difficulties in adapting to work on the night shift. Protection factors were revisited to support her throughout this work situation until the middle of July. Dysphoria feelings were explored in new coworker relationships, feeling better with her new team and away from customers. 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    Assignment:  - Using hydrotherapy for improving body awareness, focus, relaxation and sensory (progressing assignment).     Interactive Complexity:  Communication difficulties present during current the psychiatric procedure include:  1. None.     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      Kenia Webb, PhD, Postdoctoral Fellow

## 2025-06-21 NOTE — NURSING NOTE
Problem: Chronic Conditions and Co-morbidities  Goal: Patient's chronic conditions and co-morbidity symptoms are monitored and maintained or improved  Outcome: Progressing  Flowsheets (Taken 6/21/2025 0110)  Care Plan - Patient's Chronic Conditions and Co-Morbidity Symptoms are Monitored and Maintained or Improved:   Monitor and assess patient's chronic conditions and comorbid symptoms for stability, deterioration, or improvement   Collaborate with multidisciplinary team to address chronic and comorbid conditions and prevent exacerbation or deterioration   Update acute care plan with appropriate goals if chronic or comorbid symptoms are exacerbated and prevent overall improvement and discharge     Problem: Discharge Planning  Goal: Discharge to home or other facility with appropriate resources  Outcome: Progressing  Flowsheets (Taken 6/21/2025 0110)  Discharge to home or other facility with appropriate resources:   Identify barriers to discharge with patient and caregiver   Arrange for needed discharge resources and transportation as appropriate   Identify discharge learning needs (meds, wound care, etc)     Problem: Skin/Tissue Integrity  Goal: Skin integrity remains intact  Description: 1.  Monitor for areas of redness and/or skin breakdown  2.  Assess vascular access sites hourly  3.  Every 4-6 hours minimum:  Change oxygen saturation probe site  4.  Every 4-6 hours:  If on nasal continuous positive airway pressure, respiratory therapy assess nares and determine need for appliance change or resting period  Outcome: Progressing  Flowsheets (Taken 6/21/2025 0110)  Skin Integrity Remains Intact: Monitor for areas of redness and/or skin breakdown     Problem: Safety - Adult  Goal: Free from fall injury  Outcome: Progressing  Flowsheets (Taken 6/21/2025 0110)  Free From Fall Injury: Instruct family/caregiver on patient safety     Problem: ABCDS Injury Assessment  Goal: Absence of physical injury  Outcome:  Pt last seen in PT 05/29.  He has since cancelled as in chart with MyChart exchange.  No further PT is currently scheduled.  Consider note dated 05/29 to serve as final summary.